# Patient Record
Sex: FEMALE | Race: WHITE | NOT HISPANIC OR LATINO | Employment: FULL TIME | URBAN - METROPOLITAN AREA
[De-identification: names, ages, dates, MRNs, and addresses within clinical notes are randomized per-mention and may not be internally consistent; named-entity substitution may affect disease eponyms.]

---

## 2017-01-19 ENCOUNTER — COMMUNICATION - HEALTHEAST (OUTPATIENT)
Dept: INTERNAL MEDICINE | Facility: CLINIC | Age: 57
End: 2017-01-19

## 2017-01-26 ENCOUNTER — RECORDS - HEALTHEAST (OUTPATIENT)
Dept: ADMINISTRATIVE | Facility: OTHER | Age: 57
End: 2017-01-26

## 2017-02-02 ENCOUNTER — AMBULATORY - HEALTHEAST (OUTPATIENT)
Dept: INTERNAL MEDICINE | Facility: CLINIC | Age: 57
End: 2017-02-02

## 2017-02-02 ENCOUNTER — COMMUNICATION - HEALTHEAST (OUTPATIENT)
Dept: INTERNAL MEDICINE | Facility: CLINIC | Age: 57
End: 2017-02-02

## 2017-02-07 ENCOUNTER — COMMUNICATION - HEALTHEAST (OUTPATIENT)
Dept: INTERNAL MEDICINE | Facility: CLINIC | Age: 57
End: 2017-02-07

## 2017-02-24 ENCOUNTER — OFFICE VISIT - HEALTHEAST (OUTPATIENT)
Dept: INTERNAL MEDICINE | Facility: CLINIC | Age: 57
End: 2017-02-24

## 2017-02-24 ENCOUNTER — AMBULATORY - HEALTHEAST (OUTPATIENT)
Dept: INTERNAL MEDICINE | Facility: CLINIC | Age: 57
End: 2017-02-24

## 2017-02-24 DIAGNOSIS — K21.9 GERD (GASTROESOPHAGEAL REFLUX DISEASE): ICD-10-CM

## 2017-02-24 DIAGNOSIS — Z13.9 SCREENING: ICD-10-CM

## 2017-02-24 DIAGNOSIS — R06.09 DYSPNEA ON EXERTION: ICD-10-CM

## 2017-02-24 DIAGNOSIS — D50.9 IRON DEFICIENCY ANEMIA: ICD-10-CM

## 2017-02-24 DIAGNOSIS — R53.83 FATIGUE: ICD-10-CM

## 2017-02-24 ASSESSMENT — MIFFLIN-ST. JEOR: SCORE: 1115.66

## 2017-02-28 ENCOUNTER — HOSPITAL ENCOUNTER (OUTPATIENT)
Dept: RADIOLOGY | Facility: HOSPITAL | Age: 57
Discharge: HOME | End: 2017-02-28
Attending: INTERNAL MEDICINE

## 2017-02-28 ENCOUNTER — RECORDS - HEALTHEAST (OUTPATIENT)
Dept: MAMMOGRAPHY | Facility: CLINIC | Age: 57
End: 2017-02-28

## 2017-02-28 DIAGNOSIS — R06.09 OTHER FORMS OF DYSPNEA: ICD-10-CM

## 2017-02-28 DIAGNOSIS — Z13.9 ENCOUNTER FOR SCREENING, UNSPECIFIED: ICD-10-CM

## 2017-02-28 DIAGNOSIS — R06.09 DYSPNEA ON EXERTION: ICD-10-CM

## 2017-03-01 ENCOUNTER — AMBULATORY - HEALTHEAST (OUTPATIENT)
Dept: INTERNAL MEDICINE | Facility: CLINIC | Age: 57
End: 2017-03-01

## 2017-03-01 DIAGNOSIS — R06.09 DYSPNEA ON EXERTION: ICD-10-CM

## 2017-03-03 ENCOUNTER — COMMUNICATION - HEALTHEAST (OUTPATIENT)
Dept: INTERNAL MEDICINE | Facility: CLINIC | Age: 57
End: 2017-03-03

## 2017-03-06 ENCOUNTER — COMMUNICATION - HEALTHEAST (OUTPATIENT)
Dept: INTERNAL MEDICINE | Facility: CLINIC | Age: 57
End: 2017-03-06

## 2017-03-06 ENCOUNTER — AMBULATORY - HEALTHEAST (OUTPATIENT)
Dept: INTERNAL MEDICINE | Facility: CLINIC | Age: 57
End: 2017-03-06

## 2017-03-06 DIAGNOSIS — R06.09 DYSPNEA ON EXERTION: ICD-10-CM

## 2017-03-14 ENCOUNTER — HOSPITAL ENCOUNTER (OUTPATIENT)
Dept: CARDIOLOGY | Facility: CLINIC | Age: 57
Discharge: HOME | End: 2017-03-14
Attending: INTERNAL MEDICINE

## 2017-03-14 DIAGNOSIS — R06.09 OTHER FORMS OF DYSPNEA: ICD-10-CM

## 2017-03-14 DIAGNOSIS — R06.09 DYSPNEA ON EXERTION: ICD-10-CM

## 2017-03-15 LAB
CV STRESS CURRENT BP HE: NORMAL
CV STRESS CURRENT HR HE: 109
CV STRESS CURRENT HR HE: 114
CV STRESS CURRENT HR HE: 114
CV STRESS CURRENT HR HE: 116
CV STRESS CURRENT HR HE: 122
CV STRESS CURRENT HR HE: 123
CV STRESS CURRENT HR HE: 124
CV STRESS CURRENT HR HE: 138
CV STRESS CURRENT HR HE: 138
CV STRESS CURRENT HR HE: 139
CV STRESS CURRENT HR HE: 142
CV STRESS CURRENT HR HE: 149
CV STRESS CURRENT HR HE: 153
CV STRESS CURRENT HR HE: 160
CV STRESS CURRENT HR HE: 160
CV STRESS CURRENT HR HE: 163
CV STRESS CURRENT HR HE: 167
CV STRESS CURRENT HR HE: 171
CV STRESS CURRENT HR HE: 172
CV STRESS CURRENT HR HE: 173
CV STRESS DEVIATION TIME HE: NORMAL
CV STRESS ECHO PERCENT HR HE: NORMAL
CV STRESS EXERCISE STAGE HE: NORMAL
CV STRESS EXERCISE STAGE REACHED HE: NORMAL
CV STRESS FINAL RESTING BP HE: NORMAL
CV STRESS FINAL RESTING HR HE: 109
CV STRESS MAX HR HE: 173
CV STRESS MAX TREADMILL GRADE HE: 12
CV STRESS MAX TREADMILL SPEED HE: 2.5
CV STRESS PEAK DIA BP HE: NORMAL
CV STRESS PEAK SYS BP HE: NORMAL
CV STRESS PHASE HE: NORMAL
CV STRESS PROTOCOL HE: NORMAL
CV STRESS RESTING PT POSITION HE: NORMAL
CV STRESS ST DEVIATION AMOUNT HE: NORMAL
CV STRESS ST DEVIATION ELEVATION HE: NORMAL
CV STRESS ST EVELATION AMOUNT HE: NORMAL
CV STRESS TEST TYPE HE: NORMAL
CV STRESS TOTAL STAGE TIME MIN 1 HE: NORMAL
STRESS ECHO BASELINE BP: NORMAL
STRESS ECHO BASELINE HR: 108
STRESS ECHO CALCULATED PERCENT HR: 104 %
STRESS ECHO LAST STRESS BP: NORMAL
STRESS ECHO LAST STRESS HR: 173
STRESS ECHO POST ESTIMATED WORKLOAD: 6.4
STRESS ECHO POST EXERCISE DUR MIN: 4
STRESS ECHO POST EXERCISE DUR SEC: 30
STRESS ECHO TARGET HR: 172.64

## 2017-03-20 ENCOUNTER — COMMUNICATION - HEALTHEAST (OUTPATIENT)
Dept: INTERNAL MEDICINE | Facility: CLINIC | Age: 57
End: 2017-03-20

## 2017-03-24 ENCOUNTER — COMMUNICATION - HEALTHEAST (OUTPATIENT)
Dept: INTERNAL MEDICINE | Facility: CLINIC | Age: 57
End: 2017-03-24

## 2017-06-11 ENCOUNTER — COMMUNICATION - HEALTHEAST (OUTPATIENT)
Dept: INTERNAL MEDICINE | Facility: CLINIC | Age: 57
End: 2017-06-11

## 2017-08-12 ENCOUNTER — RECORDS - HEALTHEAST (OUTPATIENT)
Dept: ADMINISTRATIVE | Facility: OTHER | Age: 57
End: 2017-08-12

## 2017-08-14 ENCOUNTER — RECORDS - HEALTHEAST (OUTPATIENT)
Dept: ADMINISTRATIVE | Facility: OTHER | Age: 57
End: 2017-08-14

## 2017-08-15 ENCOUNTER — OFFICE VISIT - HEALTHEAST (OUTPATIENT)
Dept: INTERNAL MEDICINE | Facility: CLINIC | Age: 57
End: 2017-08-15

## 2017-08-15 ENCOUNTER — COMMUNICATION - HEALTHEAST (OUTPATIENT)
Dept: INTERNAL MEDICINE | Facility: CLINIC | Age: 57
End: 2017-08-15

## 2017-08-15 DIAGNOSIS — Z01.818 PREOP EXAM FOR INTERNAL MEDICINE: ICD-10-CM

## 2017-08-15 DIAGNOSIS — D64.9 ANEMIA: ICD-10-CM

## 2017-08-15 DIAGNOSIS — K21.9 GASTROESOPHAGEAL REFLUX DISEASE WITHOUT ESOPHAGITIS: ICD-10-CM

## 2017-08-15 DIAGNOSIS — S91.309A: ICD-10-CM

## 2017-08-15 ASSESSMENT — MIFFLIN-ST. JEOR: SCORE: 1102.05

## 2017-08-16 ENCOUNTER — ANESTHESIA - HEALTHEAST (OUTPATIENT)
Dept: SURGERY | Facility: CLINIC | Age: 57
End: 2017-08-16

## 2017-08-16 ENCOUNTER — RECORDS - HEALTHEAST (OUTPATIENT)
Dept: ADMINISTRATIVE | Facility: OTHER | Age: 57
End: 2017-08-16

## 2017-08-16 ENCOUNTER — SURGERY - HEALTHEAST (OUTPATIENT)
Dept: SURGERY | Facility: CLINIC | Age: 57
End: 2017-08-16

## 2017-08-16 ASSESSMENT — MIFFLIN-ST. JEOR: SCORE: 1098.65

## 2017-08-22 ENCOUNTER — RECORDS - HEALTHEAST (OUTPATIENT)
Dept: ADMINISTRATIVE | Facility: OTHER | Age: 57
End: 2017-08-22

## 2017-08-30 ENCOUNTER — RECORDS - HEALTHEAST (OUTPATIENT)
Dept: ADMINISTRATIVE | Facility: OTHER | Age: 57
End: 2017-08-30

## 2017-09-11 ENCOUNTER — RECORDS - HEALTHEAST (OUTPATIENT)
Dept: ADMINISTRATIVE | Facility: OTHER | Age: 57
End: 2017-09-11

## 2017-09-18 ENCOUNTER — RECORDS - HEALTHEAST (OUTPATIENT)
Dept: ADMINISTRATIVE | Facility: OTHER | Age: 57
End: 2017-09-18

## 2017-10-03 ENCOUNTER — COMMUNICATION - HEALTHEAST (OUTPATIENT)
Dept: INFECTIOUS DISEASES | Facility: CLINIC | Age: 57
End: 2017-10-03

## 2017-10-04 ENCOUNTER — RECORDS - HEALTHEAST (OUTPATIENT)
Dept: ADMINISTRATIVE | Facility: OTHER | Age: 57
End: 2017-10-04

## 2017-10-04 ENCOUNTER — COMMUNICATION - HEALTHEAST (OUTPATIENT)
Dept: INTERNAL MEDICINE | Facility: CLINIC | Age: 57
End: 2017-10-04

## 2017-10-06 ENCOUNTER — OFFICE VISIT - HEALTHEAST (OUTPATIENT)
Dept: INTERNAL MEDICINE | Facility: CLINIC | Age: 57
End: 2017-10-06

## 2017-10-06 DIAGNOSIS — K21.9 GASTROESOPHAGEAL REFLUX DISEASE WITHOUT ESOPHAGITIS: ICD-10-CM

## 2017-10-06 DIAGNOSIS — D50.9 IRON DEFICIENCY ANEMIA, UNSPECIFIED IRON DEFICIENCY ANEMIA TYPE: ICD-10-CM

## 2017-10-06 DIAGNOSIS — M65.10 ACHILLES TENDON INFECTION: ICD-10-CM

## 2017-10-06 DIAGNOSIS — Z01.818 PREOPERATIVE EXAMINATION: ICD-10-CM

## 2017-10-06 ASSESSMENT — MIFFLIN-ST. JEOR: SCORE: 1102.05

## 2017-10-09 ENCOUNTER — RECORDS - HEALTHEAST (OUTPATIENT)
Dept: ADMINISTRATIVE | Facility: OTHER | Age: 57
End: 2017-10-09

## 2017-10-10 ENCOUNTER — RECORDS - HEALTHEAST (OUTPATIENT)
Dept: ADMINISTRATIVE | Facility: OTHER | Age: 57
End: 2017-10-10

## 2017-10-12 ENCOUNTER — RECORDS - HEALTHEAST (OUTPATIENT)
Dept: ADMINISTRATIVE | Facility: OTHER | Age: 57
End: 2017-10-12

## 2017-10-17 ENCOUNTER — RECORDS - HEALTHEAST (OUTPATIENT)
Dept: ADMINISTRATIVE | Facility: OTHER | Age: 57
End: 2017-10-17

## 2017-10-18 ASSESSMENT — MIFFLIN-ST. JEOR: SCORE: 1102.05

## 2017-10-19 ENCOUNTER — SURGERY - HEALTHEAST (OUTPATIENT)
Dept: SURGERY | Facility: HOSPITAL | Age: 57
End: 2017-10-19

## 2017-10-19 ENCOUNTER — ANESTHESIA - HEALTHEAST (OUTPATIENT)
Dept: SURGERY | Facility: HOSPITAL | Age: 57
End: 2017-10-19

## 2017-10-23 ENCOUNTER — RECORDS - HEALTHEAST (OUTPATIENT)
Dept: ADMINISTRATIVE | Facility: OTHER | Age: 57
End: 2017-10-23

## 2017-10-30 ENCOUNTER — RECORDS - HEALTHEAST (OUTPATIENT)
Dept: ADMINISTRATIVE | Facility: OTHER | Age: 57
End: 2017-10-30

## 2017-11-01 ENCOUNTER — COMMUNICATION - HEALTHEAST (OUTPATIENT)
Dept: INFECTIOUS DISEASES | Facility: CLINIC | Age: 57
End: 2017-11-01

## 2017-11-02 ENCOUNTER — RECORDS - HEALTHEAST (OUTPATIENT)
Dept: ADMINISTRATIVE | Facility: OTHER | Age: 57
End: 2017-11-02

## 2017-11-06 ENCOUNTER — RECORDS - HEALTHEAST (OUTPATIENT)
Dept: ADMINISTRATIVE | Facility: OTHER | Age: 57
End: 2017-11-06

## 2017-11-09 ENCOUNTER — RECORDS - HEALTHEAST (OUTPATIENT)
Dept: ADMINISTRATIVE | Facility: OTHER | Age: 57
End: 2017-11-09

## 2017-11-13 ENCOUNTER — OFFICE VISIT - HEALTHEAST (OUTPATIENT)
Dept: INFECTIOUS DISEASES | Facility: CLINIC | Age: 57
End: 2017-11-13

## 2017-11-16 ENCOUNTER — AMBULATORY - HEALTHEAST (OUTPATIENT)
Dept: INFECTIOUS DISEASES | Facility: CLINIC | Age: 57
End: 2017-11-16

## 2017-11-16 ENCOUNTER — RECORDS - HEALTHEAST (OUTPATIENT)
Dept: ADMINISTRATIVE | Facility: OTHER | Age: 57
End: 2017-11-16

## 2017-11-16 DIAGNOSIS — A49.8 PSEUDOMONAS INFECTION: ICD-10-CM

## 2017-11-20 ENCOUNTER — RECORDS - HEALTHEAST (OUTPATIENT)
Dept: ADMINISTRATIVE | Facility: OTHER | Age: 57
End: 2017-11-20

## 2017-11-27 ENCOUNTER — RECORDS - HEALTHEAST (OUTPATIENT)
Dept: ADMINISTRATIVE | Facility: OTHER | Age: 57
End: 2017-11-27

## 2017-12-09 ENCOUNTER — COMMUNICATION - HEALTHEAST (OUTPATIENT)
Dept: INTERNAL MEDICINE | Facility: CLINIC | Age: 57
End: 2017-12-09

## 2017-12-09 DIAGNOSIS — K21.9 GERD (GASTROESOPHAGEAL REFLUX DISEASE): ICD-10-CM

## 2017-12-13 ENCOUNTER — RECORDS - HEALTHEAST (OUTPATIENT)
Dept: ADMINISTRATIVE | Facility: OTHER | Age: 57
End: 2017-12-13

## 2018-01-08 ENCOUNTER — OFFICE VISIT - HEALTHEAST (OUTPATIENT)
Dept: INFECTIOUS DISEASES | Facility: CLINIC | Age: 58
End: 2018-01-08

## 2018-01-08 DIAGNOSIS — S91.009D WOUND OF ANKLE, UNSPECIFIED LATERALITY, SUBSEQUENT ENCOUNTER: ICD-10-CM

## 2018-03-06 ENCOUNTER — RECORDS - HEALTHEAST (OUTPATIENT)
Dept: LAB | Facility: CLINIC | Age: 58
End: 2018-03-06

## 2018-03-06 ENCOUNTER — RECORDS - HEALTHEAST (OUTPATIENT)
Dept: ADMINISTRATIVE | Facility: OTHER | Age: 58
End: 2018-03-06

## 2018-03-06 LAB
C REACTIVE PROTEIN LHE: <0.1 MG/DL (ref 0–0.8)
ERYTHROCYTE [DISTWIDTH] IN BLOOD BY AUTOMATED COUNT: 14.9 % (ref 11–14.5)
ERYTHROCYTE [SEDIMENTATION RATE] IN BLOOD BY WESTERGREN METHOD: 8 MM/HR (ref 0–20)
HCT VFR BLD AUTO: 35.5 % (ref 35–47)
HGB BLD-MCNC: 11.3 G/DL (ref 12–16)
MCH RBC QN AUTO: 27.1 PG (ref 27–34)
MCHC RBC AUTO-ENTMCNC: 31.8 G/DL (ref 32–36)
MCV RBC AUTO: 85 FL (ref 80–100)
PLATELET # BLD AUTO: 321 THOU/UL (ref 140–440)
PMV BLD AUTO: 9.2 FL (ref 8.5–12.5)
RBC # BLD AUTO: 4.17 MILL/UL (ref 3.8–5.4)
WBC: 7.5 THOU/UL (ref 4–11)

## 2018-03-18 ENCOUNTER — COMMUNICATION - HEALTHEAST (OUTPATIENT)
Dept: INTERNAL MEDICINE | Facility: CLINIC | Age: 58
End: 2018-03-18

## 2018-03-20 ENCOUNTER — COMMUNICATION - HEALTHEAST (OUTPATIENT)
Dept: INTERNAL MEDICINE | Facility: CLINIC | Age: 58
End: 2018-03-20

## 2018-03-20 ENCOUNTER — RECORDS - HEALTHEAST (OUTPATIENT)
Dept: ADMINISTRATIVE | Facility: OTHER | Age: 58
End: 2018-03-20

## 2018-03-20 ENCOUNTER — OFFICE VISIT - HEALTHEAST (OUTPATIENT)
Dept: INTERNAL MEDICINE | Facility: CLINIC | Age: 58
End: 2018-03-20

## 2018-03-20 DIAGNOSIS — S80.929A: ICD-10-CM

## 2018-03-20 DIAGNOSIS — Z01.810 PREOP CARDIOVASCULAR EXAM: ICD-10-CM

## 2018-03-20 LAB
ALBUMIN SERPL-MCNC: 3.4 G/DL (ref 3.5–5)
ALP SERPL-CCNC: 90 U/L (ref 45–120)
ALT SERPL W P-5'-P-CCNC: <9 U/L (ref 0–45)
ANION GAP SERPL CALCULATED.3IONS-SCNC: 9 MMOL/L (ref 5–18)
AST SERPL W P-5'-P-CCNC: 14 U/L (ref 0–40)
BILIRUB SERPL-MCNC: 0.4 MG/DL (ref 0–1)
BUN SERPL-MCNC: 14 MG/DL (ref 8–22)
CALCIUM SERPL-MCNC: 9.3 MG/DL (ref 8.5–10.5)
CHLORIDE BLD-SCNC: 110 MMOL/L (ref 98–107)
CO2 SERPL-SCNC: 23 MMOL/L (ref 22–31)
CREAT SERPL-MCNC: 0.69 MG/DL (ref 0.6–1.1)
ERYTHROCYTE [DISTWIDTH] IN BLOOD BY AUTOMATED COUNT: 13.5 % (ref 11–14.5)
ERYTHROCYTE [SEDIMENTATION RATE] IN BLOOD BY WESTERGREN METHOD: 49 MM/HR (ref 0–20)
GFR SERPL CREATININE-BSD FRML MDRD: >60 ML/MIN/1.73M2
GLUCOSE BLD-MCNC: 90 MG/DL (ref 70–125)
HCT VFR BLD AUTO: 33.8 % (ref 35–47)
HGB BLD-MCNC: 10.8 G/DL (ref 12–16)
MCH RBC QN AUTO: 26.5 PG (ref 27–34)
MCHC RBC AUTO-ENTMCNC: 32.1 G/DL (ref 32–36)
MCV RBC AUTO: 83 FL (ref 80–100)
PLATELET # BLD AUTO: 359 THOU/UL (ref 140–440)
PMV BLD AUTO: 7.3 FL (ref 7–10)
POTASSIUM BLD-SCNC: 4.7 MMOL/L (ref 3.5–5)
PROT SERPL-MCNC: 7.3 G/DL (ref 6–8)
RBC # BLD AUTO: 4.09 MILL/UL (ref 3.8–5.4)
SODIUM SERPL-SCNC: 142 MMOL/L (ref 136–145)
WBC: 9.7 THOU/UL (ref 4–11)

## 2018-03-20 ASSESSMENT — MIFFLIN-ST. JEOR: SCORE: 1081.64

## 2018-03-21 LAB — C REACTIVE PROTEIN LHE: 0.8 MG/DL (ref 0–0.8)

## 2018-03-22 ENCOUNTER — COMMUNICATION - HEALTHEAST (OUTPATIENT)
Dept: INTERNAL MEDICINE | Facility: CLINIC | Age: 58
End: 2018-03-22

## 2018-03-22 ENCOUNTER — ANESTHESIA - HEALTHEAST (OUTPATIENT)
Dept: SURGERY | Facility: HOSPITAL | Age: 58
End: 2018-03-22

## 2018-03-22 ASSESSMENT — MIFFLIN-ST. JEOR: SCORE: 1081.64

## 2018-03-23 ENCOUNTER — HOME INFUSION (PRE-WILLOW HOME INFUSION) (OUTPATIENT)
Dept: PHARMACY | Facility: CLINIC | Age: 58
End: 2018-03-23

## 2018-03-23 ENCOUNTER — SURGERY - HEALTHEAST (OUTPATIENT)
Dept: SURGERY | Facility: HOSPITAL | Age: 58
End: 2018-03-23

## 2018-03-26 ENCOUNTER — HOME INFUSION (PRE-WILLOW HOME INFUSION) (OUTPATIENT)
Dept: PHARMACY | Facility: CLINIC | Age: 58
End: 2018-03-26

## 2018-03-26 ENCOUNTER — AMBULATORY - HEALTHEAST (OUTPATIENT)
Dept: INFECTIOUS DISEASES | Facility: CLINIC | Age: 58
End: 2018-03-26

## 2018-03-26 ENCOUNTER — INFUSION - HEALTHEAST (OUTPATIENT)
Dept: INFUSION THERAPY | Facility: HOSPITAL | Age: 58
End: 2018-03-26

## 2018-03-26 ENCOUNTER — RECORDS - HEALTHEAST (OUTPATIENT)
Dept: ADMINISTRATIVE | Facility: OTHER | Age: 58
End: 2018-03-26

## 2018-03-26 DIAGNOSIS — M65.10 ACHILLES TENDON INFECTION: ICD-10-CM

## 2018-03-26 NOTE — PROGRESS NOTES
This is a recent snapshot of the patient's [facility] medical record.  For current drug dose and complete information and questions, call [phone] or In Basket pool, fv home infusion ([id number])  CSN Number:  [id number]

## 2018-03-27 ENCOUNTER — RECORDS - HEALTHEAST (OUTPATIENT)
Dept: RADIOLOGY | Facility: CLINIC | Age: 58
End: 2018-03-27

## 2018-03-27 NOTE — PROGRESS NOTES
This is a recent snapshot of the patient's [facility] medical record.  For current drug dose and complete information and questions, call [phone] or In Basket pool, fv home infusion ([id number])  CSN Number:  [id number]

## 2018-03-28 ENCOUNTER — HOME INFUSION (PRE-WILLOW HOME INFUSION) (OUTPATIENT)
Dept: PHARMACY | Facility: CLINIC | Age: 58
End: 2018-03-28

## 2018-03-28 LAB
AST SERPL W P-5'-P-CCNC: 9 U/L (ref 0–45)
BASOPHILS # BLD AUTO: 0.1 10E9/L (ref 0–0.2)
BASOPHILS NFR BLD AUTO: 0.8 %
BUN SERPL-MCNC: 7 MG/DL (ref 7–30)
CREAT SERPL-MCNC: 0.63 MG/DL (ref 0.52–1.04)
CRP SERPL-MCNC: 4.4 MG/L (ref 0–8)
DIFFERENTIAL METHOD BLD: ABNORMAL
EOSINOPHIL # BLD AUTO: 0.3 10E9/L (ref 0–0.7)
EOSINOPHIL NFR BLD AUTO: 4.4 %
ERYTHROCYTE [DISTWIDTH] IN BLOOD BY AUTOMATED COUNT: 14.5 % (ref 10–15)
ERYTHROCYTE [SEDIMENTATION RATE] IN BLOOD BY WESTERGREN METHOD: 72 MM/H (ref 0–30)
GFR SERPL CREATININE-BSD FRML MDRD: >90 ML/MIN/1.7M2
HCT VFR BLD AUTO: 32.5 % (ref 35–47)
HGB BLD-MCNC: 9.9 G/DL (ref 11.7–15.7)
IMM GRANULOCYTES # BLD: 0 10E9/L (ref 0–0.4)
IMM GRANULOCYTES NFR BLD: 0.4 %
LYMPHOCYTES # BLD AUTO: 1.8 10E9/L (ref 0.8–5.3)
LYMPHOCYTES NFR BLD AUTO: 23.8 %
MCH RBC QN AUTO: 26.5 PG (ref 26.5–33)
MCHC RBC AUTO-ENTMCNC: 30.5 G/DL (ref 31.5–36.5)
MCV RBC AUTO: 87 FL (ref 78–100)
MONOCYTES # BLD AUTO: 0.6 10E9/L (ref 0–1.3)
MONOCYTES NFR BLD AUTO: 8.5 %
NEUTROPHILS # BLD AUTO: 4.7 10E9/L (ref 1.6–8.3)
NEUTROPHILS NFR BLD AUTO: 62.1 %
NRBC # BLD AUTO: 0 10*3/UL
NRBC BLD AUTO-RTO: 0 /100
PLATELET # BLD AUTO: 484 10E9/L (ref 150–450)
RBC # BLD AUTO: 3.74 10E12/L (ref 3.8–5.2)
WBC # BLD AUTO: 7.6 10E9/L (ref 4–11)

## 2018-03-28 PROCEDURE — 84520 ASSAY OF UREA NITROGEN: CPT | Performed by: INTERNAL MEDICINE

## 2018-03-28 PROCEDURE — 86140 C-REACTIVE PROTEIN: CPT | Performed by: INTERNAL MEDICINE

## 2018-03-28 PROCEDURE — 85652 RBC SED RATE AUTOMATED: CPT | Performed by: INTERNAL MEDICINE

## 2018-03-28 PROCEDURE — 84450 TRANSFERASE (AST) (SGOT): CPT | Performed by: INTERNAL MEDICINE

## 2018-03-28 PROCEDURE — 82565 ASSAY OF CREATININE: CPT | Performed by: INTERNAL MEDICINE

## 2018-03-28 PROCEDURE — 85025 COMPLETE CBC W/AUTO DIFF WBC: CPT | Performed by: INTERNAL MEDICINE

## 2018-03-29 ENCOUNTER — HOME INFUSION (PRE-WILLOW HOME INFUSION) (OUTPATIENT)
Dept: PHARMACY | Facility: CLINIC | Age: 58
End: 2018-03-29

## 2018-03-30 NOTE — PROGRESS NOTES
This is a recent snapshot of the patient's [facility] medical record.  For current drug dose and complete information and questions, call [phone] or In Basket pool, fv home infusion ([id number])  CSN Number:  [id number]

## 2018-04-02 ENCOUNTER — HOME INFUSION (PRE-WILLOW HOME INFUSION) (OUTPATIENT)
Dept: PHARMACY | Facility: CLINIC | Age: 58
End: 2018-04-02

## 2018-04-02 ENCOUNTER — RECORDS - HEALTHEAST (OUTPATIENT)
Dept: ADMINISTRATIVE | Facility: OTHER | Age: 58
End: 2018-04-02

## 2018-04-02 ENCOUNTER — COMMUNICATION - HEALTHEAST (OUTPATIENT)
Dept: ADMINISTRATIVE | Facility: CLINIC | Age: 58
End: 2018-04-02

## 2018-04-03 ENCOUNTER — HOME INFUSION (PRE-WILLOW HOME INFUSION) (OUTPATIENT)
Dept: PHARMACY | Facility: CLINIC | Age: 58
End: 2018-04-03

## 2018-04-03 ENCOUNTER — INFUSION - HEALTHEAST (OUTPATIENT)
Dept: INFUSION THERAPY | Facility: HOSPITAL | Age: 58
End: 2018-04-03

## 2018-04-03 ENCOUNTER — RECORDS - HEALTHEAST (OUTPATIENT)
Dept: ADMINISTRATIVE | Facility: OTHER | Age: 58
End: 2018-04-03

## 2018-04-03 DIAGNOSIS — M65.10 ACHILLES TENDON INFECTION: ICD-10-CM

## 2018-04-03 NOTE — PROGRESS NOTES
This is a recent snapshot of the patient's [facility] medical record.  For current drug dose and complete information and questions, call [phone] or In [facility] pool, fv home infusion ([id number])  CSN Number:  [id number]

## 2018-04-04 ENCOUNTER — HOME INFUSION (PRE-WILLOW HOME INFUSION) (OUTPATIENT)
Dept: PHARMACY | Facility: CLINIC | Age: 58
End: 2018-04-04

## 2018-04-04 ENCOUNTER — RECORDS - HEALTHEAST (OUTPATIENT)
Dept: LAB | Facility: CLINIC | Age: 58
End: 2018-04-04

## 2018-04-04 ENCOUNTER — RECORDS - HEALTHEAST (OUTPATIENT)
Dept: ADMINISTRATIVE | Facility: OTHER | Age: 58
End: 2018-04-04

## 2018-04-04 LAB
AST SERPL W P-5'-P-CCNC: 13 U/L (ref 0–40)
BASOPHILS # BLD AUTO: 0.1 THOU/UL (ref 0–0.2)
BASOPHILS NFR BLD AUTO: 1 % (ref 0–2)
BUN SERPL-MCNC: 12 MG/DL (ref 8–22)
C REACTIVE PROTEIN LHE: <0.1 MG/DL (ref 0–0.8)
CREAT SERPL-MCNC: 0.64 MG/DL (ref 0.6–1.1)
EOSINOPHIL # BLD AUTO: 0.2 THOU/UL (ref 0–0.4)
EOSINOPHIL NFR BLD AUTO: 3 % (ref 0–6)
ERYTHROCYTE [DISTWIDTH] IN BLOOD BY AUTOMATED COUNT: 14.4 % (ref 11–14.5)
ERYTHROCYTE [SEDIMENTATION RATE] IN BLOOD BY WESTERGREN METHOD: 32 MM/HR (ref 0–20)
GFR SERPL CREATININE-BSD FRML MDRD: >60 ML/MIN/1.73M2
HCT VFR BLD AUTO: 32.2 % (ref 35–47)
HGB BLD-MCNC: 9.7 G/DL (ref 12–16)
LYMPHOCYTES # BLD AUTO: 2.3 THOU/UL (ref 0.8–4.4)
LYMPHOCYTES NFR BLD AUTO: 32 % (ref 20–40)
MCH RBC QN AUTO: 26.4 PG (ref 27–34)
MCHC RBC AUTO-ENTMCNC: 30.1 G/DL (ref 32–36)
MCV RBC AUTO: 88 FL (ref 80–100)
MONOCYTES # BLD AUTO: 0.6 THOU/UL (ref 0–0.9)
MONOCYTES NFR BLD AUTO: 9 % (ref 2–10)
NEUTROPHILS # BLD AUTO: 4 THOU/UL (ref 2–7.7)
NEUTROPHILS NFR BLD AUTO: 56 % (ref 50–70)
PLATELET # BLD AUTO: 510 THOU/UL (ref 140–440)
PMV BLD AUTO: 9.4 FL (ref 8.5–12.5)
RBC # BLD AUTO: 3.68 MILL/UL (ref 3.8–5.4)
WBC: 7.3 THOU/UL (ref 4–11)

## 2018-04-04 NOTE — PROGRESS NOTES
This is a recent snapshot of the patient's [facility] medical record.  For current drug dose and complete information and questions, call [phone] or In Basket pool, fv home infusion ([id number])  CSN Number:  [id number]

## 2018-04-05 ENCOUNTER — HOME INFUSION (PRE-WILLOW HOME INFUSION) (OUTPATIENT)
Dept: PHARMACY | Facility: CLINIC | Age: 58
End: 2018-04-05

## 2018-04-06 NOTE — PROGRESS NOTES
This is a recent snapshot of the patient's [facility] medical record.  For current drug dose and complete information and questions, call [phone] or In Basket pool, fv home infusion ([id number])  CSN Number:  [id number]

## 2018-04-09 NOTE — PROGRESS NOTES
This is a recent snapshot of the patient's [facility] medical record.  For current drug dose and complete information and questions, call [phone] or In Basket pool, fv home infusion ([id number])  CSN Number:  [id number]

## 2018-04-11 ENCOUNTER — AMBULATORY - HEALTHEAST (OUTPATIENT)
Dept: INFECTIOUS DISEASES | Facility: CLINIC | Age: 58
End: 2018-04-11

## 2018-04-11 ENCOUNTER — RECORDS - HEALTHEAST (OUTPATIENT)
Dept: ADMINISTRATIVE | Facility: OTHER | Age: 58
End: 2018-04-11

## 2018-04-11 ENCOUNTER — HOME INFUSION (PRE-WILLOW HOME INFUSION) (OUTPATIENT)
Dept: PHARMACY | Facility: CLINIC | Age: 58
End: 2018-04-11

## 2018-04-11 LAB
AST SERPL W P-5'-P-CCNC: 15 U/L (ref 0–45)
BASOPHILS # BLD AUTO: 0.1 10E9/L (ref 0–0.2)
BASOPHILS NFR BLD AUTO: 0.8 %
BUN SERPL-MCNC: 13 MG/DL (ref 7–30)
CREAT SERPL-MCNC: 0.54 MG/DL (ref 0.52–1.04)
CRP SERPL-MCNC: <2.9 MG/L (ref 0–8)
DIFFERENTIAL METHOD BLD: ABNORMAL
EOSINOPHIL # BLD AUTO: 0.2 10E9/L (ref 0–0.7)
EOSINOPHIL NFR BLD AUTO: 3.5 %
ERYTHROCYTE [DISTWIDTH] IN BLOOD BY AUTOMATED COUNT: 14.5 % (ref 10–15)
ERYTHROCYTE [SEDIMENTATION RATE] IN BLOOD BY WESTERGREN METHOD: 20 MM/H (ref 0–30)
GFR SERPL CREATININE-BSD FRML MDRD: >90 ML/MIN/1.7M2
HCT VFR BLD AUTO: 32.5 % (ref 35–47)
HGB BLD-MCNC: 9.9 G/DL (ref 11.7–15.7)
IMM GRANULOCYTES # BLD: 0 10E9/L (ref 0–0.4)
IMM GRANULOCYTES NFR BLD: 0.2 %
LYMPHOCYTES # BLD AUTO: 2.5 10E9/L (ref 0.8–5.3)
LYMPHOCYTES NFR BLD AUTO: 40.9 %
MCH RBC QN AUTO: 26.2 PG (ref 26.5–33)
MCHC RBC AUTO-ENTMCNC: 30.5 G/DL (ref 31.5–36.5)
MCV RBC AUTO: 86 FL (ref 78–100)
MONOCYTES # BLD AUTO: 0.5 10E9/L (ref 0–1.3)
MONOCYTES NFR BLD AUTO: 8.4 %
NEUTROPHILS # BLD AUTO: 2.8 10E9/L (ref 1.6–8.3)
NEUTROPHILS NFR BLD AUTO: 46.2 %
NRBC # BLD AUTO: 0 10*3/UL
NRBC BLD AUTO-RTO: 0 /100
PLATELET # BLD AUTO: 371 10E9/L (ref 150–450)
RBC # BLD AUTO: 3.78 10E12/L (ref 3.8–5.2)
WBC # BLD AUTO: 6 10E9/L (ref 4–11)

## 2018-04-11 PROCEDURE — 82565 ASSAY OF CREATININE: CPT | Performed by: INTERNAL MEDICINE

## 2018-04-11 PROCEDURE — 84450 TRANSFERASE (AST) (SGOT): CPT | Performed by: INTERNAL MEDICINE

## 2018-04-11 PROCEDURE — 84520 ASSAY OF UREA NITROGEN: CPT | Performed by: INTERNAL MEDICINE

## 2018-04-11 PROCEDURE — 85652 RBC SED RATE AUTOMATED: CPT | Performed by: INTERNAL MEDICINE

## 2018-04-11 PROCEDURE — 85025 COMPLETE CBC W/AUTO DIFF WBC: CPT | Performed by: INTERNAL MEDICINE

## 2018-04-11 PROCEDURE — 86140 C-REACTIVE PROTEIN: CPT | Performed by: INTERNAL MEDICINE

## 2018-04-12 ENCOUNTER — HOME INFUSION (PRE-WILLOW HOME INFUSION) (OUTPATIENT)
Dept: PHARMACY | Facility: CLINIC | Age: 58
End: 2018-04-12

## 2018-04-12 NOTE — PROGRESS NOTES
This is a recent snapshot of the patient's [facility] medical record.  For current drug dose and complete information and questions, call [phone] or In [facility] pool, fv home infusion ([id number])  CSN Number:  [id number]

## 2018-04-13 ENCOUNTER — HOME INFUSION (PRE-WILLOW HOME INFUSION) (OUTPATIENT)
Dept: PHARMACY | Facility: CLINIC | Age: 58
End: 2018-04-13

## 2018-04-13 NOTE — PROGRESS NOTES
This is a recent snapshot of the patient's [facility] medical record.  For current drug dose and complete information and questions, call [phone] or In Basket pool, fv home infusion ([id number])  CSN Number:  [id number]

## 2018-04-14 ENCOUNTER — HOME INFUSION (PRE-WILLOW HOME INFUSION) (OUTPATIENT)
Dept: PHARMACY | Facility: CLINIC | Age: 58
End: 2018-04-14

## 2018-04-16 ENCOUNTER — OFFICE VISIT - HEALTHEAST (OUTPATIENT)
Dept: INFECTIOUS DISEASES | Facility: CLINIC | Age: 58
End: 2018-04-16

## 2018-04-16 ENCOUNTER — HOME INFUSION (PRE-WILLOW HOME INFUSION) (OUTPATIENT)
Dept: PHARMACY | Facility: CLINIC | Age: 58
End: 2018-04-16

## 2018-04-16 DIAGNOSIS — L08.9 FOOT INFECTION: ICD-10-CM

## 2018-04-17 ENCOUNTER — OFFICE VISIT - HEALTHEAST (OUTPATIENT)
Dept: INTERNAL MEDICINE | Facility: CLINIC | Age: 58
End: 2018-04-17

## 2018-04-17 DIAGNOSIS — M65.10 ACHILLES TENDON INFECTION: ICD-10-CM

## 2018-04-17 DIAGNOSIS — I82.622 ACUTE DEEP VEIN THROMBOSIS (DVT) OF BRACHIAL VEIN OF LEFT UPPER EXTREMITY (H): ICD-10-CM

## 2018-04-17 NOTE — PROGRESS NOTES
This is a recent snapshot of the patient's [facility] medical record.  For current drug dose and complete information and questions, call [phone] or In Basket pool, fv home infusion ([id number])  CSN Number:  [id number]

## 2018-04-17 NOTE — PROGRESS NOTES
This is a recent snapshot of the patient's [facility] medical record.  For current drug dose and complete information and questions, call [phone] or In Basket pool, fv home infusion ([id number])  CSN Number:  [id number]

## 2018-04-17 NOTE — PROGRESS NOTES
This is a recent snapshot of the patient's [facility] medical record.  For current drug dose and complete information and questions, call [phone] or In Basket pool, fv home infusion ([id number])  CSN Number:  [id number]

## 2018-04-18 ENCOUNTER — RECORDS - HEALTHEAST (OUTPATIENT)
Dept: ADMINISTRATIVE | Facility: OTHER | Age: 58
End: 2018-04-18

## 2018-04-20 ENCOUNTER — HOSPITAL ENCOUNTER (OUTPATIENT)
Dept: ULTRASOUND IMAGING | Facility: CLINIC | Age: 58
Discharge: HOME | End: 2018-04-20
Attending: INTERNAL MEDICINE

## 2018-04-20 DIAGNOSIS — I82.622 ACUTE DEEP VEIN THROMBOSIS (DVT) OF BRACHIAL VEIN OF LEFT UPPER EXTREMITY (H): ICD-10-CM

## 2018-04-23 ENCOUNTER — RECORDS - HEALTHEAST (OUTPATIENT)
Dept: ADMINISTRATIVE | Facility: OTHER | Age: 58
End: 2018-04-23

## 2018-04-25 ENCOUNTER — RECORDS - HEALTHEAST (OUTPATIENT)
Dept: ADMINISTRATIVE | Facility: OTHER | Age: 58
End: 2018-04-25

## 2018-06-13 ENCOUNTER — RECORDS - HEALTHEAST (OUTPATIENT)
Dept: ADMINISTRATIVE | Facility: OTHER | Age: 58
End: 2018-06-13

## 2018-06-25 ENCOUNTER — RECORDS - HEALTHEAST (OUTPATIENT)
Dept: ADMINISTRATIVE | Facility: OTHER | Age: 58
End: 2018-06-25

## 2018-07-17 ENCOUNTER — RECORDS - HEALTHEAST (OUTPATIENT)
Dept: ADMINISTRATIVE | Facility: OTHER | Age: 58
End: 2018-07-17

## 2018-07-19 ENCOUNTER — RECORDS - HEALTHEAST (OUTPATIENT)
Dept: LAB | Facility: CLINIC | Age: 58
End: 2018-07-19

## 2018-07-19 ENCOUNTER — TRANSFERRED RECORDS (OUTPATIENT)
Dept: HEALTH INFORMATION MANAGEMENT | Facility: CLINIC | Age: 58
End: 2018-07-19

## 2018-07-19 LAB
C REACTIVE PROTEIN LHE: <0.1 MG/DL (ref 0–0.8)
ERYTHROCYTE [DISTWIDTH] IN BLOOD BY AUTOMATED COUNT: 16.1 % (ref 11–14.5)
ERYTHROCYTE [SEDIMENTATION RATE] IN BLOOD BY WESTERGREN METHOD: 11 MM/HR (ref 0–20)
HCT VFR BLD AUTO: 34.3 % (ref 35–47)
HGB BLD-MCNC: 10.4 G/DL (ref 12–16)
MCH RBC QN AUTO: 24.3 PG (ref 27–34)
MCHC RBC AUTO-ENTMCNC: 30.3 G/DL (ref 32–36)
MCV RBC AUTO: 80 FL (ref 80–100)
PLATELET # BLD AUTO: 276 THOU/UL (ref 140–440)
PMV BLD AUTO: 10 FL (ref 8.5–12.5)
RBC # BLD AUTO: 4.28 MILL/UL (ref 3.8–5.4)
WBC: 8.1 THOU/UL (ref 4–11)

## 2018-07-25 ENCOUNTER — COMMUNICATION - HEALTHEAST (OUTPATIENT)
Dept: INTERNAL MEDICINE | Facility: CLINIC | Age: 58
End: 2018-07-25

## 2018-07-25 ENCOUNTER — RECORDS - HEALTHEAST (OUTPATIENT)
Dept: ADMINISTRATIVE | Facility: OTHER | Age: 58
End: 2018-07-25

## 2018-07-26 ENCOUNTER — OFFICE VISIT - HEALTHEAST (OUTPATIENT)
Dept: INTERNAL MEDICINE | Facility: CLINIC | Age: 58
End: 2018-07-26

## 2018-07-26 DIAGNOSIS — Z01.818 PREOP EXAMINATION: ICD-10-CM

## 2018-07-26 DIAGNOSIS — M65.10 ACHILLES TENDON INFECTION: ICD-10-CM

## 2018-07-26 LAB — HGB BLD-MCNC: 10.9 G/DL (ref 12–16)

## 2018-07-27 ENCOUNTER — TRANSFERRED RECORDS (OUTPATIENT)
Dept: HEALTH INFORMATION MANAGEMENT | Facility: CLINIC | Age: 58
End: 2018-07-27

## 2018-07-27 ENCOUNTER — RECORDS - HEALTHEAST (OUTPATIENT)
Dept: ADMINISTRATIVE | Facility: OTHER | Age: 58
End: 2018-07-27

## 2018-08-03 ENCOUNTER — RECORDS - HEALTHEAST (OUTPATIENT)
Dept: ADMINISTRATIVE | Facility: OTHER | Age: 58
End: 2018-08-03

## 2018-08-03 ENCOUNTER — COMMUNICATION - HEALTHEAST (OUTPATIENT)
Dept: ADMINISTRATIVE | Facility: CLINIC | Age: 58
End: 2018-08-03

## 2018-08-08 ENCOUNTER — RECORDS - HEALTHEAST (OUTPATIENT)
Dept: ADMINISTRATIVE | Facility: OTHER | Age: 58
End: 2018-08-08

## 2018-08-13 ENCOUNTER — OFFICE VISIT - HEALTHEAST (OUTPATIENT)
Dept: INFECTIOUS DISEASES | Facility: CLINIC | Age: 58
End: 2018-08-13

## 2018-08-13 DIAGNOSIS — T14.8XXA WOUND INFECTION: ICD-10-CM

## 2018-08-13 DIAGNOSIS — L08.9 WOUND INFECTION: ICD-10-CM

## 2018-08-13 ASSESSMENT — MIFFLIN-ST. JEOR: SCORE: 1099.78

## 2018-08-15 ENCOUNTER — RECORDS - HEALTHEAST (OUTPATIENT)
Dept: ADMINISTRATIVE | Facility: OTHER | Age: 58
End: 2018-08-15

## 2018-08-16 LAB
BACTERIA SPEC CULT: ABNORMAL
GRAM STAIN RESULT: ABNORMAL
GRAM STAIN RESULT: ABNORMAL

## 2018-08-28 ENCOUNTER — RECORDS - HEALTHEAST (OUTPATIENT)
Dept: ADMINISTRATIVE | Facility: OTHER | Age: 58
End: 2018-08-28

## 2018-08-28 ENCOUNTER — TRANSFERRED RECORDS (OUTPATIENT)
Dept: HEALTH INFORMATION MANAGEMENT | Facility: CLINIC | Age: 58
End: 2018-08-28

## 2018-09-10 ENCOUNTER — TELEPHONE (OUTPATIENT)
Dept: PLASTIC SURGERY | Facility: CLINIC | Age: 58
End: 2018-09-10

## 2018-09-10 NOTE — TELEPHONE ENCOUNTER
Received message from Dr. Damico's nurse to contact patient to schedule first available with Dr. Damico. No answer, left message for patient to call back to set up appt.    Gorge RODRIGUEZ LPN

## 2018-10-31 ENCOUNTER — OFFICE VISIT (OUTPATIENT)
Dept: PLASTIC SURGERY | Facility: CLINIC | Age: 58
End: 2018-10-31
Payer: COMMERCIAL

## 2018-10-31 VITALS
BODY MASS INDEX: 20.84 KG/M2 | HEART RATE: 85 BPM | OXYGEN SATURATION: 97 % | TEMPERATURE: 98.7 F | HEIGHT: 65 IN | SYSTOLIC BLOOD PRESSURE: 144 MMHG | RESPIRATION RATE: 18 BRPM | DIASTOLIC BLOOD PRESSURE: 81 MMHG | WEIGHT: 125.1 LBS

## 2018-10-31 DIAGNOSIS — S91.309A NONHEALING WOUND OF HEEL: Primary | ICD-10-CM

## 2018-10-31 RX ORDER — SUCRALFATE 1 G/10ML
1 SUSPENSION ORAL 4 TIMES DAILY PRN
Refills: 5 | Status: ON HOLD | COMMUNITY
Start: 2018-03-20 | End: 2020-11-11

## 2018-10-31 RX ORDER — IBUPROFEN 200 MG
200-800 TABLET ORAL 2 TIMES DAILY
COMMUNITY

## 2018-10-31 RX ORDER — PANTOPRAZOLE SODIUM 40 MG/1
40 TABLET, DELAYED RELEASE ORAL 2 TIMES DAILY
COMMUNITY
End: 2022-02-07

## 2018-10-31 ASSESSMENT — PAIN SCALES - GENERAL: PAINLEVEL: NO PAIN (0)

## 2018-10-31 NOTE — LETTER
Date:November 2, 2018      Patient was self referred, no letter generated. Do not send.        [facility] Health Information

## 2018-10-31 NOTE — PROGRESS NOTES
Service Date: 10/31/2018      PRESENTING COMPLAINT:  Consultation for left chronic nonhealing Achilles tendon wound.      HISTORY OF PRESENTING COMPLAINT:  Mrs. Underwood is 58 years old.  Her history goes back to about 12 years ago when she had a ruptured Achilles tendon, underwent a repair.  Unfortunately, it got infected.  The wound failed to heal in, had multiple debridements, primary closure of the wound, which continued to dehisce.  Ultimately over the years, has had 2 failed free flaps from radial forearm skin grafts as well as a reverse sural flap that have all failed.  The patient has a nonhealing granulating skin-only wound over the Achilles tendon has had multiple infections, last one being in April requiring debridements, hospitalization and antibiotic therapy.  Currently, noninfected, here to get my recommendations.      PAST MEDICAL HISTORY:  GERD.      PAST SURGICAL HISTORY:  Hysterectomy and .      MEDICATIONS:  Protonix.      ALLERGIES:  Morphine, hives.      SOCIAL HISTORY:  Quit smoking 20 years ago, she smoked 1/2 pack a day for about 10 years.  Does not drink alcohol.  Lives in [place].  Works in administration.      REVIEW OF SYSTEMS:  Denies chest pain, shortness of breath, MI, CVA, DVT and PE.      PHYSICAL EXAMINATION:   VITAL SIGNS:  Stable.  She is afebrile, in no obvious distress.  She is 5 feet 5 inches, 125 pounds, BMI of 20.8 kg/m2.  On examination of her left leg, she has an approximately 6 x 3 cm granulating wound in the Achilles tendon area.  There were a lot of scars in the region.  No palpable posterior tibial but a palpable dorsalis pedis.  She has some faint numbness in the saphenous nerve as well as the superficial peroneal nerve distribution, sural nerve distribution remarkably does have sensation and deep peroneal nerve as well as tibial nerve have sensation.  She has good range of motion of her ankle.  She has no swelling.  She has no evidence of infection.  She  has thin subcutaneous tissues in the anterolateral thigh areas and she has thin tissues in the groin regions.  No scars.      ASSESSMENT AND PLAN:  Based on above findings, a diagnosis of chronic nonhealing wound of the Achilles tendon area of the left heel was made.  It is odd that this area has not healed in despite numerous attempts, it is also concerning that she has lost 2 free flaps in the process.  I do not think this wound is going to heal in secondarily.  There is no evidence that this wound looks like a malignancy.  I think the next course of action is to proceed with ruling out any occult hematologic issues that led to the free flap failures.  We will send her to Hematology to rule out hypocoagulability issues.  At the same time, we will get a CTA of her lower extremities to assess the blood flow to the area as well as to the region including the groins and the thighs.  With respect to treatment of the area, option #1 is to do nothing, continue with conservative management proceed with just dressing changes, probably get a biopsy if she chooses this option just to rule out any occult malignancy.  Option 2 is to proceed with a thorough debridement, biopsy and then ultimately closure with a free flap.  At this time, keeping in mind either spasms or hypercoagulability issues, we will rule the hypercoagulability issues with the Hematology consult, but if it is a spasmodic issue, although she has no history of other vascular issues, we will probably think of doing some kind of vasodilator tree regional block sympathectomy through the block perioperatively, probably prophylactically keep her on some anticoagulation. and of course intraoperatively do aggressive amounts of stripping of the adventitia and maybe even think of doing some kind of local papaverine slow drip in the local area with an On-Q pump catheter.  The exact flap that we would use would either be a free skip flap SCIP or a free ALT flap, maybe  think about getting out of the zone of injury with vein grafts if necessary and all of this would also depend upon the findings on the CTA.  This was discussed with the patient in detail.  She understood the plan and agreed.  We will start with the above-named steps and then proceed as indicated.  She was happy with the visit.  I look forward to helping her out in the near future.      Total time spent with patient 30 minutes, more than half was counseling.         RAFFI CROWLEY MD             D: 10/31/2018   T: 10/31/2018   MT: FELIZ      Name:     AMY HUNT   MRN:      [id number]        Account:      [id number]   :      1960           Service Date: 10/31/2018      Document: [id number]

## 2018-10-31 NOTE — LETTER
10/31/2018       RE: Anna Underwood  [address]  [place] [address]     Dear Colleague,    Thank you for referring your patient, Anna Underwood, to the [facility] PLASTIC AND RECONSTRUCTIVE SURGERY at [facility]. Please see a copy of my visit note below.    Service Date: 10/31/2018      PRESENTING COMPLAINT:  Consultation for left chronic nonhealing Achilles tendon wound.      HISTORY OF PRESENTING COMPLAINT:  Mrs. Underwood is 58 years old.  Her history goes back to about 12 years ago when she had a ruptured Achilles tendon, underwent a repair.  Unfortunately, it got infected.  The wound failed to heal in, had multiple debridements, primary closure of the wound, which continued to dehisce.  Ultimately over the years, has had 2 failed free flaps from radial forearm skin grafts as well as a reverse sural flap that have all failed.  The patient has a nonhealing granulating skin-only wound over the Achilles tendon has had multiple infections, last one being in April requiring debridements, hospitalization and antibiotic therapy.  Currently, noninfected, here to get my recommendations.      PAST MEDICAL HISTORY:  GERD.      PAST SURGICAL HISTORY:  Hysterectomy and .      MEDICATIONS:  Protonix.      ALLERGIES:  Morphine, hives.      SOCIAL HISTORY:  Quit smoking 20 years ago, she smoked 1/2 pack a day for about 10 years.  Does not drink alcohol.  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      REVIEW OF SYSTEMS:  Denies chest pain, shortness of breath, MI, CVA, DVT and PE.      PHYSICAL EXAMINATION:   VITAL SIGNS:  Stable.  She is afebrile, in no obvious distress.  She is 5 feet 5 inches, 125 pounds, BMI of 20.8 kg/m2.  On examination of her right leg, she has an approximately 6 x 3 cm granulating wound in the Achilles tendon area.  There were a lot of scars in the region.  No palpable posterior tibial but a palpable dorsalis pedis.  She has  some faint numbness in the saphenous nerve as well as the superficial peroneal nerve distribution, sural nerve distribution remarkably does have sensation and deep peroneal nerve as well as tibial nerve have sensation.  She has good range of motion of her ankle.  She has no swelling.  She has no evidence of infection.  She has thin subcutaneous tissues in the anterolateral thigh areas and she has thin tissues in the groin regions.  No scars.      ASSESSMENT AND PLAN:  Based on above findings, a diagnosis of chronic nonhealing wound of the Achilles tendon area of the left heel was made.  It is odd that this area has not healed in despite numerous attempts, it is also concerning that she has lost 2 free flaps in the process.  I do not think this wound is going to heal in secondarily.  There is no evidence that this wound looks like a malignancy.  I think the next course of action is to proceed with ruling out any occult hematologic issues that led to the free flap failures.  We will send her to Hematology to rule out hypocoagulability issues.  At the same time, we will get a CTA of her lower extremities to assess the blood flow to the area as well as to the region including the groins and the thighs.  With respect to treatment of the area, option #1 is to do nothing, continue with conservative management proceed with just dressing changes, probably get a biopsy if she chooses this option just to rule out any occult malignancy.  Option 2 is to proceed with a thorough debridement, biopsy and then ultimately closure with a free flap.  At this time, keeping in mind either spasms or hypercoagulability issues, we will rule the hypercoagulability issues with the Hematology consult, but if it is a spasmodic issue, although she has no history of other vascular issues, we will probably think of doing some kind of vasodilator tree regional block sympathectomy through the block perioperatively, probably prophylactically keep her  on some anticoagulation. and of course intraoperatively do aggressive amounts of stripping of the adventitia and maybe even think of doing some kind of local papaverine slow drip in the local area with an On-Q pump catheter.  The exact flap that we would use would either be a free skip flap SCIP or a free ALT flap, maybe think about getting out of the zone of injury with vein grafts if necessary and all of this would also depend upon the findings on the CTA.  This was discussed with the patient in detail.  She understood the plan and agreed.  We will start with the above-named steps and then proceed as indicated.  She was happy with the visit.  I look forward to helping her out in the near future.      Total time spent with patient 30 minutes, more than half was counseling.         RAFFI DAMICO MD             D: 10/31/2018   T: 10/31/2018   MT: KK      Name:     AMY HUNT   MRN:      [id number]        Account:      [id number]   :      1960           Service Date: 10/31/2018      Document: [id number]       Again, thank you for allowing me to participate in the care of your patient.      Sincerely,    RAFFI Damico MD

## 2018-10-31 NOTE — NURSING NOTE
"Chief Complaint   Patient presents with     Consult     Pt here for a consult for an open wound       Vitals:    10/31/18 0800   BP: 144/81   BP Location: Left arm   Patient Position: Sitting   Cuff Size: Adult Regular   Pulse: 85   Resp: 18   Temp: 98.7  F (37.1  C)   TempSrc: Oral   SpO2: 97%   Weight: 125 lb 1.6 oz   Height: 5' 5\"       Body mass index is 20.82 kg/(m^2).      CELINE Abrams, EMT                      "

## 2018-11-07 ENCOUNTER — MYC MEDICAL ADVICE (OUTPATIENT)
Dept: PLASTIC SURGERY | Facility: CLINIC | Age: 58
End: 2018-11-07

## 2018-11-07 ENCOUNTER — TRANSFERRED RECORDS (OUTPATIENT)
Dept: HEALTH INFORMATION MANAGEMENT | Facility: CLINIC | Age: 58
End: 2018-11-07

## 2018-11-10 ENCOUNTER — HEALTH MAINTENANCE LETTER (OUTPATIENT)
Age: 58
End: 2018-11-10

## 2018-11-14 ENCOUNTER — OFFICE VISIT (OUTPATIENT)
Dept: PLASTIC SURGERY | Facility: CLINIC | Age: 58
End: 2018-11-14
Payer: COMMERCIAL

## 2018-11-14 DIAGNOSIS — T81.89XS NON-HEALING SURGICAL WOUND, SEQUELA: ICD-10-CM

## 2018-11-14 DIAGNOSIS — T81.89XS NON-HEALING SURGICAL WOUND, SEQUELA: Primary | ICD-10-CM

## 2018-11-14 PROBLEM — T81.89XA NONHEALING SURGICAL WOUND: Status: ACTIVE | Noted: 2018-11-14

## 2018-11-14 RX ORDER — CEFAZOLIN SODIUM 2 G/50ML
2 SOLUTION INTRAVENOUS
Status: CANCELLED | OUTPATIENT
Start: 2018-11-14

## 2018-11-14 RX ORDER — CEFAZOLIN SODIUM 1 G/50ML
1 INJECTION, SOLUTION INTRAVENOUS SEE ADMIN INSTRUCTIONS
Status: CANCELLED | OUTPATIENT
Start: 2018-11-14

## 2018-11-14 ASSESSMENT — PAIN SCALES - GENERAL: PAINLEVEL: NO PAIN (0)

## 2018-11-14 NOTE — LETTER
11/14/2018       RE: Anna Sanderson  [address]  [place] [address]     Dear Colleague,    Thank you for referring your patient, Anna Sanderson, to the [facility] PLASTIC AND RECONSTRUCTIVE SURGERY at [facility]. Please see a copy of my visit note below.    Service Date: 11/14/2018      FOLLOWUP VISIT NOTE      PRESENTING COMPLAINT:  Followup visit for left nonhealing wound of her heel.      HISTORY OF PRESENTING COMPLAINT:  Ms. Sanderson is 58 years old, well known to my service.  In the interim, the patient has gotten a CTA done at an outside facility.  The films have been sent to our facility.  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  She is yet to see Hematology.  She is doing that at the end of this month.  She is interested in pursuing another round of surgery to try and heal this nonhealing wound.  No otherwise change in her history and physical exam.      ASSESSMENT AND PLAN:  Based on above findings, a diagnosis of a nonhealing chronic wound of her left heel was made.  Plan is to proceed with either a SCIP or ALT free flap.  My plan is to most likely discuss with our anesthesiologist to do an epidural at the time of surgery and to see if the sympathetic to the left leg can be knocked out.  Similarly, we will also do aggressive adventitial stripping of the vessel used as the recipient vessel.  We will also use papaverine as a drip in the local area and will also put her on a heparin drip postoperatively.  We will obviously see if Hematology finds any hypercoagulable states that need to be addressed as well.  All of this was discussed with the patient.  She is eager to proceed.  We will plan this in the coming months.  In the interim, we will follow up with the steps discussed  above.  All questions were answered.  She was happy with the visit.      Total time spent with patient 15 minutes of which more than half was counseling.         RAFFI DAMICO MD             D: 2018   T: 11/15/2018   MT: john      Name:     AMY PAGE   MRN:      [id number]        Account:      [id number]   :      1960           Service Date: 2018      Document: [id number]       Again, thank you for allowing me to participate in the care of your patient.      Sincerely,    RAFFI Damico MD

## 2018-11-14 NOTE — LETTER
Date:November 16, 2018      Patient was self referred, no letter generated. Do not send.        [facility] Health Information

## 2018-11-14 NOTE — NURSING NOTE
Chief Complaint   Patient presents with     RECHECK     Pt here for follow up on CT scans       There were no vitals filed for this visit.    There is no height or weight on file to calculate BMI.      CELINE Abrams, EMT                    No vitals taken per provider

## 2018-11-15 ENCOUNTER — HOSPITAL ENCOUNTER (INPATIENT)
Facility: CLINIC | Age: 58
Setting detail: SURGERY ADMIT
End: 2018-11-15
Attending: PLASTIC SURGERY | Admitting: PLASTIC SURGERY
Payer: COMMERCIAL

## 2018-11-15 NOTE — PROGRESS NOTES
Service Date: 11/14/2018      FOLLOWUP VISIT NOTE      PRESENTING COMPLAINT:  Followup visit for left nonhealing wound of her heel.      HISTORY OF PRESENTING COMPLAINT:  Ms. Sanderson is 58 years old, well known to my service.  In the interim, the patient has gotten a CTA done at an outside facility.  The films have been sent to our facility.  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  She is yet to see Hematology.  She is doing that at the end of this month.  She is interested in pursuing another round of surgery to try and heal this nonhealing wound.  No otherwise change in her history and physical exam.      ASSESSMENT AND PLAN:  Based on above findings, a diagnosis of a nonhealing chronic wound of her left heel was made.  Plan is to proceed with either a SCIP or ALT free flap.  My plan is to most likely discuss with our anesthesiologist to do an epidural at the time of surgery and to see if the sympathetic to the left leg can be knocked out.  Similarly, we will also do aggressive adventitial stripping of the vessel used as the recipient vessel.  We will also use papaverine as a drip in the local area and will also put her on a heparin drip postoperatively.  We will obviously see if Hematology finds any hypercoagulable states that need to be addressed as well.  All of this was discussed with the patient.  She is eager to proceed.  We will plan this in the coming months.  In the interim, we will follow up with the steps discussed above.  All questions were answered.  She was happy with the visit.      Total time spent with patient 15 minutes of which more than half was counseling.         RAFFI CROWLEY MD             D: 11/14/2018   T: 11/15/2018   MT: john      Name:     AMY PAGE   MRN:      [id number]        Account:       [id number]   :      1960           Service Date: 2018      Document: [id number]

## 2018-12-13 ENCOUNTER — TELEPHONE (OUTPATIENT)
Dept: PLASTIC SURGERY | Facility: CLINIC | Age: 58
End: 2018-12-13

## 2018-12-13 NOTE — TELEPHONE ENCOUNTER
Left VM for patient reminding her of upcoming appointment which is scheduled for 10:30 a.m. on 12/19/18. Left call back number.     Erika Pruett LPN

## 2019-03-09 ENCOUNTER — OFFICE VISIT - HEALTHEAST (OUTPATIENT)
Dept: FAMILY MEDICINE | Facility: CLINIC | Age: 59
End: 2019-03-09

## 2019-03-09 DIAGNOSIS — J02.9 PHARYNGITIS, UNSPECIFIED ETIOLOGY: ICD-10-CM

## 2019-03-09 LAB — DEPRECATED S PYO AG THROAT QL EIA: NORMAL

## 2019-03-10 LAB — GROUP A STREP BY PCR: NORMAL

## 2019-03-31 ENCOUNTER — COMMUNICATION - HEALTHEAST (OUTPATIENT)
Dept: INTERNAL MEDICINE | Facility: CLINIC | Age: 59
End: 2019-03-31

## 2019-03-31 DIAGNOSIS — K21.9 GERD (GASTROESOPHAGEAL REFLUX DISEASE): ICD-10-CM

## 2019-04-09 ENCOUNTER — COMMUNICATION - HEALTHEAST (OUTPATIENT)
Dept: INTERNAL MEDICINE | Facility: CLINIC | Age: 59
End: 2019-04-09

## 2019-04-24 ENCOUNTER — RECORDS - HEALTHEAST (OUTPATIENT)
Dept: ADMINISTRATIVE | Facility: OTHER | Age: 59
End: 2019-04-24

## 2019-04-24 ENCOUNTER — RECORDS - HEALTHEAST (OUTPATIENT)
Dept: LAB | Facility: CLINIC | Age: 59
End: 2019-04-24

## 2019-04-24 LAB
C REACTIVE PROTEIN LHE: 1 MG/DL (ref 0–0.8)
ERYTHROCYTE [DISTWIDTH] IN BLOOD BY AUTOMATED COUNT: 17.5 % (ref 11–14.5)
ERYTHROCYTE [SEDIMENTATION RATE] IN BLOOD BY WESTERGREN METHOD: 14 MM/HR (ref 0–20)
HCT VFR BLD AUTO: 31.5 % (ref 35–47)
HGB BLD-MCNC: 9.2 G/DL (ref 12–16)
MCH RBC QN AUTO: 22.4 PG (ref 27–34)
MCHC RBC AUTO-ENTMCNC: 29.2 G/DL (ref 32–36)
MCV RBC AUTO: 77 FL (ref 80–100)
PLATELET # BLD AUTO: 310 THOU/UL (ref 140–440)
PMV BLD AUTO: 8.9 FL (ref 8.5–12.5)
RBC # BLD AUTO: 4.1 MILL/UL (ref 3.8–5.4)
WBC: 10.3 THOU/UL (ref 4–11)

## 2019-05-01 ENCOUNTER — RECORDS - HEALTHEAST (OUTPATIENT)
Dept: ADMINISTRATIVE | Facility: OTHER | Age: 59
End: 2019-05-01

## 2019-05-02 ENCOUNTER — HOSPITAL ENCOUNTER (OUTPATIENT)
Dept: ULTRASOUND IMAGING | Facility: CLINIC | Age: 59
Discharge: HOME | End: 2019-05-02
Attending: ORTHOPAEDIC SURGERY

## 2019-05-02 ENCOUNTER — RECORDS - HEALTHEAST (OUTPATIENT)
Dept: ADMINISTRATIVE | Facility: OTHER | Age: 59
End: 2019-05-02

## 2019-05-02 DIAGNOSIS — L02.416 CUTANEOUS ABSCESS OF LEFT LOWER EXTREMITY: ICD-10-CM

## 2019-05-07 ENCOUNTER — RECORDS - HEALTHEAST (OUTPATIENT)
Dept: ADMINISTRATIVE | Facility: OTHER | Age: 59
End: 2019-05-07

## 2019-07-01 ENCOUNTER — COMMUNICATION - HEALTHEAST (OUTPATIENT)
Dept: INTERNAL MEDICINE | Facility: CLINIC | Age: 59
End: 2019-07-01

## 2019-07-01 DIAGNOSIS — K21.9 GERD (GASTROESOPHAGEAL REFLUX DISEASE): ICD-10-CM

## 2019-07-03 ENCOUNTER — OFFICE VISIT - HEALTHEAST (OUTPATIENT)
Dept: INTERNAL MEDICINE | Facility: CLINIC | Age: 59
End: 2019-07-03

## 2019-07-03 DIAGNOSIS — Z12.31 VISIT FOR SCREENING MAMMOGRAM: ICD-10-CM

## 2019-07-03 DIAGNOSIS — J06.9 UPPER RESPIRATORY TRACT INFECTION, UNSPECIFIED TYPE: ICD-10-CM

## 2019-07-11 ENCOUNTER — RECORDS - HEALTHEAST (OUTPATIENT)
Dept: ADMINISTRATIVE | Facility: OTHER | Age: 59
End: 2019-07-11

## 2019-07-23 ENCOUNTER — COMMUNICATION - HEALTHEAST (OUTPATIENT)
Dept: INTERNAL MEDICINE | Facility: CLINIC | Age: 59
End: 2019-07-23

## 2019-07-23 ENCOUNTER — OFFICE VISIT - HEALTHEAST (OUTPATIENT)
Dept: INTERNAL MEDICINE | Facility: CLINIC | Age: 59
End: 2019-07-23

## 2019-07-23 ENCOUNTER — RECORDS - HEALTHEAST (OUTPATIENT)
Dept: ADMINISTRATIVE | Facility: OTHER | Age: 59
End: 2019-07-23

## 2019-07-23 DIAGNOSIS — M65.10 ACHILLES TENDON INFECTION: ICD-10-CM

## 2019-07-23 DIAGNOSIS — D50.8 OTHER IRON DEFICIENCY ANEMIA: ICD-10-CM

## 2019-07-23 DIAGNOSIS — Z01.818 PREOPERATIVE EXAMINATION: ICD-10-CM

## 2019-07-23 DIAGNOSIS — D50.9 IRON DEFICIENCY ANEMIA, UNSPECIFIED IRON DEFICIENCY ANEMIA TYPE: ICD-10-CM

## 2019-07-23 DIAGNOSIS — K21.9 GASTROESOPHAGEAL REFLUX DISEASE WITHOUT ESOPHAGITIS: ICD-10-CM

## 2019-07-23 LAB
ANION GAP SERPL CALCULATED.3IONS-SCNC: 9 MMOL/L (ref 5–18)
BUN SERPL-MCNC: 10 MG/DL (ref 8–22)
CALCIUM SERPL-MCNC: 9.5 MG/DL (ref 8.5–10.5)
CHLORIDE BLD-SCNC: 109 MMOL/L (ref 98–107)
CO2 SERPL-SCNC: 23 MMOL/L (ref 22–31)
CREAT SERPL-MCNC: 0.72 MG/DL (ref 0.6–1.1)
GFR SERPL CREATININE-BSD FRML MDRD: >60 ML/MIN/1.73M2
GLUCOSE BLD-MCNC: 84 MG/DL (ref 70–125)
HGB BLD-MCNC: 9.1 G/DL (ref 12–16)
POTASSIUM BLD-SCNC: 3.8 MMOL/L (ref 3.5–5)
SODIUM SERPL-SCNC: 141 MMOL/L (ref 136–145)

## 2019-07-23 ASSESSMENT — MIFFLIN-ST. JEOR: SCORE: 1098.65

## 2019-07-24 ENCOUNTER — RECORDS - HEALTHEAST (OUTPATIENT)
Dept: LAB | Facility: CLINIC | Age: 59
End: 2019-07-24

## 2019-07-24 ENCOUNTER — RECORDS - HEALTHEAST (OUTPATIENT)
Dept: ADMINISTRATIVE | Facility: OTHER | Age: 59
End: 2019-07-24

## 2019-07-27 LAB
BACTERIA SPEC CULT: ABNORMAL
BACTERIA SPEC CULT: NORMAL
BACTERIA SPEC CULT: NORMAL
GRAM STAIN RESULT: ABNORMAL

## 2019-08-01 ENCOUNTER — RECORDS - HEALTHEAST (OUTPATIENT)
Dept: ADMINISTRATIVE | Facility: OTHER | Age: 59
End: 2019-08-01

## 2019-08-15 ENCOUNTER — RECORDS - HEALTHEAST (OUTPATIENT)
Dept: ADMINISTRATIVE | Facility: OTHER | Age: 59
End: 2019-08-15

## 2019-08-15 ENCOUNTER — RECORDS - HEALTHEAST (OUTPATIENT)
Dept: LAB | Facility: CLINIC | Age: 59
End: 2019-08-15

## 2019-08-15 LAB
BACTERIA SPEC CULT: NORMAL
BACTERIA SPEC CULT: NORMAL
C REACTIVE PROTEIN LHE: <0.1 MG/DL (ref 0–0.8)
ERYTHROCYTE [DISTWIDTH] IN BLOOD BY AUTOMATED COUNT: 18.1 % (ref 11–14.5)
ERYTHROCYTE [SEDIMENTATION RATE] IN BLOOD BY WESTERGREN METHOD: 10 MM/HR (ref 0–20)
HCT VFR BLD AUTO: 32.1 % (ref 35–47)
HGB BLD-MCNC: 9.3 G/DL (ref 12–16)
MCH RBC QN AUTO: 22.5 PG (ref 27–34)
MCHC RBC AUTO-ENTMCNC: 29 G/DL (ref 32–36)
MCV RBC AUTO: 78 FL (ref 80–100)
PLATELET # BLD AUTO: 371 THOU/UL (ref 140–440)
PMV BLD AUTO: 9.7 FL (ref 8.5–12.5)
RBC # BLD AUTO: 4.13 MILL/UL (ref 3.8–5.4)
WBC: 7.2 THOU/UL (ref 4–11)

## 2019-08-19 ENCOUNTER — RECORDS - HEALTHEAST (OUTPATIENT)
Dept: ADMINISTRATIVE | Facility: OTHER | Age: 59
End: 2019-08-19

## 2019-08-20 ENCOUNTER — COMMUNICATION - HEALTHEAST (OUTPATIENT)
Dept: CARE COORDINATION | Facility: CLINIC | Age: 59
End: 2019-08-20

## 2019-08-26 ENCOUNTER — OFFICE VISIT - HEALTHEAST (OUTPATIENT)
Dept: INTERNAL MEDICINE | Facility: CLINIC | Age: 59
End: 2019-08-26

## 2019-08-26 ENCOUNTER — RECORDS - HEALTHEAST (OUTPATIENT)
Dept: ADMINISTRATIVE | Facility: OTHER | Age: 59
End: 2019-08-26

## 2019-08-26 ENCOUNTER — COMMUNICATION - HEALTHEAST (OUTPATIENT)
Dept: INTERNAL MEDICINE | Facility: CLINIC | Age: 59
End: 2019-08-26

## 2019-08-26 DIAGNOSIS — Z01.818 ENCOUNTER FOR PREOPERATIVE EXAMINATION FOR GENERAL SURGICAL PROCEDURE: ICD-10-CM

## 2019-08-26 DIAGNOSIS — M65.10 ACHILLES TENDON INFECTION: ICD-10-CM

## 2019-08-26 DIAGNOSIS — T81.89XS NON-HEALING SURGICAL WOUND, SEQUELA: ICD-10-CM

## 2019-08-26 DIAGNOSIS — D50.9 IRON DEFICIENCY ANEMIA, UNSPECIFIED IRON DEFICIENCY ANEMIA TYPE: ICD-10-CM

## 2019-08-26 LAB
ERYTHROCYTE [DISTWIDTH] IN BLOOD BY AUTOMATED COUNT: 17 % (ref 11–14.5)
FERRITIN SERPL-MCNC: 14 NG/ML (ref 10–130)
HCT VFR BLD AUTO: 29.9 % (ref 35–47)
HGB BLD-MCNC: 9.5 G/DL (ref 12–16)
IRON SATN MFR SERPL: 3 % (ref 20–50)
IRON SERPL-MCNC: 15 UG/DL (ref 42–175)
MCH RBC QN AUTO: 22.5 PG (ref 27–34)
MCHC RBC AUTO-ENTMCNC: 31.8 G/DL (ref 32–36)
MCV RBC AUTO: 71 FL (ref 80–100)
PLATELET # BLD AUTO: 504 THOU/UL (ref 140–440)
PMV BLD AUTO: 7.8 FL (ref 7–10)
RBC # BLD AUTO: 4.23 MILL/UL (ref 3.8–5.4)
TIBC SERPL-MCNC: 442 UG/DL (ref 313–563)
TRANSFERRIN SERPL-MCNC: 353 MG/DL (ref 212–360)
WBC: 9.4 THOU/UL (ref 4–11)

## 2019-08-26 ASSESSMENT — MIFFLIN-ST. JEOR: SCORE: 1081.64

## 2019-08-28 ENCOUNTER — RECORDS - HEALTHEAST (OUTPATIENT)
Dept: ADMINISTRATIVE | Facility: OTHER | Age: 59
End: 2019-08-28

## 2019-08-28 ENCOUNTER — RECORDS - HEALTHEAST (OUTPATIENT)
Dept: LAB | Facility: CLINIC | Age: 59
End: 2019-08-28

## 2019-08-30 ENCOUNTER — INFUSION - HEALTHEAST (OUTPATIENT)
Dept: INFUSION THERAPY | Facility: CLINIC | Age: 59
End: 2019-08-30

## 2019-08-30 ENCOUNTER — RECORDS - HEALTHEAST (OUTPATIENT)
Dept: ADMINISTRATIVE | Facility: OTHER | Age: 59
End: 2019-08-30

## 2019-08-30 DIAGNOSIS — D50.9 IRON DEFICIENCY ANEMIA, UNSPECIFIED IRON DEFICIENCY ANEMIA TYPE: ICD-10-CM

## 2019-08-30 LAB
ERYTHROCYTE [DISTWIDTH] IN BLOOD BY AUTOMATED COUNT: 18 % (ref 11–14.5)
HCT VFR BLD AUTO: 28.3 % (ref 35–47)
HGB BLD-MCNC: 8.2 G/DL (ref 12–16)
LAB AP CHARGES (HE HISTORICAL CONVERSION): NORMAL
MCH RBC QN AUTO: 22.5 PG (ref 27–34)
MCHC RBC AUTO-ENTMCNC: 29 G/DL (ref 32–36)
MCV RBC AUTO: 78 FL (ref 80–100)
PATH REPORT.COMMENTS IMP SPEC: NORMAL
PATH REPORT.FINAL DX SPEC: NORMAL
PATH REPORT.GROSS SPEC: NORMAL
PATH REPORT.MICROSCOPIC SPEC OTHER STN: NORMAL
PATH REPORT.RELEVANT HX SPEC: NORMAL
PLATELET # BLD AUTO: 487 THOU/UL (ref 140–440)
PMV BLD AUTO: 9.5 FL (ref 8.5–12.5)
RBC # BLD AUTO: 3.65 MILL/UL (ref 3.8–5.4)
RESULT FLAG (HE HISTORICAL CONVERSION): NORMAL
WBC: 7.5 THOU/UL (ref 4–11)

## 2019-08-31 LAB
BACTERIA SPEC CULT: ABNORMAL
BACTERIA SPEC CULT: ABNORMAL
BACTERIA SPEC CULT: NORMAL
GRAM STAIN RESULT: ABNORMAL
GRAM STAIN RESULT: ABNORMAL

## 2019-09-03 ENCOUNTER — RECORDS - HEALTHEAST (OUTPATIENT)
Dept: ADMINISTRATIVE | Facility: OTHER | Age: 59
End: 2019-09-03

## 2019-09-05 LAB
BACTERIA SPEC CULT: NORMAL
BACTERIA SPEC CULT: NORMAL

## 2019-09-10 ENCOUNTER — RECORDS - HEALTHEAST (OUTPATIENT)
Dept: ADMINISTRATIVE | Facility: OTHER | Age: 59
End: 2019-09-10

## 2019-09-17 ENCOUNTER — RECORDS - HEALTHEAST (OUTPATIENT)
Dept: ADMINISTRATIVE | Facility: OTHER | Age: 59
End: 2019-09-17

## 2019-09-24 ENCOUNTER — RECORDS - HEALTHEAST (OUTPATIENT)
Dept: ADMINISTRATIVE | Facility: OTHER | Age: 59
End: 2019-09-24

## 2019-09-27 ENCOUNTER — RECORDS - HEALTHEAST (OUTPATIENT)
Dept: GENERAL RADIOLOGY | Facility: CLINIC | Age: 59
End: 2019-09-27

## 2019-09-27 ENCOUNTER — OFFICE VISIT - HEALTHEAST (OUTPATIENT)
Dept: INTERNAL MEDICINE | Facility: CLINIC | Age: 59
End: 2019-09-27

## 2019-09-27 DIAGNOSIS — R68.83 CHILLS (WITHOUT FEVER): ICD-10-CM

## 2019-09-27 DIAGNOSIS — R50.9 ACUTE FEBRILE ILLNESS: ICD-10-CM

## 2019-09-27 DIAGNOSIS — J02.9 SORE THROAT: ICD-10-CM

## 2019-09-27 DIAGNOSIS — J18.9 PNEUMONIA OF RIGHT LOWER LOBE DUE TO INFECTIOUS ORGANISM: ICD-10-CM

## 2019-09-27 DIAGNOSIS — J02.0 STREPTOCOCCAL SORE THROAT: ICD-10-CM

## 2019-09-27 LAB — DEPRECATED S PYO AG THROAT QL EIA: NORMAL

## 2019-09-28 ENCOUNTER — COMMUNICATION - HEALTHEAST (OUTPATIENT)
Dept: INTERNAL MEDICINE | Facility: CLINIC | Age: 59
End: 2019-09-28

## 2019-09-28 DIAGNOSIS — K21.9 GERD (GASTROESOPHAGEAL REFLUX DISEASE): ICD-10-CM

## 2019-09-28 LAB — GROUP A STREP BY PCR: NORMAL

## 2019-10-01 ENCOUNTER — HEALTH MAINTENANCE LETTER (OUTPATIENT)
Age: 59
End: 2019-10-01

## 2019-10-08 ENCOUNTER — RECORDS - HEALTHEAST (OUTPATIENT)
Dept: ADMINISTRATIVE | Facility: OTHER | Age: 59
End: 2019-10-08

## 2019-11-05 ENCOUNTER — OFFICE VISIT - HEALTHEAST (OUTPATIENT)
Dept: INTERNAL MEDICINE | Facility: CLINIC | Age: 59
End: 2019-11-05

## 2019-11-05 ENCOUNTER — RECORDS - HEALTHEAST (OUTPATIENT)
Dept: ADMINISTRATIVE | Facility: OTHER | Age: 59
End: 2019-11-05

## 2019-11-05 DIAGNOSIS — Z12.11 ENCOUNTER FOR COLORECTAL CANCER SCREENING: ICD-10-CM

## 2019-11-05 DIAGNOSIS — Z11.59 ENCOUNTER FOR HEPATITIS C SCREENING TEST FOR LOW RISK PATIENT: ICD-10-CM

## 2019-11-05 DIAGNOSIS — Z00.00 ROUTINE GENERAL MEDICAL EXAMINATION AT A HEALTH CARE FACILITY: ICD-10-CM

## 2019-11-05 DIAGNOSIS — Z12.31 VISIT FOR SCREENING MAMMOGRAM: ICD-10-CM

## 2019-11-05 DIAGNOSIS — Z11.4 SCREENING FOR HIV (HUMAN IMMUNODEFICIENCY VIRUS): ICD-10-CM

## 2019-11-05 DIAGNOSIS — Z12.12 ENCOUNTER FOR COLORECTAL CANCER SCREENING: ICD-10-CM

## 2019-11-05 DIAGNOSIS — M65.10 ACHILLES TENDON INFECTION: ICD-10-CM

## 2019-11-05 DIAGNOSIS — K21.00 GASTROESOPHAGEAL REFLUX DISEASE WITH ESOPHAGITIS: ICD-10-CM

## 2019-11-05 DIAGNOSIS — Z78.0 POSTMENOPAUSAL STATUS: ICD-10-CM

## 2019-11-05 DIAGNOSIS — R10.13 ABDOMINAL PAIN, EPIGASTRIC: ICD-10-CM

## 2019-11-05 DIAGNOSIS — D50.9 IRON DEFICIENCY ANEMIA, UNSPECIFIED IRON DEFICIENCY ANEMIA TYPE: ICD-10-CM

## 2019-11-05 DIAGNOSIS — Z13.220 ENCOUNTER FOR SCREENING FOR LIPOID DISORDERS: ICD-10-CM

## 2019-11-05 LAB
ALBUMIN SERPL-MCNC: 4 G/DL (ref 3.5–5)
ALBUMIN UR-MCNC: NEGATIVE MG/DL
ALP SERPL-CCNC: 97 U/L (ref 45–120)
ALT SERPL W P-5'-P-CCNC: 10 U/L (ref 0–45)
ANION GAP SERPL CALCULATED.3IONS-SCNC: 9 MMOL/L (ref 5–18)
APPEARANCE UR: CLEAR
AST SERPL W P-5'-P-CCNC: 16 U/L (ref 0–40)
BACTERIA #/AREA URNS HPF: ABNORMAL HPF
BILIRUB SERPL-MCNC: 0.3 MG/DL (ref 0–1)
BILIRUB UR QL STRIP: NEGATIVE
BUN SERPL-MCNC: 12 MG/DL (ref 8–22)
CALCIUM SERPL-MCNC: 9.6 MG/DL (ref 8.5–10.5)
CHLORIDE BLD-SCNC: 108 MMOL/L (ref 98–107)
CHOLEST SERPL-MCNC: 181 MG/DL
CO2 SERPL-SCNC: 26 MMOL/L (ref 22–31)
COLOR UR AUTO: YELLOW
CREAT SERPL-MCNC: 0.69 MG/DL (ref 0.6–1.1)
ERYTHROCYTE [DISTWIDTH] IN BLOOD BY AUTOMATED COUNT: 18.2 % (ref 11–14.5)
FASTING STATUS PATIENT QL REPORTED: YES
FERRITIN SERPL-MCNC: 21 NG/ML (ref 10–130)
GFR SERPL CREATININE-BSD FRML MDRD: >60 ML/MIN/1.73M2
GLUCOSE BLD-MCNC: 78 MG/DL (ref 70–125)
GLUCOSE UR STRIP-MCNC: NEGATIVE MG/DL
HCT VFR BLD AUTO: 38.7 % (ref 35–47)
HCV AB SERPL QL IA: NEGATIVE
HDLC SERPL-MCNC: 58 MG/DL
HGB BLD-MCNC: 12.8 G/DL (ref 12–16)
HGB UR QL STRIP: ABNORMAL
HIV 1+2 AB+HIV1 P24 AG SERPL QL IA: NEGATIVE
IRON SATN MFR SERPL: 18 % (ref 20–50)
IRON SERPL-MCNC: 69 UG/DL (ref 42–175)
KETONES UR STRIP-MCNC: NEGATIVE MG/DL
LDLC SERPL CALC-MCNC: 110 MG/DL
LEUKOCYTE ESTERASE UR QL STRIP: NEGATIVE
LIPASE SERPL-CCNC: 53 U/L (ref 0–52)
MCH RBC QN AUTO: 27.2 PG (ref 27–34)
MCHC RBC AUTO-ENTMCNC: 33.1 G/DL (ref 32–36)
MCV RBC AUTO: 82 FL (ref 80–100)
NITRATE UR QL: NEGATIVE
PH UR STRIP: 6.5 [PH] (ref 5–8)
PLATELET # BLD AUTO: 351 THOU/UL (ref 140–440)
PMV BLD AUTO: 7.2 FL (ref 7–10)
POTASSIUM BLD-SCNC: 4.4 MMOL/L (ref 3.5–5)
PROT SERPL-MCNC: 6.9 G/DL (ref 6–8)
RBC # BLD AUTO: 4.72 MILL/UL (ref 3.8–5.4)
RBC #/AREA URNS AUTO: ABNORMAL HPF
SODIUM SERPL-SCNC: 143 MMOL/L (ref 136–145)
SP GR UR STRIP: 1.01 (ref 1–1.03)
SQUAMOUS #/AREA URNS AUTO: ABNORMAL LPF
TIBC SERPL-MCNC: 388 UG/DL (ref 313–563)
TRANSFERRIN SERPL-MCNC: 311 MG/DL (ref 212–360)
TRIGL SERPL-MCNC: 64 MG/DL
UROBILINOGEN UR STRIP-ACNC: ABNORMAL
WBC #/AREA URNS AUTO: ABNORMAL HPF
WBC: 9.4 THOU/UL (ref 4–11)

## 2019-11-05 ASSESSMENT — MIFFLIN-ST. JEOR: SCORE: 1095.24

## 2019-11-09 ENCOUNTER — HOSPITAL ENCOUNTER (OUTPATIENT)
Dept: ULTRASOUND IMAGING | Facility: CLINIC | Age: 59
Discharge: HOME | End: 2019-11-09
Attending: INTERNAL MEDICINE

## 2019-11-09 DIAGNOSIS — R10.13 ABDOMINAL PAIN, EPIGASTRIC: ICD-10-CM

## 2019-11-29 ENCOUNTER — HOSPITAL ENCOUNTER (OUTPATIENT)
Dept: RADIOLOGY | Facility: CLINIC | Age: 59
Discharge: HOME | End: 2019-11-29
Attending: INTERNAL MEDICINE

## 2019-11-29 ENCOUNTER — OFFICE VISIT - HEALTHEAST (OUTPATIENT)
Dept: INTERNAL MEDICINE | Facility: CLINIC | Age: 59
End: 2019-11-29

## 2019-11-29 DIAGNOSIS — R05.3 PERSISTENT COUGH FOR 3 WEEKS OR LONGER: ICD-10-CM

## 2019-11-29 ASSESSMENT — MIFFLIN-ST. JEOR: SCORE: 1095.24

## 2020-01-02 ENCOUNTER — RECORDS - HEALTHEAST (OUTPATIENT)
Dept: ADMINISTRATIVE | Facility: OTHER | Age: 60
End: 2020-01-02

## 2020-01-02 ENCOUNTER — RECORDS - HEALTHEAST (OUTPATIENT)
Dept: BONE DENSITY | Facility: CLINIC | Age: 60
End: 2020-01-02

## 2020-01-02 DIAGNOSIS — Z78.0 ASYMPTOMATIC MENOPAUSAL STATE: ICD-10-CM

## 2020-01-21 ENCOUNTER — OFFICE VISIT - HEALTHEAST (OUTPATIENT)
Dept: INTERNAL MEDICINE | Facility: CLINIC | Age: 60
End: 2020-01-21

## 2020-01-21 DIAGNOSIS — D50.9 IRON DEFICIENCY ANEMIA, UNSPECIFIED IRON DEFICIENCY ANEMIA TYPE: ICD-10-CM

## 2020-01-21 DIAGNOSIS — K21.00 GASTROESOPHAGEAL REFLUX DISEASE WITH ESOPHAGITIS: ICD-10-CM

## 2020-01-21 DIAGNOSIS — M81.0 OSTEOPOROSIS OF MULTIPLE SITES WITHOUT PATHOLOGICAL FRACTURE: ICD-10-CM

## 2020-01-21 LAB
HGB BLD-MCNC: 12.3 G/DL (ref 12–16)
TSH SERPL DL<=0.005 MIU/L-ACNC: 0.98 UIU/ML (ref 0.3–5)

## 2020-01-21 ASSESSMENT — MIFFLIN-ST. JEOR: SCORE: 1095.24

## 2020-01-22 ENCOUNTER — AMBULATORY - HEALTHEAST (OUTPATIENT)
Dept: INTERNAL MEDICINE | Facility: CLINIC | Age: 60
End: 2020-01-22

## 2020-01-22 DIAGNOSIS — E55.9 VITAMIN D DEFICIENCY: ICD-10-CM

## 2020-01-22 LAB
25(OH)D3 SERPL-MCNC: 18.5 NG/ML (ref 30–80)
25(OH)D3 SERPL-MCNC: 18.5 NG/ML (ref 30–80)

## 2020-02-19 ENCOUNTER — COMMUNICATION - HEALTHEAST (OUTPATIENT)
Dept: GENERAL RADIOLOGY | Facility: CLINIC | Age: 60
End: 2020-02-19

## 2020-02-20 ENCOUNTER — OFFICE VISIT - HEALTHEAST (OUTPATIENT)
Dept: INTERNAL MEDICINE | Facility: CLINIC | Age: 60
End: 2020-02-20

## 2020-02-20 ENCOUNTER — AMBULATORY - HEALTHEAST (OUTPATIENT)
Dept: INTERNAL MEDICINE | Facility: CLINIC | Age: 60
End: 2020-02-20

## 2020-02-20 DIAGNOSIS — Z23 NEED FOR ZOSTER VACCINE: ICD-10-CM

## 2020-02-20 DIAGNOSIS — J06.9 UPPER RESPIRATORY TRACT INFECTION, UNSPECIFIED TYPE: ICD-10-CM

## 2020-02-21 ENCOUNTER — COMMUNICATION - HEALTHEAST (OUTPATIENT)
Dept: INTERNAL MEDICINE | Facility: CLINIC | Age: 60
End: 2020-02-21

## 2020-02-28 ENCOUNTER — AMBULATORY - HEALTHEAST (OUTPATIENT)
Dept: NURSING | Facility: CLINIC | Age: 60
End: 2020-02-28

## 2020-03-03 ENCOUNTER — OFFICE VISIT - HEALTHEAST (OUTPATIENT)
Dept: INTERNAL MEDICINE | Facility: CLINIC | Age: 60
End: 2020-03-03

## 2020-03-03 DIAGNOSIS — J32.0 CHRONIC MAXILLARY SINUSITIS: ICD-10-CM

## 2020-03-03 DIAGNOSIS — R05.9 COUGH: ICD-10-CM

## 2020-03-03 ASSESSMENT — MIFFLIN-ST. JEOR: SCORE: 1072.56

## 2020-03-10 ENCOUNTER — HOSPITAL ENCOUNTER (OUTPATIENT)
Dept: CT IMAGING | Facility: CLINIC | Age: 60
Discharge: HOME | End: 2020-03-10
Attending: INTERNAL MEDICINE

## 2020-03-10 DIAGNOSIS — J32.0 CHRONIC MAXILLARY SINUSITIS: ICD-10-CM

## 2020-03-10 DIAGNOSIS — R05.9 COUGH: ICD-10-CM

## 2020-03-13 ENCOUNTER — COMMUNICATION - HEALTHEAST (OUTPATIENT)
Dept: INTERNAL MEDICINE | Facility: CLINIC | Age: 60
End: 2020-03-13

## 2020-03-13 ENCOUNTER — AMBULATORY - HEALTHEAST (OUTPATIENT)
Dept: INTERNAL MEDICINE | Facility: CLINIC | Age: 60
End: 2020-03-13

## 2020-03-13 DIAGNOSIS — J47.0 BRONCHIECTASIS WITH ACUTE LOWER RESPIRATORY INFECTION (H): ICD-10-CM

## 2020-03-22 ENCOUNTER — COMMUNICATION - HEALTHEAST (OUTPATIENT)
Dept: INTERNAL MEDICINE | Facility: CLINIC | Age: 60
End: 2020-03-22

## 2020-03-22 DIAGNOSIS — J47.0 BRONCHIECTASIS WITH ACUTE LOWER RESPIRATORY INFECTION (H): ICD-10-CM

## 2020-03-23 ENCOUNTER — OFFICE VISIT - HEALTHEAST (OUTPATIENT)
Dept: INTERNAL MEDICINE | Facility: CLINIC | Age: 60
End: 2020-03-23

## 2020-03-23 DIAGNOSIS — R05.3 CHRONIC COUGH: ICD-10-CM

## 2020-04-07 ENCOUNTER — AMBULATORY - HEALTHEAST (OUTPATIENT)
Dept: PULMONOLOGY | Facility: OTHER | Age: 60
End: 2020-04-07

## 2020-04-07 DIAGNOSIS — J47.0 BRONCHIECTASIS WITH ACUTE LOWER RESPIRATORY INFECTION (H): ICD-10-CM

## 2020-05-21 ENCOUNTER — COMMUNICATION - HEALTHEAST (OUTPATIENT)
Dept: SCHEDULING | Facility: CLINIC | Age: 60
End: 2020-05-21

## 2020-05-21 ENCOUNTER — COMMUNICATION - HEALTHEAST (OUTPATIENT)
Dept: INTERNAL MEDICINE | Facility: CLINIC | Age: 60
End: 2020-05-21

## 2020-05-21 ENCOUNTER — OFFICE VISIT - HEALTHEAST (OUTPATIENT)
Dept: INTERNAL MEDICINE | Facility: CLINIC | Age: 60
End: 2020-05-21

## 2020-05-21 DIAGNOSIS — R05.3 CHRONIC COUGH: ICD-10-CM

## 2020-05-22 ENCOUNTER — RECORDS - HEALTHEAST (OUTPATIENT)
Dept: ADMINISTRATIVE | Facility: OTHER | Age: 60
End: 2020-05-22

## 2020-05-26 ENCOUNTER — COMMUNICATION - HEALTHEAST (OUTPATIENT)
Dept: INTERNAL MEDICINE | Facility: CLINIC | Age: 60
End: 2020-05-26

## 2020-05-27 ENCOUNTER — RECORDS - HEALTHEAST (OUTPATIENT)
Dept: LAB | Facility: CLINIC | Age: 60
End: 2020-05-27

## 2020-05-27 ENCOUNTER — TRANSFERRED RECORDS (OUTPATIENT)
Dept: HEALTH INFORMATION MANAGEMENT | Facility: CLINIC | Age: 60
End: 2020-05-27

## 2020-05-29 LAB
BACTERIA SPEC CULT: ABNORMAL
GRAM STAIN RESULT: ABNORMAL
GRAM STAIN RESULT: ABNORMAL

## 2020-05-30 LAB
BACTERIA SPEC CULT: ABNORMAL
BACTERIA SPEC CULT: ABNORMAL
BACTERIA SPEC CULT: NORMAL
BACTERIA SPEC CULT: NORMAL
GRAM STAIN RESULT: ABNORMAL
GRAM STAIN RESULT: ABNORMAL

## 2020-06-02 ENCOUNTER — COMMUNICATION - HEALTHEAST (OUTPATIENT)
Dept: INTERNAL MEDICINE | Facility: CLINIC | Age: 60
End: 2020-06-02

## 2020-06-02 ENCOUNTER — RECORDS - HEALTHEAST (OUTPATIENT)
Dept: ADMINISTRATIVE | Facility: OTHER | Age: 60
End: 2020-06-02

## 2020-06-04 ENCOUNTER — AMBULATORY - HEALTHEAST (OUTPATIENT)
Dept: LAB | Facility: CLINIC | Age: 60
End: 2020-06-04

## 2020-06-04 DIAGNOSIS — S80.929A: ICD-10-CM

## 2020-06-04 DIAGNOSIS — L08.9: ICD-10-CM

## 2020-06-04 DIAGNOSIS — Z47.89 AFTERCARE FOLLOWING SURGERY OF THE MUSCULOSKELETAL SYSTEM: ICD-10-CM

## 2020-06-04 LAB
C REACTIVE PROTEIN LHE: <0.1 MG/DL (ref 0–0.8)
ERYTHROCYTE [DISTWIDTH] IN BLOOD BY AUTOMATED COUNT: 12.9 % (ref 11–14.5)
ERYTHROCYTE [SEDIMENTATION RATE] IN BLOOD BY WESTERGREN METHOD: 25 MM/HR (ref 0–20)
HCT VFR BLD AUTO: 37.7 % (ref 35–47)
HGB BLD-MCNC: 12.3 G/DL (ref 12–16)
MCH RBC QN AUTO: 27.5 PG (ref 27–34)
MCHC RBC AUTO-ENTMCNC: 32.5 G/DL (ref 32–36)
MCV RBC AUTO: 85 FL (ref 80–100)
PLATELET # BLD AUTO: 505 THOU/UL (ref 140–440)
PMV BLD AUTO: 7 FL (ref 7–10)
RBC # BLD AUTO: 4.46 MILL/UL (ref 3.8–5.4)
WBC: 8.6 THOU/UL (ref 4–11)

## 2020-06-10 ENCOUNTER — COMMUNICATION - HEALTHEAST (OUTPATIENT)
Dept: INTERNAL MEDICINE | Facility: CLINIC | Age: 60
End: 2020-06-10

## 2020-06-18 ENCOUNTER — RECORDS - HEALTHEAST (OUTPATIENT)
Dept: ADMINISTRATIVE | Facility: OTHER | Age: 60
End: 2020-06-18

## 2020-06-22 ENCOUNTER — COMMUNICATION - HEALTHEAST (OUTPATIENT)
Dept: INTERNAL MEDICINE | Facility: CLINIC | Age: 60
End: 2020-06-22

## 2020-06-23 ENCOUNTER — AMBULATORY - HEALTHEAST (OUTPATIENT)
Dept: INTERNAL MEDICINE | Facility: CLINIC | Age: 60
End: 2020-06-23

## 2020-06-23 DIAGNOSIS — D75.839 THROMBOCYTOSIS: ICD-10-CM

## 2020-06-25 ENCOUNTER — RECORDS - HEALTHEAST (OUTPATIENT)
Dept: LAB | Facility: CLINIC | Age: 60
End: 2020-06-25

## 2020-06-25 LAB
C REACTIVE PROTEIN LHE: 1 MG/DL (ref 0–0.8)
ERYTHROCYTE [DISTWIDTH] IN BLOOD BY AUTOMATED COUNT: 14.3 % (ref 11–14.5)
ERYTHROCYTE [SEDIMENTATION RATE] IN BLOOD BY WESTERGREN METHOD: 17 MM/HR (ref 0–20)
HCT VFR BLD AUTO: 37.5 % (ref 35–47)
HGB BLD-MCNC: 11.8 G/DL (ref 12–16)
MCH RBC QN AUTO: 27.8 PG (ref 27–34)
MCHC RBC AUTO-ENTMCNC: 31.5 G/DL (ref 32–36)
MCV RBC AUTO: 88 FL (ref 80–100)
PLATELET # BLD AUTO: 324 THOU/UL (ref 140–440)
PMV BLD AUTO: 9.1 FL (ref 8.5–12.5)
RBC # BLD AUTO: 4.25 MILL/UL (ref 3.8–5.4)
WBC: 10.6 THOU/UL (ref 4–11)

## 2020-06-28 ENCOUNTER — COMMUNICATION - HEALTHEAST (OUTPATIENT)
Dept: INTERNAL MEDICINE | Facility: CLINIC | Age: 60
End: 2020-06-28

## 2020-06-28 DIAGNOSIS — K21.9 GERD (GASTROESOPHAGEAL REFLUX DISEASE): ICD-10-CM

## 2020-06-29 ENCOUNTER — TRANSFERRED RECORDS (OUTPATIENT)
Dept: HEALTH INFORMATION MANAGEMENT | Facility: CLINIC | Age: 60
End: 2020-06-29

## 2020-06-29 ENCOUNTER — RECORDS - HEALTHEAST (OUTPATIENT)
Dept: ADMINISTRATIVE | Facility: OTHER | Age: 60
End: 2020-06-29

## 2020-06-29 ENCOUNTER — COMMUNICATION - HEALTHEAST (OUTPATIENT)
Dept: SCHEDULING | Facility: CLINIC | Age: 60
End: 2020-06-29

## 2020-07-01 ENCOUNTER — RECORDS - HEALTHEAST (OUTPATIENT)
Dept: LAB | Facility: CLINIC | Age: 60
End: 2020-07-01

## 2020-07-01 ENCOUNTER — RECORDS - HEALTHEAST (OUTPATIENT)
Dept: ADMINISTRATIVE | Facility: OTHER | Age: 60
End: 2020-07-01

## 2020-07-01 ENCOUNTER — TRANSFERRED RECORDS (OUTPATIENT)
Dept: HEALTH INFORMATION MANAGEMENT | Facility: CLINIC | Age: 60
End: 2020-07-01

## 2020-07-04 LAB
BACTERIA SPEC CULT: ABNORMAL
BACTERIA SPEC CULT: ABNORMAL
GRAM STAIN RESULT: ABNORMAL
GRAM STAIN RESULT: ABNORMAL

## 2020-07-06 ENCOUNTER — COMMUNICATION - HEALTHEAST (OUTPATIENT)
Dept: INFECTIOUS DISEASES | Facility: CLINIC | Age: 60
End: 2020-07-06

## 2020-07-06 ENCOUNTER — RECORDS - HEALTHEAST (OUTPATIENT)
Dept: ADMINISTRATIVE | Facility: OTHER | Age: 60
End: 2020-07-06

## 2020-07-13 ENCOUNTER — RECORDS - HEALTHEAST (OUTPATIENT)
Dept: ADMINISTRATIVE | Facility: OTHER | Age: 60
End: 2020-07-13

## 2020-07-20 ENCOUNTER — OFFICE VISIT - HEALTHEAST (OUTPATIENT)
Dept: INFECTIOUS DISEASES | Facility: CLINIC | Age: 60
End: 2020-07-20

## 2020-07-20 DIAGNOSIS — L08.9 WOUND INFECTION: ICD-10-CM

## 2020-07-20 DIAGNOSIS — T14.8XXA WOUND INFECTION: ICD-10-CM

## 2020-07-20 DIAGNOSIS — T81.30XA WOUND DEHISCENCE: ICD-10-CM

## 2020-07-22 ENCOUNTER — AMBULATORY - HEALTHEAST (OUTPATIENT)
Dept: ENDOCRINOLOGY | Facility: CLINIC | Age: 60
End: 2020-07-22

## 2020-07-22 ENCOUNTER — AMBULATORY - HEALTHEAST (OUTPATIENT)
Dept: VASCULAR SURGERY | Facility: CLINIC | Age: 60
End: 2020-07-22

## 2020-07-22 ENCOUNTER — TRANSFERRED RECORDS (OUTPATIENT)
Dept: HEALTH INFORMATION MANAGEMENT | Facility: CLINIC | Age: 60
End: 2020-07-22

## 2020-07-22 ENCOUNTER — RECORDS - HEALTHEAST (OUTPATIENT)
Dept: ADMINISTRATIVE | Facility: OTHER | Age: 60
End: 2020-07-22

## 2020-07-22 DIAGNOSIS — T14.8XXA WOUND INFECTION: ICD-10-CM

## 2020-07-22 DIAGNOSIS — L08.9 WOUND INFECTION: ICD-10-CM

## 2020-07-22 DIAGNOSIS — I73.9 PAD (PERIPHERAL ARTERY DISEASE) (H): ICD-10-CM

## 2020-07-24 ENCOUNTER — RECORDS - HEALTHEAST (OUTPATIENT)
Dept: VASCULAR ULTRASOUND | Facility: CLINIC | Age: 60
End: 2020-07-24

## 2020-07-24 ENCOUNTER — AMBULATORY - HEALTHEAST (OUTPATIENT)
Dept: VASCULAR SURGERY | Facility: CLINIC | Age: 60
End: 2020-07-24

## 2020-07-24 ENCOUNTER — COMMUNICATION - HEALTHEAST (OUTPATIENT)
Dept: INFECTIOUS DISEASES | Facility: CLINIC | Age: 60
End: 2020-07-24

## 2020-07-24 DIAGNOSIS — I73.9 PERIPHERAL VASCULAR DISEASE, UNSPECIFIED (H): ICD-10-CM

## 2020-07-24 DIAGNOSIS — I87.1 COMPRESSION OF VEIN: ICD-10-CM

## 2020-07-24 DIAGNOSIS — I87.1 MAY-THURNER SYNDROME: ICD-10-CM

## 2020-07-25 LAB
BACTERIA SPEC CULT: ABNORMAL
BACTERIA SPEC CULT: ABNORMAL
GRAM STAIN RESULT: ABNORMAL

## 2020-07-27 ENCOUNTER — OFFICE VISIT - HEALTHEAST (OUTPATIENT)
Dept: INFECTIOUS DISEASES | Facility: CLINIC | Age: 60
End: 2020-07-27

## 2020-07-27 DIAGNOSIS — L08.9 LOCAL INFECTION OF WOUND: ICD-10-CM

## 2020-07-27 DIAGNOSIS — T14.8XXA WOUND INFECTION: ICD-10-CM

## 2020-07-27 DIAGNOSIS — T14.8XXA LOCAL INFECTION OF WOUND: ICD-10-CM

## 2020-07-27 DIAGNOSIS — L08.9 WOUND INFECTION: ICD-10-CM

## 2020-07-28 ENCOUNTER — RECORDS - HEALTHEAST (OUTPATIENT)
Dept: ADMINISTRATIVE | Facility: OTHER | Age: 60
End: 2020-07-28

## 2020-07-28 ENCOUNTER — HOSPITAL ENCOUNTER (OUTPATIENT)
Dept: CT IMAGING | Facility: CLINIC | Age: 60
Discharge: HOME | End: 2020-07-28
Attending: SURGERY

## 2020-07-28 ENCOUNTER — AMBULATORY - HEALTHEAST (OUTPATIENT)
Dept: LAB | Facility: CLINIC | Age: 60
End: 2020-07-28

## 2020-07-28 ENCOUNTER — HOSPITAL ENCOUNTER (OUTPATIENT)
Dept: MRI IMAGING | Facility: CLINIC | Age: 60
Discharge: HOME | End: 2020-07-28
Attending: INTERNAL MEDICINE

## 2020-07-28 DIAGNOSIS — L08.9 LOCAL INFECTION OF WOUND: ICD-10-CM

## 2020-07-28 DIAGNOSIS — T14.8XXA LOCAL INFECTION OF WOUND: ICD-10-CM

## 2020-07-28 DIAGNOSIS — I87.1 MAY-THURNER SYNDROME: ICD-10-CM

## 2020-07-28 LAB
ALBUMIN SERPL-MCNC: 3.8 G/DL (ref 3.5–5)
ALP SERPL-CCNC: 91 U/L (ref 45–120)
ALT SERPL W P-5'-P-CCNC: <9 U/L (ref 0–45)
ANION GAP SERPL CALCULATED.3IONS-SCNC: 10 MMOL/L (ref 5–18)
AST SERPL W P-5'-P-CCNC: 14 U/L (ref 0–40)
BASOPHILS # BLD AUTO: 0.1 THOU/UL (ref 0–0.2)
BASOPHILS NFR BLD AUTO: 1 % (ref 0–2)
BILIRUB SERPL-MCNC: 0.2 MG/DL (ref 0–1)
BUN SERPL-MCNC: 8 MG/DL (ref 8–22)
C REACTIVE PROTEIN LHE: 0.1 MG/DL (ref 0–0.8)
CALCIUM SERPL-MCNC: 9.1 MG/DL (ref 8.5–10.5)
CHLORIDE BLD-SCNC: 106 MMOL/L (ref 98–107)
CO2 SERPL-SCNC: 25 MMOL/L (ref 22–31)
CREAT SERPL-MCNC: 0.72 MG/DL (ref 0.6–1.1)
EOSINOPHIL # BLD AUTO: 0.2 THOU/UL (ref 0–0.4)
EOSINOPHIL NFR BLD AUTO: 2 % (ref 0–6)
ERYTHROCYTE [DISTWIDTH] IN BLOOD BY AUTOMATED COUNT: 14.4 % (ref 11–14.5)
ERYTHROCYTE [SEDIMENTATION RATE] IN BLOOD BY WESTERGREN METHOD: 29 MM/HR (ref 0–20)
GFR SERPL CREATININE-BSD FRML MDRD: >60 ML/MIN/1.73M2
GLUCOSE BLD-MCNC: 87 MG/DL (ref 70–125)
HCT VFR BLD AUTO: 39.4 % (ref 35–47)
HGB BLD-MCNC: 12.4 G/DL (ref 12–16)
LYMPHOCYTES # BLD AUTO: 3.7 THOU/UL (ref 0.8–4.4)
LYMPHOCYTES NFR BLD AUTO: 35 % (ref 20–40)
MCH RBC QN AUTO: 27.7 PG (ref 27–34)
MCHC RBC AUTO-ENTMCNC: 31.5 G/DL (ref 32–36)
MCV RBC AUTO: 88 FL (ref 80–100)
MONOCYTES # BLD AUTO: 0.9 THOU/UL (ref 0–0.9)
MONOCYTES NFR BLD AUTO: 9 % (ref 2–10)
NEUTROPHILS # BLD AUTO: 5.5 THOU/UL (ref 2–7.7)
NEUTROPHILS NFR BLD AUTO: 53 % (ref 50–70)
PLATELET # BLD AUTO: 434 THOU/UL (ref 140–440)
PMV BLD AUTO: 8.9 FL (ref 8.5–12.5)
POTASSIUM BLD-SCNC: 3.8 MMOL/L (ref 3.5–5)
PROT SERPL-MCNC: 7.6 G/DL (ref 6–8)
RBC # BLD AUTO: 4.47 MILL/UL (ref 3.8–5.4)
SODIUM SERPL-SCNC: 141 MMOL/L (ref 136–145)
WBC: 10.4 THOU/UL (ref 4–11)

## 2020-07-30 ENCOUNTER — COMMUNICATION - HEALTHEAST (OUTPATIENT)
Dept: INFECTIOUS DISEASES | Facility: CLINIC | Age: 60
End: 2020-07-30

## 2020-07-31 ENCOUNTER — OFFICE VISIT - HEALTHEAST (OUTPATIENT)
Dept: VASCULAR SURGERY | Facility: CLINIC | Age: 60
End: 2020-07-31

## 2020-07-31 DIAGNOSIS — L98.499 CHRONIC SKIN ULCER, UNSPECIFIED ULCER STAGE (H): ICD-10-CM

## 2020-08-03 ENCOUNTER — COMMUNICATION - HEALTHEAST (OUTPATIENT)
Dept: VASCULAR SURGERY | Facility: CLINIC | Age: 60
End: 2020-08-03

## 2020-08-03 DIAGNOSIS — I87.2 VENOUS INSUFFICIENCY: ICD-10-CM

## 2020-08-03 DIAGNOSIS — L98.499 CHRONIC SKIN ULCER, UNSPECIFIED ULCER STAGE (H): ICD-10-CM

## 2020-08-04 ENCOUNTER — HOSPITAL ENCOUNTER (OUTPATIENT)
Dept: WOUND CARE | Facility: CLINIC | Age: 60
Discharge: HOME | End: 2020-08-04
Attending: PHYSICIAN ASSISTANT | Admitting: PHYSICIAN ASSISTANT
Payer: COMMERCIAL

## 2020-08-04 VITALS — HEIGHT: 64 IN | BODY MASS INDEX: 20.49 KG/M2 | TEMPERATURE: 98 F | RESPIRATION RATE: 16 BRPM | WEIGHT: 120 LBS

## 2020-08-04 DIAGNOSIS — T81.89XA NON-HEALING SURGICAL WOUND, INITIAL ENCOUNTER: Primary | ICD-10-CM

## 2020-08-04 PROCEDURE — 99203 OFFICE O/P NEW LOW 30 MIN: CPT | Mod: 25 | Performed by: PHYSICIAN ASSISTANT

## 2020-08-04 PROCEDURE — 11042 DBRDMT SUBQ TIS 1ST 20SQCM/<: CPT

## 2020-08-04 PROCEDURE — G0463 HOSPITAL OUTPT CLINIC VISIT: HCPCS | Mod: 25

## 2020-08-04 PROCEDURE — 11042 DBRDMT SUBQ TIS 1ST 20SQCM/<: CPT | Performed by: PHYSICIAN ASSISTANT

## 2020-08-04 RX ORDER — DOXYCYCLINE 100 MG/1
CAPSULE ORAL
COMMUNITY
Start: 2020-07-20 | End: 2020-11-04

## 2020-08-04 RX ORDER — ERGOCALCIFEROL 1.25 MG/1
50000 CAPSULE, LIQUID FILLED ORAL WEEKLY
COMMUNITY
Start: 2020-05-15 | End: 2021-12-24

## 2020-08-04 RX ORDER — CIPROFLOXACIN 500 MG/1
500 TABLET, FILM COATED ORAL
COMMUNITY
Start: 2020-07-16 | End: 2020-08-06

## 2020-08-04 ASSESSMENT — MIFFLIN-ST. JEOR: SCORE: 1109.32

## 2020-08-04 NOTE — PROGRESS NOTES
Patient arrived for wound care visit. Certified Wound Care Nurse time spent evaluating patient record, completed a full evaluation and documented wound(s) & alen-wound skin; provided recommendation based on treatment plan. Applied dressing, reviewed discharge instructions, patient education, and discussed plan of care with appropriate medical team staff members and patient and/or family members.

## 2020-08-04 NOTE — PROGRESS NOTES
[place] [facility]    ASSESSMENT:   1. Dehisced surgical wound of left posterior ankle with necrotic tendon exposed  2. 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 Occluded posterior tibial artery  4. Suspect she has been noncompliant with immobilizing this area adequately    PLAN/DISCUSSION:   1. Wound care plan: vashe soak, dres with MeSalt  2. Continue CAM walker - may need some kind of cast/splint to immobilize in future  3. EdemaWear to help with microperfusion and edema control  4. Consider dermatopathology biopsy with tissue cultures (mycobacterium, fungal, aerobic, anaerobic)    HISTORY OF PRESENT ILLNESS:   Anna Sanderson is a 60 year old female who presents today for a nonhealing surgical wound of her left posterior ankle. PMH significant for iron deficiency anemia for which she receives infusions, GERD, and osteoporosis. Original injury in 2004, has had numerous surgical debridements, and failed primary closure, free flaps from radial forearm x 2 and reverse sural flap. Has been on antibiotics off and on since 2004. Brief history as follows:    6/3/04 left anterior process fracture   10/7/04 ORIF  5/10/05 MRI showing mild partial thickness tearing of achilles  10/05 tendon and ulcer dessication  10/13/05 debridement with primary closure  10/27/05 repeat repair, half of tendon remained, wound VAC placed   Sustained rupture of achilles tendon in 2005 after trauma from a shopping cart. Underwent repair with Dr. Ricco Isbell, [facility]. Subsequently developed infection which could not be managed despite several I&Ds  7/30/07 negative vasculopathic workup  11/27/07 MSSA + culture  12/21/07 STSG  2/21/08 occlusion of L posterior tibial artery at level of previous postop change   5/19/08 full thickness skin graft  11/10/08 infection, MSSA & strep viridans  1/2009 graft, tendon ruptured and wou31/nd reopened  1/22/09 MSSA infection  3/31/09 I&D for L ankle extraarticular soft tissue  "abscess, culture grew fusobacterium prevotella, MSSA, enteroccocus, peptostreptoccocus, strep viridans  5/2/12, 1/17/13, 6/19/14, 3/4/15, 8/16/17, 10/9/17, 1/8/18, 3/23/18, 7/27/18 I&D's with Dr. Carie Wiley  10/31/18 consultation with Dr. Damico, recommended hematology and vascular in preparation for flap, she ultimately cancelled the flap reconstruction  11/7/18 CTA showed occluded PTA but had runoff from anterior tibial and peroneal arteries to foot  7/24/19 I&D Dr. Carie Wiley [facility]  7/20/20 presented to ID (Dr. Paulina Ruggiero) for concern of infection, recommended vascular workup and repeat MRI, restarted on antibiotics  7/24/20 - ABIs at [facility] (R 1.04, L 1.06)  7/24/20 venous competency study showing insufficiency of GSV  7/29/20 CTA negative for May Thurners  7/29/20  MR shows complex fluid collection but negative for OM    CURRENT/PREVIOUS DRESSING: adaptic    COMPRESSION: none    IMMOBILIZATION: CAM walker    REVIEW OF SYSTEMS:  CONSTITUTIONAL: Denies fevers or acute illness, denies recent weight loss  ENDOCRINE: Denies diabetes    PAST MEDICAL HISTORY: GERD, osteoporosis, iron deficiency anemia    SOCIAL HISTORY: 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 She has never used smokeless tobacco.    MEDICATIONS:   Current Outpatient Medications   Medication     CARAFATE 1 GM/10ML suspension     ciprofloxacin (CIPRO) 500 MG tablet     denosumab (PROLIA) 60 MG/ML SOSY injection     doxycycline monohydrate (MONODOX) 100 MG capsule     IBUPROFEN PO     order for DME     pantoprazole (PROTONIX) 40 MG EC tablet     vitamin D2 (ERGOCALCIFEROL) 93130 units (1250 mcg) capsule     No current facility-administered medications for this encounter.        VITALS: Temp 98  F (36.7  C) (Temporal)   Resp 16   Ht 1.626 m (5' 4\")   Wt 54.4 kg (120 lb)   BMI 20.60 kg/m       PHYSICAL EXAM:  GENERAL: Patient is alert and oriented and in no acute distress  CV: " palpable pedal pulses  INTEGUMENTARY:   Wound (used by OP [facility] only) 08/04/20 0805 Left posterior ankle trauma (Active)   Length (cm) 4.7 08/04/20 0800   Width (cm) 4.5 08/04/20 0800   Depth (cm) 1.6 08/04/20 0800   Wound (cm^2) 21.15 cm^2 08/04/20 0800   Wound Volume (cm^3) 33.84 cm^3 08/04/20 0800   Tunneling [Depth (cm)/Location] 1.4cm at 12 o'clock 08/04/20 0817   Dressing Appearance moist drainage 08/04/20 0817   Drainage Characteristics/Odor serosanguineous 08/04/20 0817   Drainage Amount copious 08/04/20 0817   Thickness/Stage full thickness 08/04/20 0817   Base tendon;slough 08/04/20 0817   Periwound intact 08/04/20 0817   Periwound Temperature warm 08/04/20 0817   Care, Wound debrided 08/04/20 0817           PROCEDURE: 4% topical lidocaine was applied to the wound by nursing staff. Patient was determined to be capable of making their own medical decisions and informed consent was obtained. Using a 15 blade a surgical debridement was performed down to and including subcutaneous tissue of <20 cm2. Hemostasis was achieved with pressure. The patient tolerated the procedure well.      FOLLOW-UP: 3 wks with Dr. Elena STARK PA-C

## 2020-08-04 NOTE — DISCHARGE INSTRUCTIONS
[facility]  [address] [facility] [address] [address]    Call us at [phone] if you have any questions about your wounds, have redness or swelling around your wound, have a fever of 101 or greater or if you have any other problems or concerns. We answer the phone Monday through Friday 8 am to 4 pm, please leave a message as we check the voicemail frequently throughout the day.     Anna Sanderson      1960    Medications/supplements to aid in healin. 1 packet of Lex Supplement into your favorite beverage twice a day. Purchase at [facility] in [address] [facility], [place] (by [employer]) or [employer]  2. Vitamin D3 5,000 iu per day.    A diet high in protein is important for wound healing, we recommend getting 90 grams of protein per day. Taking protein shakes or bars are a good way to get extra protein in your diet.  Other good sources of protein:  Pork 26g per 3 oz  Whey protein powder - 24g per scoop (on average)  Greek yogurt - 23g per 8oz   Chicken or Turkey - 23g per 3oz  Fish - 20-25g per 3oz  Beef - 18-23g per 3oz  Navy beans - 20g per cup  Cottage cheese - 14g per 1/2 cup   Lentils - 13g per 1/4 cup  Beef jerky 13g per 1oz  2% milk - 8g per cup  Peanut butter - 8g per 2 tablespoons  Eggs - 6g per egg  Mixed nuts - 6g per 2oz     Wound care recommendations to left achilles wound  After cleansing with saline or wound cleanser, apply small amount of VASHE on gauze, lay into wound bed, let sit for 10 minutes, remove gauze (do not rinse) then apply dressing. Apply Mesalt to wound bed including tucking into the tunneled area followed by EdemaWear (Spandage used in clinic) then cover with gauze and secure with roll gauze and tape. Change daily    Please raise your legs above your heart for 30 mins 3 times a day to promote wound healing.      Halima Bynum PA-C. 2020    Wound Clinc follow up with Dr. Parker on 2020 at 8:00am

## 2020-08-13 ENCOUNTER — HOSPITAL ENCOUNTER (OUTPATIENT)
Dept: WOUND CARE | Facility: CLINIC | Age: 60
Discharge: HOME | End: 2020-08-13
Attending: SURGERY | Admitting: SURGERY
Payer: COMMERCIAL

## 2020-08-13 VITALS
SYSTOLIC BLOOD PRESSURE: 145 MMHG | TEMPERATURE: 98.2 F | RESPIRATION RATE: 16 BRPM | HEART RATE: 96 BPM | DIASTOLIC BLOOD PRESSURE: 83 MMHG

## 2020-08-13 DIAGNOSIS — T81.89XA NON-HEALING SURGICAL WOUND, INITIAL ENCOUNTER: ICD-10-CM

## 2020-08-13 PROCEDURE — 97602 WOUND(S) CARE NON-SELECTIVE: CPT

## 2020-08-13 RX ORDER — CIPROFLOXACIN 500 MG/1
TABLET, FILM COATED ORAL
COMMUNITY
Start: 2020-08-07 | End: 2020-11-04

## 2020-08-13 NOTE — DISCHARGE INSTRUCTIONS
[facility]  [address] [facility] [address] [address]    Call us at [phone] if you have any questions about your wounds, have redness  or swelling around your wound, have a fever of 101 or greater or if you have any  other problems or concerns. We answer the phone Monday through Friday 8 am to 4  pm, please leave a message as we check the voicemail frequently throughout the day.    Anna Sanderson 1960    Medications/supplements to aid in healin. 1 packet of Lex Supplement into your favorite beverage twice a day. Purchase at [facility] in [address]  2. Vitamin D3 10,000 iu per day.  3. Vitamin C 2,000 mg daily  4. Folate 400 mcg daily   5. Vitamin B 12 1000 mcg daily    A diet high in protein is important for wound healing, we recommend getting 90  grams of protein per day. Taking protein shakes or bars are a good way to get extra  protein in your diet. Other good sources of protein:  Pork 26g per 3 oz  Whey protein powder - 24g per scoop (on average)  Greek yogurt - 23g per 8oz  Chicken or Turkey - 23g per 3oz  Fish - 20-25g per 3oz  Beef - 18-23g per 3oz  Navy beans - 20g per cup  Cottage cheese - 14g per 1/2 cup  Lentils - 13g per 1/4 cup  Beef jerky 13g per 1oz  2% milk - 8g per cup  Peanut butter - 8g per 2 tablespoons  Eggs - 6g per egg  Mixed nuts - 6g per 2oz    Wound care recommendations to left achilles wound  After cleansing with saline or wound cleanser, apply small amount of VASHE on  gauze, lay into wound bed, let sit for 10 minutes, remove gauze (do not rinse) then  apply dressing. Apply Mesalt to wound bed including tucking into the tunneled area  followed by EdemaWear then cover with gauze and secure  with roll gauze and tape. Change daily    Please raise your legs above your heart for 30 mins 3 times a day to promote  wound healing.    Dr. Nicole Parker, 2020    Wound Clinc follow up with  Dr. Her in September and Dr. Parker in October

## 2020-08-13 NOTE — PROGRESS NOTES
Visit Date:   08/13/2020      [facility] VISIT       This is a 60-year-old female who was referred from our physician's assistant, Patricia Bynum, for evaluation of a very chronic right Achilles wound.  This patient's history is very complex, in that she has had multiple failed attempts at healing this defect.  It originated with an injury from a shopping cart and ultimately had 2 failed flaps by Dr. Doran and Dr. Price in [place] using bilateral radial forearm flaps that were complicated by vasospasm.  They then tried a reverse sural, but that was too short, so there was still an open wound.  More recently, she had been assessed by Dr. Damico at the , who was willing to attempt another free flap, but she chose not to go that route.  Vascular studies so far have shown an occluded posterior tibial vessel and there was some question as to whether or not she would benefit from a GSV ablation.  She is also being followed by Dr. [facility]ie [facility] at [place] and has had a number of procedures, including debridements and also tendon transfer.  She has used the VAC in the past, and currently has been using Mesalt and Vashe, which helped clean things up quite a bit and get rid of the odor.  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      PHYSICAL EXAMINATION:     EXTREMITIES:  On exam, her right foreleg shows a well-healed donor graft site proximally from her reverse sural.  There is the wound itself, which measures 5.7 x 4.7 x 1.5 cm with tunneling of 1.8 cm at the 12 o'clock position.  Surrounding the defect with the exposed tendinous structures or what is left of them, there was a lot of kind of indurated scar tissue.  The wound itself appears quite dry, but with minimal slough.  The tendon area was very thickened, indurated and fibrotic.   There is a crevice medially to the Achilles that tunnels a bit.  There is not a lot of what I would consider beefy granulation tissue.  The area in general has no appearance of infection, although I would not be surprised if there is biofilm.  Her dorsalis pedis pulse is very strong, but no posterior tibial pulse is present given her previous vascular workup.  The question remains if she has adequate collaterals in this region.  We will have Dr. Kenyon Her take a look at her case in the next month or so to see if there is any further workup that we need to look at local perfusion.  We could conceivably use a SPY real time angiography in the OR if we needed to.  Hyperbaric oxygen might also be a possibility given the fact that she has had multiple failed flaps.  This may be a bit of a disruption to her life, however, to go for daily treatments, but that is a possibility.  While we could certainly debride her in the operating room, the question is how much can and should be debrided from the tendon and still leave her with a functional ankle.  Would she would benefit from the VAC Veraflo to stimulate more aggressive granulation tissue or should we consider something like a skin substitute and/or an amniotic product with a traditional VAC.  Should all these things fail, at the very least, she might need a fusion, but still would need coverage, or she would just have to learn to live with her wound if she wanted to preserve her limb from amputation, which was suggested in the past.  As far as other factors related to healing, she does have a history of iron deficiency anemia, but her last infusion was last year.  Nutritionally, she does take some protein supplements, even though she prefers carbs.  She is a nonsmoker and she is not on any immunosuppressants.  Also to consider would be some occult infections.  She states she gets a lot of Pseudomonas usually, but that we may need to look for fungal and mycobacterial  cultures as well.  My hope would be to debride things and use some sort of a skin substitute to fill the crevice until we have a more beefy bed.  We will see what Dr. Her might have to add.  She may recommend some of his vascular health supplements.  She can see him in September and I will see her on 10/01.  Please see nursing notes for dimensions of the wound today.  Vital signs were actually within normal limits.    Labs:   Recent Labs   Lab Test 18  1435   HGB 9.9*   WBC 6.0   CRP <2.9     Nutrition requirements were discussed with patient today.  Vitals:  BP (!) 145/83   Pulse 96   Temp 98.2  F (36.8  C) (Temporal)   Resp 16   Wound:     Photo:        DAYANNA PARISH MD             D: 2020   T: 2020   MT: NATHAN      Name:     AMY PAGE   MRN:      [id number]        Account:      [id number]   :      1960           Visit Date:   2020      Document: [id number]

## 2020-08-18 ENCOUNTER — TELEPHONE (OUTPATIENT)
Dept: WOUND CARE | Facility: CLINIC | Age: 60
End: 2020-08-18

## 2020-08-18 DIAGNOSIS — T81.89XA NON-HEALING SURGICAL WOUND, INITIAL ENCOUNTER: Primary | ICD-10-CM

## 2020-08-18 NOTE — TELEPHONE ENCOUNTER
Anna wants to know if she should go to the Vascular Surgeon in [place] if she is suppose to see Dr. Her here?

## 2020-08-27 ENCOUNTER — COMMUNICATION - HEALTHEAST (OUTPATIENT)
Dept: INTERNAL MEDICINE | Facility: CLINIC | Age: 60
End: 2020-08-27

## 2020-08-27 DIAGNOSIS — M81.0 OSTEOPOROSIS OF MULTIPLE SITES WITHOUT PATHOLOGICAL FRACTURE: ICD-10-CM

## 2020-08-27 DIAGNOSIS — Z23 NEED FOR SHINGLES VACCINE: ICD-10-CM

## 2020-08-31 ENCOUNTER — HOSPITAL ENCOUNTER (OUTPATIENT)
Dept: ULTRASOUND IMAGING | Facility: CLINIC | Age: 60
Discharge: HOME | End: 2020-08-31
Attending: SURGERY | Admitting: SURGERY
Payer: COMMERCIAL

## 2020-08-31 DIAGNOSIS — T81.89XA NON-HEALING SURGICAL WOUND, INITIAL ENCOUNTER: ICD-10-CM

## 2020-08-31 PROCEDURE — 93925 LOWER EXTREMITY STUDY: CPT

## 2020-09-02 ENCOUNTER — COMMUNICATION - HEALTHEAST (OUTPATIENT)
Dept: GENERAL RADIOLOGY | Facility: CLINIC | Age: 60
End: 2020-09-02

## 2020-09-03 ENCOUNTER — HOSPITAL ENCOUNTER (OUTPATIENT)
Dept: WOUND CARE | Facility: CLINIC | Age: 60
Discharge: HOME | End: 2020-09-03
Attending: SURGERY | Admitting: SURGERY
Payer: COMMERCIAL

## 2020-09-03 VITALS
RESPIRATION RATE: 18 BRPM | DIASTOLIC BLOOD PRESSURE: 80 MMHG | SYSTOLIC BLOOD PRESSURE: 122 MMHG | TEMPERATURE: 98 F | HEART RATE: 87 BPM

## 2020-09-03 DIAGNOSIS — T81.89XA NON-HEALING SURGICAL WOUND, INITIAL ENCOUNTER: ICD-10-CM

## 2020-09-03 DIAGNOSIS — L97.323 SKIN ULCER OF LEFT ANKLE WITH NECROSIS OF MUSCLE (H): ICD-10-CM

## 2020-09-03 DIAGNOSIS — I73.9 PAD (PERIPHERAL ARTERY DISEASE) (H): Primary | ICD-10-CM

## 2020-09-03 PROBLEM — J30.9 ALLERGIC RHINITIS: Status: ACTIVE | Noted: 2020-09-03

## 2020-09-03 PROBLEM — K21.00 GASTROESOPHAGEAL REFLUX DISEASE WITH ESOPHAGITIS: Status: ACTIVE | Noted: 2020-09-03

## 2020-09-03 PROBLEM — R05.3 CHRONIC COUGH: Status: ACTIVE | Noted: 2020-03-23

## 2020-09-03 PROBLEM — M76.60 ACHILLES TENDINITIS: Status: ACTIVE | Noted: 2019-07-26

## 2020-09-03 PROBLEM — M81.0 OSTEOPOROSIS OF MULTIPLE SITES WITHOUT PATHOLOGICAL FRACTURE: Status: ACTIVE | Noted: 2020-09-03

## 2020-09-03 PROBLEM — R10.13 ABDOMINAL PAIN, EPIGASTRIC: Status: ACTIVE | Noted: 2019-11-05

## 2020-09-03 PROBLEM — L20.9 ATOPIC DERMATITIS, UNSPECIFIED TYPE: Status: ACTIVE | Noted: 2020-09-03

## 2020-09-03 PROBLEM — M65.10 ACHILLES TENDON INFECTION: Status: ACTIVE | Noted: 2020-09-03

## 2020-09-03 PROBLEM — E55.9 VITAMIN D DEFICIENCY: Status: ACTIVE | Noted: 2020-01-22

## 2020-09-03 PROCEDURE — 99213 OFFICE O/P EST LOW 20 MIN: CPT | Performed by: SURGERY

## 2020-09-03 PROCEDURE — 97602 WOUND(S) CARE NON-SELECTIVE: CPT

## 2020-09-03 RX ORDER — DIOSMIN COMPLEX NO.1 630 MG
1 TABLET ORAL 2 TIMES DAILY
Qty: 180 TABLET | Refills: 3 | Status: SHIPPED | OUTPATIENT
Start: 2020-09-03 | End: 2021-11-04

## 2020-09-03 NOTE — DISCHARGE INSTRUCTIONS
[facility]  [address] [facility] [address] [address]    Call us at [phone] if you have any questions about your wounds, have redness or swelling around your wound, have a fever of 101 or greater or if you have any other problems or concerns. We answer the phone Monday through Friday 8 am to 4 pm, please leave a message as we check the voicemail frequently throughout the day.     Anna Sanderson      1960    Medications/supplements to aid in healin. 1 packet of Lex Supplement into your favorite beverage twice a day. Purchase at [facility] in [address]  2. Vitamin C 2,000 mg daily  3. Folate 1000 mcg daily   4. Vitamin B 12 1000 mcg daily    A diet high in protein is important for wound healing, we recommend getting 90 grams of protein per day. Taking protein shakes or bars are a good way to get extra protein in your diet.  Other good sources of protein:  Pork 26g per 3 oz  Whey protein powder - 24g per scoop (on average)  Greek yogurt - 23g per 8oz   Chicken or Turkey - 23g per 3oz  Fish - 20-25g per 3oz  Beef - 18-23g per 3oz  Navy beans - 20g per cup  Cottage cheese - 14g per 1/2 cup   Lentils - 13g per 1/4 cup  Beef jerky 13g per 1oz  2% milk - 8g per cup  Peanut butter - 8g per 2 tablespoons  Eggs - 6g per egg  Mixed nuts - 6g per 2oz     Wound care recommendations to left achilles wound  After cleansing with saline or wound cleanser, apply small amount of VASHE on gauze, lay into wound bed and into tunneled area followed by EdemaWear then cover with gauze and secure with roll gauze and tape. Change twice daily  Compression:   You have a compression wrap coolflex pulled to second white line on strap, this is supposed to be removed at night and put back on first thing in the morning.   Please remove compression dressing if toes turn blue and/or tingle and can not be relieved by raising the leg for one hour.     Please  raise your legs above your heart for 30 mins 3 times a day to promote wound healing.         SHI Her M.D.. September 3, 2020    Wound Clinc follow up Dr. Elena Sims Compression By [employer] Phone [phone] to get coolflex

## 2020-09-03 NOTE — PROGRESS NOTES
[facility] Progress Note    Subject: Anna Sanderson medical record reviewed, previous extensive evaluation with Dr. Aislinn Parker, chronic ulceration left distal calf.  60-year-old female, remote history of tobacco utilization for 15 years, ceased 15 years ago, nondiabetic, no history of Sjogren's syndrome, scleroderma, lupus, vasculitis, hypertension or hyperlipidemia.  Original injury was falling on the stairs approximately 15 years ago resulting in left ankle fracture, was in a cast, within several weeks of cast removal, she was struck from behind a grocery store by a grocery cart resulting in Achilles tendon rupture.  Subsequent development of complex wound which required a left radial soft tissue free flap that failed, subsequent attempted right radial artery based soft tissue free flap however intraoperative significant vasospasm resulted in abandoning the procedure and the flap was replaced to the right arm.  Recently underwent a CT scan of the abdomen to evaluate for potential May Thurner syndrome given chronic edema of the left lower extremity,, performed at [facility], results identified in care everywhere, moderate extrinsic compression of the left common iliac vein was noted but not felt to be hemodynamically significant, there was no evidence of May Thurner syndrome.  Previous CT angiogram performed through [facility] November 7, 2018, this demonstrated occlusion of the left posterior tibial artery.  A duplex ultrasound performed at our vascular lab on August 31, 2020 identified the left common femoral, profunda femoris, superficial normal and popliteal arteries to be patent, the peroneal and posterior tibial arteries were identified to be patent with biphasic waveforms, the anterior tibial artery was noted to be patent however had retrograde flow distally.  This is discordant with the previously performed CT angiogram which demonstrated the posterior  tibial artery to be occluded, based on physical examination there is angiosomal arterial occlusive disease that would be associated with the posterior tibial artery, therefore repeat high-resolution CT angiogram of the left lower extremity to further evaluate and intra-articular options for percutaneous intervention.  She is currently performing hypochlorous acid washes to the ulceration and application of Mesalt, utilizing edema wear.    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  Exam:  /80   Pulse 87   Temp 98  F (36.7  C) (Temporal)   Resp 18   Wound (used by OP [facility] only) 08/04/20 0805 Left posterior ankle trauma (Active)   Length (cm) 5 09/03/20 0800   Width (cm) 4.5 09/03/20 0800   Depth (cm) 1.5 09/03/20 0800   Wound (cm^2) 22.5 cm^2 09/03/20 0800   Wound Volume (cm^3) 33.75 cm^3 09/03/20 0800   Wound healing % -6.38 09/03/20 0800   Dressing Appearance moist drainage 09/03/20 0800   Drainage Characteristics/Odor serosanguineous 09/03/20 0800   Drainage Amount moderate 09/03/20 0800   Thickness/Stage full thickness 09/03/20 0800   Base red/granulating 09/03/20 0800   Red (%), Wound Tissue Color 100 09/03/20 0800   Periwound intact 09/03/20 0800   Periwound Temperature warm 09/03/20 0800   Periwound Skin Turgor soft 09/03/20 0800   Edges open 09/03/20 0800   Care, Wound non-select wound debridement performed 09/03/20 0800     60-year-old female, normal body mass index, left posterior calf wound with no exposure of Achilles tendon however extensive biofilm  is present, small 1 of undermining, no periwound erythema, some degree of granulation tissue present, no odor, no bone or tendon exposure.  She has full range of motion.  Palpable left dorsalis pedis pulse, nonpalpable left posterior tibial pulse, palpable left popliteal pulse.  Absent sensation at the left deep peroneal nerve distribution, hypersensitivity and tingling in the posterior tibial nerve distribution of the plantar surface of the foot, no heel ulcerations.  Measures for cool flex calf and foot component, foot length 14 cm, base of toes 20 cm, heel to ankle mortise 30 cm, length of left calf 30 cm, ankle circumference 23 cm, calf circumference 26 cm.        Impression: Complex wound left posterior distal calf, history of Achilles tendon rupture, status post left radial forearm free flap, failed vascular anastomosis, occluded left posterior tibial artery by previous CT angiogram though discordant with findings of duplex ultrasound recently performed.    Plan: We will dress the wounds with hypochlorous acid Vashe moist moist dressing changes twice a day, edema wear, adjunct for Sigvaris CoolFlex calf component and foot component given significance of edema in the periwound region and immediately proximal and distal..  Micro nutrient adjective therapy consisting of vitamin D 10,000 units daily, vitamin C 2000 is daily, micronized flavonoid fraction, vascular, 1 tablet twice daily, MTHFR testing, if heterozygous or homozygous, will initiate Rheumate, medical grade folate and vitamin B12 daily  Patient will return to the clinic in 4 weeks time    Mitchell Her MD on 9/3/2020 at 9:44 AM

## 2020-09-04 ENCOUNTER — AMBULATORY - HEALTHEAST (OUTPATIENT)
Dept: NURSING | Facility: CLINIC | Age: 60
End: 2020-09-04

## 2020-09-04 DIAGNOSIS — Z23 NEED FOR SHINGLES VACCINE: ICD-10-CM

## 2020-09-08 ENCOUNTER — RECORDS - HEALTHEAST (OUTPATIENT)
Dept: ADMINISTRATIVE | Facility: OTHER | Age: 60
End: 2020-09-08

## 2020-09-14 ENCOUNTER — TRANSFERRED RECORDS (OUTPATIENT)
Dept: HEALTH INFORMATION MANAGEMENT | Facility: CLINIC | Age: 60
End: 2020-09-14

## 2020-09-14 ENCOUNTER — HOSPITAL ENCOUNTER (OUTPATIENT)
Dept: CT IMAGING | Facility: CLINIC | Age: 60
Discharge: HOME | End: 2020-09-14
Attending: SURGERY

## 2020-09-14 DIAGNOSIS — I73.9 PERIPHERAL ARTERY DISEASE (H): ICD-10-CM

## 2020-09-14 DIAGNOSIS — T81.89XA NON-HEALING SURGICAL WOUND, INITIAL ENCOUNTER: ICD-10-CM

## 2020-09-16 ENCOUNTER — COMMUNICATION - HEALTHEAST (OUTPATIENT)
Dept: INTERNAL MEDICINE | Facility: CLINIC | Age: 60
End: 2020-09-16

## 2020-09-16 ENCOUNTER — OFFICE VISIT - HEALTHEAST (OUTPATIENT)
Dept: INTERNAL MEDICINE | Facility: CLINIC | Age: 60
End: 2020-09-16

## 2020-09-16 ENCOUNTER — AMBULATORY - HEALTHEAST (OUTPATIENT)
Dept: INTERNAL MEDICINE | Facility: CLINIC | Age: 60
End: 2020-09-16

## 2020-09-16 DIAGNOSIS — H60.00 FURUNCLE OF EAR: ICD-10-CM

## 2020-09-17 ENCOUNTER — TELEPHONE (OUTPATIENT)
Dept: WOUND CARE | Facility: CLINIC | Age: 60
End: 2020-09-17

## 2020-09-17 DIAGNOSIS — I73.9 PAD (PERIPHERAL ARTERY DISEASE) (H): ICD-10-CM

## 2020-09-17 DIAGNOSIS — T81.89XA NON-HEALING SURGICAL WOUND, INITIAL ENCOUNTER: Primary | ICD-10-CM

## 2020-09-17 NOTE — TELEPHONE ENCOUNTER
"Pt referred to [facility] by Dr. Her for left distal calf ulceration.    8/31/20 bilateral LE arterial US in Epic:  \"Flow in the left anterior tibial artery distally is retrograde suggesting a stenosis or occlusion in the proximal left anterior tibial artery\"    Per Dr. Her's 9/3/20 note:  \"Palpable left dorsalis pedis pulse, nonpalpable left posterior tibial pulse, palpable left popliteal pulse\".    Per Care Everywhere, pt has been evaluated by Dr. Quezada at [facility].  Pt would like to transfer care to [facility].    Routing to scheduling to contact pt to coordinate AMY with exercise and in person consult with Vascular Surgery at next available.    FEMI HurtadoN, RN-BC  [facility]      "

## 2020-09-17 NOTE — TELEPHONE ENCOUNTER
Due to recent Left Lower Ext CT Arteriography results Dr. Her is requesting a consult by our Vascular Surgery Team consult placed.

## 2020-09-17 NOTE — TELEPHONE ENCOUNTER
RAJANI to sched US AMY and in person new consult with Vascular Surgery.     Anna will be at the wounds clinic on 10/1 so trying to coordinate a 9:30am apt for the same day for US and then next available with Surgeon.     Tita BOWER

## 2020-09-18 ENCOUNTER — TELEPHONE (OUTPATIENT)
Dept: WOUND CARE | Facility: CLINIC | Age: 60
End: 2020-09-18

## 2020-09-18 NOTE — TELEPHONE ENCOUNTER
Patient is questioning if she needs to have the AMY repeated since it was just done in July. Below are results. Will review with [facility] staff and have them follow up with her.   US AMY Doppler No Exercise, 1-2 Levels, Bilateral7/24/2020  [facility]  Other Result Information   This result has an attachment that is not available.   Result Narrative       RESTING ANKLE-BRACHIAL INDICES (AMY'S)    Indication: Left calf ulcer non-healing     Previous: None    History: Previous Smoker and Vascular Ulcers           Right: mmHg Index     Brachial: 126    Ankle-(PT): 159 1.18   Ankle-(DP): 141 1.04            Digit: 127 0.94                  Left: mmHg Index      Brachial: 135    Ankle-(PT): 143 1.06   Ankle-(DP): 143 1.06           Digit:  106 0.79     Resting ankle-brachial index of 1.18 on the right. Toe Pressures of 127   mmHg and TBI of 0.94    Resting ankle-brachial index of 1.06 on the left. Toe Pressures of 106   mmHg and TBI of 0.79    WAVEFORMS: The right dorsalis pedis and posterior tibial arteries show   triphasic waveforms.   The left dorsalis pedis and posterior tibial arteries show triphasic   waveforms.    Impression:      Right AMY is  Normal .    Left AMY is Normal

## 2020-09-21 NOTE — TELEPHONE ENCOUNTER
Anna is scheduled to new consult with Dr. Stacy on 9/23/2020.    Per nurse DEB, Anna has US in chart from 8/2020. No further imaging needed.     Tita BOWER

## 2020-09-23 ENCOUNTER — OFFICE VISIT (OUTPATIENT)
Dept: OTHER | Facility: CLINIC | Age: 60
End: 2020-09-23
Attending: SURGERY
Payer: COMMERCIAL

## 2020-09-23 ENCOUNTER — COMMUNICATION - HEALTHEAST (OUTPATIENT)
Dept: INTERNAL MEDICINE | Facility: CLINIC | Age: 60
End: 2020-09-23

## 2020-09-23 VITALS
HEIGHT: 64 IN | SYSTOLIC BLOOD PRESSURE: 134 MMHG | RESPIRATION RATE: 18 BRPM | DIASTOLIC BLOOD PRESSURE: 87 MMHG | WEIGHT: 120 LBS | HEART RATE: 93 BPM | OXYGEN SATURATION: 98 % | BODY MASS INDEX: 20.49 KG/M2

## 2020-09-23 DIAGNOSIS — I70.202 POPLITEAL ARTERY OCCLUSION, LEFT (H): Primary | ICD-10-CM

## 2020-09-23 DIAGNOSIS — I73.9 PAD (PERIPHERAL ARTERY DISEASE) (H): ICD-10-CM

## 2020-09-23 DIAGNOSIS — T81.89XA NON-HEALING SURGICAL WOUND, INITIAL ENCOUNTER: ICD-10-CM

## 2020-09-23 PROCEDURE — G0463 HOSPITAL OUTPT CLINIC VISIT: HCPCS

## 2020-09-23 PROCEDURE — 99214 OFFICE O/P EST MOD 30 MIN: CPT | Mod: ZP | Performed by: SURGERY

## 2020-09-23 ASSESSMENT — MIFFLIN-ST. JEOR: SCORE: 1109.32

## 2020-09-23 NOTE — PROGRESS NOTES
"Anna Sanderson is a 60 year old female who presents for:  Chief Complaint   Patient presents with     RECHECK     Referred by Dr. 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 US completed 8/2020. Per MAO August US can be used no additional imaging needed.   *vg        Vitals:    Vitals:    09/23/20 0915 09/23/20 0916   BP: 115/66 134/87   BP Location: Right arm Left arm   Patient Position: Chair Chair   Cuff Size: Adult Regular Adult Regular   Pulse: 93    Resp: 18    SpO2: 98%    Weight: 120 lb (54.4 kg)    Height: 5' 4\" (1.626 m)        BMI:  Estimated body mass index is 20.6 kg/m  as calculated from the following:    Height as of this encounter: 5' 4\" (1.626 m).    Weight as of this encounter: 120 lb (54.4 kg).    Pain Score:  Data Unavailable        Diana Price CMA    "

## 2020-09-25 ENCOUNTER — TELEPHONE (OUTPATIENT)
Dept: OTHER | Facility: CLINIC | Age: 60
End: 2020-09-25

## 2020-09-25 DIAGNOSIS — Z11.59 ENCOUNTER FOR SCREENING FOR OTHER VIRAL DISEASES: Primary | ICD-10-CM

## 2020-09-25 NOTE — TELEPHONE ENCOUNTER
Type of procedure: LEFT LEG ANGIOGRAM WITH POSSIBLE INTERVENTION  Location of procedure: [facility]  Date and time of procedure: 10/2/20 @ 9:30 AM  Requesting Surgeon: DR. ACOSTA  Performing Surgeon/IR: DR. ACOSTA  Pre-Op Appt Date: PT TO SCHEDULE  Post-Op Appt Date: PT TO SCHEDULE   Packet sent out: Yes  Pre-cert/Authorization completed:  Yes  Date: 9/25/20

## 2020-09-28 ENCOUNTER — OFFICE VISIT - HEALTHEAST (OUTPATIENT)
Dept: INTERNAL MEDICINE | Facility: CLINIC | Age: 60
End: 2020-09-28

## 2020-09-28 DIAGNOSIS — D50.9 IRON DEFICIENCY ANEMIA, UNSPECIFIED IRON DEFICIENCY ANEMIA TYPE: ICD-10-CM

## 2020-09-28 DIAGNOSIS — K21.00 GASTROESOPHAGEAL REFLUX DISEASE WITH ESOPHAGITIS: ICD-10-CM

## 2020-09-28 DIAGNOSIS — T81.89XD NON-HEALING SURGICAL WOUND, SUBSEQUENT ENCOUNTER: ICD-10-CM

## 2020-09-28 DIAGNOSIS — Z01.818 PREOP GENERAL PHYSICAL EXAM: ICD-10-CM

## 2020-09-28 LAB
ERYTHROCYTE [DISTWIDTH] IN BLOOD BY AUTOMATED COUNT: 13.6 % (ref 11–14.5)
HCT VFR BLD AUTO: 36.9 % (ref 35–47)
HGB BLD-MCNC: 12.2 G/DL (ref 12–16)
MCH RBC QN AUTO: 28.5 PG (ref 27–34)
MCHC RBC AUTO-ENTMCNC: 32.9 G/DL (ref 32–36)
MCV RBC AUTO: 87 FL (ref 80–100)
PLATELET # BLD AUTO: 366 THOU/UL (ref 140–440)
PMV BLD AUTO: 7.4 FL (ref 7–10)
RBC # BLD AUTO: 4.27 MILL/UL (ref 3.8–5.4)
WBC: 11.1 THOU/UL (ref 4–11)

## 2020-09-28 RX ORDER — HEPARIN SODIUM 200 [USP'U]/100ML
1 INJECTION, SOLUTION INTRAVENOUS CONTINUOUS PRN
Status: CANCELLED | OUTPATIENT
Start: 2020-09-28

## 2020-09-28 ASSESSMENT — MIFFLIN-ST. JEOR: SCORE: 1099.78

## 2020-09-30 ENCOUNTER — COMMUNICATION - HEALTHEAST (OUTPATIENT)
Dept: INFECTIOUS DISEASES | Facility: CLINIC | Age: 60
End: 2020-09-30

## 2020-09-30 ENCOUNTER — COMMUNICATION - HEALTHEAST (OUTPATIENT)
Dept: SCHEDULING | Facility: CLINIC | Age: 60
End: 2020-09-30

## 2020-10-01 ENCOUNTER — HOSPITAL ENCOUNTER (OUTPATIENT)
Dept: WOUND CARE | Facility: CLINIC | Age: 60
End: 2020-10-01
Attending: SURGERY
Payer: COMMERCIAL

## 2020-10-01 VITALS
HEART RATE: 95 BPM | SYSTOLIC BLOOD PRESSURE: 133 MMHG | RESPIRATION RATE: 18 BRPM | TEMPERATURE: 97.6 F | DIASTOLIC BLOOD PRESSURE: 87 MMHG

## 2020-10-01 DIAGNOSIS — T81.89XA NON-HEALING SURGICAL WOUND, INITIAL ENCOUNTER: ICD-10-CM

## 2020-10-01 DIAGNOSIS — L97.323 SKIN ULCER OF LEFT ANKLE WITH NECROSIS OF MUSCLE (H): ICD-10-CM

## 2020-10-01 PROCEDURE — 87075 CULTR BACTERIA EXCEPT BLOOD: CPT | Performed by: SURGERY

## 2020-10-01 PROCEDURE — 87070 CULTURE OTHR SPECIMN AEROBIC: CPT | Performed by: SURGERY

## 2020-10-01 PROCEDURE — 97602 WOUND(S) CARE NON-SELECTIVE: CPT

## 2020-10-01 PROCEDURE — 97597 DBRDMT OPN WND 1ST 20 CM/<: CPT | Performed by: SURGERY

## 2020-10-01 PROCEDURE — 11042 DBRDMT SUBQ TIS 1ST 20SQCM/<: CPT

## 2020-10-01 RX ORDER — CEFAZOLIN SODIUM 2 G/100ML
2 INJECTION, SOLUTION INTRAVENOUS
Status: CANCELLED | OUTPATIENT
Start: 2020-10-01

## 2020-10-01 RX ORDER — CEFAZOLIN SODIUM 1 G/3ML
1 INJECTION, POWDER, FOR SOLUTION INTRAMUSCULAR; INTRAVENOUS SEE ADMIN INSTRUCTIONS
Status: CANCELLED | OUTPATIENT
Start: 2020-10-01

## 2020-10-01 NOTE — DISCHARGE INSTRUCTIONS
[facility]  [address] [address]    Call us at [phone] if you have any questions about your wounds, have redness or swelling around your wound, have a fever of 101 or greater or if you have any other problems or concerns. We answer the phone Monday through Friday 8 am to 4 pm, please leave a message as we check the voicemail frequently throughout the day.     Anna Sanderson      1960  Continue with vitamins  Wound care recommendations to left achilles wound  After cleansing with saline or wound cleanser, apply small amount of VASHE on gauze, lay into wound bed and into tunneled area followed by EdemaWear then cover with gauze and secure with roll gauze and tape. Change twice daily  Compression:  You have a compression wrap coolflex pulled to second white line on strap, this is supposed to be removed at night and put back on first thing in the morning.  Please remove compression dressing if toes turn blue and/or tingle and can not be relieved by raising the leg for one hour.  Please raise your legs above your heart for 30 mins 3 times a day to promote wound healing.       SHI Her M.D.. October 1, 2020

## 2020-10-01 NOTE — PROGRESS NOTES
[facility] [place] Progress Note    Subject: Anna Sanderson history of flap placement left lower extremity, medical record reviewed.  CT angiogram from [facility] reviewed, images reviewed, duplex ultrasound demonstrates retrograde flow with chronically occluded posterior tibial artery.  She has a palpable dorsalis pedis pulse however may have a significant angisome deficit in the area of the ulcer.  She is scheduled for an angiogram with Dr. Stacy tomorrow.  I discussed surgical management with operative debridement using sonic 1 with Prontosan, application of vera flow with hypochlorous acid, potential hospitalization for 7 days given that vera flow can only be utilized inpatient with subsequent split-thickness skin grafting.  Will further discuss with the patient after completion angiogram though anticipate potential surgical date of October 22, 2020.  Her  has significant medical complications and required assistance from family during her hospitalization, she needs to explore these options.    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  Exam:  /87 (BP Location: Left arm)   Pulse 95   Temp 97.6  F (36.4  C) (Temporal)   Resp 18   Wound (used by OP [facility] only) 08/04/20 0805 Left posterior ankle trauma (Active)   Thickness/Stage full thickness 10/01/20 0800   Dressing Appearance moist drainage 10/01/20 0800   Base red 10/01/20 0800   Periwound intact 10/01/20 0800   Periwound Temperature warm 10/01/20 0800   Periwound Skin Turgor  soft 10/01/20 0800   Edges open 10/01/20 0800   Length (cm) 5 10/01/20 0800   Width (cm) 4 10/01/20 0800   Depth (cm) 1.3 10/01/20 0800   Wound (cm^2) 20 cm^2 10/01/20 0800   Wound Volume (cm^3) 26 cm^3 10/01/20 0800   Wound healing % 5.44 10/01/20 0800   Drainage Characteristics/Odor serous 10/01/20 0800   Drainage Amount moderate 10/01/20 0800   Care, Wound debrided 10/01/20 0800     60-year-old female, conversant, alert and oriented 3, palpable left her dorsalis pedis pulse.  Extensive biofilm present within wound, this was selective debrided    Procedure:   Patient was determined to be capable of making their own medical decisions and informed consent was obtained. Topical anesthetic of 4% lidocaine was applied, debridement was performed using a #15 blade down to and including biofilm, selective debridement.  Wound thoroughly irrigated with normal saline, An culture obtained.  Bleeding controlled with light pressure. Patient tolerated procedure well.    Impression: History of free flap placement from left radial-based forearm to left leg, chronic wound, attempted right radial free flap placement which was abandoned due to significant vasospasm, chronic left leg wound pending performance of angiography October 2, 2020    Plan: We will dress the wounds with Vashe moist to moist gauze, angiogram tomorrow, will list for surgical procedure on October 22, 2020 consisting of Sonic 1 debridement, Prontosan, application of vera flow with hypochlorous acid.      Mitchell Her MD on 10/1/2020 at 4:07 PM  No images are attached to the encounter, unable to incorporate due to new epic software change yesterday

## 2020-10-02 ENCOUNTER — APPOINTMENT (OUTPATIENT)
Dept: INTERVENTIONAL RADIOLOGY/VASCULAR | Facility: CLINIC | Age: 60
End: 2020-10-02
Attending: SURGERY
Payer: COMMERCIAL

## 2020-10-02 ENCOUNTER — HOSPITAL ENCOUNTER (OUTPATIENT)
Facility: CLINIC | Age: 60
Discharge: HOME | End: 2020-10-02
Attending: SURGERY | Admitting: SURGERY
Payer: COMMERCIAL

## 2020-10-02 ENCOUNTER — TELEPHONE (OUTPATIENT)
Facility: CLINIC | Age: 60
End: 2020-10-02

## 2020-10-02 ENCOUNTER — APPOINTMENT (OUTPATIENT)
Dept: SURGERY | Facility: PHYSICIAN GROUP | Age: 60
End: 2020-10-02
Payer: COMMERCIAL

## 2020-10-02 VITALS
BODY MASS INDEX: 20.03 KG/M2 | DIASTOLIC BLOOD PRESSURE: 58 MMHG | WEIGHT: 117.3 LBS | HEART RATE: 87 BPM | RESPIRATION RATE: 16 BRPM | HEIGHT: 64 IN | SYSTOLIC BLOOD PRESSURE: 116 MMHG | TEMPERATURE: 98.2 F | OXYGEN SATURATION: 96 %

## 2020-10-02 DIAGNOSIS — I70.202 POPLITEAL ARTERY OCCLUSION, LEFT (H): ICD-10-CM

## 2020-10-02 DIAGNOSIS — L97.323 SKIN ULCER OF LEFT ANKLE WITH NECROSIS OF MUSCLE (H): Primary | ICD-10-CM

## 2020-10-02 DIAGNOSIS — Z11.59 ENCOUNTER FOR SCREENING FOR OTHER VIRAL DISEASES: Primary | ICD-10-CM

## 2020-10-02 DIAGNOSIS — T81.89XA NON-HEALING SURGICAL WOUND, INITIAL ENCOUNTER: ICD-10-CM

## 2020-10-02 LAB
APTT PPP: 31 SEC (ref 22–37)
CHOLEST SERPL-MCNC: 183 MG/DL
CREAT SERPL-MCNC: 0.8 MG/DL (ref 0.52–1.04)
ERYTHROCYTE [DISTWIDTH] IN BLOOD BY AUTOMATED COUNT: 14.2 % (ref 10–15)
GFR SERPL CREATININE-BSD FRML MDRD: 82 ML/MIN/{1.73_M2}
HBA1C MFR BLD: 5.4 % (ref 0–5.6)
HCT VFR BLD AUTO: 36.5 % (ref 35–47)
HDLC SERPL-MCNC: 55 MG/DL
HGB BLD-MCNC: 11.6 G/DL (ref 11.7–15.7)
INR PPP: 0.89 (ref 0.86–1.14)
LDLC SERPL CALC-MCNC: 118 MG/DL
MCH RBC QN AUTO: 28.4 PG (ref 26.5–33)
MCHC RBC AUTO-ENTMCNC: 31.8 G/DL (ref 31.5–36.5)
MCV RBC AUTO: 89 FL (ref 78–100)
NONHDLC SERPL-MCNC: 128 MG/DL
PLATELET # BLD AUTO: 416 10E9/L (ref 150–450)
RBC # BLD AUTO: 4.09 10E12/L (ref 3.8–5.2)
TRIGL SERPL-MCNC: 51 MG/DL
WBC # BLD AUTO: 6.8 10E9/L (ref 4–11)

## 2020-10-02 PROCEDURE — 258N000003 HC RX IP 258 OP 636: Performed by: SURGERY

## 2020-10-02 PROCEDURE — 250N000011 HC RX IP 250 OP 636: Performed by: SURGERY

## 2020-10-02 PROCEDURE — C1887 CATHETER, GUIDING: HCPCS

## 2020-10-02 PROCEDURE — 255N000002 HC RX 255 OP 636: Performed by: SURGERY

## 2020-10-02 PROCEDURE — 85027 COMPLETE CBC AUTOMATED: CPT | Performed by: SURGERY

## 2020-10-02 PROCEDURE — 85610 PROTHROMBIN TIME: CPT | Performed by: SURGERY

## 2020-10-02 PROCEDURE — 85730 THROMBOPLASTIN TIME PARTIAL: CPT | Mod: GZ | Performed by: SURGERY

## 2020-10-02 PROCEDURE — 272N000686 HC HEADBAND, CERIBELL EEG

## 2020-10-02 PROCEDURE — 272N000567 HC SHEATH CR4

## 2020-10-02 PROCEDURE — C1769 GUIDE WIRE: HCPCS

## 2020-10-02 PROCEDURE — 272N000116 HC CATH CR1

## 2020-10-02 PROCEDURE — 272N000197 HC ACCESSORY CR6

## 2020-10-02 PROCEDURE — 36415 COLL VENOUS BLD VENIPUNCTURE: CPT

## 2020-10-02 PROCEDURE — 36247 INS CATH ABD/L-EXT ART 3RD: CPT | Performed by: SURGERY

## 2020-10-02 PROCEDURE — 250N000009 HC RX 250: Performed by: SURGERY

## 2020-10-02 PROCEDURE — 80061 LIPID PANEL: CPT | Performed by: SURGERY

## 2020-10-02 PROCEDURE — 76937 US GUIDE VASCULAR ACCESS: CPT

## 2020-10-02 PROCEDURE — 82565 ASSAY OF CREATININE: CPT | Performed by: SURGERY

## 2020-10-02 PROCEDURE — 75710 ARTERY X-RAYS ARM/LEG: CPT | Mod: 26 | Performed by: SURGERY

## 2020-10-02 PROCEDURE — 75774 ARTERY X-RAY EACH VESSEL: CPT | Mod: 26 | Performed by: SURGERY

## 2020-10-02 PROCEDURE — 36246 INS CATH ABD/L-EXT ART 2ND: CPT

## 2020-10-02 PROCEDURE — 999N000012 HC STATISTIC ANGIOGRAM, STENT, VERTEBRO PLASTY

## 2020-10-02 PROCEDURE — 83036 HEMOGLOBIN GLYCOSYLATED A1C: CPT | Mod: GZ | Performed by: SURGERY

## 2020-10-02 PROCEDURE — 99152 MOD SED SAME PHYS/QHP 5/>YRS: CPT | Performed by: SURGERY

## 2020-10-02 RX ORDER — LIDOCAINE 40 MG/G
CREAM TOPICAL
Status: DISCONTINUED | OUTPATIENT
Start: 2020-10-02 | End: 2020-10-02 | Stop reason: HOSPADM

## 2020-10-02 RX ORDER — FLUMAZENIL 0.1 MG/ML
0.2 INJECTION, SOLUTION INTRAVENOUS
Status: DISCONTINUED | OUTPATIENT
Start: 2020-10-02 | End: 2020-10-02 | Stop reason: HOSPADM

## 2020-10-02 RX ORDER — DEXTROSE MONOHYDRATE 25 G/50ML
25-50 INJECTION, SOLUTION INTRAVENOUS
Status: DISCONTINUED | OUTPATIENT
Start: 2020-10-02 | End: 2020-10-02 | Stop reason: HOSPADM

## 2020-10-02 RX ORDER — SODIUM CHLORIDE 9 MG/ML
INJECTION, SOLUTION INTRAVENOUS CONTINUOUS
Status: DISCONTINUED | OUTPATIENT
Start: 2020-10-02 | End: 2020-10-02 | Stop reason: HOSPADM

## 2020-10-02 RX ORDER — NICOTINE POLACRILEX 4 MG
15-30 LOZENGE BUCCAL
Status: DISCONTINUED | OUTPATIENT
Start: 2020-10-02 | End: 2020-10-02 | Stop reason: HOSPADM

## 2020-10-02 RX ORDER — FENTANYL CITRATE 50 UG/ML
25-50 INJECTION, SOLUTION INTRAMUSCULAR; INTRAVENOUS EVERY 5 MIN PRN
Status: DISCONTINUED | OUTPATIENT
Start: 2020-10-02 | End: 2020-10-02 | Stop reason: HOSPADM

## 2020-10-02 RX ORDER — IODIXANOL 320 MG/ML
50 INJECTION, SOLUTION INTRAVASCULAR ONCE
Status: COMPLETED | OUTPATIENT
Start: 2020-10-02 | End: 2020-10-02

## 2020-10-02 RX ORDER — NALOXONE HYDROCHLORIDE 0.4 MG/ML
.1-.4 INJECTION, SOLUTION INTRAMUSCULAR; INTRAVENOUS; SUBCUTANEOUS
Status: DISCONTINUED | OUTPATIENT
Start: 2020-10-02 | End: 2020-10-02 | Stop reason: HOSPADM

## 2020-10-02 RX ORDER — HEPARIN SODIUM 200 [USP'U]/100ML
1 INJECTION, SOLUTION INTRAVENOUS CONTINUOUS PRN
Status: DISCONTINUED | OUTPATIENT
Start: 2020-10-02 | End: 2020-10-02 | Stop reason: HOSPADM

## 2020-10-02 RX ADMIN — FENTANYL CITRATE 25 MCG: 50 INJECTION, SOLUTION INTRAMUSCULAR; INTRAVENOUS at 10:39

## 2020-10-02 RX ADMIN — SODIUM CHLORIDE: 9 INJECTION, SOLUTION INTRAVENOUS at 09:12

## 2020-10-02 RX ADMIN — LIDOCAINE HYDROCHLORIDE 10 ML: 10 INJECTION, SOLUTION INFILTRATION; PERINEURAL at 10:55

## 2020-10-02 RX ADMIN — FENTANYL CITRATE 50 MCG: 50 INJECTION, SOLUTION INTRAMUSCULAR; INTRAVENOUS at 10:15

## 2020-10-02 RX ADMIN — MIDAZOLAM HYDROCHLORIDE 1 MG: 1 INJECTION, SOLUTION INTRAMUSCULAR; INTRAVENOUS at 10:14

## 2020-10-02 RX ADMIN — FENTANYL CITRATE 50 MCG: 50 INJECTION, SOLUTION INTRAMUSCULAR; INTRAVENOUS at 10:01

## 2020-10-02 RX ADMIN — IODIXANOL 45 ML: 320 INJECTION, SOLUTION INTRAVASCULAR at 10:54

## 2020-10-02 RX ADMIN — FENTANYL CITRATE 50 MCG: 50 INJECTION, SOLUTION INTRAMUSCULAR; INTRAVENOUS at 09:55

## 2020-10-02 RX ADMIN — HEPARIN SODIUM 2000 UNITS: 200 INJECTION, SOLUTION INTRAVENOUS at 10:53

## 2020-10-02 RX ADMIN — MIDAZOLAM HYDROCHLORIDE 1 MG: 1 INJECTION, SOLUTION INTRAMUSCULAR; INTRAVENOUS at 09:54

## 2020-10-02 RX ADMIN — HEPARIN SODIUM 2000 UNITS: 200 INJECTION, SOLUTION INTRAVENOUS at 10:52

## 2020-10-02 RX ADMIN — FENTANYL CITRATE 25 MCG: 50 INJECTION, SOLUTION INTRAMUSCULAR; INTRAVENOUS at 10:52

## 2020-10-02 RX ADMIN — MIDAZOLAM HYDROCHLORIDE 0.5 MG: 1 INJECTION, SOLUTION INTRAMUSCULAR; INTRAVENOUS at 10:52

## 2020-10-02 RX ADMIN — MIDAZOLAM HYDROCHLORIDE 0.5 MG: 1 INJECTION, SOLUTION INTRAMUSCULAR; INTRAVENOUS at 10:39

## 2020-10-02 RX ADMIN — MIDAZOLAM HYDROCHLORIDE 1 MG: 1 INJECTION, SOLUTION INTRAMUSCULAR; INTRAVENOUS at 10:00

## 2020-10-02 ASSESSMENT — MIFFLIN-ST. JEOR: SCORE: 1097.07

## 2020-10-02 NOTE — PROGRESS NOTES
Care Suites Admission Nursing Note    Patient Information  Name: Anna Sanderson  Age: 60 year old  Reason for admission: left leg angiogram  Care Suites arrival time: 0815    Visitor Information  Name: elizabeth Quiroga  Informed of visitor restrictions: Yes  1 visitor allowed per patient   Visitor must screen negative for COVID symptoms   Visitor must wear a mask  Waiting rooms closed to visitors    Patient Admission/Assessment   Pre-procedure assessment complete: Yes  If abnormal assessment/labs, provider notified: N/A  NPO: Yes  Medications held per instructions/orders: N/A  Consent: obtained  If applicable, pregnancy test status: obtained  Patient oriented to room: Yes  Education/questions answered: Yes  Plan/other: peripheral angiogram    Discharge Planning  Discharge name/phone number: elizabeth Salvador [phone]  Overnight post sedation caregiver:  Sher  Discharge location: home    Rochelle Bradley RN

## 2020-10-02 NOTE — PROGRESS NOTES
Care Suites Post Procedure Note    Patient Information  Name: Anna PARRIS Sanderson  Age: 60 year old    Post Procedure  Time patient returned to Care Suites: 1130  Concerns/abnormal assessment: no  If abnormal assessment, provider notified: N/A  Plan/Other: Bedrest for 4 hours- discharge sometime around 1600.    Anna Hernandez RN

## 2020-10-02 NOTE — DISCHARGE INSTRUCTIONS
Peripheral Angiogram Discharge Instructions - Femoral     After you go home:      Have an adult stay with you until tomorrow.    Drink extra fluids for 2 days.    You may resume your normal diet.    No smoking       For 24 hours - due to the sedation you received:    Relax and take it easy.    Do NOT make any important or legal decisions.    Do NOT drive or operate machines at home or at work.    Do NOT drink alcohol.    Care of Groin Puncture Site:      For the first 24 hrs - check the puncture site every 1-2 hours while awake.    For 2 days, when you cough, sneeze, laugh or move your bowels, hold your hand over the puncture site and press firmly.    Remove the bandaid after 24 hours. If there is minor oozing, apply another bandaid and remove it after 12 hours.    It is normal to have a small bruise or pea size lump at the site.    You may shower tomorrow.  Do NOT take a bath, or use a hot tub or pool for at least 3 days. Do NOT scrub the site. Do not use lotion or powder near the puncture site.     Activity:            For 2 days:    No stooping or squatting    Do NOT do any heavy activity such as exercise, lifting, or straining.     No housework, yard work or any activity that make you sweat    Do NOT lift more than 10 pounds    Bleeding:      If you start bleeding from the site in your groin, lie down flat and press firmly on/above the site for 10 minutes.     Once bleeding stops, lay flat for 2 hours.     [facility] as soon as you can.       Call 911 right away if you have heavy bleeding or bleeding that does not stop.      Medicines:           Take your medications, including blood thinners, unless your provider tells you not to.      If you have stopped any medicines, check with your provider about when to restart them.        Follow Up Appointments:      Follow up with [facility] as directed.    Call the clinic if:      You have increased pain or a large or growing hard lump  around the site.    The site is red, swollen, hot or tender.    Blood or fluid is draining from the site.    You have chills or a fever greater than 101 F (38 C).    Your leg feels numb, cool or changes color.    You have hives, a rash or unusual itching.    New pain in the back or belly that you cannot control with Tylenol.    Any questions or concerns.    Other Instructions:  No need to follow-up with Dr. Stacy.  Follow-up with Dr. Her.          If you have questions or your original symptoms do not improve, call:         [facility] @ [phone]

## 2020-10-02 NOTE — TELEPHONE ENCOUNTER
Type of surgery: IRRIGATION AND DEBRIDEMENT LOWER EXTREMITY WITH VASHE APPLICATION; WOUND VAC APPLICATION  Location of surgery: [facility]  Date and time of surgery: 10/22/2020 11:00AM  Surgeon: DR. MAE  Pre-Op Appt Date: 9/28/2020  [facility]   Packet sent out: GIVEN AT APPOINTEMENT BY AW  Pre-cert/Authorization completed:  UNKNOWN  Date: 10/2/2020

## 2020-10-02 NOTE — IR NOTE
Interventional Radiology Intra-procedural Nursing Note    Patient Name: Anna Sanderson  Medical Record Number: [id number]  Today's Date: October 2, 2020    Start Time: 0957  End of procedure time: 1057  Procedure: left leg angiogram, no intervention  Report given to: EDIL Dinero, Care suites  Time pt departs:  1120  : n/a    Other Notes:   Versed 4mg  Fentanyl 200mcg    Patient tolerated procedure well. VSS. Patient alert, respirations regular and unlabored, no c/o pain at this time.     Procedure access site (right groin) is c/d/i. 5f right femoral sheath out at 1055, manual pressure was held x20 minutes, hemostasis achieved, area covered with sterile gauze quick clot and tegaderm dressing, no hematoma    Patient transferred back to Care suites in stable condition accompanied by myself.        Radha Baig RN

## 2020-10-02 NOTE — PROGRESS NOTES
Care Suites Post Procedure Note    Patient Information  Name: Anna Sanderson  Age: 60 year old    Post Procedure  Time patient returned to Care Suites: 1130  Concerns/abnormal assessment: no immediate  If abnormal assessment, provider notified: N/A  Plan/Other: Continue post procedure plan of care.  Report received from Rochelle Rivera to continue monitoring pt.    Right groin site CDI, no hematoma.  Pulses are palpable.  VS stable, denies any pain or discomfort.  Taking po fluids well.  Reinforced discharge instruction.  All questions are answered.  Anticipate bedrest until 1530  Anticipate discharge after 1600 if stable.    1530 pt was up walking off bedrest, voiding without difficulty.  Right groin site is stable, no hematoma or bleeding.  VS stable. Pt can be discharged after 1600    Marco Trujillo RN     Care Suites Discharge Nursing Note    Patient Information  Name: Anna Sanderson  Age: 60 year old    Discharge Education:  Discharge instructions reviewed: Yes  Additional education/resources provided: Na    Patient/patient representative verbalizes understanding: Yes  Patient discharging on new medications: No  Medication education completed: N/A    Discharge Plans:   Discharge location: home  Discharge ride contacted: Yes, her sister Fani  [phone]  Approximate discharge time: [id number]    Discharge Criteria:  Discharge criteria met and vital signs stable: Yes    Patient Belongs:  Patient belongings returned to patient: Yes    Marco Trujillo RN

## 2020-10-02 NOTE — PRE-PROCEDURE
GENERAL PRE-PROCEDURE:   Procedure:  Left leg angiogram - possible intervention  Date/Time:  10/2/2020 9:22 AM    Written consent obtained?: Yes    Risks and benefits: Risks, benefits and alternatives were discussed    DC Plan: Appropriate discharge home plan in place for patients who are going home after procedure   Consent given by:  Patient  Patient states understanding of procedure being performed: Yes    Patient's understanding of procedure matches consent: Yes    Procedure consent matches procedure scheduled: Yes    Expected level of sedation:  Minimal  Appropriately NPO:  Yes  ASA Class:  Class 2- mild systemic disease, no acute problems, no functional limitations  Mallampati  :  Grade 2- soft palate, base of uvula, tonsillar pillars, and portion of posterior pharyngeal wall visible  Lungs:  Lungs clear with good breath sounds bilaterally  Heart:  Normal heart sounds and rate  History & Physical reviewed:  History and physical reviewed and no updates needed  Statement of review:  I have reviewed the lab findings, diagnostic data, medications, and the plan for sedation

## 2020-10-02 NOTE — PROGRESS NOTES
Pt remains on bedrest x 4 hours, until 1530, denies urge to void, taking PO food and fluids well, VSS, AVS reviewed with pt and son and given to pt, right groin site remains soft with dsng clean and dry, no signs of bleeding or hematoma. Very pleasant, cooperative.

## 2020-10-02 NOTE — PROGRESS NOTES
PATIENT/VISITOR WELLNESS SCREENING    Step 1 Patient Screening    1. In the last month, have you been in contact with someone who was confirmed or suspected to have Coronavirus/COVID-19? No    2. Do you have the following symptoms?  Fever/Chills? No   Cough? No   Shortness of breath? No   New loss of taste or smell? No  Sore throat? No  Muscle or body aches? No  Headaches? No  Fatigue? No  Vomiting or diarrhea? No    Step 2 Visitor Screening    1. Name of Visitor (1 visitor per patient): elizabeth Salvador    2. In the last month, have you been in contact with someone who was confirmed or suspected to have Coronavirus/COVID-19? No    3. Do you have the following symptoms?  Fever/Chills? No   Cough? No   Shortness of breath? No   Skin rash? No   Loss of taste or smell? No  Sore throat? No  Runny or stuffy nose? No  Muscle or body aches? No  Headaches? No  Fatigue? No  Vomiting or diarrhea? No    If the visitor has positive symptoms, notify supervisor/manger  Per policy, the visitor will need to leave the facility     Step 3 Refer to logic grid below for actions    NO SYMPTOM(S)    ACTIONS:  1. Standard rooming process  2. Provider to assess per normal protocol  3. Implement precautions as needed and per guidelines     POSITIVE SYMPTOM(S)  If positive for ANY of the following symptoms: fever, cough, shortness of breath, rash    ACTION:  1. Continue to have the patient wear a mask   2. Room patient as soon as possible  3. Don appropriate PPE when entering room  4. Provider evaluation

## 2020-10-03 LAB
BACTERIA SPEC CULT: NO GROWTH
Lab: NORMAL
SPECIMEN SOURCE: NORMAL

## 2020-10-05 ENCOUNTER — TELEPHONE (OUTPATIENT)
Dept: WOUND CARE | Facility: CLINIC | Age: 60
End: 2020-10-05

## 2020-10-05 DIAGNOSIS — L97.323 SKIN ULCER OF LEFT ANKLE WITH NECROSIS OF MUSCLE (H): Primary | ICD-10-CM

## 2020-10-05 NOTE — TELEPHONE ENCOUNTER
Discussed upcoming surgery with Dr. Her based off of recent surgery with Dr. Stacy. In preparation for that she should have patient in Rooke Boot. Order placed to get from [facility].   Patient notified.

## 2020-10-06 ENCOUNTER — COMMUNICATION - HEALTHEAST (OUTPATIENT)
Dept: INTERNAL MEDICINE | Facility: CLINIC | Age: 60
End: 2020-10-06

## 2020-10-08 LAB
BACTERIA SPEC CULT: NORMAL
Lab: NORMAL
SPECIMEN SOURCE: NORMAL

## 2020-10-13 ENCOUNTER — TELEPHONE (OUTPATIENT)
Dept: WOUND CARE | Facility: CLINIC | Age: 60
End: 2020-10-13

## 2020-10-13 NOTE — TELEPHONE ENCOUNTER
Patient stated that [facility] has been less than responsive and she has called around for the order to other locations and noone seems to have it. She preferred to have the order for the matilde boot sent to [facility] ([facility]). I called the [facility] location and got the fax number and faxed it to [phone]. I let her know they can ship it to her. She expressed understanding.

## 2020-10-13 NOTE — TELEPHONE ENCOUNTER
[facility]    Who is the name of the provider?:  Dr. Her      What is the location you see this provider at?: [facility]    Reason for call:  Patient is stating that Dr. Her ordered a Rooke boot for her. She talked with [facility] and they told her they did not have the order for it. Patient can be reached at [phone] with any questions    Can we leave a detailed message on this number?  YES

## 2020-10-23 ENCOUNTER — TELEPHONE (OUTPATIENT)
Dept: WOUND CARE | Facility: CLINIC | Age: 60
End: 2020-10-23

## 2020-10-26 ENCOUNTER — COMMUNICATION - HEALTHEAST (OUTPATIENT)
Dept: SCHEDULING | Facility: CLINIC | Age: 60
End: 2020-10-26

## 2020-11-02 ENCOUNTER — AMBULATORY - HEALTHEAST (OUTPATIENT)
Dept: LAB | Facility: CLINIC | Age: 60
End: 2020-11-02

## 2020-11-02 ENCOUNTER — OFFICE VISIT - HEALTHEAST (OUTPATIENT)
Dept: INTERNAL MEDICINE | Facility: CLINIC | Age: 60
End: 2020-11-02

## 2020-11-02 ENCOUNTER — COMMUNICATION - HEALTHEAST (OUTPATIENT)
Dept: VASCULAR SURGERY | Facility: CLINIC | Age: 60
End: 2020-11-02

## 2020-11-02 DIAGNOSIS — Z01.818 PREOP GENERAL PHYSICAL EXAM: ICD-10-CM

## 2020-11-02 DIAGNOSIS — K21.00 GASTROESOPHAGEAL REFLUX DISEASE WITH ESOPHAGITIS WITHOUT HEMORRHAGE: ICD-10-CM

## 2020-11-02 DIAGNOSIS — T81.89XD NON-HEALING SURGICAL WOUND, SUBSEQUENT ENCOUNTER: ICD-10-CM

## 2020-11-02 DIAGNOSIS — Z11.59 SPECIAL SCREENING EXAMINATION FOR VIRAL DISEASE: ICD-10-CM

## 2020-11-02 ASSESSMENT — MIFFLIN-ST. JEOR: SCORE: 1099.78

## 2020-11-04 ENCOUNTER — COMMUNICATION - HEALTHEAST (OUTPATIENT)
Dept: SCHEDULING | Facility: CLINIC | Age: 60
End: 2020-11-04

## 2020-11-04 ENCOUNTER — ANESTHESIA EVENT (OUTPATIENT)
Dept: SURGERY | Facility: CLINIC | Age: 60
End: 2020-11-04
Payer: COMMERCIAL

## 2020-11-04 SDOH — HEALTH STABILITY: MENTAL HEALTH: CURRENT SMOKER: 0

## 2020-11-04 NOTE — PROGRESS NOTES
PTA medications updated by Medication Scribe prior to surgery via phone call with patient      -LAST DOSES ENTERED BY NURSE-    Comments:    Medication history sources: Patient, Surescripts and H&P  Medication history source reliability: Good  Adherence assessment: N/A Not Observed    Significant changes made to the medication list:  Patient reports no longer taking the following meds (med scribe removed from PTA med list): Aspirin      Additional medication history information:   None        Prior to Admission medications    Medication Sig Last Dose Taking? Auth Provider   CARAFATE 1 GM/10ML suspension Take 1 g by mouth 4 times daily as needed  MORE THAN 1 MONTH at PRN Yes Reported, Patient   denosumab (PROLIA) 60 MG/ML SOSY injection Inject 60 mg Subcutaneous every 6 months  SEPTEMBER 2020 Yes Reported, Patient   Dietary Management Product (VASCULERA) TABS Take 1 tablet by mouth 2 times daily  Yes Mitchell Her MD   ibuprofen (ADVIL/MOTRIN) 200 MG tablet Take 200-800 mg by mouth 2 times daily   Yes Reported, Patient   pantoprazole (PROTONIX) 40 MG EC tablet Take 40 mg by mouth 2 times daily   Yes Reported, Patient   vitamin D2 (ERGOCALCIFEROL) 06696 units (1250 mcg) capsule Take 50,000 Units by mouth once a week Saturdays 10/31/2020 Yes Reported, Patient

## 2020-11-05 ENCOUNTER — ANESTHESIA (OUTPATIENT)
Dept: SURGERY | Facility: CLINIC | Age: 60
End: 2020-11-05
Payer: COMMERCIAL

## 2020-11-05 ENCOUNTER — HOSPITAL ENCOUNTER (INPATIENT)
Facility: CLINIC | Age: 60
LOS: 6 days | Discharge: HOME | End: 2020-11-11
Attending: SURGERY | Admitting: SURGERY
Payer: COMMERCIAL

## 2020-11-05 ENCOUNTER — COMMUNICATION - HEALTHEAST (OUTPATIENT)
Dept: INTERNAL MEDICINE | Facility: CLINIC | Age: 60
End: 2020-11-05

## 2020-11-05 ENCOUNTER — APPOINTMENT (OUTPATIENT)
Dept: SURGERY | Facility: PHYSICIAN GROUP | Age: 60
End: 2020-11-05
Payer: COMMERCIAL

## 2020-11-05 DIAGNOSIS — L97.323 SKIN ULCER OF LEFT ANKLE WITH NECROSIS OF MUSCLE (H): ICD-10-CM

## 2020-11-05 DIAGNOSIS — T81.89XD NON-HEALING SURGICAL WOUND, SUBSEQUENT ENCOUNTER: ICD-10-CM

## 2020-11-05 DIAGNOSIS — T81.89XD NON-HEALING SURGICAL WOUND, SUBSEQUENT ENCOUNTER: Primary | ICD-10-CM

## 2020-11-05 PROCEDURE — 250N000003 HC SEVOFLURANE, EA 15 MIN: Performed by: SURGERY

## 2020-11-05 PROCEDURE — 999N001020 HC STATISTIC H-SEND OUTS PREP: Performed by: SURGERY

## 2020-11-05 PROCEDURE — 250N000013 HC RX MED GY IP 250 OP 250 PS 637: Performed by: SURGERY

## 2020-11-05 PROCEDURE — 0JBP0ZZ EXCISION OF LEFT LOWER LEG SUBCUTANEOUS TISSUE AND FASCIA, OPEN APPROACH: ICD-10-PCS | Performed by: SURGERY

## 2020-11-05 PROCEDURE — 370N000002 HC ANESTHESIA TECHNICAL FEE, EACH ADDTL 15 MIN: Performed by: SURGERY

## 2020-11-05 PROCEDURE — 250N000009 HC RX 250: Performed by: SURGERY

## 2020-11-05 PROCEDURE — 761N000003 HC RECOVERY PHASE 1 LEVEL 2 FIRST HR: Performed by: SURGERY

## 2020-11-05 PROCEDURE — 88305 TISSUE EXAM BY PATHOLOGIST: CPT | Mod: 26 | Performed by: DERMATOLOGY

## 2020-11-05 PROCEDURE — 250N000009 HC RX 250: Performed by: NURSE ANESTHETIST, CERTIFIED REGISTERED

## 2020-11-05 PROCEDURE — 272N000001 HC OR GENERAL SUPPLY STERILE: Performed by: SURGERY

## 2020-11-05 PROCEDURE — 250N000011 HC RX IP 250 OP 636: Performed by: NURSE ANESTHETIST, CERTIFIED REGISTERED

## 2020-11-05 PROCEDURE — 360N000015 HC SURGERY LEVEL 2 EA 15 ADDTL MIN: Performed by: SURGERY

## 2020-11-05 PROCEDURE — 120N000001 HC R&B MED SURG/OB

## 2020-11-05 PROCEDURE — 761N000004 HC RECOVERY PHASE 1 LEVEL 2 EA ADDTL HR: Performed by: SURGERY

## 2020-11-05 PROCEDURE — 258N000003 HC RX IP 258 OP 636: Performed by: ANESTHESIOLOGY

## 2020-11-05 PROCEDURE — 250N000011 HC RX IP 250 OP 636: Performed by: SURGERY

## 2020-11-05 PROCEDURE — 999N000139 HC STATISTIC PRE-PROCEDURE ASSESSMENT II: Performed by: SURGERY

## 2020-11-05 PROCEDURE — 258N000003 HC RX IP 258 OP 636: Performed by: NURSE ANESTHETIST, CERTIFIED REGISTERED

## 2020-11-05 PROCEDURE — 370N000001 HC ANESTHESIA TECHNICAL FEE, 1ST 30 MIN: Performed by: SURGERY

## 2020-11-05 PROCEDURE — 360N000014 HC SURGERY LEVEL 2 1ST 30 MIN: Performed by: SURGERY

## 2020-11-05 PROCEDURE — 88305 TISSUE EXAM BY PATHOLOGIST: CPT | Mod: TC | Performed by: SURGERY

## 2020-11-05 RX ORDER — FENTANYL CITRATE 50 UG/ML
25-50 INJECTION, SOLUTION INTRAMUSCULAR; INTRAVENOUS
Status: DISCONTINUED | OUTPATIENT
Start: 2020-11-05 | End: 2020-11-05 | Stop reason: HOSPADM

## 2020-11-05 RX ORDER — NALOXONE HYDROCHLORIDE 0.4 MG/ML
.1-.4 INJECTION, SOLUTION INTRAMUSCULAR; INTRAVENOUS; SUBCUTANEOUS
Status: DISCONTINUED | OUTPATIENT
Start: 2020-11-05 | End: 2020-11-11 | Stop reason: HOSPADM

## 2020-11-05 RX ORDER — VANCOMYCIN HYDROCHLORIDE 1 G/200ML
1000 INJECTION, SOLUTION INTRAVENOUS
Status: COMPLETED | OUTPATIENT
Start: 2020-11-05 | End: 2020-11-05

## 2020-11-05 RX ORDER — DIOSMIN COMPLEX NO.1 630 MG
1 TABLET ORAL 2 TIMES DAILY
Status: DISCONTINUED | OUTPATIENT
Start: 2020-11-05 | End: 2020-11-11 | Stop reason: HOSPADM

## 2020-11-05 RX ORDER — ACETAMINOPHEN 325 MG/1
650 TABLET ORAL EVERY 4 HOURS PRN
Status: DISCONTINUED | OUTPATIENT
Start: 2020-11-08 | End: 2020-11-11 | Stop reason: HOSPADM

## 2020-11-05 RX ORDER — FOLIC ACID 1 MG/1
1 TABLET ORAL DAILY
Status: DISCONTINUED | OUTPATIENT
Start: 2020-11-05 | End: 2020-11-11 | Stop reason: HOSPADM

## 2020-11-05 RX ORDER — CEFAZOLIN SODIUM 2 G/100ML
2 INJECTION, SOLUTION INTRAVENOUS
Status: DISCONTINUED | OUTPATIENT
Start: 2020-11-05 | End: 2020-11-05 | Stop reason: CLARIF

## 2020-11-05 RX ORDER — ONDANSETRON 4 MG/1
4 TABLET, ORALLY DISINTEGRATING ORAL EVERY 30 MIN PRN
Status: DISCONTINUED | OUTPATIENT
Start: 2020-11-05 | End: 2020-11-10

## 2020-11-05 RX ORDER — SODIUM CHLORIDE, SODIUM LACTATE, POTASSIUM CHLORIDE, CALCIUM CHLORIDE 600; 310; 30; 20 MG/100ML; MG/100ML; MG/100ML; MG/100ML
INJECTION, SOLUTION INTRAVENOUS CONTINUOUS
Status: DISCONTINUED | OUTPATIENT
Start: 2020-11-05 | End: 2020-11-05 | Stop reason: HOSPADM

## 2020-11-05 RX ORDER — CEFAZOLIN SODIUM 1 G/3ML
1 INJECTION, POWDER, FOR SOLUTION INTRAMUSCULAR; INTRAVENOUS SEE ADMIN INSTRUCTIONS
Status: DISCONTINUED | OUTPATIENT
Start: 2020-11-05 | End: 2020-11-05 | Stop reason: CLARIF

## 2020-11-05 RX ORDER — LIDOCAINE HYDROCHLORIDE 20 MG/ML
INJECTION, SOLUTION INFILTRATION; PERINEURAL PRN
Status: DISCONTINUED | OUTPATIENT
Start: 2020-11-05 | End: 2020-11-05

## 2020-11-05 RX ORDER — FENTANYL CITRATE 50 UG/ML
50 INJECTION, SOLUTION INTRAMUSCULAR; INTRAVENOUS
Status: DISCONTINUED | OUTPATIENT
Start: 2020-11-05 | End: 2020-11-05 | Stop reason: HOSPADM

## 2020-11-05 RX ORDER — IBUPROFEN 200 MG
200-800 TABLET ORAL 2 TIMES DAILY
Status: DISCONTINUED | OUTPATIENT
Start: 2020-11-05 | End: 2020-11-11 | Stop reason: HOSPADM

## 2020-11-05 RX ORDER — ONDANSETRON 2 MG/ML
INJECTION INTRAMUSCULAR; INTRAVENOUS PRN
Status: DISCONTINUED | OUTPATIENT
Start: 2020-11-05 | End: 2020-11-05

## 2020-11-05 RX ORDER — NALOXONE HYDROCHLORIDE 0.4 MG/ML
.1-.4 INJECTION, SOLUTION INTRAMUSCULAR; INTRAVENOUS; SUBCUTANEOUS
Status: ACTIVE | OUTPATIENT
Start: 2020-11-05 | End: 2020-11-06

## 2020-11-05 RX ORDER — LIDOCAINE 40 MG/G
CREAM TOPICAL
Status: DISCONTINUED | OUTPATIENT
Start: 2020-11-05 | End: 2020-11-10

## 2020-11-05 RX ORDER — CHOLECALCIFEROL (VITAMIN D3) 1250 MCG
1250 CAPSULE ORAL
Status: DISCONTINUED | OUTPATIENT
Start: 2020-11-07 | End: 2020-11-11 | Stop reason: HOSPADM

## 2020-11-05 RX ORDER — FENTANYL CITRATE 50 UG/ML
INJECTION, SOLUTION INTRAMUSCULAR; INTRAVENOUS PRN
Status: DISCONTINUED | OUTPATIENT
Start: 2020-11-05 | End: 2020-11-05

## 2020-11-05 RX ORDER — HYDROMORPHONE HYDROCHLORIDE 1 MG/ML
.3-.5 INJECTION, SOLUTION INTRAMUSCULAR; INTRAVENOUS; SUBCUTANEOUS EVERY 5 MIN PRN
Status: DISCONTINUED | OUTPATIENT
Start: 2020-11-05 | End: 2020-11-11 | Stop reason: HOSPADM

## 2020-11-05 RX ORDER — ONDANSETRON 2 MG/ML
4 INJECTION INTRAMUSCULAR; INTRAVENOUS EVERY 30 MIN PRN
Status: DISCONTINUED | OUTPATIENT
Start: 2020-11-05 | End: 2020-11-10

## 2020-11-05 RX ORDER — UBIDECARENONE 75 MG
100 CAPSULE ORAL DAILY
Status: DISCONTINUED | OUTPATIENT
Start: 2020-11-05 | End: 2020-11-11 | Stop reason: HOSPADM

## 2020-11-05 RX ORDER — OXYCODONE HYDROCHLORIDE 5 MG/1
5-10 TABLET ORAL
Status: DISCONTINUED | OUTPATIENT
Start: 2020-11-05 | End: 2020-11-11 | Stop reason: HOSPADM

## 2020-11-05 RX ORDER — EPHEDRINE SULFATE 50 MG/ML
INJECTION, SOLUTION INTRAMUSCULAR; INTRAVENOUS; SUBCUTANEOUS PRN
Status: DISCONTINUED | OUTPATIENT
Start: 2020-11-05 | End: 2020-11-05

## 2020-11-05 RX ORDER — VANCOMYCIN HYDROCHLORIDE 1 G/200ML
1000 INJECTION, SOLUTION INTRAVENOUS EVERY 12 HOURS
Status: COMPLETED | OUTPATIENT
Start: 2020-11-06 | End: 2020-11-06

## 2020-11-05 RX ORDER — PROPOFOL 10 MG/ML
INJECTION, EMULSION INTRAVENOUS PRN
Status: DISCONTINUED | OUTPATIENT
Start: 2020-11-05 | End: 2020-11-05

## 2020-11-05 RX ORDER — DEXAMETHASONE SODIUM PHOSPHATE 4 MG/ML
INJECTION, SOLUTION INTRA-ARTICULAR; INTRALESIONAL; INTRAMUSCULAR; INTRAVENOUS; SOFT TISSUE PRN
Status: DISCONTINUED | OUTPATIENT
Start: 2020-11-05 | End: 2020-11-05

## 2020-11-05 RX ORDER — IBUPROFEN 400 MG/1
800 TABLET, FILM COATED ORAL EVERY 8 HOURS PRN
Status: DISCONTINUED | OUTPATIENT
Start: 2020-11-05 | End: 2020-11-11 | Stop reason: HOSPADM

## 2020-11-05 RX ORDER — HEPARIN SODIUM 200 [USP'U]/100ML
1 INJECTION, SOLUTION INTRAVENOUS CONTINUOUS PRN
Status: DISCONTINUED | OUTPATIENT
Start: 2020-11-05 | End: 2020-11-05

## 2020-11-05 RX ORDER — ACETAMINOPHEN 325 MG/1
975 TABLET ORAL EVERY 8 HOURS
Status: ACTIVE | OUTPATIENT
Start: 2020-11-05 | End: 2020-11-08

## 2020-11-05 RX ORDER — PANTOPRAZOLE SODIUM 40 MG/1
40 TABLET, DELAYED RELEASE ORAL 2 TIMES DAILY
Status: DISCONTINUED | OUTPATIENT
Start: 2020-11-05 | End: 2020-11-11 | Stop reason: HOSPADM

## 2020-11-05 RX ORDER — SUCRALFATE ORAL 1 G/10ML
1 SUSPENSION ORAL 4 TIMES DAILY PRN
Status: DISCONTINUED | OUTPATIENT
Start: 2020-11-05 | End: 2020-11-11 | Stop reason: HOSPADM

## 2020-11-05 RX ADMIN — MIDAZOLAM 1 MG: 1 INJECTION INTRAMUSCULAR; INTRAVENOUS at 12:50

## 2020-11-05 RX ADMIN — Medication 10 MG: at 13:20

## 2020-11-05 RX ADMIN — DEXAMETHASONE SODIUM PHOSPHATE 4 MG: 4 INJECTION, SOLUTION INTRA-ARTICULAR; INTRALESIONAL; INTRAMUSCULAR; INTRAVENOUS; SOFT TISSUE at 13:03

## 2020-11-05 RX ADMIN — VANCOMYCIN HYDROCHLORIDE 1000 MG: 1 INJECTION, SOLUTION INTRAVENOUS at 12:52

## 2020-11-05 RX ADMIN — PHENYLEPHRINE HYDROCHLORIDE 100 MCG: 10 INJECTION INTRAVENOUS at 13:14

## 2020-11-05 RX ADMIN — FOLIC ACID 1 MG: 1 TABLET ORAL at 19:03

## 2020-11-05 RX ADMIN — MIDAZOLAM 1 MG: 1 INJECTION INTRAMUSCULAR; INTRAVENOUS at 12:54

## 2020-11-05 RX ADMIN — LIDOCAINE HYDROCHLORIDE 100 MG: 20 INJECTION, SOLUTION INFILTRATION; PERINEURAL at 12:56

## 2020-11-05 RX ADMIN — Medication 10 MG: at 13:29

## 2020-11-05 RX ADMIN — PANTOPRAZOLE SODIUM 40 MG: 40 TABLET, DELAYED RELEASE ORAL at 21:50

## 2020-11-05 RX ADMIN — SODIUM CHLORIDE, POTASSIUM CHLORIDE, SODIUM LACTATE AND CALCIUM CHLORIDE: 600; 310; 30; 20 INJECTION, SOLUTION INTRAVENOUS at 12:48

## 2020-11-05 RX ADMIN — VITAM B12 100 MCG: 100 TAB at 19:04

## 2020-11-05 RX ADMIN — PROPOFOL 170 MG: 10 INJECTION, EMULSION INTRAVENOUS at 12:56

## 2020-11-05 RX ADMIN — SODIUM CHLORIDE, POTASSIUM CHLORIDE, SODIUM LACTATE AND CALCIUM CHLORIDE: 600; 310; 30; 20 INJECTION, SOLUTION INTRAVENOUS at 15:57

## 2020-11-05 RX ADMIN — IBUPROFEN 400 MG: 200 TABLET, FILM COATED ORAL at 21:50

## 2020-11-05 RX ADMIN — FENTANYL CITRATE 50 MCG: 50 INJECTION, SOLUTION INTRAMUSCULAR; INTRAVENOUS at 13:13

## 2020-11-05 RX ADMIN — ONDANSETRON 4 MG: 2 INJECTION INTRAMUSCULAR; INTRAVENOUS at 13:30

## 2020-11-05 RX ADMIN — IBUPROFEN 800 MG: 400 TABLET ORAL at 19:03

## 2020-11-05 RX ADMIN — FENTANYL CITRATE 50 MCG: 50 INJECTION, SOLUTION INTRAMUSCULAR; INTRAVENOUS at 12:56

## 2020-11-05 ASSESSMENT — ACTIVITIES OF DAILY LIVING (ADL): ADLS_ACUITY_SCORE: 14

## 2020-11-05 ASSESSMENT — LIFESTYLE VARIABLES: TOBACCO_USE: 0

## 2020-11-05 ASSESSMENT — MIFFLIN-ST. JEOR: SCORE: 1101.61

## 2020-11-05 ASSESSMENT — ENCOUNTER SYMPTOMS: SEIZURES: 0

## 2020-11-05 NOTE — ANESTHESIA POSTPROCEDURE EVALUATION
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    Anesthesia Type:  General    Note:  Anesthesia Post Evaluation    Patient location during evaluation: PACU  Patient participation: Able to fully participate in evaluation  Level of consciousness: awake and alert  Pain management: adequate  Airway patency: patent  Cardiovascular status: acceptable, hemodynamically stable and blood pressure returned to baseline  Respiratory status: acceptable  Hydration status: acceptable  PONV: none     Anesthetic complications: None          Last vitals:  Vitals:    11/05/20 1409 11/05/20 1415 11/05/20 1430   BP: 134/77 128/85 122/82   Pulse: 90 96 92   Resp: 18 16 16   Temp: 36.5  C (97.7  F) 36.6  C (97.9  F)    SpO2: 100% 99% 99%         Electronically Signed By: Loni Good MD  November 5, 2020  2:38 PM

## 2020-11-05 NOTE — ANESTHESIA CARE TRANSFER NOTE
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    Anesthesia Type:   General     Note:  Airway :Face Mask  Patient transferred to:PACU  Comments: At end of procedure, spontaneous respirations, adequate tidal volumes, followed commands to voice, LMA removed atraumatically, oropharynx suctioned, airway patent after LMA removal. Oxygen via facemask at 10 liters per minute to PACU. Oxygen tubing connected to wall O2 in PACU, SpO2, NiBP, and EKG monitors and alarms on and functioning, Tracy Hugger warmer connected to patient gown, report on patient's clinical status given to PACU RN, RN questions answered.Handoff Report: Identifed the Patient, Identified the Reponsible Provider, Reviewed the pertinent medical history, Discussed the surgical course, Reviewed Intra-OP anesthesia mangement and issues during anesthesia, Set expectations for post-procedure period and Allowed opportunity for questions and acknowledgement of understanding      Vitals: (Last set prior to Anesthesia Care Transfer)    CRNA VITALS  11/5/2020 1333 - 11/5/2020 1411      11/5/2020             Resp Rate (observed):  16    EKG:  Sinus rhythm                Electronically Signed By: SAWYER Kramer CRNA  November 5, 2020  2:11 PM

## 2020-11-05 NOTE — OP NOTE
Procedure Date: November 5, 2020      SURGEON: Mitchell Her MD     ASSISTANT:  None.     PREOPERATIVE DIAGNOSIS:   1.  Chronic left posterior distal calf wound, history of free flap application     POSTOPERATIVE DIAGNOSIS:   1.    Same     PROCEDURES PERFORMED:  Excisional debridement left posterior calf wound with debridement of skin, subcutaneous tissue, chronic biofilm, application of vera flow negative pressure wound therapy with hypochlorous acid instillation    ANESTHESIA:  MAC with lidocaine     ESTIMATED BLOOD LOSS:  Less than 10 cc          SPECIMENS:  Periwound skin full-thickness biopsy sent to dermatopathology, [facility]     COMPLICATIONS: None     DRAINS:  Vera flow negative pressure wound therapy with hypochlorous acid installation       DESCRIPTION OF PROCEDURE: Informed consent was obtained.  Patient was appropriately marked.  Patient was then taken to the operating room and placed in the supine position.  All pressure points were well-padded. The patient's left leg were then prepped and draped in a sterile fashion.  A timeout was performed.  Wound was examined, no bone exposure, no significant undermining, wound dimensions are approximately 6 x 4 cm with approximately 3 cm of depth.  A 10 blade was utilized to sharply excise the wound edges, full-thickness biopsy was sent to dermatopathology at [facility] for evaluation.  10 blade was utilized additionally to debride full margin of the wound, skin, subcutaneous tissue, biofilm, chronic scar tissue, chronic fibrotic tissue.  No bone exposure, chronic induration, biofilm present, no odor.  The wound was then debrided with ultrasonic debridement,Misonix, using 1 bottle of Prontosan, wound was then soaked with hypochlorous acid for 15 minutes.  Ioban was then placed over the wound, the wound window was created, vera flow negative pressure wound therapy sponges and placed in standard fashion with a bridge to the posterior aspect  of the calf for the suction port, installation with her directly over the wound.  An occlusive seal was obtained and therefore negative pressure wound therapy installation with hypochlorous acid initiated, installation volume 14 cc.  A Rooke boot was placed.    The patient tolerated the procedure and anesthesia well and was brought back to the recovery room, vital signs stable and vascular status intact to recovery room.  Anticipate bedside vera flow change in approximately 3 to 4 days.  When adequate wound bed preparation, will perform split-thickness skin grafting.  Will place negative pressure wound therapy over split-thickness skin graft for anticipated 1 week, will obtain approval for home negative pressure therapy utilization.    DISPOSITION:  Admit  WB status: Full    Antibiotics: Postoperative vancomycin given status of MRSA.  Mitchell Her MD  Pager [phone]

## 2020-11-05 NOTE — PLAN OF CARE
Report given to  55 RN, Gene. Pt AOx4, VSS, denies pain. Wound vac in place and programmed for wound cleansing. Pt aware of POC and anticipates long stay as discussed w/. All belongings transported with pt to floor.

## 2020-11-05 NOTE — PROVIDER NOTIFICATION
MD Notification    Notified Person: MD    Notified Person Name:  Taina    Notification Date/Time: 11/5/2020  1749 PM    Notification Interaction: Text paged    Purpose of Notification: Pt asking for Advil 800 mg.    Orders Received: PRN motrin ordered.    Comments:

## 2020-11-06 ENCOUNTER — COMMUNICATION - HEALTHEAST (OUTPATIENT)
Dept: INTERNAL MEDICINE | Facility: CLINIC | Age: 60
End: 2020-11-06

## 2020-11-06 DIAGNOSIS — E55.9 VITAMIN D DEFICIENCY: ICD-10-CM

## 2020-11-06 LAB — GLUCOSE BLDC GLUCOMTR-MCNC: 97 MG/DL (ref 70–99)

## 2020-11-06 PROCEDURE — 999N001017 HC STATISTIC GLUCOSE BY METER IP

## 2020-11-06 PROCEDURE — 120N000001 HC R&B MED SURG/OB

## 2020-11-06 PROCEDURE — 250N000013 HC RX MED GY IP 250 OP 250 PS 637: Performed by: SURGERY

## 2020-11-06 PROCEDURE — 999N000128 HC STATISTIC PERIPHERAL IV START W/O US GUIDANCE

## 2020-11-06 PROCEDURE — 250N000011 HC RX IP 250 OP 636: Performed by: SURGERY

## 2020-11-06 RX ADMIN — IBUPROFEN 400 MG: 200 TABLET, FILM COATED ORAL at 08:33

## 2020-11-06 RX ADMIN — IBUPROFEN 800 MG: 200 TABLET, FILM COATED ORAL at 21:43

## 2020-11-06 RX ADMIN — IBUPROFEN 800 MG: 400 TABLET ORAL at 16:09

## 2020-11-06 RX ADMIN — VITAM B12 100 MCG: 100 TAB at 08:33

## 2020-11-06 RX ADMIN — VANCOMYCIN HYDROCHLORIDE 1000 MG: 1 INJECTION, SOLUTION INTRAVENOUS at 00:22

## 2020-11-06 RX ADMIN — PANTOPRAZOLE SODIUM 40 MG: 40 TABLET, DELAYED RELEASE ORAL at 08:33

## 2020-11-06 RX ADMIN — FOLIC ACID 1 MG: 1 TABLET ORAL at 08:33

## 2020-11-06 RX ADMIN — PANTOPRAZOLE SODIUM 40 MG: 40 TABLET, DELAYED RELEASE ORAL at 21:43

## 2020-11-06 ASSESSMENT — ACTIVITIES OF DAILY LIVING (ADL)
ADLS_ACUITY_SCORE: 14

## 2020-11-06 NOTE — PROGRESS NOTES
Surgery Progress Note:     S: doing well, no issues this afternoon, resting comfortably, ate a good lunch     O:  General: appears well and not in distress   CV: regular rate and rhythm   Resp: normal respiratory effort   Abd: soft, no distenstion  Extremities: warm and well perfused; left leg in rook boot; vera flow wound vac with good seal, no leak     A/P:   Ms Robertson is a 59 yo female who is POD#1 from excisional debridement to chronic left posterior calf wound.     -continue vera flow wound vac    -Dr Her will plan to change on Monday   -plan for skin grafting in the near future when wound bed has good base of granulation tissue   -continue current cares, regular diet, IS, pain control     -d/w Dr Taina Costa MD  General Surgery Resident, PGY-4   P: [phone]

## 2020-11-06 NOTE — PLAN OF CARE
Pt A&Ox4. CMS intact-baseline numbness near wound. Wound vac in place and functioning. VSS. Up indep in room. Taking ibuprofen for pain. Voiding adequately in BR. Continue to monitor.

## 2020-11-06 NOTE — PROGRESS NOTES
SPIRITUAL HEALTH SERVICES Progress Note  [facility]    Referral Source: Hospital  Request    Pt expressed some concern regarding the welfare of others due to COVID, but indicated that she feels little threat herself. She designated her son as visitor but discouraged visits out of concern for his health and other members of the family in particular grandchildren. Pt stated that she serves as the primary caregiver for her  who has significant health problems due to type 1 diabetes. Pt stated that she relies upon children to provide support for her spouse allowing her to care for herself during this time and is confident that the support will continue as long as needed (daughter is currently living in her home). Pt indicates that she has confidence in her ability to cope with caring for her  during her recovery and while she is working full-time.    Provided emotional and spiritual support through reflective conversation and affirmation of concerns and feelings.     Pt said she expects to remain in hospital for about a week and is open to more visits by  while she is in hospital. Pt is aware of the availability of  services by request. Follow up in the next few days.      Geo Vigil  Chaplain Resident

## 2020-11-06 NOTE — PLAN OF CARE
A&OX4, VSS on RA. POD 0. Wound vac with Veroflow instillation c.d.I, Left foot in Rooke Boot. CMS intact.Pain managed with Motrin.Up Independently. On regular diet. PIV Sled. Possible plan for graft on Thursday. Continue to monitor.

## 2020-11-07 PROCEDURE — 250N000013 HC RX MED GY IP 250 OP 250 PS 637: Performed by: SURGERY

## 2020-11-07 PROCEDURE — 120N000001 HC R&B MED SURG/OB

## 2020-11-07 RX ADMIN — IBUPROFEN 800 MG: 200 TABLET, FILM COATED ORAL at 21:48

## 2020-11-07 RX ADMIN — IBUPROFEN 800 MG: 200 TABLET, FILM COATED ORAL at 08:08

## 2020-11-07 RX ADMIN — IBUPROFEN 800 MG: 400 TABLET ORAL at 16:17

## 2020-11-07 RX ADMIN — FOLIC ACID 1 MG: 1 TABLET ORAL at 08:07

## 2020-11-07 RX ADMIN — CHOLECALCIFEROL CAP 1.25 MG (50000 UNIT) 1250 MCG: 1.25 CAP at 08:07

## 2020-11-07 RX ADMIN — PANTOPRAZOLE SODIUM 40 MG: 40 TABLET, DELAYED RELEASE ORAL at 20:51

## 2020-11-07 RX ADMIN — PANTOPRAZOLE SODIUM 40 MG: 40 TABLET, DELAYED RELEASE ORAL at 08:08

## 2020-11-07 RX ADMIN — VITAM B12 100 MCG: 100 TAB at 08:08

## 2020-11-07 ASSESSMENT — ACTIVITIES OF DAILY LIVING (ADL)
ADLS_ACUITY_SCORE: 14

## 2020-11-07 NOTE — PROGRESS NOTES
Vascular Surgery Progress Note    S: Very comfortable.    O:   Vitals:  BP  Min: 111/62  Max: 135/75  Temp  Av.3  F (36.8  C)  Min: 98.2  F (36.8  C)  Max: 98.4  F (36.9  C)  Pulse  Av.5  Min: 68  Max: 71  No intake/output data recorded.    Physical Exam: Alert and appropriate.  Comfortable.          Christy had some leak issues this morning.               This was reinforced  by nursing staff and is working                       Well presently.          No leg edema or cellulitis.  CMS intact    Assessment/Plan: Continue with CortezaFlow if issues with seal we will change this at bedside.  Plan otherwise to change this on 2020 with Dr. Her.      Wm. Flaco MD

## 2020-11-07 NOTE — PROGRESS NOTES
Pt A&Ox4. CMS intact-baseline numbness near wound. VSS. Up indep in room. Taking ibuprofen for pain. Voiding adequately. Wound vac in place. Continue to monitor.

## 2020-11-07 NOTE — PLAN OF CARE
A&O. VSS. CMS intact. Scheduled Ibuprofen pain. Dressing CDI, wound vac in place, reinforced. Reg diet. Ind in room.

## 2020-11-08 PROCEDURE — 250N000013 HC RX MED GY IP 250 OP 250 PS 637: Performed by: SURGERY

## 2020-11-08 PROCEDURE — 120N000001 HC R&B MED SURG/OB

## 2020-11-08 RX ADMIN — VITAM B12 100 MCG: 100 TAB at 08:35

## 2020-11-08 RX ADMIN — PANTOPRAZOLE SODIUM 40 MG: 40 TABLET, DELAYED RELEASE ORAL at 08:35

## 2020-11-08 RX ADMIN — IBUPROFEN 800 MG: 200 TABLET, FILM COATED ORAL at 20:20

## 2020-11-08 RX ADMIN — IBUPROFEN 800 MG: 400 TABLET ORAL at 15:15

## 2020-11-08 RX ADMIN — IBUPROFEN 800 MG: 400 TABLET ORAL at 08:35

## 2020-11-08 RX ADMIN — FOLIC ACID 1 MG: 1 TABLET ORAL at 08:35

## 2020-11-08 RX ADMIN — PANTOPRAZOLE SODIUM 40 MG: 40 TABLET, DELAYED RELEASE ORAL at 20:20

## 2020-11-08 ASSESSMENT — ACTIVITIES OF DAILY LIVING (ADL)
ADLS_ACUITY_SCORE: 14

## 2020-11-08 NOTE — PROGRESS NOTES
Pt A&Ox4. CMS intact-baseline numbness near wound. VSS. Up indep in room. Taking ibuprofen for pain. Voiding adequately. Wound vac in place-reinforced this afternoon for small leaking. Shower done today. Continue to monitor.

## 2020-11-08 NOTE — PLAN OF CARE
VSS, pt on room air. Scheduled ibuprofen given for pain control. Wound vac in place to left ankle wound dressing clean dry and intact.

## 2020-11-08 NOTE — PLAN OF CARE
A&O. VSS. CMS intact. Scheduled Ibuprofen pain. Dressing CDI, wound vac in place. Reg diet. Ind in room.

## 2020-11-09 ENCOUNTER — TELEPHONE (OUTPATIENT)
Facility: CLINIC | Age: 60
End: 2020-11-09

## 2020-11-09 PROBLEM — T81.89XD NON-HEALING SURGICAL WOUND, SUBSEQUENT ENCOUNTER: Status: ACTIVE | Noted: 2020-10-02

## 2020-11-09 PROCEDURE — 250N000013 HC RX MED GY IP 250 OP 250 PS 637: Performed by: SURGERY

## 2020-11-09 PROCEDURE — 120N000001 HC R&B MED SURG/OB

## 2020-11-09 RX ADMIN — PANTOPRAZOLE SODIUM 40 MG: 40 TABLET, DELAYED RELEASE ORAL at 20:38

## 2020-11-09 RX ADMIN — VITAM B12 100 MCG: 100 TAB at 09:11

## 2020-11-09 RX ADMIN — IBUPROFEN 800 MG: 400 TABLET ORAL at 16:41

## 2020-11-09 RX ADMIN — IBUPROFEN 800 MG: 200 TABLET, FILM COATED ORAL at 20:37

## 2020-11-09 RX ADMIN — PANTOPRAZOLE SODIUM 40 MG: 40 TABLET, DELAYED RELEASE ORAL at 09:11

## 2020-11-09 RX ADMIN — IBUPROFEN 800 MG: 400 TABLET ORAL at 06:19

## 2020-11-09 RX ADMIN — FOLIC ACID 1 MG: 1 TABLET ORAL at 09:11

## 2020-11-09 ASSESSMENT — ACTIVITIES OF DAILY LIVING (ADL)
ADLS_ACUITY_SCORE: 14
DEPENDENT_IADLS:: INDEPENDENT
ADLS_ACUITY_SCORE: 14

## 2020-11-09 NOTE — TELEPHONE ENCOUNTER
Type of surgery: SURGICAL PROCUREMENT, SPLIT-THICKNESS SKIN GRAFT, HARVEST FROM LEFT THIGH TO LEFT LEG WOUND;  WOUND VAC APPLICATION  Location of surgery: [facility]  Date and time of surgery: 11/10/2020 3:30PM  Surgeon: DR. MAE  Pre-Op Appt Date: N/A  Post-Op Appt Date: N/A   Packet sent out: Not Applicable  Pre-cert/Authorization completed:  Not Applicable  Date: 11/9/2020

## 2020-11-09 NOTE — PLAN OF CARE
Date/Time  11/9 7-3:30    Service: Ortho  Behavior/Aggression tool color: Green  Diagnosis: MISONIX IRRIGATION AND DEBRIDEMENT LOWER EXTREMITY WITH VASHE APPLICATION  POD#: 3  Mental Status: A&Ox4  Activity/dangle: IND  Diet: Reg  Pain: Denies  Foster/Voiding: BR  Tele/Restraints/Iso: MRSA contact iso  02/LDA:   D/C Date:   Other Info: Will go down for surgery tomorrow around 3-4pm by .

## 2020-11-09 NOTE — PROGRESS NOTES
Veraflo removed, wound granulation tissue, no odor  Ready for STSG  Goals, risks, benefits discussed  STSG harvest from left thigh  Orders placed for 11- surgery  Will place outpatient VAC POD1 and plan discontinue home 11- with outpatient VAC over STSG  Orders placed  Anticipate surgery at 3pm or after    Taina  P - [phone]

## 2020-11-09 NOTE — PLAN OF CARE
Pt A&O X4. VSS on RA. CMS intact. Wound vac intact, dressing leaking when ambulating, rook book on. Up indep in room. Scheduled ibuprofen for pain. Plan for wound vac change @ bedside tomorrow. Continue to monitor.

## 2020-11-09 NOTE — PROGRESS NOTES
"[facility]  Inpatient dressing change  Patient seen with Dr. Her for assessment for future skin grafting.     Exam:  /78 (BP Location: Left arm)   Pulse 62   Temp 98.4  F (36.9  C) (Oral)   Resp 18   Ht 1.626 m (5' 4\")   Wt 53.7 kg (118 lb 4.8 oz)   SpO2 98%   BMI 20.31 kg/m    Left posterior achilles 4.5 x 3.5 x 1.0 100 Granular wound bed  Procedure: non-selective debridement was performed, no bleeding occurred. Patient tolerated procedure well.  Procedure: Wound was redressed with veraflow NPWT @ 125mmhg with good seal and no leaks.    Bella Richards, FEMIN, RN, CWOCN   November 9, 2020        "

## 2020-11-09 NOTE — PLAN OF CARE
Vss, slept well. No c/o pain. Independent in room. Left foot dsg clean and dry. Wound vac in place, to be replaced today.

## 2020-11-09 NOTE — CONSULTS
Care Management Initial Consult    General Information  Assessment completed with: Anna Julien  Type of CM/SW Visit: Initial Assessment  Primary Care Provider verified and updated as needed: Yes   Readmission within the last 30 days: no previous admission in last 30 days      Reason for Consult: care coordination/care conference;discharge planning  Advance Care Planning:            Communication Assessment  Patient's communication style: spoken language (English or Bilingual)    Hearing Difficulty or Deaf: no   Wear Glasses or Blind: no    Cognitive  Cognitive/Neuro/Behavioral: WDL  Level of Consciousness: alert  Arousal Level: opens eyes spontaneously  Orientation: oriented x 4  Mood/Behavior: calm;cooperative  Best Language: 0 - No aphasia  Speech: fluent    Living Environment:   People in home: spouse  Sher  Current living Arrangements: apartment      Able to return to prior arrangements: yes       Family/Social Support:  Care provided by: self  Provides care for: no one  Marital Status:              Description of Support System:    Spouse and childrent       Current Resources:   Skilled Home Care Services:  none  Community Resources: None  Equipment currently used at home: none(cam walker boot)  Supplies currently used at home: Wound Care Supplies;Other(Wound vac)    Employment/Financial:  Employment Status: employed full-time        Financial Concerns: No concerns identified           Lifestyle & Psychosocial Needs:        Socioeconomic History     Marital status:      Spouse name: Not on file     Number of children: Not on file     Years of education: Not on file     Highest education level: Not on file     Tobacco Use     Smoking status: Former Smoker     Quit date: 1/1/2005     Years since quitting: 15.8     Smokeless tobacco: Never Used   Substance and Sexual Activity     Alcohol use: No     Drug use: No       Functional Status:  Prior to admission patient needed assistance:    Dependent ADLs:: Independent  Dependent IADLs:: Independent  Assesssment of Functional Status: At functional baseline    Mental Health Status:          Chemical Dependency Status:                Values/Beliefs:  Spiritual, Cultural Beliefs, Mosque Practices, Values that affect care: no               Additional Information:  Pt is currently using a vera flow KCI wound vac. Plan it so go to the OR for skin graft with non vera flow KCI wound vac placement tomorrow 11/10 afternoon with discharge later that evening (pt hopes) or on 11/11. Pt is in need of a vac for home.  Orders placed through [employer]. Documents faxed to [facility] at [phone].  Waiting on insurance auth and release of ready vac.     Tiffany Gomez RN BAN  Inpatient Care Coordination  [address] [address]  [email or web address]   Office: [phone]  Fax: [phone]

## 2020-11-10 ENCOUNTER — SURGERY (OUTPATIENT)
Age: 60
End: 2020-11-10
Payer: COMMERCIAL

## 2020-11-10 ENCOUNTER — APPOINTMENT (OUTPATIENT)
Dept: SURGERY | Facility: PHYSICIAN GROUP | Age: 60
End: 2020-11-10
Payer: COMMERCIAL

## 2020-11-10 ENCOUNTER — ANESTHESIA EVENT (OUTPATIENT)
Dept: SURGERY | Facility: CLINIC | Age: 60
End: 2020-11-10
Payer: COMMERCIAL

## 2020-11-10 ENCOUNTER — ANESTHESIA (OUTPATIENT)
Dept: SURGERY | Facility: CLINIC | Age: 60
End: 2020-11-10
Payer: COMMERCIAL

## 2020-11-10 PROCEDURE — 250N000011 HC RX IP 250 OP 636: Performed by: SURGERY

## 2020-11-10 PROCEDURE — 250N000013 HC RX MED GY IP 250 OP 250 PS 637: Performed by: SURGERY

## 2020-11-10 PROCEDURE — 999N000138 HC STATISTIC PRE-PROCEDURE ASSESSMENT I: Performed by: SURGERY

## 2020-11-10 PROCEDURE — 0HBJXZZ EXCISION OF LEFT UPPER LEG SKIN, EXTERNAL APPROACH: ICD-10-PCS | Performed by: SURGERY

## 2020-11-10 PROCEDURE — 360N000020 HC SURGERY LEVEL 3 1ST 30 MIN: Performed by: SURGERY

## 2020-11-10 PROCEDURE — 250N000011 HC RX IP 250 OP 636: Performed by: NURSE ANESTHETIST, CERTIFIED REGISTERED

## 2020-11-10 PROCEDURE — 258N000003 HC RX IP 258 OP 636: Performed by: ANESTHESIOLOGY

## 2020-11-10 PROCEDURE — 15100 SPLT AGRFT T/A/L 1ST 100SQCM: CPT | Performed by: SURGERY

## 2020-11-10 PROCEDURE — 761N000001 HC RECOVERY PHASE 1 LEVEL 1 FIRST HR: Performed by: SURGERY

## 2020-11-10 PROCEDURE — 370N000002 HC ANESTHESIA TECHNICAL FEE, EACH ADDTL 15 MIN: Performed by: SURGERY

## 2020-11-10 PROCEDURE — 15002 WOUND PREP TRK/ARM/LEG: CPT | Performed by: SURGERY

## 2020-11-10 PROCEDURE — 0HRLX74 REPLACEMENT OF LEFT LOWER LEG SKIN WITH AUTOLOGOUS TISSUE SUBSTITUTE, PARTIAL THICKNESS, EXTERNAL APPROACH: ICD-10-PCS | Performed by: SURGERY

## 2020-11-10 PROCEDURE — 120N000001 HC R&B MED SURG/OB

## 2020-11-10 PROCEDURE — 360N000021 HC SURGERY LEVEL 3 EA 15 ADDTL MIN: Performed by: SURGERY

## 2020-11-10 PROCEDURE — 370N000001 HC ANESTHESIA TECHNICAL FEE, 1ST 30 MIN: Performed by: SURGERY

## 2020-11-10 PROCEDURE — 272N000001 HC OR GENERAL SUPPLY STERILE: Performed by: SURGERY

## 2020-11-10 PROCEDURE — 258N000003 HC RX IP 258 OP 636: Performed by: NURSE ANESTHETIST, CERTIFIED REGISTERED

## 2020-11-10 PROCEDURE — 250N000009 HC RX 250: Performed by: SURGERY

## 2020-11-10 RX ORDER — CEFAZOLIN SODIUM 1 G/3ML
1 INJECTION, POWDER, FOR SOLUTION INTRAMUSCULAR; INTRAVENOUS SEE ADMIN INSTRUCTIONS
Status: DISCONTINUED | OUTPATIENT
Start: 2020-11-10 | End: 2020-11-10 | Stop reason: HOSPADM

## 2020-11-10 RX ORDER — CEFAZOLIN SODIUM 1 G/3ML
1 INJECTION, POWDER, FOR SOLUTION INTRAMUSCULAR; INTRAVENOUS EVERY 8 HOURS
Status: COMPLETED | OUTPATIENT
Start: 2020-11-11 | End: 2020-11-11

## 2020-11-10 RX ORDER — HYDRALAZINE HYDROCHLORIDE 20 MG/ML
2.5-5 INJECTION INTRAMUSCULAR; INTRAVENOUS EVERY 10 MIN PRN
Status: DISCONTINUED | OUTPATIENT
Start: 2020-11-10 | End: 2020-11-10 | Stop reason: HOSPADM

## 2020-11-10 RX ORDER — SODIUM CHLORIDE, SODIUM LACTATE, POTASSIUM CHLORIDE, CALCIUM CHLORIDE 600; 310; 30; 20 MG/100ML; MG/100ML; MG/100ML; MG/100ML
INJECTION, SOLUTION INTRAVENOUS CONTINUOUS
Status: DISCONTINUED | OUTPATIENT
Start: 2020-11-10 | End: 2020-11-10 | Stop reason: HOSPADM

## 2020-11-10 RX ORDER — CEFAZOLIN SODIUM 2 G/100ML
2 INJECTION, SOLUTION INTRAVENOUS
Status: COMPLETED | OUTPATIENT
Start: 2020-11-10 | End: 2020-11-10

## 2020-11-10 RX ORDER — PROPOFOL 10 MG/ML
INJECTION, EMULSION INTRAVENOUS CONTINUOUS PRN
Status: DISCONTINUED | OUTPATIENT
Start: 2020-11-10 | End: 2020-11-10

## 2020-11-10 RX ORDER — ALBUTEROL SULFATE 0.83 MG/ML
2.5 SOLUTION RESPIRATORY (INHALATION) EVERY 4 HOURS PRN
Status: DISCONTINUED | OUTPATIENT
Start: 2020-11-10 | End: 2020-11-10 | Stop reason: HOSPADM

## 2020-11-10 RX ORDER — FENTANYL CITRATE 50 UG/ML
25-50 INJECTION, SOLUTION INTRAMUSCULAR; INTRAVENOUS
Status: DISCONTINUED | OUTPATIENT
Start: 2020-11-10 | End: 2020-11-10 | Stop reason: HOSPADM

## 2020-11-10 RX ORDER — HYDROMORPHONE HYDROCHLORIDE 1 MG/ML
.3-.5 INJECTION, SOLUTION INTRAMUSCULAR; INTRAVENOUS; SUBCUTANEOUS EVERY 5 MIN PRN
Status: DISCONTINUED | OUTPATIENT
Start: 2020-11-10 | End: 2020-11-10 | Stop reason: HOSPADM

## 2020-11-10 RX ORDER — ONDANSETRON 4 MG/1
4 TABLET, ORALLY DISINTEGRATING ORAL EVERY 30 MIN PRN
Status: DISCONTINUED | OUTPATIENT
Start: 2020-11-10 | End: 2020-11-10 | Stop reason: HOSPADM

## 2020-11-10 RX ORDER — ONDANSETRON 2 MG/ML
INJECTION INTRAMUSCULAR; INTRAVENOUS PRN
Status: DISCONTINUED | OUTPATIENT
Start: 2020-11-10 | End: 2020-11-10

## 2020-11-10 RX ORDER — ONDANSETRON 2 MG/ML
4 INJECTION INTRAMUSCULAR; INTRAVENOUS EVERY 30 MIN PRN
Status: DISCONTINUED | OUTPATIENT
Start: 2020-11-10 | End: 2020-11-10 | Stop reason: HOSPADM

## 2020-11-10 RX ORDER — MAGNESIUM CARB/ALUMINUM HYDROX 105-160MG
TABLET,CHEWABLE ORAL PRN
Status: DISCONTINUED | OUTPATIENT
Start: 2020-11-10 | End: 2020-11-10 | Stop reason: HOSPADM

## 2020-11-10 RX ORDER — DEXAMETHASONE SODIUM PHOSPHATE 4 MG/ML
INJECTION, SOLUTION INTRA-ARTICULAR; INTRALESIONAL; INTRAMUSCULAR; INTRAVENOUS; SOFT TISSUE PRN
Status: DISCONTINUED | OUTPATIENT
Start: 2020-11-10 | End: 2020-11-10

## 2020-11-10 RX ORDER — MEPERIDINE HYDROCHLORIDE 25 MG/ML
12.5 INJECTION INTRAMUSCULAR; INTRAVENOUS; SUBCUTANEOUS EVERY 5 MIN PRN
Status: DISCONTINUED | OUTPATIENT
Start: 2020-11-10 | End: 2020-11-10 | Stop reason: HOSPADM

## 2020-11-10 RX ORDER — MAGNESIUM HYDROXIDE 1200 MG/15ML
LIQUID ORAL PRN
Status: DISCONTINUED | OUTPATIENT
Start: 2020-11-10 | End: 2020-11-10 | Stop reason: HOSPADM

## 2020-11-10 RX ORDER — LABETALOL HYDROCHLORIDE 5 MG/ML
10 INJECTION, SOLUTION INTRAVENOUS
Status: DISCONTINUED | OUTPATIENT
Start: 2020-11-10 | End: 2020-11-10 | Stop reason: HOSPADM

## 2020-11-10 RX ORDER — FENTANYL CITRATE 50 UG/ML
INJECTION, SOLUTION INTRAMUSCULAR; INTRAVENOUS PRN
Status: DISCONTINUED | OUTPATIENT
Start: 2020-11-10 | End: 2020-11-10

## 2020-11-10 RX ORDER — NALOXONE HYDROCHLORIDE 0.4 MG/ML
.1-.4 INJECTION, SOLUTION INTRAMUSCULAR; INTRAVENOUS; SUBCUTANEOUS
Status: DISCONTINUED | OUTPATIENT
Start: 2020-11-10 | End: 2020-11-10

## 2020-11-10 RX ORDER — LIDOCAINE 40 MG/G
CREAM TOPICAL
Status: DISCONTINUED | OUTPATIENT
Start: 2020-11-10 | End: 2020-11-11 | Stop reason: HOSPADM

## 2020-11-10 RX ORDER — MINERAL OIL
OIL (ML) MISCELLANEOUS PRN
Status: DISCONTINUED | OUTPATIENT
Start: 2020-11-10 | End: 2020-11-10 | Stop reason: HOSPADM

## 2020-11-10 RX ADMIN — CEFAZOLIN SODIUM 2 G: 2 INJECTION, SOLUTION INTRAVENOUS at 16:01

## 2020-11-10 RX ADMIN — DEXAMETHASONE SODIUM PHOSPHATE 4 MG: 4 INJECTION, SOLUTION INTRA-ARTICULAR; INTRALESIONAL; INTRAMUSCULAR; INTRAVENOUS; SOFT TISSUE at 16:07

## 2020-11-10 RX ADMIN — IBUPROFEN 800 MG: 200 TABLET, FILM COATED ORAL at 08:25

## 2020-11-10 RX ADMIN — CEFAZOLIN 1 G: 1 INJECTION, POWDER, FOR SOLUTION INTRAMUSCULAR; INTRAVENOUS at 23:32

## 2020-11-10 RX ADMIN — PROPOFOL 100 MCG/KG/MIN: 10 INJECTION, EMULSION INTRAVENOUS at 15:58

## 2020-11-10 RX ADMIN — SODIUM CHLORIDE, POTASSIUM CHLORIDE, SODIUM LACTATE AND CALCIUM CHLORIDE: 600; 310; 30; 20 INJECTION, SOLUTION INTRAVENOUS at 14:57

## 2020-11-10 RX ADMIN — MINERAL OIL 10 ML: 1 OIL ORAL at 16:30

## 2020-11-10 RX ADMIN — SODIUM CHLORIDE, POTASSIUM CHLORIDE, SODIUM LACTATE AND CALCIUM CHLORIDE: 600; 310; 30; 20 INJECTION, SOLUTION INTRAVENOUS at 15:56

## 2020-11-10 RX ADMIN — PHENYLEPHRINE HYDROCHLORIDE 100 MCG: 10 INJECTION INTRAVENOUS at 16:18

## 2020-11-10 RX ADMIN — ONDANSETRON 4 MG: 2 INJECTION INTRAMUSCULAR; INTRAVENOUS at 16:13

## 2020-11-10 RX ADMIN — PANTOPRAZOLE SODIUM 40 MG: 40 TABLET, DELAYED RELEASE ORAL at 20:10

## 2020-11-10 RX ADMIN — IBUPROFEN 800 MG: 200 TABLET, FILM COATED ORAL at 22:27

## 2020-11-10 RX ADMIN — SODIUM CHLORIDE 1000 ML: 900 IRRIGANT IRRIGATION at 16:30

## 2020-11-10 RX ADMIN — PHENYLEPHRINE HYDROCHLORIDE 100 MCG: 10 INJECTION INTRAVENOUS at 16:41

## 2020-11-10 RX ADMIN — FENTANYL CITRATE 50 MCG: 50 INJECTION, SOLUTION INTRAMUSCULAR; INTRAVENOUS at 16:06

## 2020-11-10 RX ADMIN — THROMBIN, TOPICAL (BOVINE) 5000 UNITS: KIT at 16:30

## 2020-11-10 RX ADMIN — IBUPROFEN 800 MG: 400 TABLET ORAL at 18:56

## 2020-11-10 RX ADMIN — LIDOCAINE HYDROCHLORIDE 30 ML: 10 INJECTION, SOLUTION EPIDURAL; INFILTRATION; INTRACAUDAL; PERINEURAL at 16:25

## 2020-11-10 RX ADMIN — MIDAZOLAM 2 MG: 1 INJECTION INTRAMUSCULAR; INTRAVENOUS at 15:58

## 2020-11-10 RX ADMIN — PANTOPRAZOLE SODIUM 40 MG: 40 TABLET, DELAYED RELEASE ORAL at 08:25

## 2020-11-10 RX ADMIN — PHENYLEPHRINE HYDROCHLORIDE 100 MCG: 10 INJECTION INTRAVENOUS at 16:24

## 2020-11-10 ASSESSMENT — ACTIVITIES OF DAILY LIVING (ADL)
ADLS_ACUITY_SCORE: 14

## 2020-11-10 ASSESSMENT — LIFESTYLE VARIABLES: TOBACCO_USE: 0

## 2020-11-10 ASSESSMENT — ENCOUNTER SYMPTOMS: SEIZURES: 0

## 2020-11-10 NOTE — PLAN OF CARE
A&Ox 4. Assist independent in room w/walking boot on. VSS on RA ex tachy. Dressing moist W/wound vac in place on 125 suction. Pain controlled with ibuprofen. Tolerating NPO Diet -procedure at 3:15 today. Progressing per POC. Will Cont to monitor.

## 2020-11-10 NOTE — ANESTHESIA PREPROCEDURE EVALUATION
Anesthesia Pre-Procedure Evaluation    Patient: Anna Sanderson   MRN: [id number] : 1960          Preoperative Diagnosis: Non-healing surgical wound, subsequent encounter [T81.89XD]    Procedure(s):  SURGICAL PROCUREMENT, SPLIT-THICKNESS SKIN GRAFT, HARVEST FROM LEFT THIGH TO LEFT LEG WOUND  WOUND VAC APPLICATION    Past Medical History:   Diagnosis Date     GERD (gastroesophageal reflux disease)      Iron deficiency anemia      MRSA infection 2020    Left leg wound     Vitamin D deficiency      Past Surgical History:   Procedure Laterality Date     APPLY WOUND VAC Left 2020    Procedure: VERAFLO WOUND VAC APPLICATION;  Surgeon: Mitchell Her MD;  Location:  OR     GYN SURGERY      hysterectomy,  x 3     GYN SURGERY      right oophorectomy     IR LOWER EXTREMITY ANGIOGRAM LEFT  10/2/2020     IRRIGATION AND DEBRIDEMENT LOWER EXTREMITY, COMBINED Left 2020    Procedure: MISONIX IRRIGATION AND DEBRIDEMENT LOWER EXTREMITY WITH VASHE APPLICATION;  Surgeon: Mitchell Her MD;  Location:  OR     SOFT TISSUE SURGERY      multiple left Achilles area surgeries       Anesthesia Evaluation     . Pt has had prior anesthetic. Type: General    History of anesthetic complications   - PONV        ROS/MED HX    ENT/Pulmonary:      (-) tobacco use, asthma and recent URI   Neurologic:      (-) seizures, CVA and migraines   Cardiovascular:  - neg cardiovascular ROS       METS/Exercise Tolerance:     Hematologic:         Musculoskeletal: Comment: Complex wound of the distal posterior left calf ( 5 x 5 x 1.5 cm) overlying the Achilles tendon region.  Remote history of left ankle fracture.  History of Achilles tendon rupture approximately 12 years ago.  Status post failed left radial forearm free flap.  Status post failed left gastroc rotational flap.  Status post multiple Achilles procedures including debridements and an FHL tendon transfer.  Status post skin grafting of the affected area  "in 2017.   (+)  other musculoskeletal-       GI/Hepatic:     (+) GERD Asymptomatic on medication,      (-) liver disease   Renal/Genitourinary:      (-) renal disease   Endo:      (-) Type II DM and thyroid disease   Psychiatric:         Infectious Disease:         Malignancy:         Other:                          Physical Exam  Normal systems: cardiovascular, pulmonary and dental    Airway   Mallampati: II  TM distance: >3 FB  Neck ROM: full    Dental     Cardiovascular   Rhythm and rate: regular and normal      Pulmonary    breath sounds clear to auscultation            Lab Results   Component Value Date    WBC 6.8 10/02/2020    HGB 11.6 (L) 10/02/2020    HCT 36.5 10/02/2020     10/02/2020    CRP <2.9 04/11/2018    SED 20 04/11/2018     01/20/2010    POTASSIUM 4.4 01/20/2010    CHLORIDE 106 01/20/2010    CO2 26 01/20/2010    BUN 13 04/11/2018    CR 0.80 10/02/2020    GLC 81 01/20/2010    NILESH 9.3 01/20/2010    ALBUMIN 4.3 01/20/2010    PROTTOTAL 7.5 01/20/2010    ALT <6 01/20/2010    AST 15 04/11/2018    ALKPHOS 64 01/20/2010    BILITOTAL 0.2 01/20/2010    PTT 31 10/02/2020    INR 0.89 10/02/2020       Preop Vitals  BP Readings from Last 3 Encounters:   11/10/20 132/70   10/02/20 116/58   10/01/20 133/87    Pulse Readings from Last 3 Encounters:   11/10/20 77   10/02/20 87   10/01/20 95      Resp Readings from Last 3 Encounters:   11/10/20 18   10/02/20 16   10/01/20 18    SpO2 Readings from Last 3 Encounters:   11/10/20 99%   10/02/20 96%   09/23/20 98%      Temp Readings from Last 1 Encounters:   11/10/20 36.6  C (97.8  F) (Oral)    Ht Readings from Last 1 Encounters:   11/05/20 1.626 m (5' 4\")      Wt Readings from Last 1 Encounters:   11/05/20 53.7 kg (118 lb 4.8 oz)    Estimated body mass index is 20.31 kg/m  as calculated from the following:    Height as of this encounter: 1.626 m (5' 4\").    Weight as of this encounter: 53.7 kg (118 lb 4.8 oz).       Anesthesia Plan      History & Physical " Review  History and physical reviewed and following examination; no interval change.    ASA Status:  2 .    NPO Status:  > 8 hours    Plan for MAC Reason for MAC:  Deep or markedly invasive procedure (G8)  PONV prophylaxis:  Ondansetron (or other 5HT-3) and Dexamethasone or Solumedrol         Postoperative Care  Postoperative pain management:  IV analgesics and Oral pain medications.      Consents  Anesthetic plan, risks, benefits and alternatives discussed with:  Patient..                 Ian Castro DO

## 2020-11-10 NOTE — ANESTHESIA POSTPROCEDURE EVALUATION
Patient: Anna Sanderson    Procedure(s):  SURGICAL PROCUREMENT, SPLIT-THICKNESS SKIN GRAFT, HARVEST FROM LEFT THIGH TO LEFT LEG WOUND  WOUND VAC APPLICATION    Diagnosis:Non-healing surgical wound, subsequent encounter [T81.89XD]  Diagnosis Additional Information: No value filed.    Anesthesia Type:  MAC    Note:  Anesthesia Post Evaluation    Patient location during evaluation: PACU  Patient participation: Able to fully participate in evaluation  Level of consciousness: awake  Pain management: adequate  Airway patency: patent  Cardiovascular status: acceptable  Respiratory status: acceptable  Hydration status: acceptable  PONV: none     Anesthetic complications: None          Last vitals:  Vitals:    11/10/20 0925 11/10/20 0959 11/10/20 1432   BP:  132/70 124/84   Pulse:  77 69   Resp: 16 18 16   Temp:  36.6  C (97.8  F) 37.2  C (98.9  F)   SpO2:  99% 98%         Electronically Signed By: Maria Fernanda Rodriguez MD, MD  November 10, 2020  5:06 PM

## 2020-11-10 NOTE — PLAN OF CARE
Date/Time  11/9 3-7:30pm    Service: Ortho  Behavior/Aggression tool color: Green  Diagnosis: MISONIX IRRIGATION AND DEBRIDEMENT LOWER EXTREMITY WITH VASHE APPLICATION  POD#: 3  Mental Status: A&Ox4  Activity/dangle: IND  Diet: Reg  Pain: Denies  Foster/Voiding: BR  Tele/Restraints/Iso: MRSA contact iso  02/LDA:   D/C Date:   Other Info: Will go down for surgery tomorrow around 3-4pm by .

## 2020-11-10 NOTE — OP NOTE
Procedure Date: November 10, 2020      SURGEON: Mitchell Her MD     ASSISTANT:  None.     PREOPERATIVE DIAGNOSIS:   1.    Chronic left lower extremity ulceration status post debridement, application of vera flow with hypochlorous acid, history of free flap     POSTOPERATIVE DIAGNOSIS:   1.    Same     PROCEDURES PERFORMED:  Left lower extremity wound debridement, harvest split-thickness skin graft left thigh, application of split-thickness skin graft to left lower extremity ulceration, application of negative pressure wound therapy over skin graft    ANESTHESIA: MAC     ESTIMATED BLOOD LOSS: 20 cc          DESCRIPTION OF PROCEDURE: Informed consent was obtained.  Patient was appropriately marked.  Patient was then taken to the operating room and placed in the supine position.  All pressure points were well-padded. The patient's left leg were then prepped and draped in a sterile fashion.  A timeout was performed.  The left leg wound, medial posterior distal calf was examined, 10 blade was utilized to remove all superficial biofilm, wound was copious irrigated with hypochlorous acid and covered with hypochlorous acid moistened gauze.  Wound measured 4 cm x 4 cm with approximately 1 cm of depth, no bone or tendon exposure, no undermining.  1% lidocaine and saline were injected to the proximal lateral left thigh, mineral oil was placed, appropriate sized split-thickness skin graft was obtained, meshed in a 1-1.5 ratio and sewn to the chronic wound with interrupted 4-0 Monocryl sutures.  5000 units of thrombin was placed on the skin graft, and covered with a single layer of Acticoat, window created with Ioban, sponges placed over Acticoat, seal obtained, negative pressure wound therapy initiated at -125 mmHg, continuous.  The left thigh however site was examined, cleansed with hypochlorous acid, Plurogel and Aquacel silver and Ioban placed.  The leg was placed in EdemaWear and a Rooke boot.    The patient tolerated  the procedure and anesthesia well and was brought back to the recovery room, vital signs stable and vascular status intact to recovery room         Mitchell Her MD  Pager [phone]

## 2020-11-10 NOTE — OR NURSING
1715hr - [facility] Michael now rounding on Pt;   Pt awake; VS &  surgical sites are stable.  Okay for Pt to transfer ot of PACU when appropriate.

## 2020-11-10 NOTE — ANESTHESIA CARE TRANSFER NOTE
Patient: Anna Sanderson    Procedure(s):  SURGICAL PROCUREMENT, SPLIT-THICKNESS SKIN GRAFT, HARVEST FROM LEFT THIGH TO LEFT LEG WOUND  WOUND VAC APPLICATION    Diagnosis: Non-healing surgical wound, subsequent encounter [T81.89XD]  Diagnosis Additional Information: No value filed.    Anesthesia Type:   MAC     Note:  Airway :Face Mask  Patient transferred to:PACU  Comments: Pt awake and able to verbalize needs. All monitors on and audible. VSS. Spontaneous respiration without difficulty. o2 sats 98% on 10L o2 per simple facemask. Pt denies pain. Report given to PACU RN.Handoff Report: Identifed the Patient, Identified the Reponsible Provider, Reviewed the pertinent medical history, Discussed the surgical course, Reviewed Intra-OP anesthesia mangement and issues during anesthesia, Set expectations for post-procedure period and Allowed opportunity for questions and acknowledgement of understanding      Vitals: (Last set prior to Anesthesia Care Transfer)    CRNA VITALS  11/10/2020 1622 - 11/10/2020 1659      11/10/2020             Resp Rate (set):  10                Electronically Signed By: SAWYER Abbott CRNA  November 10, 2020  4:59 PM

## 2020-11-10 NOTE — PROGRESS NOTES
Care Management Follow Up    Length of Stay (days): 5    Expected Discharge Date: 11/11/20     Concerns to be Addressed: all concerns addressed in this encounter;discharge planning     Patient plan of care discussed at interdisciplinary rounds: Yes    Anticipated Discharge Disposition:  home with home vac      Anticipated Discharge Services: None  Anticipated Discharge DME: Wound Vac  Patient/family educated on Medicare website which has current facility and service quality ratings: no  Education Provided on the Discharge Plan:  yes  Patient/Family in Agreement with the Plan: yes    Referrals Placed by CM/LYNDA: Durable Medical Equipment (DME)  Private pay costs discussed: Not applicable    Additional Information:  Spoke with Jasmin Richards at [facility] ([facility] [phone]) who confirms pt will have vera flow vac removed and regular hospital wound vac applied today 11/10/20 at 1515 and plan for discharge tomorrow 11/11/2020.  She also states [facility] will manage dressing changes at their clinic ( no need for home care services).    CM-RN checked [employer] for wound vac insurance coverage confirmation; it appears vac has been approved. This will be dispensed on day of discharge 11/11/2020 by CM-RN from [employer] supply.  [facility] rep Abimael phone [phone] available if needed for support.                   Bibiana Tijerina RN, BSN, PHN  [facility]  Inpatient Care Management  Direct Mobile: [phone]

## 2020-11-10 NOTE — PLAN OF CARE
Pre-op report given to AnnRN. Patient independent with crutches in room, voiding independently in bathroom, scheduled Ibuprofen given for pain, and patient scheduled for surgery at 1515. Possible discharge to home tomorrow with wound vac.

## 2020-11-11 VITALS
BODY MASS INDEX: 20.2 KG/M2 | OXYGEN SATURATION: 92 % | SYSTOLIC BLOOD PRESSURE: 118 MMHG | RESPIRATION RATE: 16 BRPM | TEMPERATURE: 98.2 F | HEIGHT: 64 IN | HEART RATE: 65 BPM | WEIGHT: 118.3 LBS | DIASTOLIC BLOOD PRESSURE: 66 MMHG

## 2020-11-11 LAB
COPATH REPORT: NORMAL
GLUCOSE BLDC GLUCOMTR-MCNC: 94 MG/DL (ref 70–99)

## 2020-11-11 PROCEDURE — 999N001017 HC STATISTIC GLUCOSE BY METER IP

## 2020-11-11 PROCEDURE — 250N000013 HC RX MED GY IP 250 OP 250 PS 637: Performed by: SURGERY

## 2020-11-11 PROCEDURE — 250N000011 HC RX IP 250 OP 636: Performed by: SURGERY

## 2020-11-11 RX ADMIN — FOLIC ACID 1 MG: 1 TABLET ORAL at 08:17

## 2020-11-11 RX ADMIN — CEFAZOLIN 1 G: 1 INJECTION, POWDER, FOR SOLUTION INTRAMUSCULAR; INTRAVENOUS at 08:19

## 2020-11-11 RX ADMIN — IBUPROFEN 800 MG: 200 TABLET, FILM COATED ORAL at 08:17

## 2020-11-11 RX ADMIN — VITAM B12 100 MCG: 100 TAB at 08:17

## 2020-11-11 RX ADMIN — PANTOPRAZOLE SODIUM 40 MG: 40 TABLET, DELAYED RELEASE ORAL at 08:17

## 2020-11-11 ASSESSMENT — ACTIVITIES OF DAILY LIVING (ADL)
ADLS_ACUITY_SCORE: 14

## 2020-11-11 NOTE — PROGRESS NOTES
Care Management Discharge Note    Discharge Date: 11/11/20     Discharge Disposition:  home     Discharge Services: None    Discharge DME: Wound Vac    Discharge Transportation: family or friend will provide    Private pay costs discussed: Not applicable, insurance covers    Patient/family educated on Medicare website which has current facility and service quality ratings: no    Education Provided on the Discharge Plan:    Persons Notified of Discharge Plans: patient, bedside RN   Patient/Family in Agreement with the Plan: yes    Handoff Referral Completed: No    Additional Information:  IP CM-RN dispensed Home Wound Vac as per [employer] and faxed confirmation of delivery (signed by patient) to [facility].  Patient states she has appointment with wound clinic for first dressing change and states no other needs.    Bibiana Tijerina RN, BSN, PHN  [facility]  Inpatient Care Management  Direct Mobile: [phone]

## 2020-11-11 NOTE — OR NURSING
1755hr - Pt transferred back to [facility].  Lt Thigh graft donar site & LE pulses assessed w/[facility] RN; no changes to drainage to Lt thigh, Pulses remain present to LLE as previously charted.

## 2020-11-11 NOTE — DISCHARGE SUMMARY
Discharge summary  Admission date November 5, 2020  Discharge date November 11, 2020  Surgical procedure November 5, 2020 debridement left leg wound, application of negative pressure instill therapy, vera flow, hypochlorous acid, November 10, 2020 split-thickness skin graft application to left leg wound, harvest, thigh, application of negative pressure wound therapy over Acticoat on skin graft  Hospital course; patient was admitted on November 5, 2020 and underwent surgical debridement of the left lower extremity chronic wound, application of negative pressure wound therapy instill with hypochlorous acid, adequate progression to granulation tissue and wound bed preparation by November 9, 2020, patient was returned to the operating room on November 10 and underwent split-thickness skin graft harvesting from the left thigh, application of the left chronic calf wound, suture fixated, Acticoat layer, negative pressure wound therapy.  She tolerated surgical procedure well without complications.  Minimal pain on November 11, she was discharged from the hospital with a outpatient VAC with anticipated use for 1 to 2 weeks.  Medication reconciliation completed.  Follow-up in clinic November 17, 2020, phone #[phone].  Wear [place] boot left leg 24 hours a day to protect skin graft site.  Left thigh dressing from skin harvest site intact at time of discharge, anticipate change in clinic November 17.

## 2020-11-11 NOTE — PLAN OF CARE
A&O.  VSS, RA.  CMS intact.  Dressing CDI.  Wound Vac changed.  Pain managed with ibuprofen.  Up independent.  Will discharge home around 1430.

## 2020-11-11 NOTE — PLAN OF CARE
Came from surgery around 1800.  VSS, RA.  CMS intact.  Graft site with mod drainage, marked.  Dressing on left ankle CDI.  Wound Vac running at 125.  Pain managed with ibuprofen.  Up independent in room.  Possible discharge home tomorrow.

## 2020-11-11 NOTE — PROGRESS NOTES
Vac l and Left thigh dressing intact  Ok to discontinue home with home VAC on STSG  Clinic appt next week for vac change

## 2020-11-11 NOTE — PLAN OF CARE
Pt A/Ox4, VSS on RA denies pain, CMS intact, graft area drainage, wound vac in place, independent room, will continue to monitor

## 2020-11-13 ENCOUNTER — TELEPHONE (OUTPATIENT)
Dept: WOUND CARE | Facility: CLINIC | Age: 60
End: 2020-11-13

## 2020-11-13 NOTE — TELEPHONE ENCOUNTER
Patients donor site is weeping. OK to change dressing with VASHE damp gauze or absorbant dressing such as silver alginate. Patient reports understanding.      English

## 2020-11-17 ENCOUNTER — HOSPITAL ENCOUNTER (OUTPATIENT)
Dept: WOUND CARE | Facility: CLINIC | Age: 60
Discharge: HOME | End: 2020-11-17
Attending: SURGERY | Admitting: SURGERY
Payer: COMMERCIAL

## 2020-11-17 VITALS
TEMPERATURE: 98 F | RESPIRATION RATE: 18 BRPM | DIASTOLIC BLOOD PRESSURE: 92 MMHG | HEART RATE: 105 BPM | SYSTOLIC BLOOD PRESSURE: 154 MMHG

## 2020-11-17 DIAGNOSIS — L97.323 SKIN ULCER OF LEFT ANKLE WITH NECROSIS OF MUSCLE (H): ICD-10-CM

## 2020-11-17 DIAGNOSIS — T81.89XD NON-HEALING SURGICAL WOUND, SUBSEQUENT ENCOUNTER: ICD-10-CM

## 2020-11-17 PROCEDURE — 29581 APPL MULTLAYER CMPRN SYS LEG: CPT | Mod: LT | Performed by: SURGERY

## 2020-11-17 PROCEDURE — 29581 APPL MULTLAYER CMPRN SYS LEG: CPT

## 2020-11-17 PROCEDURE — 99024 POSTOP FOLLOW-UP VISIT: CPT | Performed by: SURGERY

## 2020-11-17 RX ORDER — EMOLLIENT COMBINATION NO.32
1 EMULSION, EXTENDED RELEASE TOPICAL DAILY
Qty: 225 G | Refills: 11 | Status: SHIPPED | OUTPATIENT
Start: 2020-11-17 | End: 2021-11-04

## 2020-11-17 NOTE — PROGRESS NOTES
[facility] [place] Progress Note    Subject: Anna Sanderson status post split-thickness skin graft harvested left thigh, applied to left posterior calf, history of free flap, performed 1 week ago.  Preoperative wound bed prep with vera flow using hypochlorous acid.  Pain is 3 out of 10.  Has been utilizing negative pressure wound therapy over Acticoat over split-thickness skin graft for the past week.    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)     Non-healing surgical wound, subsequent encounter     Past Medical History:   Diagnosis Date     GERD (gastroesophageal reflux disease)      Iron deficiency anemia      MRSA infection 08/2020    Left leg wound     Vitamin D deficiency      Exam:  BP (!) 154/92 (BP Location: Left arm)   Pulse 105   Temp 98  F (36.7  C) (Temporal)   Resp 18   Wound (used by OP [facility] only) 08/04/20 0805 Left posterior ankle trauma (Active)   Dressing Appearance intact;moist drainage 11/17/20 0700   Drainage Characteristics/Odor serosanguineous 11/17/20 0700   Drainage Amount moderate 11/17/20 0700     60-year-old female, split-thickness skin graft viable, well adhered, mild periwound maceration.  Left thigh harvest site healing well.  No cellulitis.        Impression: Status post split-thickness skin graft application left posterior calf    Plan: Silver cell over split-thickness skin graft, edema wear, 2 layer wrap, change on November 20 then on a weekly basis for approximately 2 to 3 weeks.  EpiCeram ordered, will begin application to harvest site and  split-thickness skin graft site in approximately 3 to 4 weeks.  Application of PluroGel to harvest site for the next 2 weeks, cover with Mepilex pad, change every 2 to 3 days..  Return negative pressure wound therapy device.    Mitchell Her MD on 11/17/2020 at 7:58 AM

## 2020-11-17 NOTE — DISCHARGE INSTRUCTIONS
[facility]  [address] [address] [address]  Appointment Phone [phone]     Anna Sanderson      1960  Keep leg dry with use of a cast protector (available at [facility] or [employer])  Wound Dressing Change: Left Posterior Ankle  Cleanse wound with saline  Skin Care: Apply moisturizing cream to skin surrounding the wound (but not in the wound):skin prep   Cover wound with silvercel cut to fit the size of the wound   Apply Edemawear followed by kerra max dressing  Change dressing twice a weekly.   Compression:   Your compression wrap is a 2 layer coflex wrap and should stay on until next week.     Please remove compression dressing if toes turn blue and/or tingle and can not be relieved by raising the leg for one hour.     Please raise your legs above your heart for 30 mins 4 times a day to promote wound healing.     SHI Her M.D.. November 17, 2020  Call us at [phone] if you have any questions about your wounds, have redness or swelling around your wound, have a fever of 101 or greater or if you have any other problems or concerns. We answer the phone Monday through Friday 8 am to 4 pm, please leave a message as we check the voicemail frequently throughout the day.

## 2020-11-20 ENCOUNTER — HOSPITAL ENCOUNTER (OUTPATIENT)
Dept: WOUND CARE | Facility: CLINIC | Age: 60
Discharge: HOME | End: 2020-11-20
Attending: SURGERY | Admitting: SURGERY
Payer: COMMERCIAL

## 2020-11-20 VITALS — HEART RATE: 91 BPM | DIASTOLIC BLOOD PRESSURE: 78 MMHG | SYSTOLIC BLOOD PRESSURE: 144 MMHG | TEMPERATURE: 98.6 F

## 2020-11-20 DIAGNOSIS — T81.89XD NON-HEALING SURGICAL WOUND, SUBSEQUENT ENCOUNTER: ICD-10-CM

## 2020-11-20 DIAGNOSIS — L97.323 SKIN ULCER OF LEFT ANKLE WITH NECROSIS OF MUSCLE (H): ICD-10-CM

## 2020-11-20 PROCEDURE — 97602 WOUND(S) CARE NON-SELECTIVE: CPT

## 2020-11-20 PROCEDURE — 29581 APPL MULTLAYER CMPRN SYS LEG: CPT

## 2020-11-20 NOTE — PROGRESS NOTES
[facility] [place] Nurse Note    Subject: Anna Sanderson presents for nurse only visit for wound check and dressing change to left achilles graft site      Exam:  BP (!) 144/78 (BP Location: Right arm)   Pulse 91   Temp 98.6  F (37  C)   Wound (used by OP [facility] only) 08/04/20 0805 Left posterior ankle trauma (Active)   Dressing Appearance moist drainage 11/20/20 1100   Length (cm) 4.7 11/20/20 1100   Width (cm) 2.5 11/20/20 1100   Depth (cm) 0.2 11/20/20 1100   Wound (cm^2) 11.75 cm^2 11/20/20 1100   Wound Volume (cm^3) 2.35 cm^3 11/20/20 1100   Wound healing % 44.44 11/20/20 1100   Drainage Characteristics/Odor serosanguineous 11/20/20 1100   Drainage Amount moderate 11/20/20 1100     Procedure:  Topical anesthetic of 4% lidocaine was applied,non-selective debridement was performed, no bleeding occurred. Patient tolerated procedure well.  Procedure :Wound redressed with silver cell, 4x4 kerramax x 2, folded 4x4 and Application of 2 layer coflex wrap was applied.  Plan: Patient will return to the clinic on Tuesday.  November 20, 2020

## 2020-11-24 ENCOUNTER — HOSPITAL ENCOUNTER (OUTPATIENT)
Dept: WOUND CARE | Facility: CLINIC | Age: 60
Discharge: HOME | End: 2020-11-24
Attending: SURGERY | Admitting: SURGERY
Payer: COMMERCIAL

## 2020-11-24 VITALS
RESPIRATION RATE: 16 BRPM | TEMPERATURE: 98.1 F | DIASTOLIC BLOOD PRESSURE: 76 MMHG | SYSTOLIC BLOOD PRESSURE: 126 MMHG | HEART RATE: 99 BPM

## 2020-11-24 DIAGNOSIS — T81.89XD NON-HEALING SURGICAL WOUND, SUBSEQUENT ENCOUNTER: ICD-10-CM

## 2020-11-24 DIAGNOSIS — L97.323 SKIN ULCER OF LEFT ANKLE WITH NECROSIS OF MUSCLE (H): ICD-10-CM

## 2020-11-24 PROCEDURE — G0463 HOSPITAL OUTPT CLINIC VISIT: HCPCS

## 2020-11-24 PROCEDURE — 99212 OFFICE O/P EST SF 10 MIN: CPT | Performed by: SURGERY

## 2020-11-24 NOTE — PROGRESS NOTES
[facility] [place] Progress Note    Subject: Anna Sanderson status post full-thickness skin graft left posterior calf.  Denies fevers chills sweats.    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)     Non-healing surgical wound, subsequent encounter     Past Medical History:   Diagnosis Date     GERD (gastroesophageal reflux disease)      Iron deficiency anemia      MRSA infection 08/2020    Left leg wound     Vitamin D deficiency      Exam:  /76 (BP Location: Left arm)   Pulse 99   Temp 98.1  F (36.7  C) (Temporal)   Resp 16      60-year-old female, split-thickness skin graft intact left posterior calf, redundant split-thickness graft sharply debrided, no cellulitis.        Impression: Status post split-thickness skin graft left posterior distal calf in setting of previous free flap    Plan: We will dress the wounds with hypochlorous acid soak for 15 minutes, application of Xeroform, edema wear, change on a daily basis, continue adjunct of micronutrients.  Patient will return to the clinic in 1 weeks time    Mitchell Her MD on 11/24/2020 at 7:47 AM

## 2020-11-24 NOTE — DISCHARGE INSTRUCTIONS
[facility]  [address] [address]    Call us at [phone] if you have any questions about your wounds, have redness or swelling around your wound, have a fever of 101 or greater or if you have any other problems or concerns. We answer the phone Monday through Friday 8 am to 4 pm, please leave a message as we check the voicemail frequently throughout the day.     Anna Sanderson      1960    Wound Dressing Change:Left Posterior Ankle  After cleansing with saline or wound cleanser, apply small amount of VASHE on gauze, lay into wound bed, let sit for 10 minutes, remove gauze (do not rinse) then apply dressing.  Cover wound with xeroform than edemawear than gauze on outside of edemawear if needed  Change dressing daily    Compression:   You have a compression wrap spandigrip E is supposed to be removed at night and put back on first thing in the morning.   Please remove compression dressing if toes turn blue and/or tingle and can not be relieved by raising the leg for one hour.      SHI Her M.D.. November 24, 2020

## 2020-12-10 ENCOUNTER — HOSPITAL ENCOUNTER (OUTPATIENT)
Dept: WOUND CARE | Facility: CLINIC | Age: 60
End: 2020-12-10
Attending: SURGERY
Payer: COMMERCIAL

## 2020-12-10 DIAGNOSIS — T81.89XD NON-HEALING SURGICAL WOUND, SUBSEQUENT ENCOUNTER: ICD-10-CM

## 2020-12-10 DIAGNOSIS — L97.323 SKIN ULCER OF LEFT ANKLE WITH NECROSIS OF MUSCLE (H): ICD-10-CM

## 2020-12-10 PROCEDURE — 99024 POSTOP FOLLOW-UP VISIT: CPT | Mod: TEL | Performed by: SURGERY

## 2020-12-10 NOTE — PROGRESS NOTES
"Anna Sanderson is a 60 year old female who is being evaluated via a billable telephone visit.      The patient has been notified of following:     \"This telephone visit will be conducted via a call between you and your physician/provider. We have found that certain health care needs can be provided without the need for a physical exam.  This service lets us provide the care you need with a short phone conversation.  If a prescription is necessary we can send it directly to your pharmacy.  If lab work is needed we can place an order for that and you can then stop by our lab to have the test done at a later time.    If during the course of the call the physician/provider feels a telephone visit is not appropriate, you will not be charged for this service.\"     Physician has received verbal consent for a Telephone Visit from the patient? Yes    Anna Sanderson status post full-thickness skin grafting left lower extremity ulceration, skin graft intact, she is concerned about a feeling of tightness and difficulty resuming normal ambulatory style, we will order physical therapy for evaluation.  She denies pain.  She states however site is well-healed.  Chief Complaint   Patient presents with     WOUND CARE       I have reviewed and updated the patient's Past Medical History, Social History, Family History and Medication List.    ALLERGIES  No clinical screening - see comments, Morphine, Adhesive tape, and Other drug allergy (see comments)  Wound (used by OP I only) 08/04/20 0805 Left posterior ankle trauma (Active)   Thickness/Stage full thickness 12/10/20 0900   Dressing Appearance moist drainage 12/10/20 0900   Base granulating 12/10/20 0900   Periwound Temperature warm 12/10/20 0900   Periwound Skin Turgor soft 12/10/20 0900   Edges open 12/10/20 0900   Length (cm) 4.5 12/10/20 0900   Width (cm) 2 12/10/20 0900   Depth (cm) 0.2 12/10/20 0900   Wound (cm^2) 9 cm^2 12/10/20 0900   Wound Volume (cm^3) 1.8 " cm^3 12/10/20 0900   Wound healing % 57.45 12/10/20 0900   Drainage Characteristics/Odor serosanguineous 12/10/20 0900   Drainage Amount moderate 12/10/20 0900   Care, Wound non-select wound debridement performed 12/10/20 0900       Additional provider notes: Photodocumentation reviewed, discussed with patient  She has chronic small ulceration which persists which will require application of Xeroform and ABD pad with daily dressing changes.  Assessment/Plan: Status post split-thickness skin grafting left posterior distal calf November 10, 2020  Physical therapy evaluation discussed for improving overall ambulatory status.  Follow-up phone call, telehealth, approximately 3 to 4 weeks.  Application of ceramide-based lotion to skin graft at harvest site on a daily basis, she is utilizing edema wear and will reinitiate Sigvaris CoolFlex inelastic component for interstitial edema control.  Phone call duration: 6 minutes    Mitchell Her MD

## 2020-12-10 NOTE — ADDENDUM NOTE
Encounter addended by: Mitchell Her MD on: 12/10/2020 10:24 AM   Actions taken: Clinical Note Signed

## 2020-12-10 NOTE — DISCHARGE INSTRUCTIONS
Wound Dressing Change:Left Posterior Ankle  After cleansing with saline or wound cleanser, apply small amount of VASHE on gauze, lay into wound bed, let sit for 10 minutes, remove gauze (do not rinse) then apply dressing.  Cover wound with xeroform than edemawear than gauze on outside of edemawear if needed  Change dressing daily  Compression:  You have a compression wrap Coolflex Wraps -apply by pulling the strap to the second white line than hooking into placePlease remove compression dressing if toes turn blue and/or tingle and can not be relieved by raising the leg for one hour.    PHYSICAL THERAPY REFERRAL     Comments: If you have not heard from the scheduling office within 2 business days, please call [phone]

## 2021-01-05 ENCOUNTER — COMMUNICATION - HEALTHEAST (OUTPATIENT)
Dept: INTERNAL MEDICINE | Facility: CLINIC | Age: 61
End: 2021-01-05

## 2021-01-09 ENCOUNTER — COMMUNICATION - HEALTHEAST (OUTPATIENT)
Dept: INTERNAL MEDICINE | Facility: CLINIC | Age: 61
End: 2021-01-09

## 2021-01-09 DIAGNOSIS — K21.9 GERD (GASTROESOPHAGEAL REFLUX DISEASE): ICD-10-CM

## 2021-01-10 ENCOUNTER — COMMUNICATION - HEALTHEAST (OUTPATIENT)
Dept: INTERNAL MEDICINE | Facility: CLINIC | Age: 61
End: 2021-01-10

## 2021-01-10 DIAGNOSIS — M81.0 OSTEOPOROSIS OF MULTIPLE SITES WITHOUT PATHOLOGICAL FRACTURE: ICD-10-CM

## 2021-01-13 ENCOUNTER — HOSPITAL ENCOUNTER (OUTPATIENT)
Dept: WOUND CARE | Facility: CLINIC | Age: 61
Discharge: HOME | End: 2021-01-13
Attending: SURGERY | Admitting: SURGERY
Payer: COMMERCIAL

## 2021-01-13 VITALS — SYSTOLIC BLOOD PRESSURE: 163 MMHG | TEMPERATURE: 98.1 F | HEART RATE: 105 BPM | DIASTOLIC BLOOD PRESSURE: 85 MMHG

## 2021-01-13 DIAGNOSIS — L97.323 SKIN ULCER OF LEFT ANKLE WITH NECROSIS OF MUSCLE (H): ICD-10-CM

## 2021-01-13 DIAGNOSIS — T81.89XD NON-HEALING SURGICAL WOUND, SUBSEQUENT ENCOUNTER: ICD-10-CM

## 2021-01-13 PROCEDURE — 99024 POSTOP FOLLOW-UP VISIT: CPT | Performed by: SURGERY

## 2021-01-13 PROCEDURE — 97597 DBRDMT OPN WND 1ST 20 CM/<: CPT

## 2021-01-13 NOTE — DISCHARGE INSTRUCTIONS
[facility]  [address] [facility] [address] [address]    Call us at [phone] if you have any questions about your wounds, have redness or swelling around your wound, have a fever of 101 or greater or if you have any other problems or concerns. We answer the phone Monday through Friday 8 am to 4 pm, please leave a message as we check the voicemail frequently throughout the day.     Anna Sanderson      1960    Medications/supplements to aid in healin. 1 packet of Lex Supplement into your favorite beverage twice a day. Purchase at [facility] in [facility], [employer] or [employer]  2. Vitamin D3 10,000 iu per day  3. Vitamin C 2,000 mg daily  4. Methyl Folate 1000 mcg daily   5. Vitamin B 12 1000 mcg daily  6. Vasculera to help your veins -take one tablet a day. Purchase from [facility]      A diet high in protein is important for wound healing, we recommend getting 90 grams of protein per day. Taking protein shakes or bars are a good way to get extra protein in your diet.  Other good sources of protein:  Pork 26g per 3 oz  Whey protein powder - 24g per scoop (on average)  Greek yogurt - 23g per 8oz   Chicken or Turkey - 23g per 3oz  Fish - 20-25g per 3oz  Beef - 18-23g per 3oz  Navy beans - 20g per cup  Cottage cheese - 14g per 1/2 cup   Lentils - 13g per 1/4 cup  Beef jerky 13g per 1oz  2% milk - 8g per cup  Peanut butter - 8g per 2 tablespoons  Eggs - 6g per egg  Mixed nuts - 6g per 2oz     Wound care recommendations to left posterior ankle  After cleansing with saline or wound cleanser, apply small amount of VANCO on gauze, lay into wound bed, let sit for 15 minutes, remove gauze (do not rinse) then apply dressing.  Apply Plurogel to wound base or on ioplex dressing, glidewear   Cover wound with Edemawear and roll gauze  Change dressing daily       SHI Her M.D.. 2021

## 2021-01-13 NOTE — PROGRESS NOTES
[facility] [place] Progress Note    Subject: Anna Sanderson returns for evaluation of left lower extremity previously placed split-thickness skin graft, known posterior tibial artery occlusion, history of multiple free flaps with previous Achilles tendon exposure.  A portion of the split-thickness skin graft has degenerated in the area of expected perfusion of the posterior tibial artery, there is no exposure of Achilles tendon though there is a cleft present.  Mild drainage present.  No cellulitis.  Denies fever chills sweats.  Is having increased discomfort along the intertibial margin, she will be discussed with her primary care physician for further evaluation.  No recent falls that would account for the onset of pain in her left lower extremity.  No associated significant edema.    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)     Non-healing surgical wound, subsequent encounter     Past Medical History:   Diagnosis Date     GERD (gastroesophageal reflux disease)      Iron deficiency anemia      MRSA infection 08/2020    Left leg wound     Vitamin D deficiency      Exam:  BP (!) 163/85 (BP Location: Right arm)   Pulse 105   Temp 98.1  F (36.7  C) (Temporal)   Wound (used by OP [facility] only) 08/04/20 0805 Left posterior ankle trauma (Active)   Thickness/Stage full thickness 01/13/21 1300   Dressing Appearance moist drainage 01/13/21 1300   Base granulating 01/13/21 1300   Periwound Temperature warm 01/13/21 1300   Periwound Skin Turgor soft 01/13/21 1300   Edges open 01/13/21 1300    Length (cm) 4.2 01/13/21 1300   Width (cm) 2.3 01/13/21 1300   Depth (cm) 0.7 01/13/21 1300   Wound (cm^2) 9.66 cm^2 01/13/21 1300   Wound Volume (cm^3) 6.76 cm^3 01/13/21 1300   Wound healing % 54.33 01/13/21 1300   Drainage Characteristics/Odor serosanguineous 01/13/21 1300   Drainage Amount moderate 01/13/21 1300   Care, Wound debrided 01/13/21 1300     60-year-old female, palpable left her Rowe pedis pulse, nonpalpable left posterior tibial pulse.  Cleft present behind granulated Achilles tendon, no Achilles tendon exposed, approximately 40% of the graft is intact, approximately 60% loss of graft.  Wound was copiously irrigated with hypochlorous acid.  No cellulitis.  She has full range of motion of her left knee and left ankle.      Impression: Degeneration split-thickness skin graft left lower extremity, chronic posterior tibial artery occlusion, history of multiple 3 flaps    Plan: Suspected angios on deficit and posterior tibial artery resulting in degeneration of skin graft.  We will dress the wounds with PluroGel which has poloxamer 188 which ideally should enhance angiogenesis in this region, utilize on Ioplex, soak wound with vancomycin solution on a daily basis, utilize edema wear.  Continue adjunct of micronutrients.  Discussed option of arterial assist boot, this would be an out-of-pocket cost of $400 will require 4 to 6 hours a day, given her 's complex medical condition, this is not an option.  Patient will return to the clinic in 3 weeks time    Mitchell Her MD on 1/13/2021 at 4:12 PM

## 2021-01-15 ENCOUNTER — HEALTH MAINTENANCE LETTER (OUTPATIENT)
Age: 61
End: 2021-01-15

## 2021-02-01 ENCOUNTER — OFFICE VISIT - HEALTHEAST (OUTPATIENT)
Dept: INTERNAL MEDICINE | Facility: CLINIC | Age: 61
End: 2021-02-01

## 2021-02-01 DIAGNOSIS — I10 ESSENTIAL HYPERTENSION: ICD-10-CM

## 2021-02-01 DIAGNOSIS — Z13.220 ENCOUNTER FOR SCREENING FOR LIPOID DISORDERS: ICD-10-CM

## 2021-02-01 DIAGNOSIS — T81.89XS NON-HEALING SURGICAL WOUND, SEQUELA: ICD-10-CM

## 2021-02-01 DIAGNOSIS — M81.0 OSTEOPOROSIS OF MULTIPLE SITES WITHOUT PATHOLOGICAL FRACTURE: ICD-10-CM

## 2021-02-01 DIAGNOSIS — Z12.12 ENCOUNTER FOR COLORECTAL CANCER SCREENING: ICD-10-CM

## 2021-02-01 DIAGNOSIS — Z00.00 ROUTINE GENERAL MEDICAL EXAMINATION AT A HEALTH CARE FACILITY: ICD-10-CM

## 2021-02-01 DIAGNOSIS — Z12.31 VISIT FOR SCREENING MAMMOGRAM: ICD-10-CM

## 2021-02-01 DIAGNOSIS — K21.00 GASTROESOPHAGEAL REFLUX DISEASE WITH ESOPHAGITIS WITHOUT HEMORRHAGE: ICD-10-CM

## 2021-02-01 DIAGNOSIS — R51.9 HEADACHE DISORDER: ICD-10-CM

## 2021-02-01 DIAGNOSIS — D50.9 IRON DEFICIENCY ANEMIA, UNSPECIFIED IRON DEFICIENCY ANEMIA TYPE: ICD-10-CM

## 2021-02-01 DIAGNOSIS — E55.9 VITAMIN D DEFICIENCY: ICD-10-CM

## 2021-02-01 DIAGNOSIS — R93.89 ABNORMAL CT OF THE CHEST: ICD-10-CM

## 2021-02-01 DIAGNOSIS — Z12.11 ENCOUNTER FOR COLORECTAL CANCER SCREENING: ICD-10-CM

## 2021-02-01 LAB
ALBUMIN SERPL-MCNC: 4.2 G/DL (ref 3.5–5)
ALP SERPL-CCNC: 67 U/L (ref 45–120)
ALT SERPL W P-5'-P-CCNC: 10 U/L (ref 0–45)
ANION GAP SERPL CALCULATED.3IONS-SCNC: 11 MMOL/L (ref 5–18)
AST SERPL W P-5'-P-CCNC: 16 U/L (ref 0–40)
BILIRUB SERPL-MCNC: 0.3 MG/DL (ref 0–1)
BUN SERPL-MCNC: 11 MG/DL (ref 8–22)
CALCIUM SERPL-MCNC: 9.4 MG/DL (ref 8.5–10.5)
CHLORIDE BLD-SCNC: 109 MMOL/L (ref 98–107)
CHOLEST SERPL-MCNC: 212 MG/DL
CO2 SERPL-SCNC: 22 MMOL/L (ref 22–31)
CREAT SERPL-MCNC: 0.66 MG/DL (ref 0.6–1.1)
ERYTHROCYTE [DISTWIDTH] IN BLOOD BY AUTOMATED COUNT: 14.5 % (ref 11–14.5)
FASTING STATUS PATIENT QL REPORTED: YES
GFR SERPL CREATININE-BSD FRML MDRD: >60 ML/MIN/1.73M2
GLUCOSE BLD-MCNC: 119 MG/DL (ref 70–125)
HCT VFR BLD AUTO: 37.5 % (ref 35–47)
HDLC SERPL-MCNC: 71 MG/DL
HGB BLD-MCNC: 11.7 G/DL (ref 12–16)
IRON SATN MFR SERPL: 6 % (ref 20–50)
IRON SERPL-MCNC: 28 UG/DL (ref 42–175)
LDLC SERPL CALC-MCNC: 130 MG/DL
MCH RBC QN AUTO: 27.3 PG (ref 27–34)
MCHC RBC AUTO-ENTMCNC: 31.2 G/DL (ref 32–36)
MCV RBC AUTO: 88 FL (ref 80–100)
PLATELET # BLD AUTO: 380 THOU/UL (ref 140–440)
PMV BLD AUTO: 8.9 FL (ref 7–10)
POTASSIUM BLD-SCNC: 4.3 MMOL/L (ref 3.5–5)
PROT SERPL-MCNC: 7.1 G/DL (ref 6–8)
RBC # BLD AUTO: 4.28 MILL/UL (ref 3.8–5.4)
SODIUM SERPL-SCNC: 142 MMOL/L (ref 136–145)
TIBC SERPL-MCNC: 449 UG/DL (ref 313–563)
TRANSFERRIN SERPL-MCNC: 359 MG/DL (ref 212–360)
TRIGL SERPL-MCNC: 55 MG/DL
TSH SERPL DL<=0.005 MIU/L-ACNC: 0.18 UIU/ML (ref 0.3–5)
WBC: 15.5 THOU/UL (ref 4–11)

## 2021-02-01 ASSESSMENT — MIFFLIN-ST. JEOR: SCORE: 1087.87

## 2021-02-02 ENCOUNTER — AMBULATORY - HEALTHEAST (OUTPATIENT)
Dept: INTERNAL MEDICINE | Facility: CLINIC | Age: 61
End: 2021-02-02

## 2021-02-02 DIAGNOSIS — D50.9 IRON DEFICIENCY ANEMIA, UNSPECIFIED IRON DEFICIENCY ANEMIA TYPE: ICD-10-CM

## 2021-02-02 LAB
25(OH)D3 SERPL-MCNC: 53.1 NG/ML (ref 30–80)
25(OH)D3 SERPL-MCNC: 53.1 NG/ML (ref 30–80)
T4 FREE SERPL-MCNC: 1 NG/DL (ref 0.7–1.8)

## 2021-02-04 ENCOUNTER — AMBULATORY - HEALTHEAST (OUTPATIENT)
Dept: INTERNAL MEDICINE | Facility: CLINIC | Age: 61
End: 2021-02-04

## 2021-02-04 DIAGNOSIS — R79.89 ABNORMAL TSH: ICD-10-CM

## 2021-02-15 ENCOUNTER — OFFICE VISIT - HEALTHEAST (OUTPATIENT)
Dept: INTERNAL MEDICINE | Facility: CLINIC | Age: 61
End: 2021-02-15

## 2021-02-15 DIAGNOSIS — R05.9 COUGH: ICD-10-CM

## 2021-03-05 ENCOUNTER — OFFICE VISIT - HEALTHEAST (OUTPATIENT)
Dept: INTERNAL MEDICINE | Facility: CLINIC | Age: 61
End: 2021-03-05

## 2021-03-05 DIAGNOSIS — I10 ESSENTIAL HYPERTENSION: ICD-10-CM

## 2021-03-05 DIAGNOSIS — R79.89 ABNORMAL TSH: ICD-10-CM

## 2021-03-05 DIAGNOSIS — R73.01 IMPAIRED FASTING GLUCOSE: ICD-10-CM

## 2021-03-05 DIAGNOSIS — M81.0 OSTEOPOROSIS OF MULTIPLE SITES WITHOUT PATHOLOGICAL FRACTURE: ICD-10-CM

## 2021-03-05 DIAGNOSIS — D50.9 IRON DEFICIENCY ANEMIA, UNSPECIFIED IRON DEFICIENCY ANEMIA TYPE: ICD-10-CM

## 2021-03-05 LAB
ALBUMIN UR-MCNC: NEGATIVE MG/DL
APPEARANCE UR: CLEAR
BACTERIA #/AREA URNS HPF: ABNORMAL HPF
BILIRUB UR QL STRIP: NEGATIVE
COLOR UR AUTO: YELLOW
GLUCOSE UR STRIP-MCNC: NEGATIVE MG/DL
HBA1C MFR BLD: 5.3 %
HGB UR QL STRIP: ABNORMAL
HYALINE CASTS #/AREA URNS LPF: ABNORMAL LPF
KETONES UR STRIP-MCNC: NEGATIVE MG/DL
LEUKOCYTE ESTERASE UR QL STRIP: NEGATIVE
MUCOUS THREADS #/AREA URNS LPF: ABNORMAL LPF
NITRATE UR QL: NEGATIVE
PH UR STRIP: 5.5 [PH] (ref 5–8)
RBC #/AREA URNS AUTO: ABNORMAL HPF
SP GR UR STRIP: >=1.03 (ref 1–1.03)
SQUAMOUS #/AREA URNS AUTO: ABNORMAL LPF
T4 FREE SERPL-MCNC: 1 NG/DL (ref 0.7–1.8)
TRANS CELLS #/AREA URNS HPF: ABNORMAL LPF
TSH SERPL DL<=0.005 MIU/L-ACNC: 0.2 UIU/ML (ref 0.3–5)
UROBILINOGEN UR STRIP-ACNC: ABNORMAL
WBC #/AREA URNS AUTO: ABNORMAL HPF

## 2021-03-05 ASSESSMENT — MIFFLIN-ST. JEOR: SCORE: 1074.26

## 2021-03-07 ENCOUNTER — AMBULATORY - HEALTHEAST (OUTPATIENT)
Dept: INTERNAL MEDICINE | Facility: CLINIC | Age: 61
End: 2021-03-07

## 2021-03-07 DIAGNOSIS — R79.89 ABNORMAL TSH: ICD-10-CM

## 2021-03-07 DIAGNOSIS — D50.9 IRON DEFICIENCY ANEMIA, UNSPECIFIED IRON DEFICIENCY ANEMIA TYPE: ICD-10-CM

## 2021-03-10 ENCOUNTER — HOSPITAL ENCOUNTER (OUTPATIENT)
Dept: WOUND CARE | Facility: CLINIC | Age: 61
Discharge: HOME | End: 2021-03-10
Attending: SURGERY | Admitting: SURGERY
Payer: COMMERCIAL

## 2021-03-10 VITALS — TEMPERATURE: 98.6 F | HEART RATE: 104 BPM | DIASTOLIC BLOOD PRESSURE: 98 MMHG | SYSTOLIC BLOOD PRESSURE: 135 MMHG

## 2021-03-10 DIAGNOSIS — L97.323 SKIN ULCER OF LEFT ANKLE WITH NECROSIS OF MUSCLE (H): ICD-10-CM

## 2021-03-10 DIAGNOSIS — T81.89XD NON-HEALING SURGICAL WOUND, SUBSEQUENT ENCOUNTER: ICD-10-CM

## 2021-03-10 PROCEDURE — 97602 WOUND(S) CARE NON-SELECTIVE: CPT

## 2021-03-10 PROCEDURE — 99213 OFFICE O/P EST LOW 20 MIN: CPT | Performed by: SURGERY

## 2021-03-10 RX ORDER — METOPROLOL SUCCINATE 25 MG/1
25 TABLET, EXTENDED RELEASE ORAL EVERY EVENING
COMMUNITY
Start: 2021-02-01 | End: 2022-02-03

## 2021-03-10 RX ORDER — MULTIPLE VITAMINS W/ MINERALS TAB 9MG-400MCG
1 TAB ORAL
Status: ON HOLD | COMMUNITY
Start: 2021-03-05 | End: 2021-12-09

## 2021-03-10 RX ORDER — BETAMETHASONE DIPROPIONATE 0.05 %
OINTMENT (GRAM) TOPICAL DAILY
Qty: 100 G | Refills: 3 | Status: SHIPPED | OUTPATIENT
Start: 2021-03-10 | End: 2021-05-13

## 2021-03-10 NOTE — DISCHARGE INSTRUCTIONS
[facility]  [address] [address]    Call us at [phone] if you have any questions about your wounds, have redness or swelling around your wound, have a fever of 101 or greater or if you have any other problems or concerns. We answer the phone Monday through Friday 8 am to 4 pm, please leave a message as we check the voicemail frequently throughout the day.     Anna Sanderson      1960  Medications/supplements to aid in healing:  Vitamin D3 10,000 iu per day  Vitamin C 2,000 mg daily  Methyl Folate 1000 mcg daily  Vitamin B 12 1000 mcg daily  Vasculera to help your veins -take one tablet a day. Purchase from [facility]  Wound care recommendations to left posterior ankle  After cleansing with soap and water in shower, apply small amount of Vanco on gauze, lay into wound bed, apply betamethasone ointment on skin around wound, wrap with saran wrap for one hour. remove then apply dressing.  Apply medline wound gel and Cover wound with Edemawear and roll gauze  Change dressing daily       SHI Her M.D.. March 10, 2021      If you had a positive experience please indicate on your patient satisfaction survey form that [facility] will be sending you.

## 2021-03-10 NOTE — PROGRESS NOTES
[facility]ound [facility]aling [place] Progress Note    Subject: Anna Sanderson chronic inflammation left medial distal calf, is split-thickness skin graft is greater than 50% intact.  She recently went through Covid, she is on a flavonoid and vitamin D, her MTHFR status is pending.  We are adding in B12 and folate today.  She is wearing EdemaWear.  Using vancomycin solution on the wound given history of MRSA.  We will try steroids under Saran wrap for an hour for the significant inflammatory components to decrease histamine and hyper permeability in the microvasculature.    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)     Non-healing surgical wound, subsequent encounter     Past Medical History:   Diagnosis Date     GERD (gastroesophageal reflux disease)      Iron deficiency anemia      MRSA infection 08/2020    Left leg wound     Vitamin D deficiency      Exam:  BP (!) 135/98   Pulse 104   Temp 98.6  F (37  C)   Wound (used by OP [facility] only) 08/04/20 0805 Left posterior ankle trauma (Active)   Dressing Appearance moist drainage 03/10/21 1400   Length (cm) 4 03/10/21 1400   Width (cm) 1.8 03/10/21 1400   Depth (cm) 0.8 03/10/21 1400   Wound (cm^2) 7.2 cm^2 03/10/21 1400   Wound Volume (cm^3) 5.76 cm^3 03/10/21 1400   Wound healing % 65.96 03/10/21 1400   Drainage Characteristics/Odor serosanguineous 03/10/21 1400   Drainage Amount moderate 03/10/21 1400     Periwound inflammation, see photodocumentation and measurements, palpable left results pedis pulse        Impression: Chronic left medial distal  posterior calf wound, history of split-thickness skin grafting, greater than 50% intact    Plan: Steroid under Saran wrap for an hour left lower extremity, then apply Medline 6.0 pH hydrogel to wound, then cover with edema wear, she would be a candidate for the [place] trial, will readdress when we are together in approximately 6 weeks.  Continue also application of vancomycin solution which is a pH of 6.0 after the steroid under Saran wrap prior to application of the hydrogel.  Add 1 mg of folate daily, 1 mg of B12 daily.    Patient will return to the clinic in 4-6 weeks time    Mitchell Her MD on 3/10/2021 at 3:02 PM

## 2021-03-19 ENCOUNTER — AMBULATORY - HEALTHEAST (OUTPATIENT)
Dept: INTERNAL MEDICINE | Facility: CLINIC | Age: 61
End: 2021-03-19

## 2021-03-26 ENCOUNTER — AMBULATORY - HEALTHEAST (OUTPATIENT)
Dept: NURSING | Facility: CLINIC | Age: 61
End: 2021-03-26

## 2021-04-14 ENCOUNTER — TELEPHONE (OUTPATIENT)
Dept: WOUND CARE | Facility: CLINIC | Age: 61
End: 2021-04-14

## 2021-04-14 NOTE — TELEPHONE ENCOUNTER
Returned call to pt. She has questions about where to get the MTHFR lab drawn and what it is looking for. Questions answered. Pt will make an appt at [facility] to get lab drawn. No further questions or concerns.

## 2021-04-16 ENCOUNTER — TRANSFERRED RECORDS (OUTPATIENT)
Dept: HEALTH INFORMATION MANAGEMENT | Facility: CLINIC | Age: 61
End: 2021-04-16

## 2021-04-16 ENCOUNTER — AMBULATORY - HEALTHEAST (OUTPATIENT)
Dept: LAB | Facility: CLINIC | Age: 61
End: 2021-04-16

## 2021-04-16 DIAGNOSIS — T81.89XD NON-HEALING SURGICAL WOUND, SUBSEQUENT ENCOUNTER: ICD-10-CM

## 2021-04-16 DIAGNOSIS — Z11.59 SPECIAL SCREENING EXAMINATION FOR VIRAL DISEASE: ICD-10-CM

## 2021-04-22 ENCOUNTER — HOSPITAL ENCOUNTER (OUTPATIENT)
Dept: WOUND CARE | Facility: CLINIC | Age: 61
Discharge: HOME | End: 2021-04-22
Attending: SURGERY | Admitting: SURGERY
Payer: COMMERCIAL

## 2021-04-22 VITALS — HEART RATE: 103 BPM | TEMPERATURE: 97.5 F | SYSTOLIC BLOOD PRESSURE: 157 MMHG | DIASTOLIC BLOOD PRESSURE: 100 MMHG

## 2021-04-22 DIAGNOSIS — Z15.89 MTHFR GENE MUTATION: ICD-10-CM

## 2021-04-22 DIAGNOSIS — T81.89XD NON-HEALING SURGICAL WOUND, SUBSEQUENT ENCOUNTER: ICD-10-CM

## 2021-04-22 DIAGNOSIS — L97.323 SKIN ULCER OF LEFT ANKLE WITH NECROSIS OF MUSCLE (H): ICD-10-CM

## 2021-04-22 PROCEDURE — 87075 CULTR BACTERIA EXCEPT BLOOD: CPT | Performed by: SURGERY

## 2021-04-22 PROCEDURE — 87076 CULTURE ANAEROBE IDENT EACH: CPT | Performed by: SURGERY

## 2021-04-22 PROCEDURE — 87070 CULTURE OTHR SPECIMN AEROBIC: CPT | Performed by: SURGERY

## 2021-04-22 PROCEDURE — 97602 WOUND(S) CARE NON-SELECTIVE: CPT

## 2021-04-22 PROCEDURE — 99213 OFFICE O/P EST LOW 20 MIN: CPT | Performed by: SURGERY

## 2021-04-22 PROCEDURE — 87077 CULTURE AEROBIC IDENTIFY: CPT | Performed by: SURGERY

## 2021-04-22 RX ORDER — DOXYCYCLINE 100 MG/1
100 CAPSULE ORAL 2 TIMES DAILY
Qty: 42 CAPSULE | Refills: 0 | Status: SHIPPED | OUTPATIENT
Start: 2021-04-22 | End: 2021-05-13

## 2021-04-22 RX ORDER — CIPROFLOXACIN 500 MG/1
500 TABLET, FILM COATED ORAL 2 TIMES DAILY
Qty: 42 TABLET | Refills: 0 | Status: SHIPPED | OUTPATIENT
Start: 2021-04-22 | End: 2021-05-13

## 2021-04-22 NOTE — PROGRESS NOTES
[facility] Wound Healing [place] Progress Note    Subject: Anna Sanderson chronic left posterior calf wound, previous split-thickness skin graft, history of previous free flap, small vasculature diminutive in size, MRSA colonization, has recovered from Covid.  Has been trialing steroid under Saran wrap in the region, approximately 2 months without significant improvement, MTHFR status was drawn at [facility] on April 16, we await the results.  She is on micronutrients.  She is having increased pain and inflammation of the site.    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)     Non-healing surgical wound, subsequent encounter     Past Medical History:   Diagnosis Date     GERD (gastroesophageal reflux disease)      Iron deficiency anemia      MRSA infection 08/2020    Left leg wound     Vitamin D deficiency      Exam:  BP (!) 157/100   Pulse 103   Temp 97.5  F (36.4  C)   Wound (used by OP [facility] only) 08/04/20 0805 Left posterior ankle trauma (Active)   Thickness/Stage full thickness 04/22/21 0700   Dressing Appearance moist drainage 04/22/21 0700   Base granulating 04/22/21 0700   Periwound intact 04/22/21 0700   Periwound Temperature warm 04/22/21 0700   Periwound Skin Turgor soft 04/22/21 0700   Edges open 04/22/21 0700   Length (cm) 3.4 04/22/21 0700   Width (cm) 1.8 04/22/21 0700   Depth (cm) 0.4 04/22/21 0700   Wound (cm^2) 6.12 cm^2 04/22/21 0700   Wound Volume (cm^3) 2.45 cm^3 04/22/21 0700   Wound healing % 71.06 04/22/21 0700   Drainage Characteristics/Odor serosanguineous 04/22/21 0700    Drainage Amount moderate 04/22/21 0700   Care, Wound non-select wound debridement performed 04/22/21 0700     60-year-old female, palpable left dorsalis pedis pulse, chronic wound with periwound inflammation, small area of undermining, wound was copiously irrigated, An culture was obtained, no purulence, no granulation tissue on top of chronically indurated Achilles tendon though no specific tendon exposure.  She is having difficulty obtaining neutrality at 90 degrees in ankle mobility due to the inflammation of the Achilles tendon.        Impression: Increased inflammation, left posterior distal calf wound, MRSA colonization    Plan: Epsom salt soak for 30 minutes on a daily basis, application of Prontosan on PolyMem silver, discontinue steroids, continue micronutrients, adjusted to B12, B complex, Jamilah-C for flavonoid and vitamin C component and continue Vasculera.  Culture was obtained today.  Begin empiric Cipro 500 mg twice daily for 3 weeks, doxycycline 100 mg twice daily for 3 weeks   Patient will return to the clinic in 3 weeks time    Mitchell Her MD on 4/22/2021 at 8:27 AM

## 2021-04-22 NOTE — DISCHARGE INSTRUCTIONS
[facility]  [address] [address]    Call us at [phone] if you have any questions about your wounds, have redness or swelling around your wound, have a fever of 101 or greater or if you have any other problems or concerns. We answer the phone Monday through Friday 8 am to 4 pm, please leave a message as we check the voicemail frequently throughout the day.     Anna Sanderson      1960    Medications/supplements to aid in healing:  Vitamin D3 10,000 iu per day  Jamilah C 1 tablet twice a day  Methyl Folate 1000 mcg daily  Vitamin B 12 1000 mcg daily  Vitamin B complex with zinc daily  Vasculera to help your veins -take one tablet a day. Purchase from [facility]  Wound care recommendations to left posterior ankle  Epsom salt soaks for 30 minutes  After cleansing with prontosan wound solution,   Cover wound with Polymem AG cut to fit the size of the wound strip of polymem ag into crease  Cover wound with Edemawear and roll gauze  Change dressing daily       SHI Her M.D.. April 22, 2021    If you had a positive experience please indicate on your patient satisfaction survey form that [facility] will be sending you.

## 2021-04-25 LAB
BACTERIA SPEC CULT: ABNORMAL
Lab: ABNORMAL
SPECIMEN SOURCE: ABNORMAL

## 2021-04-26 ENCOUNTER — TELEPHONE (OUTPATIENT)
Dept: WOUND CARE | Facility: CLINIC | Age: 61
End: 2021-04-26

## 2021-04-26 LAB
BACTERIA SPEC CULT: ABNORMAL
BACTERIA SPEC CULT: ABNORMAL
Lab: ABNORMAL
MOLECULAR DX INHERIT DISORDER - HISTORICAL: NORMAL
SPECIMEN SOURCE: ABNORMAL

## 2021-04-26 NOTE — TELEPHONE ENCOUNTER
[facility] Wound    Who is the name of the provider?:  Taina      What is the location you see this provider at?: [place]    Reason for call:  Difficulty tolerating antibiotic.      Can we leave a detailed message on this number?  YES

## 2021-04-26 NOTE — TELEPHONE ENCOUNTER
Spoke to Dr. Her, stop the cipro and see if that helps the issue.   Patient reports understanding. She will call back if doesn't get better.

## 2021-04-26 NOTE — TELEPHONE ENCOUNTER
"Pt reports that she has attempted to take the Antibiotics but has consistently vomited them back up 30-60 minutes later. She has \"tried everything\" but to no avail.   Informed pt that I will speak to Dr Her and get back to her later today.  "

## 2021-04-28 PROBLEM — Z15.89 MTHFR GENE MUTATION: Status: ACTIVE | Noted: 2021-04-28

## 2021-04-28 NOTE — ADDENDUM NOTE
Encounter addended by: Mitchell Her MD on: 4/28/2021 10:54 AM   Actions taken: Visit diagnoses modified, Problem List modified

## 2021-05-13 ENCOUNTER — HOSPITAL ENCOUNTER (OUTPATIENT)
Dept: WOUND CARE | Facility: CLINIC | Age: 61
Discharge: HOME | End: 2021-05-13
Attending: SURGERY | Admitting: SURGERY
Payer: COMMERCIAL

## 2021-05-13 VITALS
RESPIRATION RATE: 16 BRPM | DIASTOLIC BLOOD PRESSURE: 82 MMHG | TEMPERATURE: 98.7 F | SYSTOLIC BLOOD PRESSURE: 134 MMHG | HEART RATE: 97 BPM

## 2021-05-13 DIAGNOSIS — T81.89XD NON-HEALING SURGICAL WOUND, SUBSEQUENT ENCOUNTER: ICD-10-CM

## 2021-05-13 DIAGNOSIS — L97.323 SKIN ULCER OF LEFT ANKLE WITH NECROSIS OF MUSCLE (H): ICD-10-CM

## 2021-05-13 PROBLEM — R79.89 ABNORMAL TSH: Status: ACTIVE | Noted: 2021-05-13

## 2021-05-13 PROBLEM — R93.89 ABNORMAL CT OF THE CHEST: Status: ACTIVE | Noted: 2021-02-01

## 2021-05-13 PROBLEM — R31.29 MICROSCOPIC HEMATURIA: Status: ACTIVE | Noted: 2021-03-07

## 2021-05-13 PROCEDURE — 11042 DBRDMT SUBQ TIS 1ST 20SQCM/<: CPT | Performed by: SURGERY

## 2021-05-13 PROCEDURE — 11042 DBRDMT SUBQ TIS 1ST 20SQCM/<: CPT

## 2021-05-13 RX ORDER — KETOROLAC TROMETHAMINE 10 MG/1
10 TABLET, FILM COATED ORAL EVERY 6 HOURS PRN
Qty: 20 TABLET | Refills: 0 | Status: SHIPPED | OUTPATIENT
Start: 2021-05-13 | End: 2021-10-18

## 2021-05-13 NOTE — DISCHARGE INSTRUCTIONS
[facility]  [address] [address]    Call us at [phone] if you have any questions about your wounds, have redness or swelling around your wound, have a fever of 101 or greater or if you have any other problems or concerns. We answer the phone Monday through Friday 8 am to 4 pm, please leave a message as we check the voicemail frequently throughout the day.     Anna Sanderson      1960    Medications/supplements to aid in healing:  Vitamin D3 10,000 iu per day  Jamilah C 1 tablet twice a day  Methyl Folate 1000 mcg daily  Vitamin B 12 1000 mcg daily  Vitamin B complex with zinc daily  Vasculera to help your veins -take one tablet a day. Purchase from [facility]  Wound care recommendations to left posterior ankle  Keep Dry.  We applied Endoform antimicrobial into depths of wound, covered with mepitel and steri strips (leave this on)  Cover wound with gauze Edemawear and roll gauze  Change dressing daily       SHI Her M.D.. May 13, 2021      If you had a positive experience please indicate on your patient satisfaction survey form that [facility] will be sending you.

## 2021-05-13 NOTE — PROGRESS NOTES
[facility] Wound [facility]aling [place] Progress Note    Subject: Anna Robertson Kin ,, medical record reviewed, surgical debridement performed November 5, 2020 with application of instill negative pressure wound therapy utilizing hypochlorous acid, patient was returned to the operating room on November 10, 2020 and underwent split-thickness skin grafting.  Greater than 50% of the skin graft remains intact however the area over the Achilles tendon and in the region of a cleft along the posterior aspect of the wound has had split-thickness given graft loss.  Patient is noted to have heterozygous status for MTHFR abnormality and would benefit from lifelong utilization of B12, folate, B6.  Also noted vitamin D deficiency, she is on additional vitamin D.  She is also having persistent pain and inability to obtain full flexion, denies systemic symptoms of fevers chills or sweats.  She has 1 screw in her left foot, no hardware in the tibia or fibula.  She has not noted any purulent drainage from the wound.    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)     Non-healing surgical wound, subsequent encounter     MTHFR gene mutation (H) Heterozygoyus April 2021, [facility]     Abnormal CT of the chest     Abnormal TSH     Microscopic hematuria     Past Medical History:   Diagnosis Date     GERD (gastroesophageal reflux disease)      Iron deficiency anemia      MRSA infection 08/2020    Left leg wound     Vitamin D deficiency      Exam:  /82   Pulse 97   Temp 98.7  F (37.1  C) (Temporal)   Resp 16    Wound (used by OP [facility] only) 08/04/20 0805 Left posterior ankle trauma (Active)   Thickness/Stage full thickness 05/13/21 0800   Dressing Appearance moist drainage 05/13/21 0800   Base granulating;slough 05/13/21 0800   Periwound intact 05/13/21 0800   Periwound Temperature warm 05/13/21 0800   Periwound Skin Turgor soft 05/13/21 0800   Edges open 05/13/21 0800   Length (cm) 2.5 05/13/21 0800   Width (cm) 1.5 05/13/21 0800   Depth (cm) 1.5 05/13/21 0800   Wound (cm^2) 3.75 cm^2 05/13/21 0800   Wound Volume (cm^3) 5.62 cm^3 05/13/21 0800   Wound healing % 82.27 05/13/21 0800   Drainage Characteristics/Odor serosanguineous 05/13/21 0800   Drainage Amount moderate 05/13/21 0800   Care, Wound debrided 05/13/21 0800     Biofilm and cleft within wound of left posterior calf, see photodocumentation, approximately 50% skin graft previously placed remains intact.    Procedure:   Patient was determined to be capable of making their own medical decisions and informed consent was obtained. Topical anesthetic of 4% lidocaine was applied, and 1% lidocaine injected debridement was performed using a #15 blade down to and including subcutaneous tissue biofilm, the depths of the cleft were vigorously debrided with a 15 blade bleeding controlled with light pressure.  No undermining, no purulence, no subcutaneous abscesses were encountered, the wound was then covered with 5 layer antimicrobial endoform and the cleft packed with antimicrobial endoform, Mepitel and Steri-Strips were applied, edema wear was applied.  Patient tolerated procedure well.    Impression: Chronic left lower extremity wound    Plan: If pain does not improve in the next 2 weeks, MRI of the left foot and ankle and calf will be obtained for further evaluation.  Patient will change absorptive dressing over top of the Mepitel on a daily basis, getting utilizing edema wear, continue stretching exercises, will contact us if there is significant change, prescription for  Toradol was prescribed.  Patient will return to the clinic in 2 weeks time    Mitchell Her MD on 5/13/2021 at 8:56 AM

## 2021-05-23 ENCOUNTER — OFFICE VISIT - HEALTHEAST (OUTPATIENT)
Dept: FAMILY MEDICINE | Facility: CLINIC | Age: 61
End: 2021-05-23

## 2021-05-23 DIAGNOSIS — J20.9 ACUTE BRONCHITIS, UNSPECIFIED ORGANISM: ICD-10-CM

## 2021-05-23 DIAGNOSIS — R05.9 COUGH: ICD-10-CM

## 2021-05-23 LAB
BASOPHILS # BLD AUTO: 0 THOU/UL (ref 0–0.2)
BASOPHILS NFR BLD AUTO: 1 % (ref 0–2)
EOSINOPHIL # BLD AUTO: 0.3 THOU/UL (ref 0–0.4)
EOSINOPHIL NFR BLD AUTO: 5 % (ref 0–6)
ERYTHROCYTE [DISTWIDTH] IN BLOOD BY AUTOMATED COUNT: 14.4 % (ref 11–14.5)
HCT VFR BLD AUTO: 37.7 % (ref 35–47)
HGB BLD-MCNC: 11.7 G/DL (ref 12–16)
IMM GRANULOCYTES # BLD: 0 THOU/UL
IMM GRANULOCYTES NFR BLD: 0 %
LYMPHOCYTES # BLD AUTO: 1.8 THOU/UL (ref 0.8–4.4)
LYMPHOCYTES NFR BLD AUTO: 26 % (ref 20–40)
MCH RBC QN AUTO: 27.5 PG (ref 27–34)
MCHC RBC AUTO-ENTMCNC: 31 G/DL (ref 32–36)
MCV RBC AUTO: 89 FL (ref 80–100)
MONOCYTES # BLD AUTO: 0.7 THOU/UL (ref 0–0.9)
MONOCYTES NFR BLD AUTO: 10 % (ref 2–10)
NEUTROPHILS # BLD AUTO: 4.1 THOU/UL (ref 2–7.7)
NEUTROPHILS NFR BLD AUTO: 59 % (ref 50–70)
PLATELET # BLD AUTO: 301 THOU/UL (ref 140–440)
PMV BLD AUTO: 9.2 FL (ref 7–10)
RBC # BLD AUTO: 4.25 MILL/UL (ref 3.8–5.4)
WBC: 7 THOU/UL (ref 4–11)

## 2021-05-24 LAB
SARS-COV-2 PCR COMMENT: NORMAL
SARS-COV-2 RNA SPEC QL NAA+PROBE: NEGATIVE
SARS-COV-2 VIRUS SPECIMEN SOURCE: NORMAL

## 2021-05-25 ENCOUNTER — HOSPITAL ENCOUNTER (OUTPATIENT)
Dept: WOUND CARE | Facility: CLINIC | Age: 61
Discharge: HOME | End: 2021-05-25
Attending: SURGERY | Admitting: SURGERY
Payer: COMMERCIAL

## 2021-05-25 ENCOUNTER — COMMUNICATION - HEALTHEAST (OUTPATIENT)
Dept: SCHEDULING | Facility: CLINIC | Age: 61
End: 2021-05-25

## 2021-05-25 ENCOUNTER — TELEPHONE (OUTPATIENT)
Dept: WOUND CARE | Facility: CLINIC | Age: 61
End: 2021-05-25

## 2021-05-25 ENCOUNTER — RECORDS - HEALTHEAST (OUTPATIENT)
Dept: ADMINISTRATIVE | Facility: OTHER | Age: 61
End: 2021-05-25

## 2021-05-25 ENCOUNTER — RECORDS - HEALTHEAST (OUTPATIENT)
Dept: SCHEDULING | Facility: CLINIC | Age: 61
End: 2021-05-25

## 2021-05-25 VITALS
SYSTOLIC BLOOD PRESSURE: 139 MMHG | RESPIRATION RATE: 24 BRPM | DIASTOLIC BLOOD PRESSURE: 84 MMHG | HEART RATE: 114 BPM | TEMPERATURE: 97.3 F

## 2021-05-25 DIAGNOSIS — L97.323 SKIN ULCER OF LEFT ANKLE WITH NECROSIS OF MUSCLE (H): ICD-10-CM

## 2021-05-25 DIAGNOSIS — T81.89XD NON-HEALING SURGICAL WOUND, SUBSEQUENT ENCOUNTER: Primary | ICD-10-CM

## 2021-05-25 DIAGNOSIS — I73.9 PAD (PERIPHERAL ARTERY DISEASE) (H): ICD-10-CM

## 2021-05-25 DIAGNOSIS — T81.89XD NON-HEALING SURGICAL WOUND, SUBSEQUENT ENCOUNTER: ICD-10-CM

## 2021-05-25 DIAGNOSIS — L97.323: ICD-10-CM

## 2021-05-25 PROCEDURE — 87070 CULTURE OTHR SPECIMN AEROBIC: CPT | Performed by: SURGERY

## 2021-05-25 PROCEDURE — 97602 WOUND(S) CARE NON-SELECTIVE: CPT

## 2021-05-25 PROCEDURE — 87077 CULTURE AEROBIC IDENTIFY: CPT | Performed by: SURGERY

## 2021-05-25 PROCEDURE — 87186 SC STD MICRODIL/AGAR DIL: CPT | Mod: 91 | Performed by: SURGERY

## 2021-05-25 PROCEDURE — 99213 OFFICE O/P EST LOW 20 MIN: CPT | Performed by: SURGERY

## 2021-05-25 RX ORDER — CIPROFLOXACIN 500 MG/1
TABLET, FILM COATED ORAL
COMMUNITY
Start: 2021-04-22 | End: 2021-06-09

## 2021-05-25 RX ORDER — SULFAMETHOXAZOLE/TRIMETHOPRIM 800-160 MG
1 TABLET ORAL 2 TIMES DAILY
Qty: 28 TABLET | Refills: 0 | Status: SHIPPED | OUTPATIENT
Start: 2021-05-25 | End: 2021-06-09

## 2021-05-25 NOTE — DISCHARGE INSTRUCTIONS
[facility]  [address] [address]    Call us at [phone] if you have any questions about your wounds, have redness or swelling around your wound, have a fever of 101 or greater or if you have any other problems or concerns. We answer the phone Monday through Friday 8 am to 4 pm, please leave a message as we check the voicemail frequently throughout the day.     Anna Sanderson      1960    Medications/supplements to aid in healing:  Vitamin D3 10,000 iu per day  Jamilah C 1 tablet twice a day  Methyl Folate 1000 mcg daily  Vitamin B 12 1000 mcg daily  Vitamin B complex with zinc daily  Vasculera to help your veins -take one tablet a day. Purchase from [facility]    Wound care recommendations to left posterior ankle  Apply vancomycin damp gauze to wound.   Cover wound with Edemawear and gauze/roll gauze  Change dressing daily    Please call [facility] [phone] ([address] [place], [place]) to schedule your MRI Left foot and ankle appointment if you have not heard from them in two business days.             SHI Her M.D. May 25, 2021    Wound Clinic follow up as scheduled    If you had a positive experience please indicate that on your patient satisfaction survey form that [facility] will be sending you.    It was a pleasure meeting with you today.  Thank you for allowing me and my team the privilege of caring for you today.  YOU are the reason we are here, and I truly hope we provided you with the excellent service you deserve.  Please let us know if there is anything else we can do for you so that we can be sure you are leaving completely satisfied with your care experience.      If you have any billing related questions please call the [facility] Business office at [phone]. The clinic staff does not handle billing related matters.

## 2021-05-25 NOTE — TELEPHONE ENCOUNTER
Anna needs the order for the MRI faxed over to [facility].   The fax number is .   Her phone number is  [phone].

## 2021-05-25 NOTE — PROGRESS NOTES
[facility] Wound Healing [place] Progress Note    Subject: Anna Sanderson seen as a work in, 24 hours early, is having increasing pain left lower extremity at the wound site, increased feeling of fatigue and not feeling well.  Denies nausea, vomiting, chest pain, diarrhea, was just checked for Covid status which is negative, she has had her complete vaccination series.  No one else at home is ill.    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)     Non-healing surgical wound, subsequent encounter     MTHFR gene mutation (H) Heterozygoyus April 2021, [facility]     Abnormal CT of the chest     Abnormal TSH     Microscopic hematuria     Past Medical History:   Diagnosis Date     GERD (gastroesophageal reflux disease)      Iron deficiency anemia      MRSA infection 08/2020    Left leg wound     Vitamin D deficiency      Exam:  /84   Pulse 114   Temp 97.3  F (36.3  C) (Temporal)   Resp 24   Wound (used by OP [facility] only) 08/04/20 0805 Left posterior ankle trauma (Active)   Thickness/Stage full thickness 05/25/21 0846   Dressing Appearance moist drainage 05/25/21 0846   Base granulating 05/25/21 0846   Periwound intact 05/25/21 0846   Periwound Temperature warm 05/25/21 0846   Periwound Skin Turgor soft 05/25/21 0846   Edges open 05/25/21 0846   Length (cm) 2.2 05/25/21 0846   Width (cm) 2.3 05/25/21 0846   Depth (cm) 1.3 05/25/21 0846   Wound (cm^2) 5.06 cm^2 05/25/21 0846   Wound Volume (cm^3) 6.58 cm^3 05/25/21 0846   Wound healing % 76.08 05/25/21 0846   Drainage Characteristics/Odor  serous;tan 05/25/21 0846   Drainage Amount moderate 05/25/21 0846   Care, Wound non-select wound debridement performed 05/25/21 0846     Examination left lower extremity reveals inflammatory changes without purulent discharge, wound has minimal undermining, aerobic culture was obtained after thoroughly irrigation with saline, An method.  In palpation of the medial lateral aspect and posterior aspect of the wound, no fluctuance, no discharge from the wound, no erythema, no edema on the dorsum of the foot.  She cannot dorsiflex foot to neutral position due to discomfort.        Impression: Probable cellulitis with potential deep space abscess left lower extremity    Plan: Obtain MRI foot and ankle left lower extremity to evaluate for potential deep space abscess given chronicity of wound and current clinical symptoms.  We will add Bactrim DS 1 tablet twice daily to Cipro, she does have a known chronic MRSA colonization of the wound.  MRI to be obtained within the next 24 hours.  I will contact the patient via phone call to discuss results and subsequent treatment options dependent upon results of MRI.  Apply vancomycin gauze to wound, change twice a day, she is utilizing edema wear.    Mitchell Her MD on 5/25/2021 at 9:20 AM

## 2021-05-27 ENCOUNTER — RECORDS - HEALTHEAST (OUTPATIENT)
Dept: ADMINISTRATIVE | Facility: CLINIC | Age: 61
End: 2021-05-27

## 2021-05-27 NOTE — TELEPHONE ENCOUNTER
Fax received from [facility] pharmacy requesting Prior Authorization    Medication Name: Pantoprazole 40mg     Insurance Plan: [facility]   PBM:    Patient ID Number: [id number]    Please start PA process

## 2021-05-27 NOTE — TELEPHONE ENCOUNTER
Central PA team  [phone]  Pool: HE PA MED ([id number])          PA has been initiated.       PA form completed and faxed insurance via Cover My Meds     Key:  [id number]     Medication:  Pantoprazole Sodium 40MG OR [facility]    Insurance:  [employer] [place]        Response will be received via fax and may take up to 5-10 business days depending on plan

## 2021-05-27 NOTE — TELEPHONE ENCOUNTER
RN cannot approve Refill Request    RN can NOT refill this medication historical medication requested.       Belinda Coles, [facility] Triage/Med Refill 4/1/2019    Requested Prescriptions   Pending Prescriptions Disp Refills     pantoprazole (PROTONIX) 40 MG tablet [Pharmacy Med Name: PANTOPRAZOLE 40MG TABLETS] 180 tablet 0     Sig: TAKE 1 TABLET BY MOUTH TWICE DAILY.(AT 9AM AND 5PM)    GI Medications Refill Protocol Passed - 3/31/2019 10:25 AM       Passed - PCP or prescribing provider visit in last 12 or next 3 months.    Last office visit with prescriber/PCP: 4/17/2018 Jame Dunn MD OR same dept: 4/17/2018 Jame Dunn MD OR same specialty: 4/17/2018 Jame Dunn MD  Last physical: Visit date not found Last MTM visit: Visit date not found   Next visit within 3 mo: Visit date not found  Next physical within 3 mo: Visit date not found  Prescriber OR PCP: Jame Dunn MD  Last diagnosis associated with med order: 1. GERD (gastroesophageal reflux disease)  - pantoprazole (PROTONIX) 40 MG tablet [Pharmacy Med Name: PANTOPRAZOLE 40MG TABLETS]; TAKE 1 TABLET BY MOUTH TWICE DAILY.(AT 9AM AND 5PM)  Dispense: 180 tablet; Refill: 0    If protocol passes may refill for 12 months if within 3 months of last provider visit (or a total of 15 months).

## 2021-05-28 ENCOUNTER — RECORDS - HEALTHEAST (OUTPATIENT)
Dept: ADMINISTRATIVE | Facility: CLINIC | Age: 61
End: 2021-05-28

## 2021-05-28 LAB
BACTERIA SPEC CULT: ABNORMAL
Lab: ABNORMAL
SPECIMEN SOURCE: ABNORMAL

## 2021-05-30 ENCOUNTER — RECORDS - HEALTHEAST (OUTPATIENT)
Dept: ADMINISTRATIVE | Facility: CLINIC | Age: 61
End: 2021-05-30

## 2021-05-30 VITALS — HEIGHT: 65 IN | WEIGHT: 119 LBS | BODY MASS INDEX: 19.83 KG/M2

## 2021-05-30 NOTE — TELEPHONE ENCOUNTER
RN cannot approve Refill Request    RN can NOT refill this medication Protocol failed and NO refill given.       Belinda Coles, [facility] Triage/Med Refill 7/2/2019    Requested Prescriptions   Pending Prescriptions Disp Refills     pantoprazole (PROTONIX) 40 MG tablet [Pharmacy Med Name: PANTOPRAZOLE 40MG TABLETS] 180 tablet 0     Sig: TAKE 1 TABLET BY MOUTH TWICE DAILY(AT 9AM AND 5PM)       GI Medications Refill Protocol Failed - 7/1/2019  9:16 PM        Failed - PCP or prescribing provider visit in last 12 or next 3 months.     Last office visit with prescriber/PCP: 4/17/2018 Jame Dunn MD OR same dept: Visit date not found OR same specialty: 4/17/2018 Jame Dunn MD  Last physical: Visit date not found Last MTM visit: Visit date not found   Next visit within 3 mo: Visit date not found  Next physical within 3 mo: Visit date not found  Prescriber OR PCP: Jame Dunn MD  Last diagnosis associated with med order: 1. GERD (gastroesophageal reflux disease)  - pantoprazole (PROTONIX) 40 MG tablet [Pharmacy Med Name: PANTOPRAZOLE 40MG TABLETS]; TAKE 1 TABLET BY MOUTH TWICE DAILY(AT 9AM AND 5PM)  Dispense: 180 tablet; Refill: 0    If protocol passes may refill for 12 months if within 3 months of last provider visit (or a total of 15 months).

## 2021-05-30 NOTE — TELEPHONE ENCOUNTER
New Appointment Needed  What is the reason for the visit:    Pre-Op Appt Request  When is the surgery? :  07-24-19   Where is the surgery?:   [facility].  Who is the surgeon? :  Dr. Radha Mcmahon  What type of surgery is being done?: Ankle  Provider Preference: PCP only  How soon do you need to be seen?: today  Waitlist offered?: No  Okay to leave a detailed message:  Yes

## 2021-05-30 NOTE — PROGRESS NOTES
[facility] Follow Up Note    Annatristan RobertsonVandanaKin   59 y.o. female    Date of Visit: 7/3/2019    Chief Complaint   Patient presents with     Fever     yeasterday     Sore Throat     monday     Loss of Consciousness     last night during a walk     Subjective  This is a 59-year-old lady was a patient of Dr. Jame Ramos.  She comes in with a several day history of a respiratory infection.  She has had a low-grade fever as well as a sore throat and generalized body aches.  She did have a slight nonproductive cough which is now cleared.  Last evening after a short walk with her  she collapsed in her building.  She thought she had a loss of consciousness for just a couple of seconds.  She drank some Gatorade and immediately felt better and has had no problems since.  This sounds very much like some dehydration.    ROS A comprehensive review of systems was performed and was otherwise negative    Medications, allergies, and problem list were reviewed and updated    Exam  General Appearance:   On examination her blood pressure is 110/70.  Temperature is 99 degrees and O2 saturation is 100%.    Heart is in sinus rhythm with a rate of 105 and no ectopy.    Some tenderness over the left frontal sinus.    Throat appears normal with no exudate.    Some tenderness in the right cervical lymph nodes.    Lungs are clear.    The patient is alert and oriented x3.      Assessment/Plan  1. Visit for screening mammogram  Mammo Screening Bilateral   2. Upper respiratory tract infection, unspecified type  azithromycin (ZITHROMAX Z-ZAIRE) 250 MG tablet     Upper respiratory infection.  Given the circumstances I thought we would put her on a Z-Zaire and have her follow-up with Dr. Ramos if she is not improving.  Body Mass Index was not assessed due to Patient was in with an acute medical issue..    Mayito Young MD      Current Outpatient Medications on File Prior to Visit   Medication Sig      ibuprofen (ADVIL) 200 MG tablet Take 200 mg by mouth.     pantoprazole (PROTONIX) 40 MG tablet Take 40 mg by mouth 2 (two) times a day.     ondansetron (ZOFRAN) 4 MG tablet Take 4 mg by mouth every 8 (eight) hours as needed for nausea.     sucralfate (CARAFATE) 100 mg/mL suspension Take 1 g by mouth 4 (four) times a day as needed.     [DISCONTINUED] pantoprazole (PROTONIX) 40 MG tablet Take 1 tablet (40 mg total) by mouth 2 (two) times a day. Follow-up appointment needed for further refills     Current Facility-Administered Medications on File Prior to Visit   Medication     lidocaine 10 mg/mL (1 %) injection 1-5 mL     Allergies   Allergen Reactions     Silver-Calcium Alginate Dermatitis     Skin blisters- in the past     Morphine Hives     Adhesive Tape-Silicones Rash     Other Drug Allergy (See Comments) Rash     CHLORAPREP     Social History     Tobacco Use     Smoking status: Former Smoker     Types: Cigarettes     Last attempt to quit: 3/14/2003     Years since quittin.3     Smokeless tobacco: Never Used     Tobacco comment: Quit    Substance Use Topics     Alcohol use: No     Drug use: No

## 2021-05-30 NOTE — TELEPHONE ENCOUNTER
Spoke with the patient and she has been scheduled to see Dr. Crocker this afternoon fora pre-op.  Patient had no further questions at this time.  Kristy GUERRA, ABDOUL/WILMA....................10:09 AM

## 2021-05-30 NOTE — PROGRESS NOTES
Preoperative Exam    Scheduled Procedure: left foot surgery  Surgery Date:  7/24/19  Surgery Location: [facility], fax [phone]    Surgeon:  Dr. Carie Oh    Assessment/Plan:         Surgical Procedure Risk: Low (reported cardiac risk generally < 1%)  Have you had prior anesthesia?: No  Have you or any family members had a previous anesthesia reaction:  Yes: nausea  Do you or any family members have a history of a clotting or bleeding disorder?: No  Cardiac Risk Assessment: no increased risk for major cardiac complications    APPROVAL GIVEN to proceed with proposed procedure, without further diagnostic evaluation        Functional Status: Independent  Patient plans to recover at home with family.     Subjective:      Anna Underwood is a 59 y.o. female who presents for a preoperative consultation.  Patient has long-standing history of left Achilles tendinopathy related to infection and trauma.  See notes by her surgeon regarding surgical plans and needs for surgery.    General health has been excellent she has a history of chronic reflux esophagitis controlled with a combination of pantoprazole and sucralfate.  No recent concerns regarding refractory esophagitis aspiration etc.    Is on preoperative prophylactic Keflex 7.    Has used Advil as needed this has been held    History of iron deficiency anemia.  She is intolerant of oral iron and has been managed with parenteral iron follow-up hemoglobin pending.  Previous history includes DVT left brachial vein related to a PICC line.  No other history of clotting disorder or coagulopathy.  Allergies see list no antibiotic or anesthetic allergies    General health has been otherwise excellent without new acute complaints    All other systems reviewed and are negative, other than those listed in the HPI.    Pertinent History  Do you have difficulty breathing or chest pain after walking up a flight of stairs: No  History  of obstructive sleep apnea: No  Steroid use in the last 6 months: No  Frequent Aspirin/NSAID use: Yes:   Prior Blood Transfusion: No  Prior Blood Transfusion Reaction: No  If for some reason prior to, during or after the procedure, if it is medically indicated, would you be willing to have a blood transfusion?:  There is no transfusion refusal.    Current Outpatient Medications   Medication Sig Dispense Refill     cephalexin (KEFLEX) 500 MG capsule TK 1 C PO QID TAT  1     ibuprofen (ADVIL) 200 MG tablet Take 200 mg by mouth.       pantoprazole (PROTONIX) 40 MG tablet Take 40 mg by mouth 2 (two) times a day.       ondansetron (ZOFRAN) 4 MG tablet Take 4 mg by mouth every 8 (eight) hours as needed for nausea.       sucralfate (CARAFATE) 100 mg/mL suspension Take 1 g by mouth 4 (four) times a day as needed.       Current Facility-Administered Medications   Medication Dose Route Frequency Provider Last Rate Last Dose     lidocaine 10 mg/mL (1 %) injection 1-5 mL  1-5 mL Intradermal Once Paulina Ruggiero MD            Allergies   Allergen Reactions     Silver-Calcium Alginate Dermatitis     Skin blisters- in the past     Morphine Hives     Adhesive Tape-Silicones Rash     Other Drug Allergy (See Comments) Rash     CHLORAPREP       Patient Active Problem List   Diagnosis     Iron deficiency anemia     GERD (gastroesophageal reflux disease)     Allergic rhinitis     Achilles tendon infection     Deep vein thrombosis (DVT) of brachial vein of left upper extremity (H)       Past Medical History:   Diagnosis Date     Achilles tendon infection      Deep vein thrombosis (DVT) of brachial vein of left upper extremity (H) 04/17/2018    Associated with PICC line, follow-up ultrasound showing resolution of thrombus     Dermatitis      Family history of myocardial infarction     dad 35  mother 70     Fatigue 2/24/2017     GERD (gastroesophageal reflux disease)     EGD with esophagitis 2015, experiencing chest pain and dysphagia      History of anesthesia complications      Iron deficiency anemia 2015    Has required iron infusions     Kidney stones      Open wound of foot, complicated     Achilles tendon rupture complicated by infected open wound with multiple surgeries including skin grafting     Ovarian cyst      PONV (postoperative nausea and vomiting)        Past Surgical History:   Procedure Laterality Date     ANKLE SURGERY      Multiple surgeries [id number] after complications from Achilles rupture and repair with nonhealing open wound including skin grafting      SECTION      x3     HYSTERECTOMY  2002     OOPHORECTOMY Right 2002     PICC AND MIDLINE TEAM LINE INSERTION  3/23/2018            Social History     Socioeconomic History     Marital status:      Spouse name: Not on file     Number of children: Not on file     Years of education: Not on file     Highest education level: Not on file   Occupational History     Not on file   Social Needs     Financial resource strain: Not on file     Food insecurity:     Worry: Not on file     Inability: Not on file     Transportation needs:     Medical: Not on file     Non-medical: Not on file   Tobacco Use     Smoking status: Former Smoker     Types: Cigarettes     Last attempt to quit: 3/14/2003     Years since quittin.3     Smokeless tobacco: Never Used     Tobacco comment: Quit    Substance and Sexual Activity     Alcohol use: No     Drug use: No     Sexual activity: Not on file   Lifestyle     Physical activity:     Days per week: Not on file     Minutes per session: Not on file     Stress: Not on file   Relationships     Social connections:     Talks on phone: Not on file     Gets together: Not on file     Attends Latter day service: Not on file     Active member of club or organization: Not on file     Attends meetings of clubs or organizations: Not on file     Relationship status: Not on file     Intimate partner violence:     Fear of current or ex partner: Not  on file     Emotionally abused: Not on file     Physically abused: Not on file     Forced sexual activity: Not on file   Other Topics Concern     Not on file   Social History Narrative    .  to Sher. 3 children             Objective:     There were no vitals filed for this visit.      Physical Exam:  Alert and oriented with normal cognitive function.  Head and neck negative EENT normal lungs clear heart negative abdomen benign extremities normal left lower extremity is in a boot.  Pulses right lower extremity normal    There are no Patient Instructions on file for this visit.    Previous EKG negative    Labs:  No results found for this or any previous visit (from the past 24 hour(s)).    Immunization History   Administered Date(s) Administered     Influenza, inj, historic,unspecified 01/20/2010, 09/15/2016     Influenza,inj,MDCK,PF,Quad >4yrs 10/07/2017     Td, adult adsorbed, PF 02/27/2004     Tdap 02/24/2017           Electronically signed by Moody Crocker MD 07/23/19 12:57 PM

## 2021-05-31 ENCOUNTER — RECORDS - HEALTHEAST (OUTPATIENT)
Dept: ADMINISTRATIVE | Facility: CLINIC | Age: 61
End: 2021-05-31

## 2021-05-31 VITALS — WEIGHT: 116 LBS | HEIGHT: 65 IN | BODY MASS INDEX: 19.33 KG/M2

## 2021-05-31 VITALS — BODY MASS INDEX: 19.33 KG/M2 | WEIGHT: 116 LBS | HEIGHT: 65 IN

## 2021-05-31 VITALS — WEIGHT: 115.25 LBS | BODY MASS INDEX: 19.2 KG/M2 | HEIGHT: 65 IN

## 2021-05-31 VITALS — HEIGHT: 65 IN | WEIGHT: 116 LBS | BODY MASS INDEX: 19.33 KG/M2

## 2021-05-31 VITALS — WEIGHT: 116 LBS | BODY MASS INDEX: 19.3 KG/M2

## 2021-05-31 NOTE — PROGRESS NOTES
Hospital discharge follow up call to pt, no answer.  Left VM message reminding pt of appt on 8/26. RN will attempt call back at another time.

## 2021-05-31 NOTE — TELEPHONE ENCOUNTER
Upcoming Appointment Question  When is the appointment: Today  What is your appointment for?: Hospital Follow Up  Who is your appointment scheduled with?: PCP only  What is your question/concern?: Patient is questioning if her appointment today can be changed to pre-op physical?    Patient stated she was seen at [place] today and is not scheduled to have surgery on Wednesday, 8/28/2019 for debridement.  Patient stated the surgery will be taking place at the [facility].  Okay to leave a detailed message?: Yes

## 2021-05-31 NOTE — PATIENT INSTRUCTIONS - HE
Hold all supplements, aspirin and NSAIDs for 7 days prior to surgery.  Follow your surgeon's direction on when to stop eating and drinking prior to surgery.  Your surgeon will be managing your pain after your surgery.

## 2021-05-31 NOTE — PROGRESS NOTES
Preoperative Exam    Scheduled Procedure: Debridement of left achilles wound infection  Surgery Date:  08/28/2019   Surgery Location: [facility], fax [phone]    Surgeon:  Dr Wiley    Assessment/Plan:     1. Encounter for preoperative examination for general surgical procedure  No contraindications to proceeding with planned surgery and anesthesia.  She will hold her NSAIDs between now and surgery.  DVT prophylaxis with early ambulation.    2. Non-healing surgical wound, sequela/Achilles tendon infection  Chronic nonhealing wound associated with Achilles tendon now with infection with plans for deep debridement        4. Iron deficiency anemia, unspecified iron deficiency anemia type  Chronic iron deficiency anemia.  Will make arrangements for outpatient iron infusion  - Ferritin  - Iron and Transferrin Iron Binding Capacity  - HM2(CBC w/o Differential)  - Ambulatory referral to Infusion Therapy        Surgical Procedure Risk: Intermediate (reported cardiac risk generally 1-5%)  Have you had prior anesthesia?: Yes  Have you or any family members had a previous anesthesia reaction:  No  Do you or any family members have a history of a clotting or bleeding disorder?: Yes: Dvt in arm due to pic line  Cardiac Risk Assessment: no increased risk for major cardiac complications    APPROVAL GIVEN to proceed with proposed procedure, without further diagnostic evaluation        Functional Status: Independent  Patient plans to recover at home with family.     Subjective:      Anna Underwood is a 59 y.o. female who presents for a preoperative consultation.  Chronic nonhealing wound associated with left Achilles with recent hospitalization with sepsis/SIRS.  Remains on Cipro.  Plans for surgery with deep debridement of wound.    She has tolerated previous surgeries and anesthesia without any complications.    No history of lower extremity DVT or pulmonary embolus although has  developed DVT involving left brachial vein associated with PICC line    No coronary artery disease or congestive heart failure.  Denies exertional chest pain or dyspnea.    She has chronic iron deficiency anemia and has needed iron infusions in the past.  She does not tolerate oral iron replacement    All other systems reviewed and are negative, other than those listed in the HPI.    Pertinent History  Do you have difficulty breathing or chest pain after walking up a flight of stairs: No  History of obstructive sleep apnea: No  Steroid use in the last 6 months: No  Frequent Aspirin/NSAID use: No  Prior Blood Transfusion: No  Prior Blood Transfusion Reaction: No  If for some reason prior to, during or after the procedure, if it is medically indicated, would you be willing to have a blood transfusion?:  There is no transfusion refusal.    Current Outpatient Medications   Medication Sig Dispense Refill     ciprofloxacin HCl (CIPRO) 500 MG tablet Take 1 tablet (500 mg total) by mouth 2 (two) times a day. 60 tablet 0     ibuprofen (ADVIL) 200 MG tablet Take 600 mg by mouth every 6 (six) hours as needed for pain.              ondansetron (ZOFRAN) 4 MG tablet Take 4 mg by mouth every 8 (eight) hours as needed for nausea.       pantoprazole (PROTONIX) 40 MG tablet Take 40 mg by mouth 2 (two) times a day.       sucralfate (CARAFATE) 100 mg/mL suspension Take 1 g by mouth 4 (four) times a day as needed.       triamcinolone (KENALOG) 0.5 % ointment Apply topically 2 (two) times a day. Rash on arms after tape contacts skin 30 g 0     Current Facility-Administered Medications   Medication Dose Route Frequency Provider Last Rate Last Dose     lidocaine 10 mg/mL (1 %) injection 1-5 mL  1-5 mL Intradermal Once Paulina Ruggiero MD            Allergies   Allergen Reactions     Silver-Calcium Alginate Dermatitis     Skin blisters- in the past     Morphine Hives     Adhesive Tape-Silicones Rash     Other Drug Allergy (See Comments)  Other (See Comments)     CHLORAPREP Blisters.       Patient Active Problem List   Diagnosis     Iron deficiency anemia     Gastroesophageal reflux disease with esophagitis     Allergic rhinitis     Achilles tendon infection     Achilles tendinitis     Nonhealing surgical wound     Atopic dermatitis, unspecified type       Past Medical History:   Diagnosis Date     Achilles tendon infection      Deep vein thrombosis (DVT) of brachial vein of left upper extremity (H) 2018    Associated with PICC line, follow-up ultrasound showing resolution of thrombus     Dermatitis      Family history of myocardial infarction     dad 35  mother 70     Fatigue 2017     GERD (gastroesophageal reflux disease)     EGD with esophagitis , experiencing chest pain and dysphagia     History of anesthesia complications      Iron deficiency anemia 2015    Has required iron infusions     Kidney stones      Open wound of foot, complicated     Achilles tendon rupture complicated by infected open wound with multiple surgeries including skin grafting     Ovarian cyst      PONV (postoperative nausea and vomiting)      SIRS (systemic inflammatory response syndrome) (H) 2019       Past Surgical History:   Procedure Laterality Date     ANKLE SURGERY      Multiple surgeries [id number] after complications from Achilles rupture and repair with nonhealing open wound including skin grafting      SECTION      x3     HYSTERECTOMY  2002     OOPHORECTOMY Right 2002     PICC AND MIDLINE TEAM LINE INSERTION  3/23/2018            Social History     Socioeconomic History     Marital status:      Spouse name: Not on file     Number of children: Not on file     Years of education: Not on file     Highest education level: Not on file   Occupational History     Not on file   Social Needs     Financial resource strain: Not on file     Food insecurity:     Worry: Not on file     Inability: Not on file     Transportation needs:      "Medical: Not on file     Non-medical: Not on file   Tobacco Use     Smoking status: Former Smoker     Types: Cigarettes     Last attempt to quit: 3/14/2003     Years since quittin.4     Smokeless tobacco: Never Used     Tobacco comment: Quit    Substance and Sexual Activity     Alcohol use: No     Drug use: No     Sexual activity: Not on file   Lifestyle     Physical activity:     Days per week: Not on file     Minutes per session: Not on file     Stress: Not on file   Relationships     Social connections:     Talks on phone: Not on file     Gets together: Not on file     Attends Alevism service: Not on file     Active member of club or organization: Not on file     Attends meetings of clubs or organizations: Not on file     Relationship status: Not on file     Intimate partner violence:     Fear of current or ex partner: Not on file     Emotionally abused: Not on file     Physically abused: Not on file     Forced sexual activity: Not on file   Other Topics Concern     Not on file   Social History Narrative    .  to Sher. 3 children             Objective:     Vitals:    19 1348   BP: 120/72   Pulse: 91   SpO2: 100%   Weight: 115 lb (52.2 kg)   Height: 5' 4\" (1.626 m)         Physical Exam:  HEENT normal  RESPIRATORY: Breathing pattern was normal and the chest moved symmetrically.   Lung sounds were normal and there were no rales or wheezes.  CARDIOVASCULAR: Heart rate and rhythm were normal.  S1 and S2 were normal and there were no extra sounds or murmurs. Peripheral pulses in arms and legs were normal.  Jugular venous pressure was normal.  There was no peripheral edema.  No carotid bruits.  GASTROINTESTINAL: The abdomen was normal in contour.  Bowel sounds were present.   Palpation detected no tenderness, mass, or enlarged organs.   SKIN/HAIR/NAILS: No cyanosis or pallor  NEUROLOGIC: Grossly nonfocal  PSYCHIATRIC:  Mood and affect were normal     Patient Instructions     Hold " all supplements, aspirin and NSAIDs for 7 days prior to surgery.  Follow your surgeon's direction on when to stop eating and drinking prior to surgery.  Your surgeon will be managing your pain after your surgery.              Labs:  Hemoglobin 9.5 platelet 504    Immunization History   Administered Date(s) Administered     Influenza, inj, historic,unspecified 01/20/2010, 09/15/2016     Influenza,inj,MDCK,PF,Quad >4yrs 10/07/2017     Td, adult adsorbed, PF 02/27/2004     Tdap 02/24/2017           Electronically signed by Jame Dunn MD 08/26/19 1:50 PM

## 2021-06-01 ENCOUNTER — TELEPHONE (OUTPATIENT)
Dept: WOUND CARE | Facility: CLINIC | Age: 61
End: 2021-06-01

## 2021-06-01 VITALS — HEIGHT: 64 IN | WEIGHT: 119 LBS | BODY MASS INDEX: 20.32 KG/M2

## 2021-06-01 VITALS — BODY MASS INDEX: 19.63 KG/M2 | WEIGHT: 115 LBS | HEIGHT: 64 IN

## 2021-06-01 VITALS — BODY MASS INDEX: 20.25 KG/M2 | WEIGHT: 118 LBS

## 2021-06-01 VITALS — HEIGHT: 64 IN | BODY MASS INDEX: 19.63 KG/M2 | WEIGHT: 115 LBS

## 2021-06-01 VITALS — HEIGHT: 64 IN | BODY MASS INDEX: 19.74 KG/M2

## 2021-06-01 NOTE — TELEPHONE ENCOUNTER
RN cannot approve Refill Request    RN can NOT refill this medication historical medication requested. Last office visit: 8/26/2019 Jame Dunn MD Last Physical: Visit date not found Last MTM visit: Visit date not found Last visit same specialty: 9/27/2019 Ayan Coreas MD.  Next visit within 3 mo: Visit date not found  Next physical within 3 mo: Visit date not found  Last OV 9/27/2019    Hynu Nolasco, [facility] Triage/Med Refill 9/29/2019    Requested Prescriptions   Pending Prescriptions Disp Refills     pantoprazole (PROTONIX) 40 MG tablet [Pharmacy Med Name: PANTOPRAZOLE 40MG TABLETS] 180 tablet 0     Sig: TAKE 1 TABLET(40 MG) BY MOUTH TWICE DAILY. FOLLOW-UP APPOINTMENT NEEDED FOR FURTHER REFILLS       GI Medications Refill Protocol Passed - 9/28/2019 10:10 AM        Passed - PCP or prescribing provider visit in last 12 or next 3 months.     Last office visit with prescriber/PCP: 8/26/2019 Jame Dunn MD OR same dept: 9/27/2019 Ayan Coreas MD OR same specialty: 9/27/2019 Ayan Coreas MD  Last physical: Visit date not found Last MTM visit: Visit date not found   Next visit within 3 mo: Visit date not found  Next physical within 3 mo: Visit date not found  Prescriber OR PCP: Jame uDnn MD  Last diagnosis associated with med order: 1. GERD (gastroesophageal reflux disease)  - pantoprazole (PROTONIX) 40 MG tablet [Pharmacy Med Name: PANTOPRAZOLE 40MG TABLETS]; TAKE 1 TABLET(40 MG) BY MOUTH TWICE DAILY. FOLLOW-UP APPOINTMENT NEEDED FOR FURTHER REFILLS  Dispense: 180 tablet; Refill: 0    If protocol passes may refill for 12 months if within 3 months of last provider visit (or a total of 15 months).

## 2021-06-01 NOTE — PROGRESS NOTES
OFFICE VISIT NOTE    Subjective:   Chief Complaint:  Fever (every night X Sunday, ST, sore chest, plugged ears)    T 72-year old woman who presented with a 5-day history of sore throat then cough and fever.  The last few days she is been running temps of 100 101.  Some chills.  Slight dullness in her ears but no severe pain.  Minor sore throat.  No severe coughing.  No sputum production.  She is not a diabetic.  She is a non-smoker.    Current Outpatient Medications   Medication Sig     ciprofloxacin HCl (CIPRO) 500 MG tablet Take 1 tablet (500 mg total) by mouth 2 (two) times a day.     ibuprofen (ADVIL) 200 MG tablet Take 600 mg by mouth every 6 (six) hours as needed for pain.            pantoprazole (PROTONIX) 40 MG tablet Take 40 mg by mouth 2 (two) times a day.     sucralfate (CARAFATE) 100 mg/mL suspension Take 1 g by mouth 4 (four) times a day as needed.     triamcinolone (KENALOG) 0.5 % ointment Apply topically 2 (two) times a day. Rash on arms after tape contacts skin       Review of Systems:  A comprehensive review of systems is negative except for the comments above    Objective:    Pulse (!) 106   Temp 99.3  F (37.4  C) (Oral)   SpO2 98%   GENERAL: No acute distress.  Today's temp is 99.3.  Pulse 108.  Oxygen saturations 98%.  Ears nose and throat exam shows mild erythema of the throat.  No evidence of any otitis media.  No adenopathy in the neck.  Lungs do a few crackles at the end of inspiration right and left lung base posteriorly.  No flank tenderness.  She has no urinary symptoms.    Assessment & Plan   Anna Underwood is a 59 y.o. female.    Chest x-ray today shows a right lower lobe pneumonia.  We will put her on a Z-Zaire along with pushing fluids.  Tylenol or Advil to control any temperature.  Throat swab was negative for strep  Diagnoses and all orders for this visit:    Streptococcal sore throat    Sore throat  -     Rapid Strep A Screen-Throat swab    Chills (without fever)  -     XR  Chest 2 Views; Future; Expected date: 09/27/2019    Acute febrile illness    Ayan Coreas MD  Transcription using voice recognition software, may contain typographical errors.

## 2021-06-02 ENCOUNTER — TELEPHONE (OUTPATIENT)
Facility: CLINIC | Age: 61
End: 2021-06-02

## 2021-06-02 VITALS — WEIGHT: 121.1 LBS | BODY MASS INDEX: 20.15 KG/M2

## 2021-06-02 NOTE — TELEPHONE ENCOUNTER
messages were emailed to Dr. Her regarding two MRI's ordered for this patient. Clarification of these orders are being requested.

## 2021-06-03 VITALS — BODY MASS INDEX: 19.63 KG/M2 | HEIGHT: 64 IN | WEIGHT: 115 LBS

## 2021-06-03 VITALS — BODY MASS INDEX: 20.27 KG/M2 | HEIGHT: 64 IN | WEIGHT: 118.75 LBS

## 2021-06-03 VITALS
HEIGHT: 64 IN | BODY MASS INDEX: 20.14 KG/M2 | SYSTOLIC BLOOD PRESSURE: 134 MMHG | DIASTOLIC BLOOD PRESSURE: 72 MMHG | WEIGHT: 118 LBS

## 2021-06-03 VITALS
OXYGEN SATURATION: 98 % | HEART RATE: 106 BPM | DIASTOLIC BLOOD PRESSURE: 70 MMHG | SYSTOLIC BLOOD PRESSURE: 120 MMHG | TEMPERATURE: 99.3 F

## 2021-06-03 NOTE — PROGRESS NOTES
Office Visit - Follow Up   Anna Underwood   59 y.o. female    Date of Visit: 2019    Chief Complaint   Patient presents with     Cough        Assessment and Plan   1. Persistent cough for 3 weeks or longer  Obtain chest x-ray as follow-up never completed from pneumonia that was diagnosed in September with infiltrate in right middle lobe.  Given persistence of symptoms, will treat with a Z-Elsa.  - XR Chest 2 Views; Future  - azithromycin (ZITHROMAX Z-ELSA) 250 MG tablet; Take 2 tablets (500 mg) on  Day 1,  followed by 1 tablet (250 mg) once daily on Days 2 through 5.  Dispense: 6 tablet; Refill: 0    Return in about 1 year (around 2020) for Annual physical.     History of Present Illness   This 59 y.o. old with persistent cough for the last 6 weeks.  Diagnosed with pneumonia in September.  She thinks her cough never completely resolved but definitely got worse over the last several weeks.  Denies any dyspnea.  No fevers or chills.    Review of Systems:  Otherwise, a comprehensive review of systems was negative except as noted.     Medications, Allergies and Problem List   Patient Active Problem List   Diagnosis     Iron deficiency anemia     Gastroesophageal reflux disease with esophagitis     Allergic rhinitis     Achilles tendon infection     Achilles tendinitis     Nonhealing surgical wound     Atopic dermatitis, unspecified type     Abdominal pain, epigastric     Persistent cough for 3 weeks or longer       She has a past surgical history that includes Ankle surgery;  section; Oophorectomy (Right, ); Hysterectomy (); and PICC Team Line Insertion (3/23/2018).    Allergies   Allergen Reactions     Silver-Calcium Alginate Dermatitis     Skin blisters- in the past     Morphine Hives     Adhesive Tape-Silicones Rash     Other Drug Allergy (See Comments) Other (See Comments)     CHLORAPREP Blisters.       Current Outpatient Medications   Medication Sig Dispense Refill     ibuprofen  "(ADVIL) 200 MG tablet Take 600 mg by mouth every 6 (six) hours as needed for pain.              pantoprazole (PROTONIX) 40 MG tablet Take 1 tablet (40 mg total) by mouth 2 (two) times a day. 180 tablet 1     sucralfate (CARAFATE) 100 mg/mL suspension Take 1 g by mouth 4 (four) times a day as needed.       triamcinolone (KENALOG) 0.5 % ointment Apply topically 2 (two) times a day. Rash on arms after tape contacts skin 30 g 0     azithromycin (ZITHROMAX Z-ELSA) 250 MG tablet Take 2 tablets (500 mg) on  Day 1,  followed by 1 tablet (250 mg) once daily on Days 2 through 5. 6 tablet 0     Current Facility-Administered Medications   Medication Dose Route Frequency Provider Last Rate Last Dose     lidocaine 10 mg/mL (1 %) injection 1-5 mL  1-5 mL Intradermal Once Paulina Ruggiero MD            Physical Exam   General Appearance:   Well-appearing middle-aged woman    /80 (Patient Site: Left Arm, Patient Position: Sitting, Cuff Size: Adult Large)   Ht 5' 4\" (1.626 m)   Wt 118 lb (53.5 kg)   BMI 20.25 kg/m      HEENT: Normal, oropharynx normal  Neck without lymphadenopathy  Respiratory: Normal respiratory effort.  Lungs are clear with no rales or wheezes.  Heart: Regular rate and rhythm          Additional Information   Social History     Tobacco Use     Smoking status: Former Smoker     Types: Cigarettes     Last attempt to quit: 3/14/2003     Years since quittin.7     Smokeless tobacco: Never Used     Tobacco comment: Quit    Substance Use Topics     Alcohol use: No     Drug use: No              Jame Dunn MD  "

## 2021-06-03 NOTE — TELEPHONE ENCOUNTER
Messages with [employer] for [facility] doing prior authorizations.   The above listed patient Anna is scheduled to have a couple MRI's tonight at [facility]. Dr. Her is out of the office and a nurse was going to see if anyone else was available to call [employer] and do a peer to peer review.  At this time they are NOT approving these tests.   Left message with Christine that I would try. Awaiting response.

## 2021-06-03 NOTE — TELEPHONE ENCOUNTER
Spoke to Anna she reports that she has chronic infection in her wound and she is concerned about abscess . She is on bactrim until 6/8/21, she knows that she should present to the ER for evaluation. She is concerned about another provider doing a procedure on her wound. Educated Patient that it is ok for another provider to care for her wound and assess the area. She reports understanding. She will think about it and if she gets worse present to ER for evaulation

## 2021-06-03 NOTE — PATIENT INSTRUCTIONS - HE
Schedule mammogram and continue annually  Schedule follow-up appointment with OB/GYN  Schedule DEXA for osteoporosis screening  Return in 2 to 6 months for a second Shingrix vaccine  Recommending Cologuard for colon cancer screening

## 2021-06-04 ENCOUNTER — HOSPITAL ENCOUNTER (EMERGENCY)
Dept: EMERGENCY MEDICINE | Facility: CLINIC | Age: 61
Discharge: HOME | End: 2021-06-04
Payer: COMMERCIAL

## 2021-06-04 VITALS
WEIGHT: 118 LBS | BODY MASS INDEX: 20.14 KG/M2 | DIASTOLIC BLOOD PRESSURE: 80 MMHG | HEIGHT: 64 IN | SYSTOLIC BLOOD PRESSURE: 130 MMHG

## 2021-06-04 DIAGNOSIS — L03.119 CELLULITIS AND ABSCESS OF LEG: ICD-10-CM

## 2021-06-04 DIAGNOSIS — L02.419 CELLULITIS AND ABSCESS OF LEG: ICD-10-CM

## 2021-06-04 DIAGNOSIS — M79.662 PAIN OF LEFT LOWER LEG: ICD-10-CM

## 2021-06-04 LAB
ALBUMIN SERPL-MCNC: 3.4 G/DL (ref 3.5–5)
ALP SERPL-CCNC: 73 U/L (ref 45–120)
ALT SERPL W P-5'-P-CCNC: <9 U/L (ref 0–45)
ANION GAP SERPL CALCULATED.3IONS-SCNC: 9 MMOL/L (ref 5–18)
AST SERPL W P-5'-P-CCNC: 11 U/L (ref 0–40)
BASOPHILS # BLD AUTO: 0.1 THOU/UL (ref 0–0.2)
BASOPHILS NFR BLD AUTO: 1 % (ref 0–2)
BILIRUB SERPL-MCNC: 0.4 MG/DL (ref 0–1)
BUN SERPL-MCNC: 10 MG/DL (ref 8–22)
C REACTIVE PROTEIN LHE: 2.4 MG/DL (ref 0–0.8)
CALCIUM SERPL-MCNC: 8.7 MG/DL (ref 8.5–10.5)
CHLORIDE BLD-SCNC: 105 MMOL/L (ref 98–107)
CO2 SERPL-SCNC: 24 MMOL/L (ref 22–31)
CREAT SERPL-MCNC: 0.64 MG/DL (ref 0.6–1.1)
EOSINOPHIL # BLD AUTO: 0.2 THOU/UL (ref 0–0.4)
EOSINOPHIL NFR BLD AUTO: 1 % (ref 0–6)
ERYTHROCYTE [DISTWIDTH] IN BLOOD BY AUTOMATED COUNT: 14 % (ref 11–14.5)
ERYTHROCYTE [SEDIMENTATION RATE] IN BLOOD BY WESTERGREN METHOD: 52 MM/HR (ref 0–20)
GFR SERPL CREATININE-BSD FRML MDRD: >60 ML/MIN/1.73M2
GLUCOSE BLD-MCNC: 99 MG/DL (ref 70–125)
HCT VFR BLD AUTO: 36.8 % (ref 35–47)
HGB BLD-MCNC: 11.5 G/DL (ref 12–16)
IMM GRANULOCYTES # BLD: 0.1 THOU/UL
IMM GRANULOCYTES NFR BLD: 1 %
LYMPHOCYTES # BLD AUTO: 1.2 THOU/UL (ref 0.8–4.4)
LYMPHOCYTES NFR BLD AUTO: 10 % (ref 20–40)
MCH RBC QN AUTO: 27.1 PG (ref 27–34)
MCHC RBC AUTO-ENTMCNC: 31.3 G/DL (ref 32–36)
MCV RBC AUTO: 87 FL (ref 80–100)
MONOCYTES # BLD AUTO: 0.7 THOU/UL (ref 0–0.9)
MONOCYTES NFR BLD AUTO: 6 % (ref 2–10)
NEUTROPHILS # BLD AUTO: 9.9 THOU/UL (ref 2–7.7)
NEUTROPHILS NFR BLD AUTO: 82 % (ref 50–70)
PLATELET # BLD AUTO: 501 THOU/UL (ref 140–440)
PMV BLD AUTO: 9.2 FL (ref 8.5–12.5)
POTASSIUM BLD-SCNC: 4 MMOL/L (ref 3.5–5)
PROT SERPL-MCNC: 7.3 G/DL (ref 6–8)
RBC # BLD AUTO: 4.25 MILL/UL (ref 3.8–5.4)
SODIUM SERPL-SCNC: 138 MMOL/L (ref 136–145)
WBC: 12 THOU/UL (ref 4–11)

## 2021-06-05 NOTE — PROGRESS NOTES
Office Visit - Follow Up   Anna Underwood   59 y.o. female    Date of Visit: 1/21/2020    Chief Complaint   Patient presents with     Test Results     bone density        Assessment and Plan   1. Osteoporosis of multiple sites without pathological fracture  New diagnosis of osteoporosis with T score of -3.2 involving left total hip.  We will look for secondary causes including excluding thyroid disease.  Hyperparathyroidism unlikely with normal serum calcium.  Probably related to genetics with 2 sisters with diagnosis of osteoporosis and inadequate calcium and vitamin D intake.  Will check vitamin D level and begin replacement if insufficient.  She does not tolerate dairy.  Information regarding diet and other ways to get adequate calcium provided.  Recommending 1500 mg daily.  Also discussed the importance of weightbearing exercise.  She is not a good candidate for oral bisphosphonates with history of severe GERD and esophagitis.  Recommending Prolia 60 mg subcutaneously every 6 months for the next 5 years.  Will need prior authorization.  - Vitamin D, Total (25-Hydroxy)  - Thyroid Stimulating Hormone (TSH)  - denosumab 60 mg (PROLIA 60 mg/ml)    2. Iron deficiency anemia, unspecified iron deficiency anemia type  Recheck hemoglobin with history of iron deficiency anemia  - Hemoglobin    3. Gastroesophageal reflux disease with esophagitis  Continues on pantoprazole twice daily along with sulcal fate.  Poor candidate for oral bisphosphonates.  - denosumab 60 mg (PROLIA 60 mg/ml)    Return in about 1 year (around 1/21/2021) for Annual physical.     History of Present Illness   This 59 y.o. old woman here to discuss new diagnosis of osteoporosis.  DEXA with T score of -3.2 involving left total hip and -2.6 involving right femoral neck.  History of ankle fracture over 10 years ago but no recent fracture.  Inadequate calcium intake, never a milk drinker.  Unknown vitamin D level.  No symptoms to suggest  inflammatory bowel disease or celiac disease although she does have history of chronic iron deficiency anemia.  No history of thyroid disease.  Normal serum calcium at her physical.  We discussed oral bisphosphonates but she would be a poor candidate with history of severe GERD with esophagitis and ongoing symptoms despite pantoprazole twice daily and occasional sucralfate.  She takes good care of her teeth seeing her dentist every 6 months.    Review of Systems:  Otherwise, a comprehensive review of systems was negative except as noted.     Medications, Allergies and Problem List   Patient Active Problem List   Diagnosis     Iron deficiency anemia     Gastroesophageal reflux disease with esophagitis     Allergic rhinitis     Achilles tendon infection     Achilles tendinitis     Nonhealing surgical wound     Atopic dermatitis, unspecified type     Abdominal pain, epigastric     Osteoporosis of multiple sites without pathological fracture       She has a past surgical history that includes Ankle surgery;  section; Oophorectomy (Right, ); Hysterectomy (); and PICC Team Line Insertion (3/23/2018).    Allergies   Allergen Reactions     Silver-Calcium Alginate Dermatitis     Skin blisters- in the past     Morphine Hives     Adhesive Tape-Silicones Rash     Other Drug Allergy (See Comments) Other (See Comments)     CHLORAPREP Blisters.       Current Outpatient Medications   Medication Sig Dispense Refill     ibuprofen (ADVIL) 200 MG tablet Take 600 mg by mouth every 6 (six) hours as needed for pain.              pantoprazole (PROTONIX) 40 MG tablet Take 1 tablet (40 mg total) by mouth 2 (two) times a day. 180 tablet 1     sucralfate (CARAFATE) 100 mg/mL suspension Take 1 g by mouth 4 (four) times a day as needed.       triamcinolone (KENALOG) 0.5 % ointment Apply topically 2 (two) times a day. Rash on arms after tape contacts skin 30 g 0     Current Facility-Administered Medications   Medication Dose Route  "Frequency Provider Last Rate Last Dose     [START ON 2020] denosumab 60 mg (PROLIA 60 mg/ml)  60 mg Subcutaneous Once Jame Dunn MD         lidocaine 10 mg/mL (1 %) injection 1-5 mL  1-5 mL Intradermal Once Paulina Ruggiero MD            Physical Exam   General Appearance:   Well-appearing middle-aged woman    /74 (Patient Site: Left Arm, Patient Position: Sitting, Cuff Size: Adult Large)   Pulse 90   Ht 5' 4\" (1.626 m)   Wt 118 lb (53.5 kg)   SpO2 99%   BMI 20.25 kg/m               Additional Information   Social History     Tobacco Use     Smoking status: Former Smoker     Types: Cigarettes     Last attempt to quit: 3/14/2003     Years since quittin.8     Smokeless tobacco: Never Used     Tobacco comment: Quit    Substance Use Topics     Alcohol use: No     Drug use: No              Jame Dunn MD  "

## 2021-06-06 NOTE — PROGRESS NOTES
The following steps were completed to comply with the REMS program for Prolia:  1. Ordering provider has previously reviewed information in the Medication Guide and Patient Counseling Chart, including the serious risks of Prolia  and the symptoms of each risk and have been advised to seek prompt medical attention if they have signs or symptoms of any of the serious risks.  2. Provided each patient a copy of the Medication Guide and Patient Brochure.  See MAR for administration details.   Indication: Prolia  (denosumab) is a prescription medicine used to treat osteoporosis in patients who:   Are at high risk for fracture, meaning patients who have had a fracture related to osteoporosis, or who have multiple risk factors for fracture; Cannot use another osteoporosis medicine or other osteoporosis medicines did not work well.   The timeline for early/late injections would be 4 weeks early and any time after the 6 month yannick. If a patient receives their injection late, then the subsequent injection would be 6 months from the date that they actually received the injection    Have the screening questions been asked prior to this administration? Yes       Mrs. Cui arrived at clinic for Prolia injection given Subcutaneous at Left arm.      Injection was given without incidence.      Kristy GUERRA CMA/WILMA....................11:49 AM

## 2021-06-06 NOTE — TELEPHONE ENCOUNTER
Dr. Dunn,  Please review patient's CT scans.  They are finalized in her chart.  Please advise.  Thank you.  Kristy GUERRA CMA/WILMA....................10:22 AM

## 2021-06-06 NOTE — PROGRESS NOTES
"OFFICE VISIT NOTE    Subjective:   Chief Complaint:  Cough (X November (first visit) productive, yellow has had 5 rounds of abx still no relief. had 2 CXR.)    59-year-old woman who is here with complaint of persistent cough.  This is basically been present since November.  She has a chronic cough sometimes productive of sputum.  She is taken antibiotics 3 or 4 times.  When she is on the antibiotic she gets a little better but symptoms quickly returned.  No definite wheezing.  She is taking PPI to prevent reflux.  She is sleeping in a chair.  No dysphasia.  No weight loss.  Non-smoker.  She is aware of some facial pressure and wonders if her sinuses are infected.  She will frequently get a bad taste in the mouth.  Current Outpatient Medications   Medication Sig     ergocalciferol (ERGOCALCIFEROL) 1,250 mcg (50,000 unit) capsule Take 1 capsule (50,000 Units total) by mouth once a week.     ibuprofen (ADVIL) 200 MG tablet Take 600 mg by mouth every 6 (six) hours as needed for pain.            pantoprazole (PROTONIX) 40 MG tablet Take 1 tablet (40 mg total) by mouth 2 (two) times a day.     sucralfate (CARAFATE) 100 mg/mL suspension Take 1 g by mouth 4 (four) times a day as needed.     triamcinolone (KENALOG) 0.5 % ointment Apply topically 2 (two) times a day. Rash on arms after tape contacts skin       Review of Systems:  A comprehensive review of systems is negative except for the comments above    Objective:    Ht 5' 4\" (1.626 m)   Wt 113 lb (51.3 kg)   BMI 19.40 kg/m    GENERAL: No acute distress.  Thin pleasant female no distress.  She is afebrile.  Pulse 90.  Respirations 14.  ENT reveals some yellow-green mucopurulent drainage in the left nostril only.  Mouth and throat look normal.  Lungs basically are clear after coughing.  I do not hear pleural rubs, rales, wheezes, rhonchi.  No JVD.  Heart shows a regular rhythm.  Not tender over sinuses.  Assessment & Plan   Anna Underwood is a 59 y.o. " female.    Chronic cough of 4 months duration.  Is also complaining of some facial sinus pressure.  We will do CTs of the chest as well as facial sinuses looking for abnormality that would cause this much coughing.    Diagnoses and all orders for this visit:    Cough  -     CT Chest Without Contrast; Future; Expected date: 03/03/2020    Chronic maxillary sinusitis  -     CT Sinuses Without Contrast; Future; Expected date: 03/03/2020        Ayan Coreas MD  Transcription using voice recognition software, may contain typographical errors.

## 2021-06-06 NOTE — PROGRESS NOTES
[facility] Follow Up Note    Anna Underwood   59 y.o. female    Date of Visit: 2/20/2020    Chief Complaint   Patient presents with     Ear Pain     left     Cough     Subjective  This is a 59-year-old lady was a patient of Dr. Jame Ramos.  She comes in with recurrent symptoms of a respiratory infection.  This is now been going on for about 5 days.  Symptoms of included a productive cough significant left ear fullness and discomfort and some chills.  She is fatigued but she is also under some stresses with various family matters.  She tells me that she is actually been through 4 previous episodes in the last several months with 2 courses of Zithromax and 2 of Augmentin.  Now she is having yet another recurrence.    ROS A comprehensive review of systems was performed and was otherwise negative    Medications, allergies, and problem list were reviewed and updated    Exam  General Appearance:   On examination her blood pressure is 142/80.  Weight is 113 pounds.    Heart rhythm is stable with rate of 95 and no ectopy.    Lungs are clear.    There is some facial tenderness bilaterally over the frontal and maxillary sinuses.    Throat appears normal.    No enlarged cervical lymph nodes.    Ears: Right ear canal and tympanic membrane are normal.  Left ear canal and tympanic membrane also appear normal.    The patient is alert and oriented x3.      Assessment/Plan  1. Upper respiratory tract infection, unspecified type  amoxicillin-clavulanate (AUGMENTIN) 875-125 mg per tablet     Upper respiratory infection.  Given the history and symptoms I would suggest we get her back on an antibiotic.  My recommendation would be Augmentin.  We talked about Tessalon for the cough but she prefers not to take this.  We briefly discussed the recurrence of the infections and I have no good explanation other than that she has been under stress and may be a little bit vulnerable.  We will have her  follow-up with Dr. Ramos if she has any further issues.      Mayito Young MD      Current Outpatient Medications on File Prior to Visit   Medication Sig     ergocalciferol (ERGOCALCIFEROL) 1,250 mcg (50,000 unit) capsule Take 1 capsule (50,000 Units total) by mouth once a week.     ibuprofen (ADVIL) 200 MG tablet Take 600 mg by mouth every 6 (six) hours as needed for pain.            pantoprazole (PROTONIX) 40 MG tablet Take 1 tablet (40 mg total) by mouth 2 (two) times a day.     sucralfate (CARAFATE) 100 mg/mL suspension Take 1 g by mouth 4 (four) times a day as needed.     triamcinolone (KENALOG) 0.5 % ointment Apply topically 2 (two) times a day. Rash on arms after tape contacts skin     Current Facility-Administered Medications on File Prior to Visit   Medication     denosumab 60 mg (PROLIA 60 mg/ml)     lidocaine 10 mg/mL (1 %) injection 1-5 mL     Allergies   Allergen Reactions     Silver-Calcium Alginate Dermatitis     Skin blisters- in the past     Morphine Hives     Adhesive Tape-Silicones Rash     Other Drug Allergy (See Comments) Other (See Comments)     CHLORAPREP Blisters.     Social History     Tobacco Use     Smoking status: Former Smoker     Types: Cigarettes     Last attempt to quit: 3/14/2003     Years since quittin.9     Smokeless tobacco: Never Used     Tobacco comment: Quit    Substance Use Topics     Alcohol use: No     Drug use: No

## 2021-06-07 ENCOUNTER — OFFICE VISIT - HEALTHEAST (OUTPATIENT)
Dept: INTERNAL MEDICINE | Facility: CLINIC | Age: 61
End: 2021-06-07

## 2021-06-07 ENCOUNTER — HOSPITAL ENCOUNTER (OUTPATIENT)
Dept: WOUND CARE | Facility: CLINIC | Age: 61
End: 2021-06-07
Attending: SURGERY
Payer: COMMERCIAL

## 2021-06-07 ENCOUNTER — COMMUNICATION - HEALTHEAST (OUTPATIENT)
Dept: INTERNAL MEDICINE | Facility: CLINIC | Age: 61
End: 2021-06-07
Payer: COMMERCIAL

## 2021-06-07 ENCOUNTER — COMMUNICATION - HEALTHEAST (OUTPATIENT)
Dept: INFECTIOUS DISEASES | Facility: CLINIC | Age: 61
End: 2021-06-07

## 2021-06-07 DIAGNOSIS — R79.89 ABNORMAL TSH: ICD-10-CM

## 2021-06-07 DIAGNOSIS — R11.0 NAUSEA: ICD-10-CM

## 2021-06-07 DIAGNOSIS — T81.89XD NON-HEALING SURGICAL WOUND, SUBSEQUENT ENCOUNTER: ICD-10-CM

## 2021-06-07 DIAGNOSIS — L02.416 ABSCESS OF LEFT LOWER LEG: ICD-10-CM

## 2021-06-07 DIAGNOSIS — M76.62 ACHILLES TENDINITIS OF LEFT LOWER EXTREMITY: ICD-10-CM

## 2021-06-07 DIAGNOSIS — M65.10 ACHILLES TENDON INFECTION: ICD-10-CM

## 2021-06-07 DIAGNOSIS — L02.91 ABSCESS: Primary | ICD-10-CM

## 2021-06-07 LAB
ERYTHROCYTE [DISTWIDTH] IN BLOOD BY AUTOMATED COUNT: 13.6 % (ref 11–14.5)
HCT VFR BLD AUTO: 35.2 % (ref 35–47)
HGB BLD-MCNC: 11.2 G/DL (ref 12–16)
MCH RBC QN AUTO: 27 PG (ref 27–34)
MCHC RBC AUTO-ENTMCNC: 31.8 G/DL (ref 32–36)
MCV RBC AUTO: 85 FL (ref 80–100)
PLATELET # BLD AUTO: 555 THOU/UL (ref 140–440)
PMV BLD AUTO: 8.8 FL (ref 7–10)
RBC # BLD AUTO: 4.15 MILL/UL (ref 3.8–5.4)
T4 FREE SERPL-MCNC: 1.1 NG/DL (ref 0.7–1.8)
TSH SERPL DL<=0.005 MIU/L-ACNC: 0.2 UIU/ML (ref 0.3–5)
WBC: 9.4 THOU/UL (ref 4–11)

## 2021-06-07 ASSESSMENT — MIFFLIN-ST. JEOR: SCORE: 1069.73

## 2021-06-07 NOTE — TELEPHONE ENCOUNTER
If the 10 days of cefdinir did not improve her symptoms, I am not sure that refilling is going to do any good.  I can certainly get her a cough syrup.  If she wishes to further discuss treatment options, please set her up for a telephone consult this afternoon.

## 2021-06-07 NOTE — TELEPHONE ENCOUNTER
Patient stated she is still coughing and hacking up green nasty stuff. No fever. Would like a cough medicine also. Romy Arceo, CMA

## 2021-06-07 NOTE — PROGRESS NOTES
"Anna Underwood is a 59 y.o. female who is being evaluated via a billable telephone visit.      The patient has been notified of following:     \"This telephone visit will be conducted via a call between you and your physician/provider. We have found that certain health care needs can be provided without the need for a physical exam.  This service lets us provide the care you need with a short phone conversation.  If a prescription is necessary we can send it directly to your pharmacy.  If lab work is needed we can place an order for that and you can then stop by our lab to have the test done at a later time.    If during the course of the call the physician/provider feels a telephone visit is not appropriate, you will not be charged for this service.\"         Anna Underwood complains of    Chief Complaint   Patient presents with     Cough     has been on multiple antibiotics, not getting better       I have reviewed and updated the patient's Past Medical History, Social History, Family History and Medication List.    ALLERGIES  Silver-calcium alginate; Morphine; Adhesive tape-silicones; and Other drug allergy (see comments)    Additional provider notes:   59-year-old woman with history of iron deficiency anemia and GERD with persistent cough for the last 3 months productive of green-colored sputum.  Some mild dyspnea with exertion.  Has been seen twice in the last 5 weeks with the symptoms.  Minimal or partial improvement after being treated with Z-Paks twice followed by Augmentin and most recently Omnicef 600 mg daily for 10 days prescribed by myself.  She is already using Flonase 2 sprays each nostril daily.  No previous history of asthma or COPD.  Former smoker quitting over 15 years ago.  She is exposed to secondhand smoke.  Underwent CT scan of sinuses showing only mild inflammatory changes.  No air-fluid level.  CT of chest showing hyperinflation of the lungs with mild cylindrical bronchiectasis " and signs of acute on chronic bronchial and bronchiolar inflammation with inflammatory obstruction distal airways anterior segment right upper lobe, medial and lateral segment right middle lobe, inferior lingular segment and lateral basilar segment left lower lobe causing peripheral foci of consolidation and volume loss.  Denies any fevers or chills.  No hemoptysis.    Assessment/Plan:  1. Chronic cough  Persistent cough productive of green sputum with mild dyspnea not responding to several courses of antibiotics including azithromycin, Augmentin, and Omnicef.  CT showing cylindrical bronchiectasis with evidence for acute on chronic bronchial and bronchiolar inflammation causing peripheral foci of consolidation.  I would like her to be evaluated by pulmonary but I am unable to get her an appointment at this time with current restrictions in place.  Cannot exclude Pseudomonas infection.  Will try Cipro 500 mg twice daily for 10 days.  I would also consider other contributing factors to chronic cough including chronic sinus drainage and postnasal drainage.  Will increase Flonase to 2 sprays each nostril twice daily.  There could be a component of hyperreactive airways or obstructive lung disease.  She would benefit from PFTs.  Empiric trial of prednisone 40 mg daily for 5 days combined with Symbicort 2 puffs twice daily.  She will update me in 1 week regarding her symptoms.  - ciprofloxacin HCl (CIPRO) 500 MG tablet; Take 1 tablet (500 mg total) by mouth 2 (two) times a day for 10 days.  Dispense: 20 tablet; Refill: 0  - fluticasone propionate (FLONASE) 50 mcg/actuation nasal spray; 2 sprays into each nostril 2 (two) times a day.  Dispense: 16 g; Refill: 12  - budesonide-formoteroL (SYMBICORT) 80-4.5 mcg/actuation inhaler; Inhale 2 puffs 2 (two) times a day.  Dispense: 1 Inhaler; Refill: 12  - predniSONE (DELTASONE) 20 MG tablet; Take 40 mg by mouth daily for 5 days.  Dispense: 10 tablet; Refill: 0        Phone call  duration:  12 minutes    Jame Dunn MD

## 2021-06-07 NOTE — TELEPHONE ENCOUNTER
RN cannot approve Refill Request    RN can NOT refill this medication med is not covered by policy/route to provider     . Last office visit: 1/21/2020 Jame Dunn MD Last Physical: 11/5/2019 Last MTM visit: Visit date not found Last visit same specialty: 3/3/2020 Ayan Coreas MD.  Next visit within 3 mo: Visit date not found  Next physical within 3 mo: Visit date not found      Belinda Coles, [facility] Triage/Med Refill 3/23/2020    Requested Prescriptions   Pending Prescriptions Disp Refills     cefdinir (OMNICEF) 300 MG capsule [Pharmacy Med Name: CEFDINIR 300MG CAPSULES] 20 capsule 0     Sig: TAKE 2 CAPSULES(600 MG) BY MOUTH DAILY FOR 10 DAYS       There is no refill protocol information for this order

## 2021-06-07 NOTE — TELEPHONE ENCOUNTER
She just finished a 10-day course of this antibiotic.  Please clarify why she is asking for a refill.

## 2021-06-07 NOTE — PROGRESS NOTES
Brief telephone conversation, approximately 5 minutes, patient is on her way to see her primary care physician, I completed a peer to peer phone call with [facility] regarding indications for performance of MRI of the left lower extremity to evaluate for potential abscess.  She was just recently seen in urgent care, MRI was performed which confirmed a abscess.  I discussed results with the patient, would recommend interventional radiology consultation for percutaneous drainage of abscess of the left lower extremity, sent for aerobic and anaerobic culture, infectious disease consult.  I will speak with her primary care physician today during the appointment to discuss and review.      Taina

## 2021-06-08 ENCOUNTER — AMBULATORY - HEALTHEAST (OUTPATIENT)
Dept: INTERNAL MEDICINE | Facility: CLINIC | Age: 61
End: 2021-06-08

## 2021-06-08 DIAGNOSIS — L02.416 ABSCESS OF LEFT LOWER LEG: ICD-10-CM

## 2021-06-08 NOTE — TELEPHONE ENCOUNTER
She might be able to see Dr Gerard at [facility] if he is available.  Please see if anyone there has availability

## 2021-06-08 NOTE — PROGRESS NOTES
"Anna Underwood is a 60 y.o. female who is being evaluated via a billable telephone visit.      The patient has been notified of following:     \"This telephone visit will be conducted via a call between you and your physician/provider. We have found that certain health care needs can be provided without the need for a physical exam.  This service lets us provide the care you need with a short phone conversation.  If a prescription is necessary we can send it directly to your pharmacy.  If lab work is needed we can place an order for that and you can then stop by our lab to have the test done at a later time.    Telephone visits are billed at different rates depending on your insurance coverage. During this emergency period, for some insurers they may be billed the same as an in-person visit.  Please reach out to your insurance provider with any questions.    If during the course of the call the physician/provider feels a telephone visit is not appropriate, you will not be charged for this service.\"    Patient has given verbal consent to a Telephone visit? Yes    What phone number would you like to be contacted at?     Patient would like to receive their AVS by AVS Preference: Megan.    Additional provider notes:     Office Visit - Follow Up   Anna Underwood   60 y.o. female    Date of Visit: 5/21/2020    Chief Complaint   Patient presents with     Cough     productive cough with yellow phelgm, SOB, no fever         Assessment and Plan   1. Chronic cough  Recurrent chronic cough that finally responded to Cipro and prednisone.  CT scan did show mild bronchiectasis.  As COVID-19 cannot be excluded, I am recommending that she quarantine herself and not go to work until current symptoms are clarified.  We will have her restart Cipro 500 mg twice daily for 10 days and prednisone 40 mg daily for 5 days.  This might represent moderate persistent asthma and I will have her retry a maintenance inhaler but " will go with Flovent 110 MCG 2 puffs twice daily rather than Symbicort which caused side effects of feeling tremulous likely from the beta agonist.  She should continue the Flovent for a minimum of 6 months.  I suspect cough is more likely asthma related rather than bronchiectasis.  She will update me next Monday if not significantly better.  At that point, she will need to be checked for COVID-19 and will maintain quarantine status.    - ciprofloxacin HCl (CIPRO) 500 MG tablet; Take 1 tablet (500 mg total) by mouth 2 (two) times a day for 10 days.  Dispense: 20 tablet; Refill: 0  - predniSONE (DELTASONE) 20 MG tablet; Take 40 mg by mouth daily for 5 days.  Dispense: 10 tablet; Refill: 0  - fluticasone propionate (FLOVENT HFA) 110 mcg/actuation inhaler; Inhale 2 puffs 2 (two) times a day.  Dispense: 1 Inhaler; Refill: 11    No follow-ups on file.     History of Present Illness   This 60 y.o. old woman with chronic cough over the last 5 to 6 months.  Evaluated earlier this year including CT scan showing hyperinflation and mild cylindrical bronchiectasis.  Had previously not responded to several different antibiotics including azithromycin, Augmentin and Omnicef.  Referred to pulmonary but cannot get appointment until later this summer.  She finally improved after I prescribed Cipro 500 mg twice daily for possible Pseudomonas along with a 5-day course of prednisone.  She was also started on Symbicort but only took this for 1 day as it caused her to feel tremulous.  She rapidly improved with a combination of Cipro and prednisone.  Was doing well for the last 4 to 5 weeks until cough has returned in the last day.  No fevers or chills.  There is slight sore throat.    Review of Systems: No chest pain.  No headache.      Medications, Allergies and Problem List   Patient Active Problem List   Diagnosis     Iron deficiency anemia     Gastroesophageal reflux disease with esophagitis     Allergic rhinitis     Achilles tendon  infection     Achilles tendinitis     Nonhealing surgical wound     Atopic dermatitis, unspecified type     Abdominal pain, epigastric     Osteoporosis of multiple sites without pathological fracture     Vitamin D deficiency     Chronic cough       She has a past surgical history that includes Ankle surgery;  section; Oophorectomy (Right, ); Hysterectomy (); and PICC Team Line Insertion (3/23/2018).    Allergies   Allergen Reactions     Silver-Calcium Alginate Dermatitis     Skin blisters- in the past     Morphine Hives     Adhesive Tape-Silicones Rash     Other Drug Allergy (See Comments) Other (See Comments)     CHLORAPREP Blisters.       Current Outpatient Medications   Medication Sig Dispense Refill     ergocalciferol (ERGOCALCIFEROL) 1,250 mcg (50,000 unit) capsule Take 1 capsule (50,000 Units total) by mouth once a week. 13 capsule 3     fluticasone propionate (FLONASE) 50 mcg/actuation nasal spray 2 sprays into each nostril 2 (two) times a day. 16 g 12     ibuprofen (ADVIL) 200 MG tablet Take 600 mg by mouth every 6 (six) hours as needed for pain.              pantoprazole (PROTONIX) 40 MG tablet Take 1 tablet (40 mg total) by mouth 2 (two) times a day. 180 tablet 1     ciprofloxacin HCl (CIPRO) 500 MG tablet Take 1 tablet (500 mg total) by mouth 2 (two) times a day for 10 days. 20 tablet 0     fluticasone propionate (FLOVENT HFA) 110 mcg/actuation inhaler Inhale 2 puffs 2 (two) times a day. 1 Inhaler 11     predniSONE (DELTASONE) 20 MG tablet Take 40 mg by mouth daily for 5 days. 10 tablet 0     sucralfate (CARAFATE) 100 mg/mL suspension Take 1 g by mouth 4 (four) times a day as needed.       triamcinolone (KENALOG) 0.5 % ointment Apply topically 2 (two) times a day. Rash on arms after tape contacts skin 30 g 0     Current Facility-Administered Medications   Medication Dose Route Frequency Provider Last Rate Last Dose     denosumab 60 mg (PROLIA 60 mg/ml)  60 mg Subcutaneous Q6 Months  Jame Dunn MD   60 mg at 20 1143     lidocaine 10 mg/mL (1 %) injection 1-5 mL  1-5 mL Intradermal Once Paulina Ruggiero MD            Physical Exam       Temp 97  F (36.1  C)   SpO2 97%              Additional Information   Social History     Tobacco Use     Smoking status: Former Smoker     Types: Cigarettes     Last attempt to quit: 3/14/2003     Years since quittin.2     Smokeless tobacco: Never Used     Tobacco comment: Quit    Substance Use Topics     Alcohol use: No     Drug use: No              Jame Dunn MD        Phone call duration:  15 minutes    Jame Dunn MD

## 2021-06-08 NOTE — TELEPHONE ENCOUNTER
Spoke with patient, she was able to get into a clinic today at 2:00 for her pre-op. Romy Arceo CMA

## 2021-06-08 NOTE — TELEPHONE ENCOUNTER
New Appointment Needed  What is the reason for the visit:    Pre-Op Appt Request  When is the surgery? :  05-27-20   Where is the surgery?:   [facility]  Who is the surgeon? :  Dr. Carie Segovia  What type of surgery is being done?: Scraping  Provider Preference: PCP only  How soon do you need to be seen?: today  Waitlist offered?: No  Okay to leave a detailed message:  Yes

## 2021-06-08 NOTE — ADDENDUM NOTE
Encounter addended by: Bella Richards RN on: 6/8/2021 12:23 PM   Actions taken: Visit diagnoses modified, Order list changed, Diagnosis association updated

## 2021-06-08 NOTE — TELEPHONE ENCOUNTER
Spoke with Anna, explained to her that we are unable to see her today for a pre-op. Dr Doll and Dr Sosa are both full at [facility], wasn't sure where else she can go for a pre-op. I told her she may need to call her surgeon back to see if she could do the pre-op or if she had any suggestions. Anna was going to call her surgeon. Do you have any suggestions?    Romy

## 2021-06-09 ENCOUNTER — TELEPHONE (OUTPATIENT)
Dept: WOUND CARE | Facility: CLINIC | Age: 61
End: 2021-06-09

## 2021-06-09 DIAGNOSIS — L97.323 SKIN ULCER OF LEFT ANKLE WITH NECROSIS OF MUSCLE (H): ICD-10-CM

## 2021-06-09 DIAGNOSIS — T81.89XD NON-HEALING SURGICAL WOUND, SUBSEQUENT ENCOUNTER: ICD-10-CM

## 2021-06-09 LAB
AEROBIC BLOOD CULTURE BOTTLE: NO GROWTH
AEROBIC BLOOD CULTURE BOTTLE: NO GROWTH
ANAEROBIC BLOOD CULTURE BOTTLE: NO GROWTH
ANAEROBIC BLOOD CULTURE BOTTLE: NO GROWTH

## 2021-06-09 RX ORDER — CIPROFLOXACIN 500 MG/1
500 TABLET, FILM COATED ORAL 2 TIMES DAILY
Qty: 28 TABLET | Refills: 0 | Status: SHIPPED | OUTPATIENT
Start: 2021-06-09 | End: 2021-06-10

## 2021-06-09 RX ORDER — SULFAMETHOXAZOLE/TRIMETHOPRIM 800-160 MG
1 TABLET ORAL 2 TIMES DAILY
Qty: 28 TABLET | Refills: 0 | Status: SHIPPED | OUTPATIENT
Start: 2021-06-09 | End: 2021-11-04

## 2021-06-09 NOTE — PROGRESS NOTES
Office Visit - Follow Up   Anna Robertson-Kin   56 y.o. female    Date of Visit: 2/24/2017    Chief Complaint   Patient presents with     Fatigue     Shortness of Breath     leg cramps        Assessment and Plan   1. Fatigue and dyspnea  May be related to anemia but will exclude any other metabolic problem by checking appropriate labs.  If everything appears normal, consider chest x-ray especially with unexpected weight loss and possible cardiac evaluation.  - HM2(CBC w/o Differential)  - Comprehensive Metabolic Panel  - Thyroid Stimulating Hormone (TSH)    2. Iron deficiency anemia  Recheck appropriate iron studies.  She is taking a prenatal vitamin daily.  - Ferritin  - Iron and Transferrin Iron Binding Capacity    3. GERD (gastroesophageal reflux disease)  Continue pantoprazole 40 mg daily    4. Screening  She should schedule a physical but am also recommending she get scheduled a colonoscopy and a mammogram as she is overdue  - Ambulatory referral for Colonoscopy  - Mammo Screening Bilateral; Future    Return in about 3 months (around 5/24/2017) for Annual physical.     History of Present Illness   This 56 y.o. old woman with history of iron deficiency anemia and GERD who is here with complaints of generalized fatigue and low energy.  She is experiencing leg cramps.  She is felt increasing dyspnea with exertion.  All these symptoms are reminiscent of when she has been profoundly anemic.  She started taking a prenatal vitamin 6 weeks ago but has not felt any improvement in her symptoms.  No associated cough or chest pain.  She has lost 14 pounds over the last year.  Not much appetite.  Denies any abdominal pain.  No melena or hematochezia.  No change in bowel movements.  She has not had a colonoscopy.  She also has not had a mammogram.  She has been under tremendous stress worrying about her 's health.  She continues to take pantoprazole to manage her reflux symptoms.     Review of Systems:   "Otherwise, a comprehensive review of systems was negative except as noted.     Medications, Allergies and Problem List   Patient Active Problem List   Diagnosis     Infection of left foot     GERD (gastroesophageal reflux disease)     Anemia     Iron deficiency anemia     Fatigue       She has a past surgical history that includes Ankle surgery; PICC (2014); Hysterectomy; and  section.    Allergies   Allergen Reactions     Morphine Hives     Adhesive Tape-Silicones Rash     Other Drug Allergy (See Comments) Rash     CHLORAPREP       Current Outpatient Prescriptions   Medication Sig Dispense Refill     ibuprofen (ADVIL,MOTRIN) 200 MG tablet Take 400 mg by mouth 3 (three) times a day as needed for pain.       ondansetron (ZOFRAN) 4 MG tablet TAKE 1 TABLET BY MOUTH EVERY 8 HOURS AS NEEDED FOR NAUSEA 20 tablet 0     pantoprazole (PROTONIX) 40 MG tablet Take 1 tablet (40 mg total) by mouth 2 times a day at 9:00am and 5:00pm. 180 tablet 3     sucralfate (CARAFATE) 100 mg/mL suspension Take 1 g by mouth 4 (four) times a day.       No current facility-administered medications for this visit.         Physical Exam   General Appearance:   Well-appearing middle-aged woman    Visit Vitals     /74 (Patient Site: Right Arm, Patient Position: Sitting, Cuff Size: Adult Regular)     Pulse 97     Ht 5' 5\" (1.651 m)     Wt 119 lb (54 kg)     SpO2 99%     BMI 19.8 kg/m2       HEENT: Normal  Respiratory: Normal respiratory effort.  Lungs are clear with no rales or wheezes.  Heart: Regular rate and rhythm without murmurs, rubs, or gallops.    Abdomen: Abdomen is soft, nontender without guarding, rebound, masses, or hepatosplenomegaly.  Skin: No cyanosis or pallor  Psych: Alert and oriented ×3, mood appropriate         Additional Information   Social History   Substance Use Topics     Smoking status: Never Smoker     Smokeless tobacco: None     Alcohol use Yes              Jame Dunn MD  "

## 2021-06-09 NOTE — PROGRESS NOTES
Wound located on pt's L achillis measured at 5.5cm x 5.5cm. Depth about 0.5-1.0cm    Redness and yellow/green discoloration similarly indicated in video visit 7/20/20 however secretions are lessened and wound looks clean. Pt states changes wound dressing daily.     Wound culture collected today and sent to lab to be tested.    Beata Ponce, CMA

## 2021-06-09 NOTE — TELEPHONE ENCOUNTER
Called number below, relayed msg. States they received a call from Dr Hernandez who stated it is okay to wait until dr sheth returns. They will keep original appt.

## 2021-06-09 NOTE — TELEPHONE ENCOUNTER
Per patient was told by surgeon to schedule a follow up appointment with Dr. Ruggiero. Please advise on when to schedule and if visit should be in the office or virtual/phone     Anna @ [phone]

## 2021-06-09 NOTE — TELEPHONE ENCOUNTER
Refill Approved    Rx renewed per Medication Renewal Policy. Medication was last renewed on 9/29/19.    Janet Pleitez, [facility] Connection Triage/Med Refill 6/29/2020     Requested Prescriptions   Pending Prescriptions Disp Refills     pantoprazole (PROTONIX) 40 MG tablet [Pharmacy Med Name: PANTOPRAZOLE 40MG TABLETS] 180 tablet 1     Sig: TAKE 1 TABLET(40 MG) BY MOUTH TWICE DAILY       GI Medications Refill Protocol Passed - 6/28/2020  1:02 PM        Passed - PCP or prescribing provider visit in last 12 or next 3 months.     Last office visit with prescriber/PCP: 1/21/2020 Jame Dunn MD OR same dept: 3/3/2020 Ayan Coreas MD OR same specialty: 3/3/2020 Ayan Coreas MD  Last physical: 11/5/2019 Last MTM visit: Visit date not found   Next visit within 3 mo: Visit date not found  Next physical within 3 mo: Visit date not found  Prescriber OR PCP: Jame Dunn MD  Last diagnosis associated with med order: 1. GERD (gastroesophageal reflux disease)  - pantoprazole (PROTONIX) 40 MG tablet [Pharmacy Med Name: PANTOPRAZOLE 40MG TABLETS]; TAKE 1 TABLET(40 MG) BY MOUTH TWICE DAILY  Dispense: 180 tablet; Refill: 1    If protocol passes may refill for 12 months if within 3 months of last provider visit (or a total of 15 months).

## 2021-06-09 NOTE — PATIENT INSTRUCTIONS - HE
Colt Castillo,    Thank you for taking the time to speak with us today.  As there is still drainage from the wound we will repeat culture.  Infectious disease clinic will call you to set up a nurse visit to obtain wound cultures  We will change clindamycin to doxycycline and extend course of ciprofloxacin.  Depending on the culture results will determine if we need to give IV antibiotics to continue current course of therapy.  We will also request a referral for vascular clinic to determine blood supply in the region.  Patient has follow-up scheduled with Dr. Wiley.  She is also obtain a second opinion regarding recurrent dehiscence at [facility], however this was in January.  May need to revisit if the wound does not improve    Paulina Ruggiero MD  [facility]  [phone]

## 2021-06-09 NOTE — TELEPHONE ENCOUNTER
[facility]    Who is the name of the provider?:  Taina      What is the location you see this provider at?: [place]    Reason for call:  Please call re wound and upcoming appt.      Can we leave a detailed message on this number?  YES

## 2021-06-09 NOTE — TELEPHONE ENCOUNTER
Dr Segovia is concerned that she should be on IV ABX and is requesting a sooner appt to discuss so. I did notify that pt has an appt next week Monday, 07/20, but still wants something sooner.    Please call

## 2021-06-09 NOTE — TELEPHONE ENCOUNTER
I called the pt, she states that she has an infection in her L achilles again. She had a procedure to clean the site and obtain a culture. The culture is in epic and growing the bacteria. The pt states that she has been on clindamycin for the last month and based on the culture from 7/1 she was just started on cipro as well. She needs to be seen by ID to inform her how long she is to be on the abx. Pt scheduled for a virtual visit.

## 2021-06-09 NOTE — TELEPHONE ENCOUNTER
"Patient reporting that she believes her abscess has drained - \"1/4 cup of tomato soup came out of my wound\" She notes that her wound appears different. She will present to clinic tomorrow for re-evaulation  "

## 2021-06-10 ENCOUNTER — HOSPITAL ENCOUNTER (OUTPATIENT)
Dept: WOUND CARE | Facility: CLINIC | Age: 61
Discharge: HOME | End: 2021-06-10
Attending: SURGERY | Admitting: SURGERY
Payer: COMMERCIAL

## 2021-06-10 VITALS
RESPIRATION RATE: 16 BRPM | HEART RATE: 125 BPM | DIASTOLIC BLOOD PRESSURE: 79 MMHG | SYSTOLIC BLOOD PRESSURE: 148 MMHG | TEMPERATURE: 98.3 F

## 2021-06-10 DIAGNOSIS — T81.89XD NON-HEALING SURGICAL WOUND, SUBSEQUENT ENCOUNTER: ICD-10-CM

## 2021-06-10 DIAGNOSIS — L97.323 SKIN ULCER OF LEFT ANKLE WITH NECROSIS OF MUSCLE (H): ICD-10-CM

## 2021-06-10 PROCEDURE — 87077 CULTURE AEROBIC IDENTIFY: CPT | Performed by: SURGERY

## 2021-06-10 PROCEDURE — 97602 WOUND(S) CARE NON-SELECTIVE: CPT

## 2021-06-10 PROCEDURE — 87076 CULTURE ANAEROBE IDENT EACH: CPT | Performed by: SURGERY

## 2021-06-10 PROCEDURE — 87070 CULTURE OTHR SPECIMN AEROBIC: CPT | Performed by: SURGERY

## 2021-06-10 PROCEDURE — 87075 CULTR BACTERIA EXCEPT BLOOD: CPT | Performed by: SURGERY

## 2021-06-10 PROCEDURE — 87186 SC STD MICRODIL/AGAR DIL: CPT | Performed by: SURGERY

## 2021-06-10 PROCEDURE — 99213 OFFICE O/P EST LOW 20 MIN: CPT | Performed by: SURGERY

## 2021-06-10 RX ORDER — ONDANSETRON 4 MG/1
4 TABLET, FILM COATED ORAL EVERY 6 HOURS PRN
COMMUNITY
Start: 2021-06-07 | End: 2023-03-05

## 2021-06-10 NOTE — TELEPHONE ENCOUNTER
Called pt per Dr Ruggiero's request. Provider and patient have concerns on scheduling visit    -not sure how this patient will manage with an open, draining, infected wound for 3 months  -she needs to be seen sooner than October to ensure that he myositis doesn't need debridement  -Surgical services have reviewed her MRI findings and appropriate follow up will be scheduled?  -is there a way she can see Dr. Wright sooner than October?    Pt states have been wearing ace bandages for years, if that not compression enough?

## 2021-06-10 NOTE — DISCHARGE INSTRUCTIONS
[facility]  [address] [address]    Call us at [phone] if you have any questions about your wounds, have redness or swelling around your wound, have a fever of 101 or greater or if you have any other problems or concerns. We answer the phone Monday through Friday 8 am to 4 pm, please leave a message as we check the voicemail frequently throughout the day.     Anna Sanderson      1960    Medications/supplements to aid in healing:  Vitamin D3 10,000 iu per day  Jamilah C 1 tablet twice a day  Methyl Folate 1000 mcg daily  Vitamin B 12 1000 mcg daily  Vitamin B complex with zinc daily  Vasculera to help your veins -take one tablet a day. Purchase from [facility]  Wound care recommendations to left posterior ankle  Irrigate wound with 10 ml of VASHE, apply vashe damp gauze on wound, apply navy stripe edemawear change three times daily        SHI Her M.D.. April 10, 2021      If you had a positive experience please indicate on your patient satisfaction survey form that [facility] will be sending you.    If you have any billing related questions please call the [facility] Business office at [phone]. The clinic staff does not handle billing related matters.

## 2021-06-10 NOTE — TELEPHONE ENCOUNTER
Ace bandages does count as compression therapy.     Roberto, Can you schedule her to Dr. Wright's schedule that we opened up (8/17) for him please?

## 2021-06-10 NOTE — PROGRESS NOTES
[facility] [place] Progress Note    Subject: Anna Sanderson spontaneously drained abscess left posterior distal calf several days ago, foul odor, purulent.  She is currently on Cipro though is needing to take Zofran to attempt to take Cipro given significant associated nausea, tolerating the Bactrim well.  I discussed with her infectious disease physician today.  She has an appointment on Monday with Dr. Aquiles Ng at [facility] interventional radiology [place] for further evaluation to determine if any secondary abscess is present, previous MRI scanning demonstrated a clear definitive abscess which we asked for interventional evaluation within her primary location [facility], interventional radiology declined intervention, subsequent spontaneous decompression of significant abscess.  I spoken with her primary care physician, Dr. Ramos previously regarding options for management.    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)     Non-healing surgical wound, subsequent encounter     MTHFR gene mutation (H) Heterozygoyus April 2021, [facility]     Abnormal CT of the chest     Abnormal TSH     Microscopic hematuria     Past Medical History:   Diagnosis Date     GERD (gastroesophageal reflux disease)      Iron deficiency anemia      MRSA infection 08/2020    Left leg wound     Vitamin D deficiency      Exam:  BP (!) 148/79   Pulse 125   Temp 98.3  F (36.8  C) (Temporal)   Resp 16   Wound (used by OP [facility] only) 08/04/20 0805 Left posterior ankle trauma (Active)    Thickness/Stage full thickness 06/10/21 1223   Dressing Appearance moist drainage 06/10/21 1223   Base granulating 06/10/21 1223   Periwound intact 06/10/21 1223   Periwound Temperature warm 06/10/21 1223   Periwound Skin Turgor soft 06/10/21 1223   Edges open 06/10/21 1223   Length (cm) 2.8 06/10/21 1223   Width (cm) 2 06/10/21 1223   Depth (cm) 0.8 06/10/21 1223   Wound (cm^2) 5.6 cm^2 06/10/21 1223   Wound Volume (cm^3) 4.48 cm^3 06/10/21 1223   Wound healing % 73.52 06/10/21 1223   Drainage Characteristics/Odor serosanguineous 06/10/21 1223   Drainage Amount moderate 06/10/21 1223   Care, Wound non-select wound debridement performed 06/10/21 1223     See photodocumentation and wound measurements, exposure of Achilles tendon which now has dry necrosis present, area underlying and undermining, aerobic and anaerobic cultures obtained, area irrigated thoroughly with 30 cc of hypochlorous acid, no bone exposure.      Impression: Spontaneous drainage abscess left posterior calf    Plan: Follow-up with Dr. Ng on Monday at [facility] interventional radiology.  Change antibiotics to Augmentin and Bactrim, this does not provide adequate coverage for Pseudomonas, may need additional antibiotic coverage depending on results of culture.  Irrigate with hypochlorous acid 3 times a day.  I believe she will now require plastic surgical evaluation, I would recommend Dr. Puma Olivarez at [facility] in [place], will await creation of this consultation until after evaluation with Dr. Ng.  Follow-up in clinic on July 16.       Mary Jane Her MD on 6/10/2021 at 12:40 PM

## 2021-06-10 NOTE — TELEPHONE ENCOUNTER
It looks like she had the Prolia earlier this year.  Does she need insurance verification again?  I think this should probably wait for Dr. Ramos's return because he has not documented the safety issues this year.  Okay for shingles vaccine if she would like it and she is due.

## 2021-06-10 NOTE — TELEPHONE ENCOUNTER
Dr. Doll,    Patient is scheduled for the 2nd shingrix and prolia shot on Monday 8/31/20. Dr. Dunn is out of the clinic until 9/8/20.    Last prolia was given on 2/20/20, however we do not have insurance verification completed - we will need to have that completed before patients injection.    Okay for 2nd shingles vaccine also?    If so, please sign attached orders.    Thank you.    Suzette LANGLEY LPN .......... 12:09 PM  08/27/20  [facility]

## 2021-06-10 NOTE — TELEPHONE ENCOUNTER
Pt informed of below and understands, called Catie, they are able to see MRI within epic    ----- Message from Paulina Ruggiero MD sent at 7/29/2020  7:05 PM CDT -----  Colt Cota/Chula,    Please call the patient and send the MRI results to Plastic and Vascular Surgery specialists, and Podiatry regarding next steps. If patient's wound is not improving, recommend going to [facility] [place].     Paulina Ruggiero MD  [facility]  [phone]        ----- Message -----  From: Hayde, Rad Results In  Sent: 7/29/2020  11:58 AM CDT  To: Paulina Ruggiero MD

## 2021-06-10 NOTE — TELEPHONE ENCOUNTER
I missed insurance verification completed in January of this year.    No new insurance verification needed.      Suzette LANGLEY LPN .......... 12:38 PM  08/27/20  [facility]

## 2021-06-10 NOTE — PROGRESS NOTES
"Anna Underwood is a 60 y.o. female who is being evaluated via a billable video visit.  Patient denies any new symptoms.  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  The flow is phasic in common femoral vein which almost eliminates concern for May Jauregui's syndrome.  CT scan was also done and excluded it.  At this point I recommended only great saphenous vein ablation which will not expedite healing process but can be done for prevention.  We will schedule this procedure with Dr. Wright.  I patient discussed that with him and he agreed to the perform it.    The patient has been notified of following:     \"This video visit will be conducted via a call between you and your physician/provider. We have found that certain health care needs can be provided without the need for an in-person physical exam.  This service lets us provide the care you need with a video conversation.  If a prescription is necessary we can send it directly to your pharmacy.  If lab work is needed we can place an order for that and you can then stop by our lab to have the test done at a later time.    Video visits are billed at different rates depending on your insurance coverage. Please reach out to your insurance provider with any questions.    If during the course of the call the physician/provider feels a video visit is not appropriate, you will not be charged for this service.\"    Patient has given verbal consent to a Video visit? Yes  How would you like to obtain your AVS? AVS Preference: MyChart.  If dropped by the video visit, the video invitation should be sent to: Text to cell phone: [phone]  Will anyone else be joining your video visit? No        Additional nursing notes:Has a wound on left posterior ankle. Developed 10-11yrs ago from a shopping cart bumping into patient. Hx of ruptured achilles tendon and MRSA. Infectious disease is involved. On cipro and doxy. She " is having an MRI on left ankle today. discuss angio; non healing wound left posterior ankle. US done on 7/24. CTA- No evidence of significant May Thurner syndrome. Using wet to moist dressing changes for past 10 days.      Video-Visit Details    Type of service:  Video Visit      Originating Location (pt. Location): Home    Distant Location (provider location):  [facility] VASCULAR [facility]      Platform used for Video Visit: [employer]

## 2021-06-10 NOTE — PATIENT INSTRUCTIONS - HE
Colt Castillo,  Thank you for taking the time for the virtual visit today.  We discussed the cultures results, and continuing the treatment for MRSA and Pseudomonas.   We will obtain and MRI of left ankle and repeat labs.   If there is no improvement or worsening over the next 48 hours, we recommend going to [facility] or [facility] ER for emergent evaluation and consultations with Orthopedics and Plastic Surgery (as the current Plastic Surgery appointment is not until 8/4/2020)    Please call with questions. ID nurse will be calling you to assist with labs and MRI appointments.    Paulina Ruggiero MD  [facility]  [phone]

## 2021-06-10 NOTE — TELEPHONE ENCOUNTER
Discussed with the patient. There was some confusion and miscommunication on what patient needed to do. Explained that patient needed to see Dr. Wright for a GSV ablation. Cancelled her virtual visit with Dr. Vega and made an appointment with Dr. Wright instead. Next available was not until October. Advised her to start wearing compressions now since normally it is recommended patient wear compressions for 3 months prior to submitting to insurance. Will mail out a prescription to her.

## 2021-06-11 NOTE — PROGRESS NOTES
"Anna Underwood is a 60 y.o. female who is being evaluated via a billable video visit.      The patient has been notified of following:     \"This telephone visit will be conducted via a call between you and your physician/provider. We have found that certain health care needs can be provided without the need for a physical exam.  This service lets us provide the care you need with a short phone conversation.  If a prescription is necessary we can send it directly to your pharmacy.  If lab work is needed we can place an order for that and you can then stop by our lab to have the test done at a later time.    Telephone visits are billed at different rates depending on your insurance coverage. During this emergency period, for some insurers they may be billed the same as an in-person visit.  Please reach out to your insurance provider with any questions.    If during the course of the call the physician/provider feels a video  visit is not appropriate, you will not be charged for this service.\"    Patient has given verbal consent to a Telephone visit? Yes    What phone number would you like to be contacted at? [phone]    Patient would like to receive their AVS by AVS Preference: Megan.    Additional provider notes:   Chief Complaint   Patient presents with     Ear Pain     pain in left ear, can see sore on outside, red and irritated     Very pleasant patient with a h/o postsurgical wound infection ( pseudomonas , MRSA) on chronic cipro noticed this pimple like redness left ear pinna . She has no hearing loss , tinnitus or headache .no neck stiffness .  She doesn't have pets but has a grandchildren . doest remember injuring it . No prior h/o impetigo or folliculitis     Assessment/Plan:  1. Furuncle of ear  Based on the my chart message photograph and video it looks like she has a furuncle left pinna with no generalized swelling . Rest of the exam could not be done on a video visit .  Will treat " empirically with bactrim given MRSA history   Sent message to her ID physician to keep them updated as she is already on antibiotics   - sulfamethoxazole-trimethoprim (BACTRIM DS) 800-160 mg per tablet; Take 1 tablet by mouth 2 (two) times a day for 10 days.  Dispense: 20 tablet; Refill: 0          SKAMAL

## 2021-06-11 NOTE — TELEPHONE ENCOUNTER
New Appointment Needed  What is the reason for the visit:  Pre-Op  Pre-Op Appt Request  When is the surgery? :  10/2/20  Where is the surgery?:   [facility]  Who is the surgeon? :  Marino Stacy  What type of surgery is being done?: Angiogram  Provider Preference: Any available  How soon do you need to be seen?: By 9/30  Waitlist offered?: Yes  Okay to leave a detailed message:  Yes

## 2021-06-11 NOTE — PATIENT INSTRUCTIONS - HE
"  Preparing for Your Surgery  Getting started  A surgery nurse will call you to review your health history and instructions. They will give you an arrival time based on your scheduled surgery time.  Please be ready to share the following:    Your doctor's clinic name and phone number    Your medical, surgical and anesthesia history    A list of allergies and sensitivities    A list of medicines, including herbal treatments and over-the-counter drugs    Whether the patient has a legal guardian (ask how to send us the papers in advance)  If your child is having surgery, please ask for a copy of Preparing for Your Child's Surgery.    Preparing for surgery    Within 30 days of surgery: Have an exam at your family clinic (primary care clinic), or go to a pre-operative clinic. This exam is called a \"History and Physical,\" or H&P.    At your H&P exam, talk to your care team about all medicines you take. If you need to stop any medicines before surgery, ask when to start taking them again.  ? We do this for your safety. Many medicines can make you bleed too much during surgery. Some change how well surgery (anesthesia) drugs work.    Call your insurance company to see what it will and won't pay for. Ask if they need to pre-approve the surgery. (If no insurance, call [phone].)    Call your surgeon's clinic if there's any change in your health. This includes signs of a cold or flu (sore throat, runny nose, cough, rash, fever). It also includes a scrape or scratch near the surgery site.    If you have questions on the day of surgery, call your surgery center.  Eating and drinking guidelines  For your safety: Unless your surgeon tells you otherwise, follow the guidelines below.    Eat and drink as usual until 8 hours before surgery. After that, no food or milk.    Drink clear liquids until 2 hours before surgery. These are liquids you can see through, like water, Gatorade and Propel Water. You may also have black coffee " and tea (no cream or milk).    Nothing by mouth within 2 hours of surgery. This includes gum, candy and breath mints.    Stop alcohol the midnight before surgery.    If your family clinic tells you to take medicine on the morning of surgery, it's okay to take it with a sip of water.  Preventing infection    Shower or bathe the night before and morning of your surgery. Follow the instructions your clinic gave you. (If no instructions, use regular soap.)    Don't shave or clip hair near your surgery site. This can lead to skin infection.    Don't smoke the morning of surgery. Smoking increases the risk of infection. You may chew nicotine gum up to 2 hours before surgery. A nicotine patch is okay.  ? Note: Some surgeries require you to completely quit smoking and nicotine. Check with your surgeon.    Your care team will make every effort to keep you safe from infection. We will:  ? Clean our hands often with soap and water (or an alcohol-based hand rub).  ? Clean the skin at your surgery site with a special soap that kills germs. We'll also remove hair from the site as needed.  ? Wear special hair covers, masks, gowns and gloves during surgery.  ? Give antibiotic medicine, if prescribed. Not all surgeries need antibiotics.  What to bring on the day of surgery    Photo ID and insurance card    Copy of your health care directive, if you have one    Glasses and hearing aides (bring cases)  ? You can't wear contacts during surgery    Inhaler and eye drops, if you use them (tell us about these when you arrive)    CPAP machine or breathing device, if you use them    A few personal items, if spending the night    If you have . . .  ? A pacemaker or ICD (cardiac defibrillator): Bring the ID card.  ? An implanted stimulator: Bring the remote control.  ? A legal guardian: Bring a copy of the certified (court-stamped) guardianship papers.  Please remove any jewelry, including body piercings. Leave jewelry and other valuables at  home.  If you're going home the day of surgery  Important: If you don't follow the rules below, we must cancel your surgery.     Arrange for someone to drive you home after surgery. You may not drive, take a taxi or take public transportation by yourself (unless you'll have local anesthesia only).    Arrange for a responsible adult to stay with you overnight. If you don't, we may keep you in the hospital overnight, and you may need to pay the costs yourself.  Questions?   If you have any questions for your care team, list them here: _________________________________________________________________________________________________________________________________________________________________________________________________________________________________________________________________________________________________________________________  For informational purposes only. Not to replace the advice of your health care provider. Copyright   0872-7757 [employer]. All rights reserved. Clinically reviewed by Linda Burris MD. [employer] 002592 - REV 07/19.      Before Your Procedure or Hospital Admission  Testing for COVID-19 (Coronavirus)  Thank you for choosing [facility] for your health care needs. This is a very challenging time for everyone. The World Health Organization and the State of [place] have declared the COVID-19 virus a pandemic.   Our goal is to keep you and our team here at [facility] safe and healthy. We've taken several steps to make this happen. For example:    We screen our staff, care teams and patients for COVID-19.    Everyone at [facility] must wear a mask and stay 6 feet apart.    We are limiting hospital and clinic visitors.  Before you come in  All patients must get tested for COVID-19. Your test needs to happen 1 to 4 days before you check in to the hospital or surgery site.  We'll call about a week in advance to set up a testing time. The call may come  from a phone number you don't know. Then, you'll need to travel to a testing clinic, where we'll take a swab of your nose or throat.   After the test, please stay at home and stay out of contact with other people. It will be harder for you to recover if you get COVID-19 before your treatment.   Please follow all current safety guidelines, including:    Limit trips outside your home.    Limit the number of people you see.    Always wear a mask outside your home.    Use social distancing. (Stay 6 feet away from others whenever you can.)    Wash your hands often.  If your test shows you have COVID-19  If your test is positive, we'll let you know. A positive test means that you have the virus.   We'll probably have to postpone your admission, surgery or procedure. Your doctor will discuss this with you. After that, we'll let you know what to do and when you can reschedule.   We may need to cancel your treatment on short notice for other reasons, too.  If your test shows you DON'T have COVID-19  Even if your test is negative, you may still get COVID-19. It's rare but, sometimes, the test result is wrong. You could also catch the virus after taking the test.   There's a very small chance that you could catch COVID-19 in the hospital or surgery center. [facility] has taken many steps to prevent this from happening.   Day of your surgery or procedure    Please come wearing a mask or something else that covers both your nose and mouth.    When you arrive, we'll ask you some questions to find out if you have any signs or symptoms of COVID-19.    Ask your care team if you can have visitors. All visitors must wear masks and will be screened for signs of COVID-19.  ? Even if no visitors are allowed, you can still have with you:    Your legal guardian or legal decision maker    A parent and one other visitor, if you are younger than 18 years old    A partner and a , if you are in labor  ? We might need to teach you  about taking care of yourself after surgery. If so, a visitor can come into the hospital to learn about it, too.  ? The rules for visitors change often, depending on how much the virus is spreading. To learn more, see Visiting a Loved One in the Hospital during the COVID-19 Outbreak.  Please call your care team, hospital or surgery center if you have any questions. We thank you for your understanding and for choosing [facility] for your care.   Questions and Answers  Does it matter where I get tested for COVID-19?  Yes. We urge you to get tested at one of our [facility] COVID-19 testing sites. We process these tests in our lab and can get the results quickly. Your [facility] care team needs to get your results before you check in.  What should I do if I can't get tested at [facility]?  You can get tested somewhere else, but you'll need to take these extra steps:  1. Contact your family doctor or clinic to arrange your test.  2. Take the test within 4 days of your surgery or procedure. We can't accept tests older than 4 days.  3. Make sure your doctor or clinic faxes your results to [facility] at [phone].  If we don't get your results in time, we may have to postpone or cancel your treatment.  For informational purposes only. Not to replace the advice of your health care provider. Copyright   2020 [facility]. All rights reserved. Clinically reviewed by Infection Prevention and the [facility] COVID-19 Clinical Team. [employer] [id number] - 07/20.    How to Take Your Medication Before Surgery  Take your usual dose of pantoprazole on the morning of surgery with small sip of water

## 2021-06-11 NOTE — PROGRESS NOTES
"Mrs. Underwood arrived at clinic for Shingrix injection given Intramuscular at Left deltoid.      Injection was given without incidence.     Prolia Injection Phone Screen      Screening questions have been asked 2-3 days prior to administration visit for Prolia. If any questions are answered with \"Yes,\" this phone encounter were will routed to ordering provider for further evaluation.     1.  When was the last injection?  2/28/20    2.  Has insurance for this injection been verified?  Yes    3.  Did you experience any new onset achiness or rashes that lasted for over a month with your previous Prolia injection?   No    4.  Do you have a fever over 101?F or a new deep cough that is unusual for you today? No    5.  Have you started any new medications in the last 6 months that you were told could affect your immune system? These may have been prescribed by oncologist, transplant, rheumatology, or dermatology.   No    6.  In the last 6 months have you have gastric bypass or parathyroid surgery?   No    7.  Do you plan dental work requiring drilling into the bone such as implants/extractions or oral surgery in the next 2-3 months?   No    8. Do you have new insurance since the last injection? No    Patient informed if symptoms discussed above present prior to their administration appointment, they are to notify clinic immediately.    The following steps were completed to comply with the REMS program for Prolia:  1. Ordering provider has previously reviewed information in the Medication Guide and Patient Counseling Chart, including the serious risks of Prolia  and the symptoms of each risk and have been advised to seek prompt medical attention if they have signs or symptoms of any of the serious risks.  2. Provided each patient a copy of the Medication Guide and Patient Brochure.  See MAR for administration details.   Indication: Prolia  (denosumab) is a prescription medicine used to treat osteoporosis in patients who: "   Are at high risk for fracture, meaning patients who have had a fracture related to osteoporosis, or who have multiple risk factors for fracture; Cannot use another osteoporosis medicine or other osteoporosis medicines did not work well.   The timeline for early/late injections would be 4 weeks early and any time after the 6 month yannick. If a patient receives their injection late, then the subsequent injection would be 6 months from the date that they actually received the injection    Have the screening questions been asked prior to this administration? Yes       Suzette LANGLEY LPN .......... 9:02 AM  09/04/20  [facility]

## 2021-06-11 NOTE — PROGRESS NOTES
[facility]  [address] [facility] [address]  [facility] PROFESSIONAL [facility] [address]  Dept: [phone]  Dept Fax: [phone]  Primary Provider: Jame Dunn MD  Pre-op Performing Provider: JAME DUNN    PREOPERATIVE EVALUATION:  Today's date: 9/28/2020    Anna Underwood is a 60 y.o. female who presents for a preoperative evaluation.    Surgical Information:  Surgery Details 9/28/2020   Surgery/Procedure: Artieralgram of leg   Surgery Location: [facility]   Surgeon: Dr Marino Stacy   Surgery Date: 10/02/2020   Time of Surgery: 900am   Where patient plans to recover: at home with family     Fax number for surgical facility:       Subjective     HPI related to upcoming procedure nonhealing surgical wound with plans for angiogram  Preop Questions 9/28/2020   Have you ever had a heart attack or stroke? No   Have you ever had surgery on your heart or blood vessels, such as a stent placement, a coronary artery bypass, or surgery on an artery in your head, neck, heart, or legs? YES -    Do you have chest pain with activity? No   Do you have a history of  heart failure? No   Do you currently have a cold, bronchitis or symptoms of other infection? No   Do you have a cough, shortness of breath, or wheezing? No   Do you or anyone in your family have previous history of blood clots? YES -family history DVT   Do you or does anyone in your family have a serious bleeding problem such as prolonged bleeding following surgeries or cuts? No   Have you ever had problems with anemia or been told to take iron pills? YES -    Have you had any abnormal blood loss such as black, tarry or bloody stools, or abnormal vaginal bleeding? No   Have you ever had a blood transfusion? No   Are you willing to have a blood transfusion if it is medically needed before, during, or after your surgery? Yes   Have you or any of your relatives ever had problems with anesthesia? No   Do you have sleep apnea,  excessive snoring or daytime drowsiness? No   Do you have any artifical heart valves or other implanted medical devices like a pacemaker, defibrillator, or continuous glucose monitor? No   Do you have artificial joints? No   Are you allergic to latex? No   Is there any chance that you may be pregnant? No         Patient does not have a Health Care Directive or Living Will:     269}  See problem list for active medical problems.  Problems all longstanding and stable, except as noted/documented.  See ROS for pertinent symptoms related to these conditions.      Review of Systems  Constitutional, neuro, ENT, endocrine, pulmonary, cardiac, gastrointestinal, genitourinary, musculoskeletal, integument and psychiatric systems are negative, except as otherwise noted.      Patient Active Problem List    Diagnosis Date Noted     Chronic cough 03/23/2020     Vitamin D deficiency 01/22/2020     Osteoporosis of multiple sites without pathological fracture      Abdominal pain, epigastric 11/05/2019     Atopic dermatitis, unspecified type      Achilles tendinitis 07/26/2019     Nonhealing surgical wound 11/14/2018     Achilles tendon infection      Gastroesophageal reflux disease with esophagitis      Allergic rhinitis      Iron deficiency anemia 12/18/2015     Past Medical History:   Diagnosis Date     Abdominal pain, epigastric 11/5/2019     Achilles tendon infection      Chronic cough 3/23/2020    CT chest with mild bronchiectasis and small area of consolidation     Deep vein thrombosis (DVT) of brachial vein of left upper extremity (H) 04/17/2018    Associated with PICC line, follow-up ultrasound showing resolution of thrombus     Dermatitis      Family history of myocardial infarction     dad 35  mother 70     Fatigue 2/24/2017     GERD (gastroesophageal reflux disease)     EGD with esophagitis 2015, experiencing chest pain and dysphagia     History of anesthesia complications      Iron deficiency anemia 12/18/2015    Has  required iron infusions     Kidney stones      Open wound of foot, complicated     Achilles tendon rupture complicated by infected open wound with multiple surgeries including skin grafting     Osteoporosis of multiple sites without pathological fracture     DEXA with T score -2.4 L1 L4, -3.2 left femoral neck and -2.6 right femoral neck     Ovarian cyst      PONV (postoperative nausea and vomiting)      SIRS (systemic inflammatory response syndrome) (H) 2019     Vitamin D deficiency 2020     Past Surgical History:   Procedure Laterality Date     ANKLE SURGERY      Multiple surgeries [id number] after complications from Achilles rupture and repair with nonhealing open wound including skin grafting      SECTION      x3     HYSTERECTOMY  2002     OOPHORECTOMY Right 2002     PICC AND MIDLINE TEAM LINE INSERTION  3/23/2018          Current Outpatient Medications   Medication Sig Dispense Refill     aspirin 81 mg chewable tablet Chew 81 mg daily.       ciprofloxacin HCl (CIPRO) 500 MG tablet TK 1 T Q 12 H D       ergocalciferol (ERGOCALCIFEROL) 1,250 mcg (50,000 unit) capsule Take 1 capsule (50,000 Units total) by mouth once a week. 13 capsule 3     ibuprofen (ADVIL) 200 MG tablet Take 600 mg by mouth every 6 (six) hours as needed for pain.              pantoprazole (PROTONIX) 40 MG tablet TAKE 1 TABLET(40 MG) BY MOUTH TWICE DAILY 180 tablet 1     sucralfate (CARAFATE) 100 mg/mL suspension Take 1 g by mouth 4 (four) times a day as needed.       VASCULERA 630 mg Tab Take 1 tablet by mouth 2 (two) times a day.       Current Facility-Administered Medications   Medication Dose Route Frequency Provider Last Rate Last Dose     denosumab 60 mg (PROLIA 60 mg/ml)  60 mg Subcutaneous Q6 Months Jame Dunn MD   60 mg at 20 0902       Allergies   Allergen Reactions     Silver-Calcium Alginate Dermatitis     Skin blisters- in the past     Morphine Hives     Adhesive Tape-Silicones Rash     Other Drug  "Allergy (See Comments) Other (See Comments)     CHLORAPREP Blisters.       Social History     Tobacco Use     Smoking status: Former Smoker     Types: Cigarettes     Last attempt to quit: 3/14/2003     Years since quittin.5     Smokeless tobacco: Never Used     Tobacco comment: Quit    Substance Use Topics     Alcohol use: No      Family History   Problem Relation Age of Onset     Heart attack Mother      Thyroid disease Mother      Heart attack Father      Alzheimer's disease Sister      Stroke Sister      No Medical Problems Brother      No Medical Problems Sister      No Medical Problems Brother      No Medical Problems Daughter      No Medical Problems Son      No Medical Problems Son      Breast cancer Neg Hx      Social History     Substance and Sexual Activity   Drug Use No           Objective   /64 (Patient Site: Left Arm, Patient Position: Sitting, Cuff Size: Adult Large)   Pulse (!) 101   Ht 5' 4\" (1.626 m)   Wt 119 lb (54 kg)   SpO2 96%   BMI 20.43 kg/m    Physical Exam    GENERAL APPEARANCE: healthy, alert and no distress     HENT: Normal     NECK: no adenopathy, no asymmetry, masses, or scars and thyroid normal to palpation     RESP: lungs clear to auscultation - no rales, rhonchi or wheezes     CV: regular rates and rhythm, normal S1 S2, no S3 or S4 and no murmur, click or rub     ABDOMEN:  soft, nontender, no HSM or masses and bowel sounds normal     SKIN: no suspicious lesions or rashes     NEURO: Normal strength and tone, sensory exam grossly normal, mentation intact and speech normal     PSYCH: mentation appears normal. and affect normal/bright      PRE-OP Diagnostics:   Hemoglobin 12.2 platelet 366           Assessment & Plan       The proposed surgical procedure is considered LOW risk.    REVISED CARDIAC RISK INDEX   The patient has the following serious cardiovascular risks for perioperative complications:  No serious cardiac risks = 0 points    INTERPRETATION: 0 points: " Class I (very low risk - 0.4% complication rate)    Anna was seen today for pre-op exam.    Diagnoses and all orders for this visit:    Preop general physical exam  -     HM2 (CBC w/o Differential)  -     Asymptomatic COVID-19 Virus (CORONAVIRUS) PCR  -     COVID-19 Virus PCR MRF    Non-healing surgical wound, subsequent encounter     Plan for lower extremity angiogram to further evaluate    Gastroesophageal reflux disease with esophagitis  She will take her usual dose of pantoprazole on the morning of surgery    Iron deficiency anemia, unspecified iron deficiency anemia type  Hemoglobin is checked preoperatively and is stable      The patient has the following additional risks and recommendations for perioperative complications:     - No identified additional risk factors other than previously addressed     MEDICATION INSTRUCTIONS:  Take usual dose of pantoprazole on morning of surgery with small sip of water    RECOMMENDATION:  APPROVAL GIVEN to proceed with proposed procedure, without further diagnostic evaluation.    No follow-ups on file.    Signed Electronically by: Jame Dunn MD    Copy of this evaluation report is provided to requesting physician.    Preop [facility] Preop Guidelines    Revised Cardiac Risk Index

## 2021-06-12 LAB
BACTERIA SPEC CULT: ABNORMAL
Lab: ABNORMAL
SPECIMEN SOURCE: ABNORMAL

## 2021-06-12 NOTE — ANESTHESIA PROCEDURE NOTES
Peripheral Block    Patient location during procedure: pre-op  Start time: 8/16/2017 12:36 PM  End time: 8/16/2017 12:40 PM  post-op analgesia per surgeon order as noted in medical record  Staffing:  Performing  Anesthesiologist: JAMEL KHALIL  Preanesthetic Checklist  Completed: patient identified, site marked, risks, benefits, and alternatives discussed, timeout performed, consent obtained, airway assessed, oxygen available, suction available, emergency drugs available and hand hygiene performed  Peripheral Block  Block type: sciatic, popliteal  Prep: ChloraPrep  Patient position: supine  Patient monitoring: cardiac monitor, continuous pulse oximetry, heart rate and blood pressure  Laterality: left  Injection technique: ultrasound guided and paresthesia technique        lidocaine 1% local anesthesia used for local skin prep  Needle  Needle type: Stimuplex   Needle gauge: 21 G  Needle length: 4 in  no peripheral nerve catheter placed  Assessment  Injection assessment: no difficulty with injection, negative aspiration for heme, no paresthesia on injection and incremental injection

## 2021-06-12 NOTE — ANESTHESIA PROCEDURE NOTES
Peripheral Block    Patient location during procedure: pre-op  Start time: 8/16/2017 12:40 PM  End time: 8/16/2017 12:42 PM  post-op analgesia per surgeon order as noted in medical record  Staffing:  Performing  Anesthesiologist: JAMEL KHALIL  Preanesthetic Checklist  Completed: patient identified, site marked, risks, benefits, and alternatives discussed, timeout performed, consent obtained, airway assessed, oxygen available, suction available, emergency drugs available and hand hygiene performed  Peripheral Block  Block type: saphenous, adductor canal block  Prep: ChloraPrep  Patient position: supine  Patient monitoring: cardiac monitor, continuous pulse oximetry, heart rate and blood pressure  Injection technique: ultrasound guided    Ultrasound used to visualize needle placement in proximity to nerve being blocked: yes   Permanent ultrasound image captured for medical record    Needle  Needle type: Stimuplex   Needle gauge: 21 G  Needle length: 4 in  no peripheral nerve catheter placed  Assessment  Injection assessment: no difficulty with injection, negative aspiration for heme, no paresthesia on injection and incremental injection

## 2021-06-12 NOTE — TELEPHONE ENCOUNTER
Please start prior authorization for patients Pantoprazole 40mg twice a day. Pantoprazole one a day is not effective in controlling reflux symptoms. Romy Arceo, CMA

## 2021-06-12 NOTE — TELEPHONE ENCOUNTER
Central PA team  [phone]  Pool: HE PA MED ([id number])          PA has been initiated.       PA form completed and faxed insurance via Cover My Meds     Key:  [id number]     Medication:  PANTOPRAZOLE 40MG    Insurance:  [facility] [place]        Response will be received via fax and may take up to 5-10 business days depending on plan      [id number]

## 2021-06-12 NOTE — ANESTHESIA PREPROCEDURE EVALUATION
Anesthesia Evaluation      Patient summary reviewed     Airway   Mallampati: II   Pulmonary - normal exam   (+) shortness of breath, sleep apnea,                          Cardiovascular    Neuro/Psych    (+) neuromuscular disease,      Endo/Other       GI/Hepatic/Renal    (+) GERD,        Other findings:                Fatigue     Dyspnea on exertion    Sleep apnea    Open wound of foot, complicated    Iron deficiency anemia    GERD (gastroesophageal reflux disease)    Family history of premature coronary artery disease    Chronic urticaria    Allergic rhinitis             Dental - normal exam                        Anesthesia Plan  Planned anesthetic: general mask and total IV anesthesia  PONV  DEC 10 / ZOF 4  SCOPOLAMINE  TIVA  SCIATIC/SAPH FOR POSTOP PAIN  ASA 2     Anesthetic plan and risks discussed with: patient  Anesthesia plan special considerations: antiemetics,   Post-op plan: routine recovery

## 2021-06-12 NOTE — PROGRESS NOTES
Preoperative Consultation   Anna Underwood   57 y.o.  female in for preop exam.  She has had multiple surgeries from 2008 until   After complications from Achilles rupture and repair with nonhealing open wound.  Recently, she had a flare of discomfort and then pain and fever secondary to infection of the left ankle.  IV antibiotics were started she is now taking Keflex and needs a debridement.  Medically she is stable.    Date of visit: 8/15/2017  Physician: Ayan Coreas MD    This is a preoperative consultation requested by Dr. Wiley in preparation for left Achilles tendon debridement and irrigation on 2017 at [facility], fax [phone].       Assessment and Plan   Anan Underwood was seen in preoperative consultation in preparation for irrigation and debridement of left Achilles tendon..  This is a low risk surgery and the patient has no increased risk for major cardiac complications.  Please note she had a normal stress test in the past year.  She is medically stable we can proceed with anesthesia and surgery as planned.    1. Preop exam for internal medicine         Patient Profile   Social History     Social History Narrative         to Sher    3 children            Past Medical History   Patient Active Problem List   Diagnosis     Fatigue     Dyspnea on exertion     Sleep apnea     Open wound of foot, complicated     Iron deficiency anemia     GERD (gastroesophageal reflux disease)     Family history of premature coronary artery disease     Chronic urticaria     Allergic rhinitis       Past Surgical History  She has a past surgical history that includes Ankle surgery;  section; Oophorectomy (Right, ); and Hysterectomy ().     History of Present Illness   This 57 y.o. old is in for preop exam.    Recent antiplatelet use: no  Personal or family history of bleeding or clotting disorders: no  Steroid use in the past  "year: no  Personal or family history of difficulty with anesthesia: no  Current cardiopulmonary symptoms: no  Last Menstrual Period: years    Review of Systems: A comprehensive review of systems was negative except as noted.     Medications and Allergies   Current Outpatient Prescriptions   Medication Sig Dispense Refill     cephalexin (KEFLEX) 500 MG capsule TK ONE C PO  QID FOR 10 DAYS  0     ibuprofen (ADVIL,MOTRIN) 200 MG tablet Take 400 mg by mouth 3 (three) times a day as needed for pain.       ondansetron (ZOFRAN) 4 MG tablet TAKE 1 TABLET BY MOUTH EVERY 8 HOURS AS NEEDED FOR NAUSEA 20 tablet 0     pantoprazole (PROTONIX) 40 MG tablet Take 1 tablet (40 mg total) by mouth 2 times a day at 9:00am and 5:00pm. 180 tablet 3     CARAFATE 100 mg/mL suspension TAKE 10 ML 'S BY MOUTH FOUR TIMES DAILY 420 mL 0     No current facility-administered medications for this visit.      Allergies   Allergen Reactions     Morphine Hives     Adhesive Tape-Silicones Rash     Other Drug Allergy (See Comments) Rash     CHLORAPREP        Family and Social History   Family History   Problem Relation Age of Onset     Heart attack Mother      Thyroid disease Mother      Heart attack Father      Alzheimer's disease Sister      Stroke Sister      Breast cancer Neg Hx         Social History   Substance Use Topics     Smoking status: Former Smoker     Types: Cigarettes     Quit date: 3/14/2003     Smokeless tobacco: None      Comment: Quit 2003     Alcohol use No        Physical Exam   General Appearance:   Thin woman in no distress.  She is afebrile.  Blood pressure is 134/78.    Pulse 96  Ht 5' 5\" (1.651 m)  Wt 116 lb (52.6 kg)  SpO2 100%  BMI 19.3 kg/m2    EYES: Eyelids, conjunctiva, and sclera were normal. Pupils were normal. Cornea, iris, and lens were normal bilaterally.  HEAD, EARS, NOSE, MOUTH, AND THROAT: Head and face were normal. Hearing was normal to voice and the ears were normal to external exam. Nose appearance was " normal and there was no discharge. Oropharynx was normal.  NECK: Neck appearance was normal. There were no neck masses and the thyroid was not enlarged.  RESPIRATORY: Breathing pattern was normal and the chest moved symmetrically.  Percussion/auscultatory percussion was normal.  Lung sounds were normal and there were no abnormal sounds.  CARDIOVASCULAR: Heart rate and rhythm were normal.  S1 and S2 were normal and there were no extra sounds or murmurs. Peripheral pulses in arms and legs were normal.  Jugular venous pressure was normal.  There was no peripheral edema.  GASTROINTESTINAL: The abdomen was normal in contour.  Bowel sounds were present.  Percussion detected no organ enlargement or tenderness.  Palpation detected no tenderness, mass, or enlarged organs.   MUSCULOSKELETAL: Skeletal configuration was normal and muscle mass was normal for age. Joint appearance was overall normal.  She has a heavy dressing on the left ankle.  She was just recently examined by the orthopedist.  I did not remove this dressing.  LYMPHATIC: There were no enlarged nodes.  SKIN/HAIR/NAILS: Skin color was normal.  There were no skin lesions.  Hair and nails were normal.  NEUROLOGIC: The patient was alert and oriented to person, place, time, and circumstance. Speech was normal. Cranial nerves were normal. Motor strength was normal for age. The patient was normally coordinated.  PSYCHIATRIC:  Mood and affect were normal and the patient had normal recent and remote memory. The patient's judgment and insight were normal.    ADDITIONAL VITAL SIGNS: Pulse 86.  CHEST WALL/BREASTS: Normal female  RECTAL: Not checked  GENITAL/URINARY: Not checked.  She tells me she has had a hysterectomy in the past.     Additional Tests   Laboratory: Myoglobin 10.4.    Radiology: na    Electrocardiogram: Normal stress test in March of this year.    Total time spent with the patient today was 40 minutes of which > 50% was spent in counseling and coordination  of care     Ayan Coreas MD  Internal Medicine  Contact me at [phone]

## 2021-06-12 NOTE — ANESTHESIA CARE TRANSFER NOTE
Last vitals:   Vitals:    08/16/17 1356   BP: 110/85   Pulse: 83   Resp: 16   Temp: 36.6  C (97.8  F)   SpO2: 100%     Patient's level of consciousness is drowsy  Spontaneous respirations: yes  Maintains airway independently: yes  Dentition unchanged: yes  Oropharynx: oropharynx clear of all foreign objects    QCDR Measures:  ASA# 20 - Surgical Safety Checklist: WHO surgical safety checklist completed prior to induction  PQRS# 430 - Adult PONV Prevention: 4558F - Pt received => 2 anti-emetic agents (different classes) preop & intraop  ASA# 8 - Peds PONV Prevention: NA - Not pediatric patient, not GA or 2 or more risk factors NOT present  PQRS# 424 - [place]-op Temp Management: 4559F - At least one body temp DOCUMENTED => 35.5C or 95.9F within required timeframe  PQRS# 426 - PACU Transfer Protocol: - Transfer of care checklist used  ASA# 14 - Acute Post-op Pain: ASA14B - Patient did NOT experience pain >= 7 out of 10

## 2021-06-12 NOTE — ANESTHESIA POSTPROCEDURE EVALUATION
Patient: Anna RobertsonLing  IRRIGATION AND DEBRIDEMENT, LEFT ACHILLIES  Anesthesia type: general    Patient location: PACU  Last vitals:   Vitals:    08/16/17 1415   BP: 111/64   Pulse: 78   Resp: 18   Temp:    SpO2: 100%     Post vital signs: stable  Level of consciousness: awake and responds to simple questions  Post-anesthesia pain: pain controlled  Post-anesthesia nausea and vomiting: no  Pulmonary: unassisted, return to baseline  Cardiovascular: stable and blood pressure at baseline  Hydration: adequate  Anesthetic events: no    QCDR Measures:  ASA# 11 - [place]-op Cardiac Arrest: ASA11B - Patient did NOT experience unanticipated cardiac arrest  ASA# 12 - [place]-op Mortality Rate: ASA12B - Patient did NOT die  ASA# 13 - PACU Re-Intubation Rate: NA - No ETT / LMA used for case  ASA# 10 - Composite Anes Safety: ASA10A - No serious adverse event  ASA# 38 - New Corneal Injury: ASA38A - No new exposure keratitis or corneal abrasion in PACU    Additional Notes:

## 2021-06-12 NOTE — TELEPHONE ENCOUNTER
LMTCB and verify if cologuard kit was received, and to collect specimens and send in at earliest convenience.

## 2021-06-12 NOTE — TELEPHONE ENCOUNTER
New Appointment Needed  What is the reason for the visit:    Pre-Op Appt Request  When is the surgery? :  11/05/20  Where is the surgery?:   [facility]  Who is the surgeon? :  Dr. Luque  What type of surgery is being done?:  Skin surgery, debridement and graph  Provider Preference: PCP only, would be willing to see partner.  How soon do you need to be seen?:  Monday, 11/06  Waitlist offered?: Yes  Okay to leave a detailed message:  Yes

## 2021-06-12 NOTE — PROGRESS NOTES
Preoperative Consultation   Anna Underwood   60 y.o.  female for preop consultation.    Date of visit: 2020  Physician: Ayan Coreas MD    This is a preoperative consultation requested by Dr. Her in preparation for left ankle wound debridement on 2020 at [facility].  The plan is to also install a wound VAC, give antibiotics, and a few days later, do a skin graft.  She has had multiple procedures on this ankle over the past 12 years.       Assessment and Plan   Anna Underwood was seen in preoperative consultation in preparation for left ankle wound debridement..  This is a low risk surgery and the patient has no increased risk for major cardiac complications.  Please note patient has had multiple procedures on this left ankle in the past.   Currently, she is not having any fevers or chills.  She does have chronic discomfort for which she takes Advil.  Medically, she is stable we can proceed with the anesthesia and surgery as planned.    1. Preop general physical exam    2. Non-healing surgical wound, subsequent encounter    3. Gastroesophageal reflux disease with esophagitis without hemorrhage         Patient Profile   Social History     Social History Narrative    .  to Sher. 3 children        Past Medical History   Patient Active Problem List   Diagnosis     Iron deficiency anemia     Gastroesophageal reflux disease with esophagitis     Allergic rhinitis     Achilles tendon infection     Achilles tendinitis     Nonhealing surgical wound     Atopic dermatitis, unspecified type     Osteoporosis of multiple sites without pathological fracture     Vitamin D deficiency       Past Surgical History  She has a past surgical history that includes Ankle surgery;  section; Oophorectomy (Right, ); Hysterectomy (); and PICC Team Line Insertion (3/23/2018).     History of Present Illness   This 60 y.o. old female in for preop exam.  For 12  years she has had difficulty with the left ankle.  Initially, a Achilles tendon inflammation followed by rupture followed by surgical procedures and wound infections.    Recent antiplatelet use: yes Aspirin 81 mg daily  Personal or family history of bleeding or clotting disorders: no  Steroid use in the past year: no  Personal or family history of difficulty with anesthesia: no  Current cardiopulmonary symptoms: no  Last Menstrual Period: years    Review of Systems: A comprehensive review of systems was negative except as noted.     Medications and Allergies   Current Outpatient Medications   Medication Sig Dispense Refill     aspirin 81 mg chewable tablet Chew 81 mg daily.       ergocalciferol (ERGOCALCIFEROL) 1,250 mcg (50,000 unit) capsule Take 1 capsule (50,000 Units total) by mouth once a week. 13 capsule 3     ibuprofen (ADVIL) 200 MG tablet Take 600 mg by mouth every 6 (six) hours as needed for pain.              pantoprazole (PROTONIX) 40 MG tablet TAKE 1 TABLET(40 MG) BY MOUTH TWICE DAILY 180 tablet 1     sucralfate (CARAFATE) 100 mg/mL suspension Take 1 g by mouth 4 (four) times a day as needed.       VASCULERA 630 mg Tab Take 1 tablet by mouth 2 (two) times a day.       Current Facility-Administered Medications   Medication Dose Route Frequency Provider Last Rate Last Dose     denosumab 60 mg (PROLIA 60 mg/ml)  60 mg Subcutaneous Q6 Months Jame Dunn MD   60 mg at 09/04/20 0902     Allergies   Allergen Reactions     Silver-Calcium Alginate Dermatitis     Skin blisters- in the past     Morphine Hives     Adhesive Tape-Silicones Rash     Other Drug Allergy (See Comments) Other (See Comments)     CHLORAPREP Blisters.        Family and Social History   Family History   Problem Relation Age of Onset     Heart attack Mother      Thyroid disease Mother      Heart attack Father      Alzheimer's disease Sister      Stroke Sister      No Medical Problems Brother      No Medical Problems Sister      No  "Medical Problems Brother      No Medical Problems Daughter      No Medical Problems Son      No Medical Problems Son      Breast cancer Neg Hx         Social History     Tobacco Use     Smoking status: Former Smoker     Types: Cigarettes     Quit date: 3/14/2003     Years since quittin.6     Smokeless tobacco: Never Used     Tobacco comment: Quit    Substance Use Topics     Alcohol use: No     Drug use: No        Physical Exam   General Appearance:   Healthy-appearing woman in no distress.  She has a boot on the left foot.  Blood pressure 132/84.  Pulse is 82.  Oxygen saturations 99%    Ht 5' 4\" (1.626 m)   Wt 119 lb (54 kg)   BMI 20.43 kg/m      EYES: Eyelids, conjunctiva, and sclera were normal. Pupils were normal. Cornea, iris, and lens were normal bilaterally.  HEAD, EARS, NOSE, MOUTH, AND THROAT: Head and face were normal. Hearing was normal to voice and the ears were normal to external exam. Nose appearance was normal and there was no discharge. Oropharynx was normal.  NECK: Neck appearance was normal. There were no neck masses and the thyroid was not enlarged.  No bruits.  RESPIRATORY: Breathing pattern was normal and the chest moved symmetrically.  Percussion/auscultatory percussion was normal.  Lung sounds were normal and there were no abnormal sounds.  CARDIOVASCULAR: Heart rate and rhythm were normal.  S1 and S2 were normal and there were no extra sounds or murmurs. Peripheral pulses in arms and legs were normal.  Jugular venous pressure was normal.  There was no peripheral edema.  GASTROINTESTINAL: The abdomen was normal in contour.  Bowel sounds were present.  Percussion detected no organ enlargement or tenderness.  Palpation detected no tenderness, mass, or enlarged organs.   MUSCULOSKELETAL: Skeletal configuration was normal and muscle mass was normal for age. Joint appearance was overall normal.  Did not remove the left foot boot or dressing.  LYMPHATIC: There were no enlarged " nodes.  SKIN/HAIR/NAILS: Skin color was normal.  There were no skin lesions.  Hair and nails were normal.  NEUROLOGIC: The patient was alert and oriented to person, place, time, and circumstance. Speech was normal. Cranial nerves were normal. Motor strength was normal for age. The patient was normally coordinated.  PSYCHIATRIC:  Mood and affect were normal and the patient had normal recent and remote memory. The patient's judgment and insight were normal.    ADDITIONAL VITAL SIGNS: Oxygen saturation 98%  CHEST WALL/BREASTS: Normal female  RECTAL: Deferred  GENITAL/URINARY: Deferred     Additional Tests   Laboratory: na    Radiology: na    Electrocardiogram: na    Total time spent with the patient today was 40 minutes of which > 50% was spent in counseling and coordination of care     Ayan Coreas MD  Internal Medicine  Contact me at [phone]

## 2021-06-13 NOTE — ANESTHESIA PROCEDURE NOTES
Peripheral Block    Patient location during procedure: pre-op  Start time: 10/19/2017 12:03 PM  End time: 10/19/2017 12:05 PM  post-op analgesia per surgeon order as noted in medical record  Staffing:  Performing  Anesthesiologist: GUI MAYBERRY  Preanesthetic Checklist  Completed: patient identified, site marked, risks, benefits, and alternatives discussed, timeout performed, consent obtained, at patient's request, airway assessed, oxygen available, suction available, emergency drugs available and hand hygiene performed  Peripheral Block  Block type: sciatic, popliteal  Prep: ChloraPrep  Patient position: supine  Patient monitoring: cardiac monitor, continuous pulse oximetry, heart rate and blood pressure  Laterality: left  Injection technique: ultrasound guided    Ultrasound used to visualize needle placement in proximity to nerve being blocked: yes   Permanent ultrasound image captured for medical record    Needle  Needle type: Stimuplex   Needle gauge: 20G  Needle length: 4 in  no peripheral nerve catheter placed  Assessment  Injection assessment: no difficulty with injection, negative aspiration for heme, no paresthesia on injection and incremental injection

## 2021-06-13 NOTE — ANESTHESIA PREPROCEDURE EVALUATION
Anesthesia Evaluation      Patient summary reviewed   History of anesthetic complications (PONV)     Airway   Mallampati: I  Neck ROM: full   Pulmonary - normal exam    breath sounds clear to auscultation  (+) shortness of breath, sleep apnea,                          Cardiovascular - negative ROS  (-) murmur  ECG reviewed  Rhythm: regular  Rate: normal,    no murmur      Neuro/Psych    (+) neuromuscular disease,      Endo/Other - negative ROS      GI/Hepatic/Renal    (+) GERD,        Other findings:                Fatigue     Dyspnea on exertion    Sleep apnea    Open wound of foot, complicated    Iron deficiency anemia    GERD (gastroesophageal reflux disease)    Family history of premature coronary artery disease    Chronic urticaria    Allergic rhinitis             Dental - normal exam                        Anesthesia Plan  Planned anesthetic: general endotracheal and peripheral nerve block  Popliteal & adductor canal blocks - R/B/A discussed, patient consented.  ASA 2   Induction: intravenous   Anesthetic plan and risks discussed with: patient and spouse  Anesthesia plan special considerations: antiemetics,   Post-op plan: routine recovery

## 2021-06-13 NOTE — PROGRESS NOTES
Preoperative Consultation   Anna Underwood   57 y.o.  female    Date of visit: 10/6/2017  Physician: Mayito Sosa MD    This is a preoperative consultation requested by Dr. Carie Wiley in preparation for incision and drainage of the left ankle on 2017 at [facility], fax [phone].       Assessment and Plan   Anna Underwood was seen in preoperative consultation in preparation for incision is drainage of the left ankle.  This is a low risk surgery and the patient has no increased risk for major cardiac complications.  There is previously concern for obstructive sleep apnea with 1 of her previous surgeries but she reports she had a negative sleep study.  He has a mild iron deficiency anemia which should not be problematic for her surgery.    1. Preoperative examination    2. Gastroesophageal reflux disease without esophagitis    3. Achilles tendon infection    4. Iron deficiency anemia, unspecified iron deficiency anemia type         Patient Profile   Social History     Social History Narrative          to Sher    3 children            Past Medical History   Patient Active Problem List   Diagnosis     Iron deficiency anemia     GERD (gastroesophageal reflux disease)     Family history of premature coronary artery disease     Allergic rhinitis     Achilles tendon infection       Past Surgical History  She has a past surgical history that includes Ankle surgery;  section; Oophorectomy (Right, ); and Hysterectomy ().     History of Present Illness   This 57 y.o. old woman comes in for preoperative evaluation.  This is a first time I am meeting the patient and her medical history was reviewed, electronic medical record was updated and it is reflected in this note.  She unfortunately has chronic wound and periodic infection of left ankle.  She is on Keflex for this and is going to have this I&D it.  She is otherwise feeling okay, bit of  nausea from the antibiotics.  No chest pain.  She was feeling short of breath earlier this year and had a exercise stress test which was negative for inducible ischemia.  She is anemic, iron deficient.  She does not tolerate oral iron and she gets iron infusions intermittently.  She states she has been anemic since her childbirth.  She has had upper and lower endoscopies and biopsies for celiac disease were negative.    Recent antiplatelet use: yes She stopped ibuprofen in preparation for the surgery  Personal or family history of bleeding or clotting disorders: no  Steroid use in the past year: no  Personal or family history of difficulty with anesthesia: no  Current cardiopulmonary symptoms: no    Review of Systems: A comprehensive review of systems was negative except as noted.     Medications and Allergies   Current Outpatient Prescriptions   Medication Sig Dispense Refill     cephalexin (KEFLEX) 500 MG capsule TK 1 C PO Q 6 H  5     ibuprofen (ADVIL,MOTRIN) 200 MG tablet Take 400 mg by mouth 3 (three) times a day as needed for pain.       ondansetron (ZOFRAN) 4 MG tablet Take 4 mg by mouth every 8 (eight) hours as needed for nausea.       pantoprazole (PROTONIX) 40 MG tablet Take 1 tablet (40 mg total) by mouth 2 times a day at 9:00am and 5:00pm. 180 tablet 3     sucralfate (CARAFATE) 100 mg/mL suspension Take 1 g by mouth 4 (four) times a day as needed (for heartburn, spasms).       No current facility-administered medications for this visit.      Allergies   Allergen Reactions     Morphine Hives     Adhesive Tape-Silicones Rash     Other Drug Allergy (See Comments) Rash     CHLORAPREP        Family and Social History   Family History   Problem Relation Age of Onset     Heart attack Mother      Thyroid disease Mother      Heart attack Father      Alzheimer's disease Sister      Stroke Sister      No Medical Problems Brother      No Medical Problems Sister      No Medical Problems Brother      No Medical  "Problems Daughter      No Medical Problems Son      No Medical Problems Son      Breast cancer Neg Hx         Social History   Substance Use Topics     Smoking status: Former Smoker     Types: Cigarettes     Quit date: 3/14/2003     Smokeless tobacco: Never Used      Comment: Quit 2003     Alcohol use No        Physical Exam   General Appearance:   No acute distress    /74 (Patient Site: Left Arm, Patient Position: Sitting, Cuff Size: Adult Regular)  Pulse 66  Ht 5' 5\" (1.651 m)  Wt 116 lb (52.6 kg)  SpO2 97%  BMI 19.3 kg/m2    EYES: Eyelids, conjunctiva, and sclera were normal. Pupils were normal. Cornea, iris, and lens were normal bilaterally.  HEAD, EARS, NOSE, MOUTH, AND THROAT: Head and face were normal. Hearing was normal to voice and the ears were normal to external exam. Nose appearance was normal and there was no discharge. Oropharynx was normal.  NECK: Neck appearance was normal. There were no neck masses and the thyroid was not enlarged.  RESPIRATORY: Breathing pattern was normal and the chest moved symmetrically.  Percussion/auscultatory percussion was normal.  Lung sounds were normal and there were no abnormal sounds.  CARDIOVASCULAR: Heart rate and rhythm were normal.  S1 and S2 were normal and there were no extra sounds or murmurs. Peripheral pulses in arms and legs were normal.  Jugular venous pressure was normal.  There was no peripheral edema.  GASTROINTESTINAL: The abdomen was normal in contour.  Bowel sounds were present.  Percussion detected no organ enlargement or tenderness.  Palpation detected no tenderness, mass, or enlarged organs.   MUSCULOSKELETAL: Skeletal configuration was normal and muscle mass was normal for age. Joint appearance was overall normal.  Her left ankle is bandaged  LYMPHATIC: There were no enlarged nodes.  SKIN/HAIR/NAILS: Skin color was normal.  There were no skin lesions.  Hair and nails were normal.  NEUROLOGIC: The patient was alert and oriented to person, " place, time, and circumstance. Speech was normal. Cranial nerves were normal. Motor strength was normal for age. The patient was normally coordinated.  PSYCHIATRIC:  Mood and affect were normal and the patient had normal recent and remote memory. The patient's judgment and insight were normal.       Additional Tests   Laboratory: Reviewed her recent hemogram, CRP and culture    Radiology: I reviewed her exercise stress test    Electrocardiogram: Exercise stress test    Total time spent with the patient today was 40 minutes of which > 50% was spent in counseling and coordination of care     Mayito Sosa MD  Internal Medicine  Contact me at [phone]

## 2021-06-13 NOTE — ANESTHESIA CARE TRANSFER NOTE
Last vitals:   Vitals:    10/19/17 1434   BP: 134/74   Pulse: (!) 110   Resp: 12   Temp: 36  C (96.8  F)   SpO2: 100%     Patient's level of consciousness is drowsy  Spontaneous respirations: yes  Maintains airway independently: yes  Dentition unchanged: yes  Oropharynx: oropharynx clear of all foreign objects    QCDR Measures:  ASA# 20 - Surgical Safety Checklist: WHO surgical safety checklist completed prior to induction  PQRS# 430 - Adult PONV Prevention: 4558F - Pt received => 2 anti-emetic agents (different classes) preop & intraop  ASA# 8 - Peds PONV Prevention: NA - Not pediatric patient, not GA or 2 or more risk factors NOT present  PQRS# 424 - [facility]-op Temp Management: 4559F - At least one body temp DOCUMENTED => 35.5C or 95.9F within required timeframe  PQRS# 426 - PACU Transfer Protocol: - Transfer of care checklist used  ASA# 14 - Acute Post-op Pain: ASA14B - Patient did NOT experience pain >= 7 out of 10

## 2021-06-13 NOTE — ANESTHESIA PROCEDURE NOTES
Peripheral Block    Patient location during procedure: pre-op  Start time: 10/19/2017 12:01 PM  End time: 10/19/2017 12:03 PM  post-op analgesia per surgeon order as noted in medical record  Staffing:  Performing  Anesthesiologist: GUI MAYBERRY  Preanesthetic Checklist  Completed: patient identified, site marked, risks, benefits, and alternatives discussed, timeout performed, consent obtained, at patient's request, airway assessed, oxygen available, suction available, emergency drugs available and hand hygiene performed  Peripheral Block  Block type: saphenous, adductor canal block  Prep: ChloraPrep  Patient position: supine  Patient monitoring: cardiac monitor, continuous pulse oximetry, heart rate and blood pressure  Laterality: left  Injection technique: ultrasound guided    Ultrasound used to visualize needle placement in proximity to nerve being blocked: yes   Permanent ultrasound image captured for medical record    Needle  Needle type: Stimuplex   Needle gauge: 20G  Needle length: 4 in  no peripheral nerve catheter placed  Assessment  Injection assessment: no difficulty with injection, negative aspiration for heme, no paresthesia on injection and incremental injection

## 2021-06-13 NOTE — ANESTHESIA POSTPROCEDURE EVALUATION
Patient: Anna PARRIS Underwood  IRRIGATION AND DEBRIDEMENT,LEFT ACHILLES, SPLIT THICKNESS SKIN GRAFT  Anesthesia type: general    Patient location: PACU  Last vitals:   Vitals:    10/19/17 1540   BP: 156/80   Pulse: 74   Resp: 16   Temp: 37.2  C (98.9  F)   SpO2: 99%     Post vital signs: stable  Level of consciousness: alert and conversant  Post-anesthesia pain: pain controlled  Post-anesthesia nausea and vomiting: no  Pulmonary: unassisted, return to baseline  Cardiovascular: stable and blood pressure at baseline  Hydration: adequate  Anesthetic events: no    QCDR Measures:  ASA# 11 - [facility]-op Cardiac Arrest: ASA11B - Patient did NOT experience unanticipated cardiac arrest  ASA# 12 - [facility]-op Mortality Rate: ASA12B - Patient did NOT die  ASA# 13 - PACU Re-Intubation Rate: ASA13B - Patient did NOT require a new airway mgmt  ASA# 10 - Composite Anes Safety: ASA10A - No serious adverse event    Additional Notes:

## 2021-06-14 ENCOUNTER — TRANSFERRED RECORDS (OUTPATIENT)
Dept: HEALTH INFORMATION MANAGEMENT | Facility: CLINIC | Age: 61
End: 2021-06-14

## 2021-06-14 NOTE — PROGRESS NOTES
Service: Infectious Disease Clinic  Note: Progress Note  Date: 11/13/2017    ASSESSMENT:  Wound infection-- due to slow wound healing serving as portal of entry of pathogens  10/19/17: IRRIGATION AND DEBRIDEMENT,LEFT ACHILLES, SPLIT THICKNESS SKIN GRAFT (Left)  Most recent wound culture taken on 10/19/17: Polymicrobial with skin, /GI pathogens--likely contaminated wound:    3+ Bacteroides fragilis  3+ Pseudomonas aeruginosa (!)      3+ Citrobacter freundii (!)      1+ Streptococcus viridans group (!)      1+ Coagulase negative Staphylococcus (!)      1+ Enterococcus faecalis     Improved with Ciprofloxacin and Doxycycline, and wound vac has been removed. No longer has odor. Wound size decreased. Skin graft with central area of discoloration that appears non viable. Slough      8/16/17: IRRIGATION AND DEBRIDEMENT, LEFT ACHILLIES (Left)-- Staphylococcus aureus.   Wound dehiscence. No abscess, or osteomyelitis  Previous hx of left Achilles tendon region infection with Staph lugdenensis in 2015. Treated  Patient has small opening left, and no clinical recurrence of infection from 2015 to about 8/10/2017  Patient has been on IV Ancef and IV Vancomycin while inpatient      PLAN:    Repeat wound cultures in clinic on 11/13/17-- superficial as the wound does not appear to have any tracking. According to patient the size has improved.   Empiric Cefdinir and Doxycycline and   We repeated cultures of the wound in the clinic today-- results should be available within 5 days (bacterial).   Labs today  Depending on results, will consider MRI/CT imaging.  ?Wound clinic/recommendations-- defer to Orthopedics.  Follow up with ID within 2-4 weeks    Literature review: higher risk of graft failure in lower limb skin grafts-- ?Orthopedic following. We may need prolonged antibiotic course and may need to repeat IV antiinfective therapy, prophylactic abx if skin graft is repeated.     Orders Placed This Encounter   Procedures     Wound  "culture, gram stain     Culture, Fungus, Dermal     Comprehensive metabolic panel     Sedimentation rate, automated     C-reactive protein     HM1 (CBC with Diff)     Discussed with the patient that this is a challenging situation with wound healing dependent on wound care, no contamination, and good blood supply to the wound area.       Paulina Ruggiero MD  [facility]  [phone]      Dermatology Research and Practice  Volume 2014 (2014), Article ID [id number]  \"[email or web address].doi.[email or web address]\">[email or web address].doi.[email or web address]  The Incidence and Risk Factors for Lower Limb Skin Graft Failure    ________________________________________________________________________    Notes / labs / vitals reviewed.    SUBJECTIVE / INTERVAL HISTORY:     Open wound on ankle, central discolored area-- slough. No systemic signs/symptoms.   Wound is improved on Ciprofloxacin and Doxycycline. Completed Ciprofloxacin and doxycycline is extended.       ROS: A complete 12 point ROS is negative except as listed above.    PMHx/FHx/SHx: Reviewed. Interval changes noted.        OBJECTIVE:  Vitals:    17 1457   BP: 142/82   Pulse: 60   Temp: 98  F (36.7  C)             Temp (24hrs), Av.3  F (36.8  C), Min:97.7  F (36.5  C), Max:98.9  F (37.2  C)      GEN: NAD  EYES:  Anicteric, EOM intact.  HEAD, EARS, NOSE, MOUTH, AND THROAT:  Moist mucosa  NECK:  Supple.   RESPIRATORY:  Normal breathing pattern.  CARDIOVASCULAR:  . No excess JVD.  ABDOMEN:  Non distended  MUSCULOSKELETAL:  Dressing on left foot/ankle removed-- slough.   Circular wound-- central discoloration  LYMPHATIC:  Dressing on left foot  SKIN/HAIR/NAILS:  Dressing on left foot  NEUROLOGIC:  Gross neurological exam non-focal. Ambulating  PSYCHIATRIC:  A&Ox3, appropriate, mentation normal.    Antibiotics:  Doxycycline  Ciprofloxacin    Pertinent labs:    Reviewed  Recent CBC, ESR and CRP normal    MICROBIOLOGY DATA:    Cultures reviewed  AFB " Stain [[id number]] Collected: 08/16/17 1319        Order Status: Completed Specimen: Swab from Ankle, Left Updated: 08/17/17 1448        AFB Stain No Acid-fast bacilli seen on smear        Org/Slide --       Culture and Gram Stain, Surgical Site [[id number]] (Abnormal) Collected: 08/16/17 1319       Order Status: Completed Specimen: Swab from Ankle, Left Updated: 08/17/17 1305        Culture 1+ Gram Positive Cocci (!)       Culture/Gram Stain: Tissue [[id number]] (Abnormal) Collected: 08/16/17 1320       Order Status: Completed Specimen: Tissue from Ankle, Left Updated: 08/17/17 1034        Culture 3+ Coagulase positive Staphylococcus (!)       Culture, Anaerobic [[id number]] Collected: 08/16/17 1319       Order Status: Sent Specimen: Swab from Ankle, Left Updated: 08/17/17 0715       Stat Gram Stain [[id number]] Collected: 08/16/17 1320       Order Status: Completed Specimen: Tissue from Ankle, Left Updated: 08/16/17 1533        Gram Stain Result 1+ Polymorphonuclear leukocytes         2+ Gram positive cocci in pairs       Stat Gram Stain [[id number]] Collected: 08/16/17 1319       Order Status: Completed Specimen: Swab from Ankle, Left Updated: 08/16/17 1441        Gram Stain Result 1+ Polymorphonuclear leukocytes         No organisms seen       Culture, Fungus, Fluid/Tissue [[id number]] Collected: 08/16/17 1320       Order Status: Sent Specimen: Tissue from Ankle, Left Updated: 08/16/17 1431       Culture, Mycobacterium, Bdy Fluid/Tissue [[id number]] Collected: 08/16/17 1320       Order Status: Sent Specimen: Tissue from Ankle, Left Updated: 08/16/17 1431       Culture, Mycobacterium, Bdy Fluid/Tissue [[id number]] Collected: 08/16/17 1319       Order Status: Sent Specimen: Swab from Ankle, Left Updated: 08/16/17 1420       Culture, Anaerobic [[id number]] Collected: 08/16/17 1320       Order Status: Sent Specimen: Tissue from Ankle, Left Updated: 08/16/17 1417       Culture/Gram Stain: Tissue [[id number]] Collected: 08/16/17 1319        Order Status: Canceled Specimen: Swab from Ankle, Left              IMAGING/RADIOLOGY:  Reviewed

## 2021-06-14 NOTE — TELEPHONE ENCOUNTER
Refill Approved    Rx renewed per Medication Renewal Policy. Medication was last renewed on 6/29/20, last OV 11/2/20.    Lorri Morales, [facility] Triage/Med Refill 1/9/2021     Requested Prescriptions   Pending Prescriptions Disp Refills     pantoprazole (PROTONIX) 40 MG tablet [Pharmacy Med Name: PANTOPRAZOLE 40MG TABLETS] 180 tablet 1     Sig: TAKE 1 TABLET(40 MG) BY MOUTH TWICE DAILY       GI Medications Refill Protocol Passed - 1/9/2021  4:01 AM        Passed - PCP or prescribing provider visit in last 12 or next 3 months.     Last office visit with prescriber/PCP: 1/21/2020 Jame Dunn MD OR same dept: 3/3/2020 Ayan Coreas MD OR same specialty: 3/3/2020 Ayan Coreas MD  Last physical: 9/28/2020 Last MTM visit: Visit date not found   Next visit within 3 mo: Visit date not found  Next physical within 3 mo: Visit date not found  Prescriber OR PCP: Jame Dunn MD  Last diagnosis associated with med order: 1. GERD (gastroesophageal reflux disease)  - pantoprazole (PROTONIX) 40 MG tablet [Pharmacy Med Name: PANTOPRAZOLE 40MG TABLETS]; TAKE 1 TABLET(40 MG) BY MOUTH TWICE DAILY  Dispense: 180 tablet; Refill: 1    If protocol passes may refill for 12 months if within 3 months of last provider visit (or a total of 15 months).

## 2021-06-14 NOTE — PROGRESS NOTES
ID note:    Anna NYE Yasmine  11/16/2017  12:50 PM    Chart reviewed    Cultures from ankle wound reviewed.  Ordered Ciprofloxacin  Called and updated the patient.  Patient is having GI adverse effects with Doxycycline + Cefdinir. Ok to stop Cefdinir and Doxycycline and start Ciprofloxacin  Follow up with Orthopedics    Paulina Ruggiero MD  [facility]  [phone]      Culture   1+ Pseudomonas aeruginosa (!)      2+ Coagulase negative Staphylococcus (!)      Stain   No polymorphonuclear leukocytes seen      No organisms seen      Resulting Agency: [facility]   Susceptibility    Pseudomonas aeruginosa     ZORAIDA     Aztreonam <=2  Sensitive     Cefepime <=1  Sensitive     Ceftazidime <=2  Sensitive     Ciprofloxacin <=0.5  Sensitive     Gentamicin <=2  Sensitive     Levofloxacin <=1  Sensitive     Meropenem <=0.25  Sensitive     Tobramycin <=2  Sensitive              Specimen Collected: 11/13/17  6:31 PM   Last Resulted: 11/16/17 10:30 AM

## 2021-06-15 ENCOUNTER — HOSPITAL ENCOUNTER (OUTPATIENT)
Dept: WOUND CARE | Facility: CLINIC | Age: 61
End: 2021-06-15
Attending: SURGERY
Payer: COMMERCIAL

## 2021-06-15 DIAGNOSIS — L97.323 SKIN ULCER OF LEFT ANKLE WITH NECROSIS OF MUSCLE (H): ICD-10-CM

## 2021-06-15 DIAGNOSIS — T81.89XD NON-HEALING SURGICAL WOUND, SUBSEQUENT ENCOUNTER: ICD-10-CM

## 2021-06-15 PROCEDURE — 99212 OFFICE O/P EST SF 10 MIN: CPT | Mod: 95 | Performed by: SURGERY

## 2021-06-15 NOTE — PROGRESS NOTES
Assessment & Plan     Essential hypertension  Blood pressure inadequately controlled at her recent physical and started on Toprol-XL 25 mg daily.  Tolerating medication without side effects.  She is having less palpitations.  Blood pressure has come under control and is 134/82 today.  She will continue current dose.  - Urinalysis-UC if Indicated    Iron deficiency anemia, unspecified iron deficiency anemia type  Mild anemia noted at recent physical with hemoglobin 11.7.  She already takes multivitamin with iron and is not able to tolerate iron supplements causing too much GI side effects.  In the past, she has needed iron infusions.  That is not indicated at this time but hemoglobin should be monitored closely and if continues to drop, infusion may be necessary.  - multivitamin (ONE A DAY) per tablet  Dispense: 120 tablet; Refill: 2    Impaired fasting glucose  Noted to have fasting glucose of 119 and a recent physical raising concern for possible prediabetes.  Will obtain A1c.  She does not have obvious risk factors for diabetes.  - Glycosylated Hemoglobin A1c    Abnormal TSH  Noted to have suppressed TSH raising concern for possible hyperthyroidism.  May explain recent blood pressure elevation, mild tachycardia and palpitations.  If TSH remains suppressed, proceed for work-up for hyperthyroidism with nuc med study of thyroid  - Thyroid Stimulating Hormone (TSH)  - T4, Free    Osteoporosis of multiple sites without pathological fracture  She is due for her third injection of Prolia but this will need to be postponed until March 26 as she is scheduled for her second Covid vaccine on March 11.  Appointment will be made.    Recent URI responding to Z-Zaire and steroids               No follow-ups on file.    Jame Dunn MD  [facility]    Subjective       HPI 60-year-old woman here to follow-up osteoporosis, hypertension, impaired fasting glucose, suppressed TSH.  See  "assessment and plan for details of visit    Current Outpatient Medications on File Prior to Visit   Medication Sig Dispense Refill     ergocalciferol (ERGOCALCIFEROL) 1,250 mcg (50,000 unit) capsule Take 1 capsule (50,000 Units total) by mouth once a week. 13 capsule 3     ibuprofen (ADVIL) 200 MG tablet Take 600 mg by mouth every 6 (six) hours as needed for pain.              metoprolol succinate (TOPROL XL) 25 MG Take 1 tablet (25 mg total) by mouth daily. 90 tablet 3     pantoprazole (PROTONIX) 40 MG tablet TAKE 1 TABLET(40 MG) BY MOUTH TWICE DAILY 180 tablet 2     sucralfate (CARAFATE) 100 mg/mL suspension Take 1 g by mouth 4 (four) times a day as needed.       [DISCONTINUED] ferrous gluconate (FERGON) 324 MG tablet Take 1 tablet (324 mg total) by mouth daily with breakfast.  0     [DISCONTINUED] azithromycin (ZITHROMAX) 250 MG tablet Take 500mg (2 tablets) day one, and then 250mg (1 tablet) days 2-5 6 tablet 1     [DISCONTINUED] dexAMETHasone (DECADRON) 6 MG tablet Take 6 mg by mouth 2 (two) times a day with meals. 5 tablet 1     Current Facility-Administered Medications on File Prior to Visit   Medication Dose Route Frequency Provider Last Rate Last Admin     denosumab 60 mg (PROLIA 60 mg/ml)  60 mg Subcutaneous Q6 Months Jame Dunn MD   60 mg at 09/04/20 0902        Review of Systems    12 point ROS is negative other than what is described in assessment and plan and above      Objective    Vitals:    03/05/21 1601   BP: 134/82   Patient Site: Right Arm   Patient Position: Sitting   Cuff Size: Adult Regular   Pulse: 100   Temp: 97.4  F (36.3  C)   TempSrc: Tympanic   SpO2: 100%   Weight: 116 lb (52.6 kg)   Height: 5' 3.25\" (1.607 m)        Physical Exam        "

## 2021-06-15 NOTE — PROGRESS NOTES
Service: Infectious Disease Clinic  Note: Progress Note  Date: 1/8/2018    ASSESSMENT:  Wound infection-- due to slow wound healing serving as portal of entry of pathogens  10/19/17: IRRIGATION AND DEBRIDEMENT,LEFT ACHILLES, SPLIT THICKNESS SKIN GRAFT (Left)  Most recent wound culture taken on 10/19/17: Polymicrobial with skin, /GI pathogens--likely contaminated wound:    3+ Bacteroides fragilis  3+ Pseudomonas aeruginosa (!)      3+ Citrobacter freundii (!)      1+ Streptococcus viridans group (!)      1+ Coagulase negative Staphylococcus (!)      1+ Enterococcus faecalis     Improved with Ciprofloxacin and Doxycycline, and wound vac has been removed. No longer has odor. Wound size decreased. Skin graft with central area of discoloration that appears non viable. Slough      8/16/17: IRRIGATION AND DEBRIDEMENT, LEFT ACHILLIES (Left)-- Staphylococcus aureus.   Wound dehiscence. No abscess, or osteomyelitis  Previous hx of left Achilles tendon region infection with Staph lugdenensis in 2015. Treated  Patient has small opening left, and no clinical recurrence of infection from 2015 to about 8/10/2017  Patient has been on IV Ancef and IV Vancomycin while inpatient    Repeat wound culture: 11/16/17:    1+ Pseudomonas aeruginosa (!)      2+ Coagulase negative Staphylococcus (!)       PLAN:    Patient was treated with Ciprofloxacin, and dated 11/30/2017.. Wound base clean, and scant drainage.   Referral to Plastic Surgery at [facility].    Follow up with ID in 4 weeks    Literature review: higher risk of graft failure in lower limb skin grafts-- ?Orthopedic following. We may need prolonged antibiotic course and may need to repeat IV antiinfective therapy, prophylactic abx if skin graft is repeated.     Orders Placed This Encounter   Procedures     Ambulatory referral to Plastic Surgery     Discussed with the patient that this is a challenging situation with wound healing dependent on wound care, no  "contamination, and good blood supply to the wound area.       Paulina Ruggiero MD  [facility]  [phone]        Patient Instructions   Hi Anna,  Let's plan on getting a second opinion from Plastic Surgery, Archie Parker MD  , Department of Surgery , [facility].  Clinical Information   For general inquiries: [phone] or   [phone] (toll-free)    Adult appointments: [phone]  Adult referrals: [phone]    As we discussed, Dr. Parker, specializes in wound care and may have recommendations regarding next steps. At this time, the wound itself does not appear infected. We still recommend having Ciprofloxacin, available, if you notice any signs of infections, please start antibiotics (Ciprofloxacin, based on previous culture results), and contact us.     Follow up as needed.    Paulina Ruggiero MD  [facility]  [phone]          Dermatology Research and Practice  Volume 2014 (2014), Article ID [id number]  \"[email or web address].[email or web address]\">[email or web address].[email or web address]/[email or web address]  The Incidence and Risk Factors for Lower Limb Skin Graft Failure    ________________________________________________________________________    Notes / labs / vitals reviewed.    SUBJECTIVE / INTERVAL HISTORY: No increased drainage from the wound, did not tolerate doxycycline and cefdinir.   Patient is busy with work and taking care of her granddaughter Sera Hernandez, who is now 2 months old.  Patient's options for wound healing include repeating a graft, repeating a myocutaneous flap, and referral to plastic surgery.  Patient has been seeing Dr. Wiley orthopedics.  Previous attempts at skin graft have failed.  Discussed options about referral to [facility].      11/16/17: Open wound on ankle, central discolored area-- slough. No systemic signs/symptoms.   Wound is improved on Ciprofloxacin and Doxycycline. Completed " Ciprofloxacin and doxycycline is extended.       ROS: A complete 12 point ROS is negative except as listed above.    PMHx/FHx/SHx: Reviewed. Interval changes noted.        OBJECTIVE:  Vitals:    18 1419   BP: 120/70   Pulse: 64   Temp: 97.9  F (36.6  C)             Temp (24hrs), Av.3  F (36.8  C), Min:97.7  F (36.5  C), Max:98.9  F (37.2  C)      GEN: NAD  EYES:  Anicteric, EOM intact.  HEAD, EARS, NOSE, MOUTH, AND THROAT:  Moist mucosa  NECK:  Supple.   RESPIRATORY:  Normal breathing pattern.  CARDIOVASCULAR:  . No excess JVD.  ABDOMEN:  Non distended  MUSCULOSKELETAL:  Dressing on left foot/ankle removed-and drainage, clear.  Serous.  Circular wound--pink, smooth tissue.  No signs of cellulitis.    LYMPHATIC:  Dressing on left foot  SKIN/HAIR/NAILS:  Dressing on left foot  NEUROLOGIC:  Gross neurological exam non-focal. Ambulating  PSYCHIATRIC:  A&Ox3, appropriate, mentation normal.    Antibiotics:  Doxycycline stopped  Ciprofloxacin 2017 to 2017    Pertinent labs:    Reviewed  Recent CBC, ESR and CRP normal    MICROBIOLOGY DATA:  Wound culture, gram stain   Status:  Final result   . Order: [id number]     Culture      1+ Pseudomonas aeruginosa (!)          2+ Coagulase negative Staphylococcus (!)            Stain      No polymorphonuclear leukocytes seen          No organisms seen              Resulting Agency: [facility]       Susceptibility       Pseudomonas aeruginosa       ZORAIDA       Aztreonam <=2  Sensitive       Cefepime <=1  Sensitive       Ceftazidime <=2  Sensitive       Ciprofloxacin <=0.5  Sensitive       Gentamicin <=2  Sensitive       Levofloxacin <=1  Sensitive       Meropenem <=0.25  Sensitive       Tobramycin <=2  Sensitive                    Specimen Collected: 17  6:31 PM Last Resulted: 17 10:30                 Cultures reviewed  AFB Stain [[id number]] Collected: 17 1319        Order Status: Completed Specimen: Swab from Ankle, Left Updated: 17 1448        AFB  Stain No Acid-fast bacilli seen on smear        Org/Slide --       Culture and Gram Stain, Surgical Site [[id number]] (Abnormal) Collected: 08/16/17 1319       Order Status: Completed Specimen: Swab from Ankle, Left Updated: 08/17/17 1305        Culture 1+ Gram Positive Cocci (!)       Culture/Gram Stain: Tissue [[id number]] (Abnormal) Collected: 08/16/17 1320       Order Status: Completed Specimen: Tissue from Ankle, Left Updated: 08/17/17 1034        Culture 3+ Coagulase positive Staphylococcus (!)       Culture, Anaerobic [[id number]] Collected: 08/16/17 1319       Order Status: Sent Specimen: Swab from Ankle, Left Updated: 08/17/17 0715       Stat Gram Stain [[id number]] Collected: 08/16/17 1320       Order Status: Completed Specimen: Tissue from Ankle, Left Updated: 08/16/17 1533        Gram Stain Result 1+ Polymorphonuclear leukocytes         2+ Gram positive cocci in pairs       Stat Gram Stain [[id number]] Collected: 08/16/17 1319       Order Status: Completed Specimen: Swab from Ankle, Left Updated: 08/16/17 1441        Gram Stain Result 1+ Polymorphonuclear leukocytes         No organisms seen       Culture, Fungus, Fluid/Tissue [[id number]] Collected: 08/16/17 1320       Order Status: Sent Specimen: Tissue from Ankle, Left Updated: 08/16/17 1431       Culture, Mycobacterium, Bdy Fluid/Tissue [[id number]] Collected: 08/16/17 1320       Order Status: Sent Specimen: Tissue from Ankle, Left Updated: 08/16/17 1431       Culture, Mycobacterium, Bdy Fluid/Tissue [[id number]] Collected: 08/16/17 1319       Order Status: Sent Specimen: Swab from Ankle, Left Updated: 08/16/17 1420       Culture, Anaerobic [[id number]] Collected: 08/16/17 1320       Order Status: Sent Specimen: Tissue from Ankle, Left Updated: 08/16/17 1417       Culture/Gram Stain: Tissue [[id number]] Collected: 08/16/17 1319       Order Status: Canceled Specimen: Swab from Ankle, Left              IMAGING/RADIOLOGY:  Reviewed

## 2021-06-15 NOTE — PROGRESS NOTES
Telehealth visit, telephone, start time 1:29 PM, end time 1:36 PM  Discussed review of Dr. 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  Hematology consult also pending to assure that the patient does not have a underlying hypercoagulable disorder that may have contributed.  I also discussed that we would involve plastic surgery at [facility],Dr Olivarez, when appropriate.  She continues to irrigate the wound 3 times a day with hypochlorous acid and remains on oral antibiotics.  She will be following up with infectious disease in the [facility] system.  We will follow-up with us via telehealth in 2 weeks.

## 2021-06-15 NOTE — PROGRESS NOTES
Anna Underwood is a 60 y.o. female who is being evaluated via a billable telephone visit.      What phone number would you like to be contacted at? [phone]  How would you like to obtain your AVS? AVS Preference: MyChart.    Assessment & Plan     Cough with fever likely pneumonia.  Prior history of pneumonia.  Z-Zaire plus Decadron 6 mg daily for the latter for 5 days.  1 refill on both.  Push fluids rest Tylenol plus Robitussin-DM cough syrup.  Call in 5 days time regarding status if symptoms worsen emergency room evaluation.    Review of external notes as documented in note  11 minutes spent on the date of the encounter doing chart review, patient visit and documentation        No follow-ups on file.    Aquiles Lee MD  [facility]   Anna Underwood is 60 y.o. and presents today for the following health issues   HPI       Hypertension Follow-up      Do you check your blood pressure regularly outside of the clinic? Yes     Are you following a low salt diet? No    Are your blood pressures ever more than 140 on the top number (systolic) OR more       than 90 on the bottom number (diastolic), for example 140/90? No      How many servings of fruits and vegetables do you eat daily?  0-1    On average, how many sweetened beverages do you drink each day (Examples: soda, juice, sweet tea, etc.  Do NOT count diet or artificially sweetened beverages)?   1    How many days per week do you exercise enough to make your heart beat faster? 3 or less    How many minutes a day do you exercise enough to make your heart beat faster? 9 or less    How many days per week do you miss taking your medication? 0        Review of Systems  No blood in stool or urine no blood in sputum.    Fever with cough yellow sputum.  Covid test Saturday no results yet this is Monday.  Since Thursday symptoms began Wednesday afternoon.  99.8 99.9.  Got first Covid vaccine tried over-the-counter  cough syrup.  Has had pneumonia in the past several times.    128/79 pulse 105.  No oximetry reading available.      Objective    Vitals - Patient Reported  Systolic (Patient Reported): 128  Diastolic (Patient Reported): 79  Temperature (Patient Reported): 99.9  F (37.7  C)  Pulse (Patient Reported): 105    Physical Exam  No physical examination was done blood pressure 128/79 per patient's report plus pulse 105.  May need emergency room evaluation if symptoms do not improve.  In the meantime Decadron 6 mg tablets 1 daily    No physical examination was done as this was a telephone visit.  Virtual visit.            Phone call duration: 11 minutes

## 2021-06-16 ENCOUNTER — RECORDS - HEALTHEAST (OUTPATIENT)
Dept: ADMINISTRATIVE | Facility: OTHER | Age: 61
End: 2021-06-16

## 2021-06-16 ASSESSMENT — MIFFLIN-ST. JEOR
SCORE: 1081.64
SCORE: 1081.64

## 2021-06-16 NOTE — ANESTHESIA PROCEDURE NOTES
Peripheral Block    Start time: 3/23/2018 7:23 AM  End time: 3/23/2018 7:25 AM    Staffing:  Performing  Anesthesiologist: HAILE RIVERS  Performing CRNA: SICARD, SHANNON M  Preanesthetic Checklist  Completed: patient identified, site marked, risks, benefits, and alternatives discussed, timeout performed, consent obtained, at patient's request, airway assessed, oxygen available, suction available, emergency drugs available and hand hygiene performed  Peripheral Block  Block type: saphenous, adductor canal block  Prep: ChloraPrep  Patient position: supine  Patient monitoring: cardiac monitor, continuous pulse oximetry, heart rate and blood pressure  Laterality: left  Injection technique: ultrasound guided    Ultrasound used to visualize needle placement in proximity to nerve being blocked: yes   Permanent ultrasound image captured for medical record  Sterile gel and probe cover used for ultrasound.    Needle  Needle type: Stimuplex   Needle gauge: 21 G  Needle length: 3 in  no peripheral nerve catheter placed  Assessment  Injection assessment: no difficulty with injection, negative aspiration for heme, no paresthesia on injection and incremental injection  Additional Notes  stephani well
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
## 2021-06-16 NOTE — ANESTHESIA CARE TRANSFER NOTE
Last vitals:   Vitals:    03/23/18 0859   BP: 145/87   Pulse: (!) 133   Resp: 16   Temp: 36.8  C (98.2  F)   SpO2: 94%       Patient's level of consciousness is drowsy  Spontaneous respirations: yes  Maintains airway independently: yes  Dentition unchanged: yes  Oropharynx: oropharynx clear of all foreign objects    QCDR Measures:  ASA# 20 - Surgical Safety Checklist: WHO surgical safety checklist completed prior to induction  PQRS# 430 - Adult PONV Prevention: 4558F - Pt received => 2 anti-emetic agents (different classes) preop & intraop  ASA# 8 - Peds PONV Prevention: NA - Not pediatric patient, not GA or 2 or more risk factors NOT present  PQRS# 424 - [facility]-op Temp Management: 4559F - At least one body temp DOCUMENTED => 35.5C or 95.9F within required timeframe  PQRS# 426 - PACU Transfer Protocol: - Transfer of care checklist used  ASA# 14 - Acute Post-op Pain: ASA14B - Patient did NOT experience pain >= 7 out of 10

## 2021-06-16 NOTE — PROGRESS NOTES
Her cultures are all growin e coli (pansensitive) and strep viridans.    Plan: dc zosyn  Ceftriaxone 2 gm/day x 7 days then reassess after she sees Dr. Wiley.    Orders call to [facility].  Chucho Cameron

## 2021-06-16 NOTE — ANESTHESIA PREPROCEDURE EVALUATION
Anesthesia Evaluation      Patient summary reviewed     Airway   Mallampati: II  Neck ROM: full   Pulmonary - negative ROS and normal exam    breath sounds clear to auscultation                         Cardiovascular - negative ROS  Rhythm: regular  Rate: normal,         Neuro/Psych    (+) chronic pain    Endo/Other - negative ROS      GI/Hepatic/Renal            Dental - normal exam                        Anesthesia Plan  Planned anesthetic: peripheral nerve block and MAC    ASA 2   Induction: intravenous   Anesthetic plan and risks discussed with: patient    Post-op plan: routine recovery

## 2021-06-16 NOTE — PROGRESS NOTES
"Prolia Injection Phone Screen      Screening questions have been asked 2-3 days prior to administration visit for Prolia. If any questions are answered with \"Yes,\" this phone encounter were will routed to ordering provider for further evaluation.     1.  When was the last injection?  9/4/2020    2.  Has insurance for this injection been verified?  Yes    3.  Did you experience any new onset achiness or rashes that lasted for over a month with your previous Prolia injection?   No    4.  Do you have a fever over 101?F or a new deep cough that is unusual for you today? No    5.  Have you started any new medications in the last 6 months that you were told could affect your immune system? These may have been prescribed by oncologist, transplant, rheumatology, or dermatology.   No    6.  In the last 6 months have you have gastric bypass or parathyroid surgery?   No    7.  Do you plan dental work requiring drilling into the bone such as implants/extractions or oral surgery in the next 2-3 months?   No    8. Do you have new insurance since the last injection? No    9. Have you received the Covid-19 vaccine? Yes  If No - Proceed with Prolia injection  If Yes - Date of vaccination 3/11/21. Will need to wait until 2 weeks after 2nd dose of Covid-19 vaccine before administering Prolia       Patient informed if symptoms discussed above present prior to their administration appointment, they are to notify clinic immediately.     Cherelle Gil              "

## 2021-06-16 NOTE — ANESTHESIA POSTPROCEDURE EVALUATION
Patient: Anna RobertsonLing  IRRIGATION AND DEBRIDEMENT LEFT ANKLE  Anesthesia type: regional    Patient location: PACU  Last vitals:   Vitals:    03/23/18 1200   BP: 129/62   Pulse: 79   Resp: 16   Temp: 37  C (98.6  F)   SpO2: 95%     Post vital signs: stable  Level of consciousness: awake and responds to simple questions  Post-anesthesia pain: pain controlled  Post-anesthesia nausea and vomiting: no  Pulmonary: unassisted, return to baseline  Cardiovascular: stable and blood pressure at baseline  Hydration: adequate  Anesthetic events: no    QCDR Measures:  ASA# 11 - [facility]-op Cardiac Arrest: ASA11B - Patient did NOT experience unanticipated cardiac arrest  ASA# 12 - [facility]-op Mortality Rate: ASA12B - Patient did NOT die  ASA# 13 - PACU Re-Intubation Rate: ASA13B - Patient did NOT require a new airway mgmt  ASA# 10 - Composite Anes Safety: ASA10A - No serious adverse event    Additional Notes:

## 2021-06-16 NOTE — TELEPHONE ENCOUNTER
Telephone Encounter by Oxana Kamara at 4/10/2019 12:41 PM     Author: Oxana Kamara Service: -- Author Type: --    Filed: 4/10/2019 12:41 PM Encounter Date: 4/9/2019 Status: Signed    : Oxana Kamara APPROVED:    Approval start date:04/01/2019  Approval end date:04/01/2020    Pharmacy has been notified of approval and will contact patient when medication is ready for pickup.
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
## 2021-06-16 NOTE — ANESTHESIA PROCEDURE NOTES
Peripheral Block    Patient location during procedure: pre-op  Start time: 3/23/2018 7:18 AM  End time: 3/23/2018 7:25 AM  post-op analgesia per surgeon order as noted in medical record  Staffing:  Performing  Anesthesiologist: HAILE RIVERS  Performing CRNA: SICARD, SHANNON M  Preanesthetic Checklist  Completed: patient identified, site marked, risks, benefits, and alternatives discussed, timeout performed, consent obtained, at patient's request, airway assessed, oxygen available, suction available, emergency drugs available and hand hygiene performed  Peripheral Block  Block type: sciatic, popliteal  Prep: ChloraPrep  Patient position: supine  Patient monitoring: continuous pulse oximetry, cardiac monitor, blood pressure and heart rate  Injection technique: ultrasound guided    Ultrasound used to visualize needle placement in proximity to nerve being blocked: yes   Permanent ultrasound image captured for medical record  Sterile gel and probe cover used for ultrasound.    Needle  Needle type: Stimuplex   Needle gauge: 21 G  Needle length: 3 in  no peripheral nerve catheter placed  Assessment  Injection assessment: no difficulty with injection, negative aspiration for heme, no paresthesia on injection and incremental injection  Additional Notes  stephani well

## 2021-06-17 ENCOUNTER — COMMUNICATION - HEALTHEAST (OUTPATIENT)
Dept: SCHEDULING | Facility: CLINIC | Age: 61
End: 2021-06-17

## 2021-06-17 ENCOUNTER — RECORDS - HEALTHEAST (OUTPATIENT)
Dept: ADMINISTRATIVE | Facility: OTHER | Age: 61
End: 2021-06-17

## 2021-06-17 LAB
BACTERIA SPEC CULT: ABNORMAL
Lab: ABNORMAL
SPECIMEN SOURCE: ABNORMAL

## 2021-06-17 NOTE — PROGRESS NOTES
"    Assessment & Plan     Acute bronchitis, unspecified organism  Cough  Patient coming in for few days of cough, chest pain, subjective/low-grade fever.  She has been fully vaccinated for COVID-19 for several months now and has no known exposures, we did obtain Covid swab to rule out.  She has a history of community-acquired pneumonia and thus a chest x-ray and CBC are completed both of which were completely normal.  I suspect patient has acute bronchitis due to viral infection.  Will treat with pseudoephedrine and guaifenesin.  - Symptomatic COVID-19 Virus (CORONAVIRUS) PCR  - HM1(CBC and Differential)  - XR Chest 2 Views  - pseudoephedrine-guaifenesin (MUCINEX D)  mg per tablet  Dispense: 20 tablet; Refill: 0    Follow-up with PCP next week, or sooner if worsening.    Galileo Cottrell MD  [facility]    Subjective   Anna Underwood is 61 y.o. and presents today for the following health issues     HPI   Patient presents today stating she is \"not feeling well\".  Patient clarifies that she is having symptoms of cough, chest pain, fever, chills.  Patient symptoms started on Thursday and have been consistent though not necessarily getting worse.  She qualifies that her cough is productive and she is having coughing fits so hard that she vomits at times.  She also states that she has burning lung pain and gestures to the central airway.  She measured a fever to 100 at home states she has been having chills.  She has had no hemoptysis, no congestion, no change in taste or smell.  Patient states she has had diffuse joint pain.  She has had no Covid exposures and has been fully vaccinated since March.    Patient has a 20 pack year smoking history, she quit smoking 20 years ago.  She has a history of abnormal chest CT with inflammatory obstruction and community-acquired pneumonias.  She was last treated for a community-acquired pneumonia with a Z-Zaire and Decadron in " February.  She has no antibiotic allergies.    Review of Systems  10 point review of systems negative except per HPI      Objective    There were no vitals taken for this visit.  There is no height or weight on file to calculate BMI.  Physical Exam  General: Healthy alert, in no acute distress  Skin: Warm dry no rashes or lesions  Lungs: Clear to auscultation, symmetrical air expansion  Heart: Regular rate and rhythm, no murmurs appreciated  Mouth: Mucous membranes moist  Nose: No rhinorrhea  Neuro: Grossly intact  Psych: Normal wide range of affect    Recent Results (from the past 24 hour(s))   HM1 (CBC with Diff)   Result Value Ref Range    WBC 7.0 4.0 - 11.0 thou/uL    RBC 4.25 3.80 - 5.40 mill/uL    Hemoglobin 11.7 (L) 12.0 - 16.0 g/dL    Hematocrit 37.7 35.0 - 47.0 %    MCV 89 80 - 100 fL    MCH 27.5 27.0 - 34.0 pg    MCHC 31.0 (L) 32.0 - 36.0 g/dL    RDW 14.4 11.0 - 14.5 %    Platelets 301 140 - 440 thou/uL    MPV 9.2 7.0 - 10.0 fL    Neutrophils % 59 50 - 70 %    Lymphocytes % 26 20 - 40 %    Monocytes % 10 2 - 10 %    Eosinophils % 5 0 - 6 %    Basophils % 1 0 - 2 %    Immature Granulocyte % 0 <=0 %    Neutrophils Absolute 4.1 2.0 - 7.7 thou/uL    Lymphocytes Absolute 1.8 0.8 - 4.4 thou/uL    Monocytes Absolute 0.7 0.0 - 0.9 thou/uL    Eosinophils Absolute 0.3 0.0 - 0.4 thou/uL    Basophils Absolute 0.0 0.0 - 0.2 thou/uL    Immature Granulocyte Absolute 0.0 <=0.0 thou/uL       EXAM: XR CHEST 2 VIEWS  LOCATION: [facility]  DATE/TIME: 05/23/2021, 11:28 AM     INDICATION: Cough.  COMPARISON: 11/29/2019.     IMPRESSION:   Heart and mediastinal size are normal. Lungs are hyperexpanded. There are no acute airspace opacities. No pleural effusion or pneumothorax.

## 2021-06-17 NOTE — PROGRESS NOTES
[facility] called.  On meropenem 1 q 8 hr for chonic Achilles infection.  Culture 3/23 with alpha strep, e coli and several anaerobic GNB.  PICC fell out. PIV - getting bad tape allergy. She is refusing IVs.    Plan: augmentin 500 mg tid x 2 weeks and f/u with Dr. Ruggiero.    Chucho Cameron

## 2021-06-17 NOTE — PROGRESS NOTES
Office Visit - Follow Up   Anna Underwood   57 y.o. female    Date of Visit: 2018    Chief Complaint   Patient presents with     Emergency room follow up        Assessment and Plan   1. Acute deep vein thrombosis (DVT) of brachial vein of left upper extremity  We will arrange for follow-up venous Doppler ultrasound of left upper extremity to confirm that DVT has not propagated.  No anticoagulation indicated at this time.  PICC line has been removed.    - US Venous Arm Left; Future    2. Achilles tendon infection    Recent incision and drainage with 4 weeks of IV antibiotics with meropenem.  Now PICC line removed and switch to Augmentin.  Will follow up with infectious disease.    Return in about 6 months (around 10/17/2018) for Annual physical.     History of Present Illness   This 57 y.o. old woman with infection involving posterior ankle requiring I&D and 4 weeks of IV antibiotics with meropenem.  Developed pain and swelling in her left upper extremity and was evaluated in the emergency room last week.  Ultrasound revealed nonobstructive deep venous thrombosis involving left upper extremity.  PICC line removed.  Peripheral IV started but has subsequently been switched to Augmentin to complete a two-week course.  Left arm is improved with decreased swelling and pain.  There is also erythematous rash associated with PICC line site appears to be related to Betadine that was being applied.    Review of Systems: No fevers or chills.  No chest pain or dyspnea      Medications, Allergies and Problem List   Patient Active Problem List   Diagnosis     Iron deficiency anemia     GERD (gastroesophageal reflux disease)     Family history of premature coronary artery disease     Allergic rhinitis     Achilles tendon infection     Deep vein thrombosis (DVT) of brachial vein of left upper extremity       She has a past surgical history that includes Ankle surgery;  section; Oophorectomy (Right, );  "Hysterectomy (2002); and PICC Team Line Insertion (3/23/2018).    Allergies   Allergen Reactions     Silver-Calcium Alginate Dermatitis     Skin blisters- in the past     Morphine Hives     Adhesive Tape-Silicones Rash     Other Drug Allergy (See Comments) Rash     CHLORAPREP       Current Outpatient Prescriptions   Medication Sig Dispense Refill     amoxicillin-clavulanate (AUGMENTIN) 500-125 mg per tablet Take 1 tablet (500 mg total) by mouth 3 (three) times a day for 14 days. 42 tablet 0     ondansetron (ZOFRAN) 4 MG tablet Take 4 mg by mouth every 8 (eight) hours as needed for nausea.       pantoprazole (PROTONIX) 40 MG tablet Take 40 mg by mouth 2 (two) times a day.       sucralfate (CARAFATE) 100 mg/mL suspension Take 1 g by mouth 4 (four) times a day as needed.       Current Facility-Administered Medications   Medication Dose Route Frequency Provider Last Rate Last Dose     lidocaine 10 mg/mL (1 %) injection 1-5 mL  1-5 mL Intradermal Once Paulina Ruggiero MD            Physical Exam   General Appearance:     Well-appearing middle-aged woman    /82 (Patient Site: Left Arm, Patient Position: Sitting, Cuff Size: Adult Regular)  Pulse 94  Ht 5' 4\" (1.626 m)  SpO2 96%    Erythematous rash consistent with dermatitis involving left upper extremity  No significant swelling or tenderness involving arm         Additional Information   Social History   Substance Use Topics     Smoking status: Former Smoker     Types: Cigarettes     Quit date: 3/14/2003     Smokeless tobacco: Never Used      Comment: Quit 2003     Alcohol use No           Review and/or order of radiology tests: Ordering left upper extremity venous Doppler ultrasound to reassess DVT    Review and summarization of old records and/or obtaining history from someone other than the patient and.or discussion of case with another health care provider: Reviewed records from recent ER evaluation presenting with left upper extremity swelling and pain, " diagnosed with nonobstructive deep venous thrombosis       Jame Dunn MD

## 2021-06-17 NOTE — TELEPHONE ENCOUNTER
Telephone Encounter by Raquel Rodriguez at 10/7/2020  3:17 PM     Author: Raquel Rodriguez Service: -- Author Type: --    Filed: 10/7/2020  3:18 PM Encounter Date: 10/6/2020 Status: Signed    : Raquel Rodriguez APPROVED:    Approval start date: 10/2/2020  Approval end date:  10/2/2021    Pharmacy has been notified of approval and will contact patient when medication is ready for pickup.

## 2021-06-17 NOTE — PROGRESS NOTES
Pt arrived at 13:44 - ambulatory, with left lower leg elevated on a wheeled scooter. Seated in chair 1. Reviewed plan of care and discussed the purpose pt is in OP Infusion. PICC line in left upper arm, and has an excellent blood return. Used NS as the primary IV solution, and infused the first dose of meropenem 1 gm over 31 minutes - at completion, the IV line was flushed with  mls. Monitored pt following the infusion with no s/sx of a reaction, and this information was given to Johanna cleary [facility] at 15:40 via a telephone call. Pt's VSS. The PICC line was flushed and site wrapped. Left unit ambulatory in stable condition at 15:20. Offered an AVS, which was declined. Gave pt a business card for this unit with phone number, and was instructed to call with any questions or concerns.     Anette Jean RN

## 2021-06-18 ENCOUNTER — ANESTHESIA - HEALTHEAST (OUTPATIENT)
Dept: SURGERY | Facility: HOSPITAL | Age: 61
End: 2021-06-18

## 2021-06-18 ENCOUNTER — AMBULATORY - HEALTHEAST (OUTPATIENT)
Dept: OTHER | Facility: CLINIC | Age: 61
End: 2021-06-18

## 2021-06-18 ENCOUNTER — SURGERY - HEALTHEAST (OUTPATIENT)
Dept: SURGERY | Facility: HOSPITAL | Age: 61
End: 2021-06-18
Payer: COMMERCIAL

## 2021-06-18 NOTE — PATIENT INSTRUCTIONS - HE
Patient Instructions by Jame Dunn MD at 2/1/2021  3:20 PM     Author: Jame Dunn MD Service: -- Author Type: Physician    Filed: 2/1/2021  4:15 PM Encounter Date: 2/1/2021 Status: Addendum    : Jame Dunn MD (Physician)    Related Notes: Original Note by Jame Dunn MD (Physician) filed at 2/1/2021  3:57 PM       Continue annual flu shots    Schedule mammogram and continue to get completed every year    Next Prolia shot will be due after March 4, 2021.  This will be her third injection.  After your fourth, DEXA will be repeated to assess response    Recommending Cologuard as alternative to colonoscopy for colon cancer screening    You will be contacted regarding the follow-up CT chest    Continue to see your eye doctor every year    Continue to follow-up with your OB/GYN every year      Patient Education   Understanding [employer] MyPlate  The USDA (US Department of Agriculture) has guidelines to help you make healthy food choices. These are called MyPlate. MyPlate shows the food groups that make up healthy meals using the image of a place setting. Before you eat, think about the healthiest choices for what to put onto your plate or into your cup or bowl. To learn more about building a healthy plate, visit www.choosemyplate.gov.       The Food Groups    Fruits: Any fruit or 100% fruit juice counts as part of the Fruit Group. Fruits may be fresh, canned, frozen, or dried, and may be whole, cut-up, or pureed. Make half your plate fruits and vegetables.    Vegetables: Any vegetable or 100% vegetable juice counts as a member of the Vegetable Group. Vegetables may be fresh, frozen, canned, or dried. They can be served raw or cooked and may be whole, cut-up, or mashed. Make half your plate fruits and vegetables.     Grains: All foods made from grains are part of the Grains Group. These include wheat, rice, oats, cornmeal, and barley such as bread, pasta, oatmeal, cereal,  tortillas, and grits. Grains should be no more than a quarter of your plate. At least half of your grains should be whole grains.    Protein: This group includes meat, poultry, seafood, beans and peas, eggs, processed soy products (like tofu), nuts (including nut butters), and seeds. Make protein choices no more than a quarter of your plate. Meat and poultry choices should be lean or low fat.    Dairy: All fluid milk products and foods made from milk that contain calcium, like yogurt and cheese are part of the Dairy Group. (Foods that have little calcium, such as cream, butter, and cream cheese, are not part of the group.) Most dairy choices should be low-fat or fat-free.    Oils: These are fats that are liquid at room temperature. They include canola, corn, olive, soybean, and sunflower oil. Foods that are mainly oil include mayonnaise, certain salad dressings, and soft margarines. You should have only 5 to 7 teaspoons of oils a day. You probably already get this much from the food you eat.  Use [employer] to Help Build Your Meals  The [employer] can help you plan and track your meals and activity. You can look up individual foods to see or compare their nutritional value. You can get guidelines for what and how much you should eat. You can compare your food choices. And you can assess personal physical activities and see ways you can improve. Go to www.Eatplate.gov/supertracker/.    8380-5026 [employer]. [address] [place] [address], [place] [address]. All rights reserved. This information is not intended as a substitute for professional medical care. Always follow your healthcare professional's instructions.

## 2021-06-18 NOTE — PATIENT INSTRUCTIONS - HE
Patient Instructions by Galileo Cottrell MD at 5/23/2021 11:00 AM     Author: Galileo Cottrell MD Service: -- Author Type: Physician    Filed: 5/23/2021 12:11 PM Encounter Date: 5/23/2021 Status: Signed    : Galileo Cottrell MD (Physician)       Patient Education     Acute Bronchitis  Your healthcare provider has told you that you have acute bronchitis. Bronchitis is infection or inflammation of the bronchial tubes (airways in the lungs). Normally, air moves easily in and out of the airways. Bronchitis narrows the airways, making it harder for air to flow in and out of the lungs. This causes symptoms such as shortness of breath, coughing up yellow or green mucus, and wheezing. Bronchitis can be acute or chronic. Acute means the condition comes on quickly and goes away in a short time, usually within 3 to 10 days. Chronic means a condition lasts a long time and often comes back.    What causes acute bronchitis?  Acute bronchitis almost always starts as a viral respiratory infection, such as a cold or the flu. Certain factors make it more likely for a cold or flu to turn into bronchitis. These include being very young, being elderly, having a heart or lung problem, or having a weak immune system. Cigarette smoking also makes bronchitis more likely.  When bronchitis develops, the airways become swollen. The airways may also become infected with bacteria. This is known as a secondary infection.  Diagnosing acute bronchitis  Your healthcare provider will examine you and ask about your symptoms and health history. You may also have a sputum culture to test the fluid in your lungs. Chest X-rays may be done to look for infection in the lungs.  Treating acute bronchitis  Bronchitis usually clears up as the cold or flu goes away. You can help feel better faster by doing the following:    Take medicine as directed. You may be told to take ibuprofen or other over-the-counter medicines. These help relieve  inflammation in your bronchial tubes. Your healthcare provider may prescribe an inhaler to help open up the bronchial tubes. Most of the time, acute bronchitis is caused by a viral infection. Antibiotics are usually not prescribed for viral infections.    Drink plenty of fluids, such as water, juice, or warm soup. Fluids loosen mucus so that you can cough it up. This helps you breathe more easily. Fluids also prevent dehydration.    Make sure you get plenty of rest.    Do not smoke. Do not allow anyone else to smoke in your home.  Recovery and follow-up  Follow up with your doctor as you are told. You will likely feel better in a week or two. But a dry cough can linger beyond that time. Let your doctor know if you still have symptoms (other than a dry cough) after 2 weeks, or if youre prone to getting bronchial infections. Take steps to protect yourself from future infections. These steps include stopping smoking and avoiding tobacco smoke, washing your hands often, and getting a yearly flu shot.  When to call your healthcare provider  Call the healthcare provider if you have any of the following:    Fever of 100.4 F (38.0 C) or higher, or as advised    Symptoms that get worse, or new symptoms    Trouble breathing    Symptoms that dont start to improve within a week, or within 3 days of taking antibiotics   Date Last Reviewed: 12/1/2016 2000-2019 [employer]. [address] [place] [address], [place] [address]. All rights reserved. This information is not intended as a substitute for professional medical care. Always follow your healthcare professional's instructions.

## 2021-06-18 NOTE — PATIENT INSTRUCTIONS - HE
Patient Instructions by Bree Middleton LPN at 7/31/2020  2:00 PM     Author: Bree Middleton LPN Service: -- Author Type: Licensed Nurse    Filed: 8/5/2020  4:04 PM Encounter Date: 7/31/2020 Status: Signed    : Bree Middleton LPN (Licensed Nurse)         Varicose Vein Pre-Procedure Instructions/Vein Ablation     You are scheduled for a varicose vein treatment on your legs. The following is some helpful information for you, in regards to your treatment.    **Important:  A  will be needed post procedure.  (Unable to use Taxi or Uber).     We will supply a thigh high compression stocking for you. Please bring your compression sock as we only have sizes medium to X-large.    Please be aware, it is not advised to fly within 3 weeks post procedure    Please wear comfortable clothing.  We recommend that you bring a change of under clothes; they may get stained by the cleansing solution.    Feel free to bring a personal music player or a CD to listen to during your procedure.    Take your routine medications as you normally would with exception to blood thinners. Aspirin is ok to continue.    If you take Warfarin, Xarelto, or Eliquis this will need to be HELD prior to procedure according to primary care provider/doctor or cardiology who prescribes this medication.    Please notify us if you take this medication.    It is ok to eat prior to this procedure.    Please allow 1- 2 hours for your appointment.    For any questions regarding your procedure please call   [phone] to speak with the nurse.    If you would like a Good Eleonora Estimate for your upcoming service/procedure contact Cost of Care Estimates at [phone], advocates are available Monday through Friday 8am - 5pm.    Radiofrequency Ablation Codes  49138 for first vein in either leg  65271 for second vein    Please have the following information available:  1. Patient name and date of birth  2. Insurance company, plan name, ID and  group numbers  3. Description of the service/procedure and the associated procedure code numbers, if available. If more than one (1) procedure code, indicate which will be the primary procedure code.   4. The facility where the service/procedure will be performed.  5. The name of the physician involved with the service/procedure.  6. Appointment date of service.  7. Telephone number to call with the information.    Radiofrequency Ablation (RFA) Treatment for Varicose Veins    Radiofrequency ablation (RFA) is a procedure to treat varicose veins. It uses heat created from radiofrequency (RF).    Varicose veins are swollen, enlarged veins. They happen most often in the legs. Varicose veins can develop when valves in your veins become damaged. This causes problems with blood flow. Over time, too much blood collects in your veins. The veins may bulge, twist, and stand out under your skin. They can also cause symptoms such as aching, cramping, or swelling in your legs.    During RFA treatment, RF heat is sent into your vein through a thin, flexible tube (catheter). This closes off blood flow in the main problem vein.         With RFA treatment, a catheter that contains RF heat is used to seal off the main problem vein.      Getting ready for your treatment  Follow any instructions from your healthcare provider.    Tell your provider if you:    Are pregnant or think you may be pregnant    Are breastfeeding    Smoke or use alcohol on a regular basis    Have any allergies or intolerances to certain medicines. Explain what reaction you have had to these medicines in the past.    Tell your provider about any medicines you are taking. You may need to stop taking all or some of these before the test. This includes:    Medicines that can thin your blood or prevent clotting (anticoagulants)    All prescription medicines    Over-the-counter medicines such as aspirin or ibuprofen    Street drugs    Herbs, vitamins, and other  supplements    Follow any directions you're given for not eating or drinking before the procedure.    The day of your treatment  The treatment takes 45 to 60 minutes. The entire treatment (including time to prepare and recover) takes about 1 to 3 hours. You can go home the same day. For the treatment:     You'll lie down on a hospital bed.    An imaging method, such as ultrasound, is used to guide the procedure.    The leg to be treated is injected with numbing medicine.    Once your leg is numb, a needle makes a small hole (puncture) in the vein to be treated.    The catheter with the RF heat source is inserted into your vein.    More numbing medicine may be injected around your vein.    Once the catheter is in the right position, it is then slowly drawn backward. As the catheter sends out heat, the vein is closed off.    In some cases, other side branch varicose veins may be removed or tied off through a few small cuts (incisions).    When the treatment is done, the catheter is removed. Pressure is applied to the insertion site to stop any bleeding. An elastic compression stocking or a bandage may then be put on your leg.    Recovering at home  Once at home, follow all the instructions you've been given. Be sure to:    Take all medicines as directed    Care for the catheter insertion site as directed    Check for signs of infection at the catheter insertion site (see below)    Wear elastic stockings or bandages as directed    Keep your legs raised (elevated) as directed    Walk a few times a day    Avoid heavy exercise, lifting, and standing for long periods as advised    Avoid air travel, hot baths, saunas, or whirlpools as advised    Call your healthcare provider  Call your healthcare provider if you have any of the following:    Fever of 100.4 F (38 C) or higher, or as directed by your provider    Chest pain or trouble breathing    Signs of infection at the catheter insertion site. These include increased  redness or swelling (inflammation), warmth, increasing pain, bleeding, or bad-smelling discharge.    Severe numbness or tingling in the treated leg    Severe pain or swelling in the treated leg       Follow-up  You'll have a follow-up visit with your healthcare provider within a week. An ultrasound will be done to check for problems, such as blood clots. Your provider will discuss further treatments with you, if needed.  Risks and possible complications   These include the following:    Bleeding    Infection    Blood clots    Damage to the nerves in the treated area    Irritation or burning of the skin over the treated vein    Treatment doesn't improve the look or the symptoms of the problem veins    Risks of any medicines used during the treatment      Date Last Reviewed: 5/1/2016 2000-2017 [employer]. [address] [place] [address]. All rights reserved. This information is not intended as a substitute for professional medical care. Always follow your healthcare professional's instructions.    Self-Care for Spider and Varicose Veins  Your healthcare provider may suggest that you try self-care. Exercising and maintaining a healthy weight may keep problem veins from getting worse. Wearing elastic stockings and elevating your legs can help improve blood flow. Taking breaks when you sit or stand helps, too.         The top of the elastic stocking should be below the bend in your knee for a proper fit.      Exercising  Exercising is good for your veins because it improves blood flow. Walking, cycling, or swimming are great exercises for vein health. But be sure to check with your healthcare provider before starting any exercise program. Also, keep these hints in mind:    When exercising, start out slowly and try to build up to 30 minutes on most days.    Elevate your legs above heart level after exercise to keep blood from pooling in veins.    Maintaining a healthy weight  Being overweight  puts extra pressure on your veins. To maintain a healthy weight, try these tips:    Choose lean meats, fish, and skinless chicken.    Use low-fat dairy products.    Eat foods high in fiber, such as whole grains, fruits, and vegetables.    Cut down on sugar, salt, and saturated and trans fats.    Exercise regularly.    Wearing elastic stockings  Elastic stockings gently squeeze veins so blood flows upward. If you need elastic stockings, your healthcare provider can prescribe them for you. Follow your healthcare provider's advice about how and when to wear them. Elastic stockings come in several different levels of pressure. Ask your healthcare provider which level of pressure would benefit you the most.     Elevating your legs  Raising your legs above heart level will help relieve swelling and keep blood from pooling in veins. Try to elevate your legs for 15 to 20 minutes at the end of the day, and whenever you're relaxing. To make sure your legs are raised above heart level, prop them up on cushions or large pillows.    When sitting and standing  To keep blood moving when you have to sit or stand for long periods, try these tips:    At work, take walking breaks instead of coffee breaks. Walk during your lunch hour. Or try flexing your feet up and down 10 times each hour.    When standing, raise yourself up and down on your toes, or rock back and forth on your heels.    Date Last Reviewed: 5/1/2016 2000-2017 [employer]. [address] [place] [address]. All rights reserved. This information is not intended as a substitute for professional medical care. Always follow your healthcare professional's instructions.    [facility]  [address]  [place] [address]    [place] [phone]  [phone]  [place] [phone]  [email or web address]    [facility]   [address]             [address]                  [address]     [place] [address]      [place] [address]         [place] [address]  [phone]  [phone]            [phone]                Fax: [phone]  [email or web address]                                              [facility]  [address]  [place], [place] [address] [phone]  [phone]  [email or web address]    [employer]  [phone]  [email or web address]    [facility]   [phone]    [facility]  [address] [place] [address]  [phone]

## 2021-06-19 ENCOUNTER — HOME INFUSION (PRE-WILLOW HOME INFUSION) (OUTPATIENT)
Dept: PHARMACY | Facility: CLINIC | Age: 61
End: 2021-06-19

## 2021-06-19 NOTE — PROGRESS NOTES
Service: Infectious Disease Clinic  Note: Progress Note  Date: 8/13/2018    ASSESSMENT:  Recurrent wound dehiscence in Achilles area  S/P Debridement on 7/27/18  No infection seen intraop-- no abscess, no purulence. Fibrous tissue seen  Wound dehiscence again, without any signs of infection initially.   Open wound, slough  No intraoperative cultures as no drainage    Per previous notes:  4/16/18 per Dr. 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  Culture 3/23 with alpha strep, e coli and several anaerobic GNB.  Switched to Augmentin on 4/16/18, x 2 weeks  Improved clinically, until wound dehiscence in July 2018      10/19/17: IRRIGATION AND DEBRIDEMENT,LEFT ACHILLES, SPLIT THICKNESS SKIN GRAFT (Left)  Most recent wound culture taken on 10/19/17: Polymicrobial with skin, /GI pathogens--likely contaminated wound:    3+ Bacteroides fragilis  3+ Pseudomonas aeruginosa (!)      3+ Citrobacter freundii (!)      1+ Streptococcus viridans group (!)      1+ Coagulase negative Staphylococcus (!)      1+ Enterococcus faecalis     Improved with Ciprofloxacin and Doxycycline, and wound vac has been removed. No longer has odor. Wound size decreased. Skin graft with central area of discoloration that appears non viable. Slough      8/16/17: IRRIGATION AND DEBRIDEMENT, LEFT ACHILLIES (Left)-- Staphylococcus aureus.   Wound dehiscence. No abscess, or osteomyelitis  Previous hx of left Achilles tendon region infection with Staph lugdenensis in 2015. Treated  Patient has small opening left, and no clinical recurrence of infection from 2015 to about 8/10/2017  Patient has been on IV Ancef and IV Vancomycin while inpatient    Repeat wound culture: 11/16/17:    1+ Pseudomonas aeruginosa (!)      2+ Coagulase negative Staphylococcus (!)       PLAN:  Repeat wound culture  Wound base clean, and dry.   Empiric Augmentin and  "Ciprofloxacin and focus based on final culture results    Referral to Plastic Surgery at [facility].    Follow up with ID in 4 weeks    Literature review: higher risk of graft failure in lower limb skin grafts--    Orders Placed This Encounter   Procedures     Wound culture, gram stain     Ambulatory referral to Plastic Surgery     Discussed with the patient, again,  that this is a challenging situation with wound healing dependent on wound care, no contamination, and good blood supply to the wound area.   Patient expressed understanding and agreeable to the plan  Reviewed notes from [facility]-- sent to be scanned in the chart    Total Time Spent >35 minutes with >50% of the time spent in counseling, education and care coordination.        Paulina Ruggiero MD  [facility]  [phone]        Patient Instructions   Colt Castillo    Thank you for taking the time to see us today.  We took some superficial swabs of the open wound. No drainage or pus seen.   Empirically, Augmentin and Ciprofloxacin and referral to Plastic surgeon:     Let's plan on getting a recommendation from Plastic Surgery, Archie Parker MD  , Department of Surgery , [facility].  Clinical Information   For general inquiries: [phone] or   [phone] (toll-free)    Adult appointments: [phone]  Adult referrals: [phone]     As we discussed, Dr. Parker, specializes in wound care and may have recommendations regarding next steps.     Will release culture results via [employer]-- final in 5 days. We will focus antibiotics based on final culture results    Paulina Ruggiero MD  [facility]  [phone]        Dermatology Research and Practice  Volume 2014 (2014), Article ID [id number]  \"[email or web address].doi.org/[email or web address]\">[email or web address].doi.org/10.1155/[email or web address]  The Incidence and Risk Factors for Lower Limb Skin Graft " Failure    ________________________________________________________________________    Notes / labs / vitals reviewed.    SUBJECTIVE / INTERVAL HISTORY:  Wound opened up a few days after 18 I&D of Achilles  Operative note reviewed. No abscess, no fistula.  Wound without any drainage  Patient does not have any systemic s/s of fever, fatigue  Working full time, and taking care of grand daughter, Sera Hernandez      Per previous note:  No increased drainage from the wound, did not tolerate doxycycline and cefdinir.   Patient is busy with work and taking care of her granddaughter Sera Hernandez, who is now 2 months old.  Patient's options for wound healing include repeating a graft, repeating a myocutaneous flap, and referral to plastic surgery.  Patient has been seeing Dr. Wiley orthopedics.  Previous attempts at skin graft have failed.  Discussed options about referral to [facility].      17: Open wound on ankle, central discolored area-- slough. No systemic signs/symptoms.   Wound is improved on Ciprofloxacin and Doxycycline. Completed Ciprofloxacin and doxycycline is extended.       ROS: A complete 12 point ROS is negative except as listed above.    PMHx/FHx/SHx: Reviewed. Interval changes noted.        OBJECTIVE:  Vitals:    18 1434   BP: 124/80   Pulse: 87   Resp: 8   Temp: 98.4  F (36.9  C)   SpO2: 98%             Temp (24hrs), Av.3  F (36.8  C), Min:97.7  F (36.5  C), Max:98.9  F (37.2  C)      GEN: NAD  EYES:  Anicteric, EOM intact.  HEAD, EARS, NOSE, MOUTH, AND THROAT:  Moist mucosa  NECK:  Supple.   RESPIRATORY:  Normal breathing pattern.  CARDIOVASCULAR:  . No excess JVD.  ABDOMEN:  Non distended  MUSCULOSKELETAL:  Dressing on left foot/ankle removed-open wound, base with few areas of scar tissue/discoloration, no pus, no necrosis, no drainage, non tender  Circular wound--pink, smooth tissue.  No signs of cellulitis.    LYMPHATIC:  Dressing on left foot  SKIN/HAIR/NAILS:  Wound on  the L achilles regions  NEUROLOGIC:  Gross neurological exam non-focal. Ambulating  PSYCHIATRIC:  A&Ox3, appropriate, mentation normal.    Antibiotics:  reviewed    Pertinent labs:    Reviewed  Recent CBC, ESR and CRP normal    MICROBIOLOGY DATA:  No new culture results from 7/27/18 surgery    Previous cultures reviewed- L ankle  Wound culture, gram stain   Status:  Final result   . Order: [id number]     Culture      1+ Pseudomonas aeruginosa (!)          2+ Coagulase negative Staphylococcus (!)            Stain      No polymorphonuclear leukocytes seen          No organisms seen              Resulting Agency: [facility]       Susceptibility       Pseudomonas aeruginosa       ZORAIDA       Aztreonam <=2  Sensitive       Cefepime <=1  Sensitive       Ceftazidime <=2  Sensitive       Ciprofloxacin <=0.5  Sensitive       Gentamicin <=2  Sensitive       Levofloxacin <=1  Sensitive       Meropenem <=0.25  Sensitive       Tobramycin <=2  Sensitive                    Specimen Collected: 11/13/17  6:31 PM Last Resulted: 11/16/17 10:30                 Cultures reviewed  AFB Stain [[id number]] Collected: 08/16/17 1319        Order Status: Completed Specimen: Swab from Ankle, Left Updated: 08/17/17 1448        AFB Stain No Acid-fast bacilli seen on smear        Org/Slide --       Culture and Gram Stain, Surgical Site [[id number]] (Abnormal) Collected: 08/16/17 1319       Order Status: Completed Specimen: Swab from Ankle, Left Updated: 08/17/17 1305        Culture 1+ Gram Positive Cocci (!)       Culture/Gram Stain: Tissue [[id number]] (Abnormal) Collected: 08/16/17 1320       Order Status: Completed Specimen: Tissue from Ankle, Left Updated: 08/17/17 1034        Culture 3+ Coagulase positive Staphylococcus (!)       Culture, Anaerobic [[id number]] Collected: 08/16/17 1319       Order Status: Sent Specimen: Swab from Ankle, Left Updated: 08/17/17 0715       Stat Gram Stain [[id number]] Collected: 08/16/17 1320       Order Status: Completed Specimen:  Tissue from Ankle, Left Updated: 08/16/17 1533        Gram Stain Result 1+ Polymorphonuclear leukocytes         2+ Gram positive cocci in pairs       Stat Gram Stain [[id number]] Collected: 08/16/17 1319       Order Status: Completed Specimen: Swab from Ankle, Left Updated: 08/16/17 1441        Gram Stain Result 1+ Polymorphonuclear leukocytes         No organisms seen       Culture, Fungus, Fluid/Tissue [[id number]] Collected: 08/16/17 1320       Order Status: Sent Specimen: Tissue from Ankle, Left Updated: 08/16/17 1431       Culture, Mycobacterium, Bdy Fluid/Tissue [[id number]] Collected: 08/16/17 1320       Order Status: Sent Specimen: Tissue from Ankle, Left Updated: 08/16/17 1431       Culture, Mycobacterium, Bdy Fluid/Tissue [[id number]] Collected: 08/16/17 1319       Order Status: Sent Specimen: Swab from Ankle, Left Updated: 08/16/17 1420       Culture, Anaerobic [[id number]] Collected: 08/16/17 1320       Order Status: Sent Specimen: Tissue from Ankle, Left Updated: 08/16/17 1417       Culture/Gram Stain: Tissue [[id number]] Collected: 08/16/17 1319       Order Status: Canceled Specimen: Swab from Ankle, Left              IMAGING/RADIOLOGY:  Reviewed

## 2021-06-19 NOTE — PROGRESS NOTES
Preoperative Exam    Scheduled Procedure: Achilles tendon cleanout and repair   Surgery Date:  7/27/18  Surgery Location: [facility], fax [phone]    Surgeon:  Dr. Rivera    Assessment/Plan:     1. Preop examination    Patient is cleared for surgery, she has had the surgery couple of times before and has always done well with anesthesia that was given.  She is a very low risk at this point and should do very well.      - Hemoglobin    2. Achilles tendon infection          Surgical Procedure Risk: Low (reported cardiac risk generally < 1%)  Have you had prior anesthesia?: Yes  Have you or any family members had a previous anesthesia reaction:  Yes: nausea  Do you or any family members have a history of a clotting or bleeding disorder?: No  Cardiac Risk Assessment: no increased risk for major cardiac complications    Patient approved for surgery with general or local anesthesia.      Functional Status: Independent  Patient plans to recover at home with family.     Subjective:      Anna Underwood is a 58 y.o. female who presents for a preoperative consultation.  Patient is having her third surgery on his Achilles tendon.  Injury goes back 10 years where she was hit by a grocery cart and suffered an Achilles tendon injury.  She has had multiple ruptures and repairs over the course of the years.  She has problems with it rupturing and getting infected in the knee requiring antibiotics and then having to have it repaired again.  This is where she is at now, she is taking some Cipro and finishing that up tomorrow, and he plans to have surgery done tomorrow to repair at.    She is otherwise fairly healthy.  She has some mild GERD and she takes medication for that.  She has had a history of iron deficiency anemia, but is doing fine on that and just monitoring at this point.    She did have a history of a DVT in her left upper arm many years ago, but she states that this was  from a PICC line and there is no sense that she is at high risk for DVT otherwise.    All other systems reviewed and are negative, other than those listed in the HPI.    Pertinent History  Do you have difficulty breathing or chest pain after walking up a flight of stairs: No  History of obstructive sleep apnea: No  Steroid use in the last 6 months: No  Frequent Aspirin/NSAID use: Yes:    Prior Blood Transfusion: No  Prior Blood Transfusion Reaction: No  If for some reason prior to, during or after the procedure, if it is medically indicated, would you be willing to have a blood transfusion?:  There is no transfusion refusal.    Current Outpatient Prescriptions   Medication Sig Dispense Refill     ondansetron (ZOFRAN) 4 MG tablet Take 4 mg by mouth every 8 (eight) hours as needed for nausea.       pantoprazole (PROTONIX) 40 MG tablet Take 40 mg by mouth 2 (two) times a day.       sucralfate (CARAFATE) 100 mg/mL suspension Take 1 g by mouth 4 (four) times a day as needed.       Current Facility-Administered Medications   Medication Dose Route Frequency Provider Last Rate Last Dose     lidocaine 10 mg/mL (1 %) injection 1-5 mL  1-5 mL Intradermal Once Paulina Ruggiero MD            Allergies   Allergen Reactions     Silver-Calcium Alginate Dermatitis     Skin blisters- in the past     Morphine Hives     Adhesive Tape-Silicones Rash     Other Drug Allergy (See Comments) Rash     CHLORAPREP       Patient Active Problem List   Diagnosis     Iron deficiency anemia     GERD (gastroesophageal reflux disease)     Family history of premature coronary artery disease     Allergic rhinitis     Achilles tendon infection     Deep vein thrombosis (DVT) of brachial vein of left upper extremity (H)       Past Medical History:   Diagnosis Date     Achilles tendon infection      Deep vein thrombosis (DVT) of brachial vein of left upper extremity (H) 04/17/2018    Associated with PICC line, follow-up ultrasound showing resolution of  thrombus     Dermatitis      Family history of myocardial infarction     dad 35  mother 70     Fatigue 2017     GERD (gastroesophageal reflux disease)     EGD with esophagitis , experiencing chest pain and dysphagia     History of anesthesia complications      Iron deficiency anemia 2015    Has required iron infusions     Kidney stones      Open wound of foot, complicated     Achilles tendon rupture complicated by infected open wound with multiple surgeries including skin grafting     Ovarian cyst      PONV (postoperative nausea and vomiting)        Past Surgical History:   Procedure Laterality Date     ANKLE SURGERY      Multiple surgeries [id number] after complications from Achilles rupture and repair with nonhealing open wound including skin grafting      SECTION      x3     HYSTERECTOMY  2002     OOPHORECTOMY Right 2002     PICC AND MIDLINE TEAM LINE INSERTION  3/23/2018            Social History     Social History     Marital status:      Spouse name: N/A     Number of children: N/A     Years of education: N/A     Occupational History     Not on file.     Social History Main Topics     Smoking status: Former Smoker     Types: Cigarettes     Quit date: 3/14/2003     Smokeless tobacco: Never Used      Comment: Quit      Alcohol use No     Drug use: No     Sexual activity: Not on file     Other Topics Concern     Not on file     Social History Narrative    .  to Sher. 3 children               Objective:     There were no vitals filed for this visit.      Physical Exam:  General: Awake, Alert and Cooperative   Head: Normocephalic and Atraumatic   Eyes: PERRL, EOMI.   ENT: Normal pearly TMs bilaterally and Oropharynx clear   Neck: Supple and Thyroid without enlargement or nodules   Chest: Chest wall normal   Lungs: Clear to auscultation bilaterally   Heart:: Regular rate and rhythm and no murmurs.  No significant LE edema.   Abdomen: Soft, nontender,  nondistended and no hepatosplenomegaly   Musculoskeletal: Moving all extremities and No pain in the extremities   Neuro: Alert and oriented times 3 and Grossly normal   Skin: No rashes or lesions noted         There are no Patient Instructions on file for this visit.      Labs:  Labs pending at this time.  Results will be reviewed when available.    Immunization History   Administered Date(s) Administered     Influenza, inj, historic,unspecified 09/15/2016     Td,adult,historic,unspecified 02/27/2004     Tdap 02/24/2017           Electronically signed by Dilshad Rojo MD 07/26/18 2:59 PM

## 2021-06-20 ENCOUNTER — HOME INFUSION (PRE-WILLOW HOME INFUSION) (OUTPATIENT)
Dept: PHARMACY | Facility: CLINIC | Age: 61
End: 2021-06-20

## 2021-06-20 NOTE — LETTER
"Letter by Shahla Alcocer RN at      Author: Shahla Alcocer RN Service: -- Author Type: --    Filed:  Encounter Date: 8/3/2020 Status: (Other)       08/04/2020    Anna Underwood    We are prescribing some compression stockings for you. I have included different suppliers that should help you get measured and fitting to ensure proper fitting socks. You should wear this socks as much as you can. It is especially important to wear them with long periods of sitting/standing, long car rides or if you will be flying. Compression socks should get refilled every 4-6 months. They do not need to be worn at night while in bed.    If you do a lot of standing it is good to do calf raises to help keep the blood pumping. If you sit a lot at work it is good to get up periodically to walk around. Elevation of the foot of your bed 4-6\" helps the blood return back to where it is needed.          [facility]  [place] [phone]   [facility]  [place]  [address]    [place] [phone]    [facility] [phone]    [place] [phone]  [address] [place] [address] Bldg.   [address]  [place] [address]  Phone: [phone]  Fax: [phone]    [facility] Bldg.  [address]  [place] [address]  Phone: [phone]  Fax: [phone]    [facility]  [address]  [place] [address]  Phone: [phone]  Fax: [phone]    [facility]  [address]  [address]   [place] [address]   Phone: [phone]  Fax: [phone]    [place]   [address] [place] [address]   Phone: [phone]  Fax: [phone]        [facility]    [address]  [place] [address]    [place] " [phone]  [phone]  [place] [phone]  [email or web address]      [facility]   [address]             [address]                 [address]     [place] [address]      [place] [address]         [place], [place] [id number] [phone]  [phone]     [phone]               Fax: [phone]  [email or web address]  [address] [place] [address] [phone]  [phone]  [email or web address]    [place]  [phone]  [email or web address]    [facility]   [phone]    [facility]  [address] [place] [address] [phone]        Understanding Spider and Varicose Veins      Do you often hide your legs because of the way they look? You may have noticed tiny red or blue bursts (spider veins). Or maybe you have veins that bulge or look twisted (varicose veins). If so, there are treatments that can help.    What Are the Symptoms?  Spider veins or varicose veins may never be a problem. But sometimes they can cause legs to ache or swell. Your legs may also feel heavy and tired, or like theyre burning. These symptoms may be more severe at the end of the day. Prolonged sitting or standing can also make your symptoms worse.        Who Gets Spider and Varicose Veins?  Anyone can get spider or varicose veins. But vein problems tend to be hereditary (run in families). Other factors that can affect veins include:    Pregnancy, hormones, and birth control pills    A job where you stand or sit a lot    Extra weight or lack of exercise    Age    What Can Be Done?  Spider and varicose veins can affect the way you feel about yourself. Talk to your doctor about your concerns. There are treatments that can ease symptoms and make your legs look  better.  Your Treatment Options  Treatment may include self-care, sclerotherapy (injecting veins with a chemical), or surgery. Spider veins and some varicose veins can be treated with sclerotherapy. Large varicose veins may need surgery.    0368-0344 [employer]. [address] [place] [address] 41996. All rights reserved. This information is not intended as a substitute for professional medical care. Always follow your healthcare professional's instructions.

## 2021-06-21 ENCOUNTER — RECORDS - HEALTHEAST (OUTPATIENT)
Dept: LAB | Facility: CLINIC | Age: 61
End: 2021-06-21

## 2021-06-21 ENCOUNTER — HOME INFUSION (PRE-WILLOW HOME INFUSION) (OUTPATIENT)
Dept: PHARMACY | Facility: CLINIC | Age: 61
End: 2021-06-21

## 2021-06-21 LAB
ALBUMIN SERPL-MCNC: 3.1 G/DL (ref 3.5–5)
ALP SERPL-CCNC: 58 U/L (ref 45–120)
ALT SERPL W P-5'-P-CCNC: <9 U/L (ref 0–45)
ANION GAP SERPL CALCULATED.3IONS-SCNC: 12 MMOL/L (ref 5–18)
AST SERPL W P-5'-P-CCNC: 17 U/L (ref 0–40)
BILIRUB SERPL-MCNC: 0.2 MG/DL (ref 0–1)
BUN SERPL-MCNC: 11 MG/DL (ref 8–22)
C REACTIVE PROTEIN LHE: <0.1 MG/DL (ref 0–0.8)
CALCIUM SERPL-MCNC: 8 MG/DL (ref 8.5–10.5)
CHLORIDE BLD-SCNC: 107 MMOL/L (ref 98–107)
CO2 SERPL-SCNC: 21 MMOL/L (ref 22–31)
CREAT SERPL-MCNC: 0.55 MG/DL (ref 0.6–1.1)
GFR SERPL CREATININE-BSD FRML MDRD: >60 ML/MIN/1.73M2
GLUCOSE BLD-MCNC: 86 MG/DL (ref 70–125)
POTASSIUM BLD-SCNC: 4.2 MMOL/L (ref 3.5–5)
PROT SERPL-MCNC: 5.8 G/DL (ref 6–8)
SODIUM SERPL-SCNC: 140 MMOL/L (ref 136–145)
VANCOMYCIN SERPL-MCNC: 11 MG/L

## 2021-06-21 NOTE — LETTER
Letter by Luis Wright MD at      Author: Luis Wright MD Service: -- Author Type: --    Filed:  Encounter Date: 11/2/2020 Status: (Other)         11/02/20      Anna RobertsonKin  [address] [place] [address]    Dear Anna,    As a valued [facility] patient, your healthcare needs are our priority. Our clinic records indicate we have attempted to contact you to reschedule a consultation with Dr. Luis Wright, but we have not heard back from you. If you wish to schedule your appointment please contact our office at your convenience. If you have already made an appointment, please disregard this letter. We can be reached at: [phone]    Sincerely,    [facility]

## 2021-06-22 NOTE — PROGRESS NOTES
This is a recent snapshot of the patient's [facility] medical record.  For current drug dose and complete information and questions, call [phone] or In Basket pool, [facility] home infusion ([id number])  CSN Number:  [id number]

## 2021-06-22 NOTE — PROGRESS NOTES
This is a recent snapshot of the patient's [facility] medical record.  For current drug dose and complete information and questions, call [phone] or In Basket pool, [facility] home infusion ([id number])  CSN Number:  [id number]

## 2021-06-24 ENCOUNTER — RECORDS - HEALTHEAST (OUTPATIENT)
Dept: ADMINISTRATIVE | Facility: OTHER | Age: 61
End: 2021-06-24

## 2021-06-24 ENCOUNTER — HOME INFUSION (PRE-WILLOW HOME INFUSION) (OUTPATIENT)
Dept: PHARMACY | Facility: CLINIC | Age: 61
End: 2021-06-24
Payer: COMMERCIAL
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
## 2021-06-25 ENCOUNTER — HOME INFUSION (PRE-WILLOW HOME INFUSION) (OUTPATIENT)
Dept: PHARMACY | Facility: CLINIC | Age: 61
End: 2021-06-25

## 2021-06-25 ENCOUNTER — OFFICE VISIT - HEALTHEAST (OUTPATIENT)
Dept: INTERNAL MEDICINE | Facility: CLINIC | Age: 61
End: 2021-06-25

## 2021-06-25 ENCOUNTER — RECORDS - HEALTHEAST (OUTPATIENT)
Dept: LAB | Facility: CLINIC | Age: 61
End: 2021-06-25

## 2021-06-25 DIAGNOSIS — T81.89XS NON-HEALING SURGICAL WOUND, SEQUELA: ICD-10-CM

## 2021-06-25 DIAGNOSIS — D50.9 IRON DEFICIENCY ANEMIA, UNSPECIFIED IRON DEFICIENCY ANEMIA TYPE: ICD-10-CM

## 2021-06-25 DIAGNOSIS — I73.9 PAD (PERIPHERAL ARTERY DISEASE) (H): ICD-10-CM

## 2021-06-25 LAB
CREAT SERPL-MCNC: 0.61 MG/DL (ref 0.6–1.1)
GFR SERPL CREATININE-BSD FRML MDRD: >60 ML/MIN/1.73M2
VANCOMYCIN SERPL-MCNC: 18 MG/L

## 2021-06-25 NOTE — TELEPHONE ENCOUNTER
This is too complex for outpatient and the wound discoloration is very concerning. I would recommend patient go to the [facility] or the [facility] ER for evaluation, admission and IV antibiotics.   I am concerned that oral antibiotics may not suffice.     Paulina Ruggiero MD   [facility]   [phone]
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
## 2021-06-25 NOTE — TELEPHONE ENCOUNTER
I called the pt and informed of the below. The pt will go to [facility] tomorrow. The pt was recently hospitalized on 6/4 and ID was not consulted. I advise she request an ID consult, she will do so. I will also ask that the ID doctors at [facility] keep an eye out for her. Pt understands.

## 2021-06-25 NOTE — TELEPHONE ENCOUNTER
Per Dr Ruggiero's review schedule pt for 6/14/21 in clinic.  Provider advised if PCP is unable to arrange IR I&D of abscess for tomorrow by noon for pt to go to ER to facilitate efficiency. Pt understands to not wait on it. Will call back tomorrow once it is planned.

## 2021-06-25 NOTE — ED NOTES
Introduced self to patient and white board updated.  Hourly rounding explained.  Call light in reach.  Plan of care and expected length of stay explained.  Will continue to monitor.

## 2021-06-25 NOTE — TELEPHONE ENCOUNTER
See pt's mychart note with PCP, IR believes may not be an abscess. Will had Surgeon at [facility] refer to their IR Dr Ng for second opinion. May just be inflammation. Pt will update ID on report and if if it will be drained otherwise wait to see pt in clinic on Monday 6/14/21.

## 2021-06-25 NOTE — ED TRIAGE NOTES
Patient has chronic wound to L ankle for the past 10 years.  Patient reports that swelling has increased and purulent pus.  Liliana Davis RN.......6/4/2021 12:25 PM

## 2021-06-25 NOTE — TELEPHONE ENCOUNTER
Authorizing: Jame Dunn MD in [facility] INTERNAL MEDICINE    Referral: [id number] (Not Needed)           Priority: Routine   Diagnosis: Abscess of left lower leg [L02.416]   Comments   Dr Ruggiero ASAP for chronic wound left lower leg now with abscess     Dr. Ruggiero patient. Please advise on scheduling.

## 2021-06-25 NOTE — ED NOTES
Hourly rounding completed on patient.  Updated on plan of care.  Will continue to monitor.  Call light in reach.    IV patent and infusing

## 2021-06-26 ENCOUNTER — HOME INFUSION (PRE-WILLOW HOME INFUSION) (OUTPATIENT)
Dept: PHARMACY | Facility: CLINIC | Age: 61
End: 2021-06-26

## 2021-06-26 NOTE — PROGRESS NOTES
Assessment & Plan     Abscess of left lower leg  61-year-old woman with chronic nonhealing wound on posterior left leg.  She has been on antibiotics including Cipro and Bactrim.  However, increasing pain with malaise.  Was evaluated in the ER last week with MRI demonstrating open wound in the posterior aspect of lower leg near the ankle superficial to and extending into the Achilles tendon defect with a communicating abscess measuring 1 X 1.5 cm in axial dimensions and 2 to 3 cm in length.  Hospitalization was recommended in the ER but Anna declined and left AMA.  Mild leukocytosis was seen.  Blood cultures drawn and are pending at this time.  She is followed by Dr. Her at the wound care clinic.  I personally spoke with him this morning regarding her care.  He was able to review the MRI with radiology.  He is recommending making arrangements for interventional radiology to drain the abscess sending the fluid for culture.  We will plan to keep her on the combination of Cipro, Bactrim and Keflex until culture results are back but will also get her an appointment with her infectious disease specialist, Dr. Ruggiero.  Dr. Her will arrange for follow-up at his office in the near future.  She also understands the need to go to the ER if any progression of symptoms including increasing pain, development of fever or chills, weakness.  - HM2(CBC w/o Differential)  - Ambulatory referral to Interventional Radiology  - Ambulatory referral to Infectious Disease - [place]    WBC 9.4      Abnormal TSH  We will recheck thyroid cascade with suppressed TSH and mildly elevated T4 which both seem to be improving on last check.  - Thyroid Cascade    TSH returns unchanged at 0.20 as was her free T4 at 1.1.  We will continue to monitor.  Subclinical hyperthyroidism.    Nausea  Cipro is causing her some nausea and will get her prescription for Zofran  - ondansetron (ZOFRAN) 4 MG tablet  Dispense: 30 tablet; Refill: 1        40  minutes spent on the date of the encounter doing chart review, history and exam, documentation and further activities per the note       Return in about 8 months (around 2/7/2022) for Annual physical.    Jame Dunn MD  [facility]    Subjective       HPI 61-year-old woman with nonhealing wound on posterior left leg now with abscess formation.  See assessment and plan for details of visit    Current Outpatient Medications on File Prior to Visit   Medication Sig Dispense Refill     cephalexin (KEFLEX) 500 MG capsule Take 1 capsule (500 mg total) by mouth 4 (four) times a day for 7 days. 28 capsule 0     ciprofloxacin HCl (CIPRO) 500 MG tablet Take 1 tablet by mouth 2 (two) times a day.       ergocalciferol (ERGOCALCIFEROL) 1,250 mcg (50,000 unit) capsule Take 1 capsule (50,000 Units total) by mouth once a week. 13 capsule 3     ibuprofen (ADVIL) 200 MG tablet Take 600 mg by mouth every 6 (six) hours as needed for pain.              ketorolac (TORADOL) 10 mg tablet Take 1 tablet by mouth at bedtime.       metoprolol succinate (TOPROL XL) 25 MG Take 1 tablet (25 mg total) by mouth daily. 90 tablet 3     multivitamin (ONE A DAY) per tablet Take 1 tablet by mouth daily. 120 tablet 2     pantoprazole (PROTONIX) 40 MG tablet TAKE 1 TABLET(40 MG) BY MOUTH TWICE DAILY 180 tablet 2     sucralfate (CARAFATE) 100 mg/mL suspension Take 1 g by mouth 4 (four) times a day as needed.       sulfamethoxazole-trimethoprim (SEPTRA DS) 800-160 mg per tablet Take 1 tablet by mouth 2 (two) times a day.       [DISCONTINUED] pseudoephedrine-guaifenesin (MUCINEX D)  mg per tablet Take 1 tablet by mouth every 12 (twelve) hours. 20 tablet 0     Current Facility-Administered Medications on File Prior to Visit   Medication Dose Route Frequency Provider Last Rate Last Admin     denosumab 60 mg (PROLIA 60 mg/ml)  60 mg Subcutaneous Q6 Months Jame Dunn MD   60 mg at 03/26/21 1550     "    Review of Systems  She has been feeling malaise although slightly better the past 2 days.  Denies any fevers or chills.  There is significant pain in the area of concern.    Nausea      Objective    Vitals:    06/07/21 0954   BP: 124/80   Patient Site: Right Arm   Patient Position: Sitting   Cuff Size: Adult Regular   Pulse: 100   Temp: 98.6  F (37  C)   TempSrc: Tympanic   SpO2: 100%   Weight: 115 lb (52.2 kg)   Height: 5' 3.25\" (1.607 m)        Physical Exam  Well-appearing middle-aged woman who does not appear acutely ill  Reviewed photo of open wound with surrounding erythema      "

## 2021-06-26 NOTE — ANESTHESIA PREPROCEDURE EVALUATION
Anesthesia Evaluation      History of anesthetic complications (ponv)     Airway   Mallampati: II   Pulmonary - normal exam   (+) sleep apnea on CPAP, ,                          Cardiovascular - normal exam  Exercise tolerance: > or = 4 METS  (+) hypertension well controlled, ,     Rhythm: regular  Rate: normal,         Neuro/Psych      Endo/Other    (+) hyperthyroidism,      GI/Hepatic/Renal    (+) GERD well controlled,   chronic renal disease,      Other findings: Results for AMY HUNT (MRN [id number]) as of 6/18/2021 11:48    6/18/2021 07:01  Sodium: 141  Potassium: 4.1  Chloride: 109 (H)  CO2: 26  Anion Gap, Calculation: 6  BUN: 16  Creatinine: 0.63  GFR MDRD Af Amer: >60  GFR MDRD Non Af Amer: >60  Calcium: 8.6  Glucose: 82  INR: 0.95  WBC: 5.2  RBC: 2.99 (L)  Hemoglobin: 8.2 (L)  Hematocrit: 26.7 (L)  MCV: 89  MCH: 27.4  MCHC: 30.7 (L)  RDW: 13.8  Platelets: 265        Dental    (+) poor dentition                       Anesthesia Plan  Planned anesthetic: peripheral nerve block  NO CHLORAPREP FOR BLOCK  Pop/saph block for POP  GATIVA O2 facemask  Antiemetics    ASA 3   Induction: intravenous   Anesthetic plan and risks discussed with: patient  Anesthesia plan special considerations: antiemetics,   Post-op plan: routine recovery

## 2021-06-26 NOTE — ANESTHESIA POSTPROCEDURE EVALUATION
Patient: Anna RobertsonLing  Procedure(s):  IRRIGATION AND DEBRIDEMENT LEFT ACHILLES (Left)  Anesthesia type: regional    Patient location: PACU  Last vitals:   Vitals Value Taken Time   /60 06/18/21 1515   Temp 36.4  C (97.5  F) 06/18/21 1515   Pulse 62 06/18/21 1515   Resp 18 06/18/21 1515   SpO2 97 % 06/18/21 1515     Post vital signs: stable  Level of consciousness: awake and responds to simple questions  Post-anesthesia pain: pain controlled  Post-anesthesia nausea and vomiting: no  Pulmonary: unassisted, return to baseline  Cardiovascular: stable and blood pressure at baseline  Hydration: adequate  Anesthetic events: no    QCDR Measures:  ASA# 11 - [place]-op Cardiac Arrest: ASA11B - Patient did NOT experience unanticipated cardiac arrest  ASA# 12 - [place]-op Mortality Rate: ASA12B - Patient did NOT die  ASA# 13 - PACU Re-Intubation Rate: ASA13B - Patient did NOT require a new airway mgmt  ASA# 10 - Composite Anes Safety: ASA10A - No serious adverse event    Additional Notes:

## 2021-06-26 NOTE — ED PROVIDER NOTES
eMERGENCY dEPARTMENT PROGRESS NOTE      Patient was signed out to me by Dm Blankenship PA-C at 4:04 PM.      Anna Underwood is a 61 y.o. female with a pertinent history of s/p ankle surgery 2009 with nonhealing surgical wound, DVT, MRSA, and osteoporosis, who presents for evaluation of increased drainage and pain in a chronic infection. States she has a chronic wound to her left ankle that occasionally flares up. She has noticed increased odorous discharge from the wound for the past week, with associating low grade fevers which began 3-4 days ago. She started on Cipro and doxycycline 4 weeks ago but has switched to Cipro and Bactrim. Patient reports associating left foot pain with weight bearing. She follows with the [facility] and has consulted infectious disease who put her on IV antibiotics in the past.       LABS  Pertinent lab results reviewed in chart.  Results for orders placed or performed during the hospital encounter of 06/04/21   Sedimentation Rate   Result Value Ref Range    Sed Rate 52 (H) 0 - 20 mm/hr   C-Reactive Protein   Result Value Ref Range    CRP 2.4 (H) 0.0 - 0.8 mg/dL   Comprehensive Metabolic Panel   Result Value Ref Range    Sodium 138 136 - 145 mmol/L    Potassium 4.0 3.5 - 5.0 mmol/L    Chloride 105 98 - 107 mmol/L    CO2 24 22 - 31 mmol/L    Anion Gap, Calculation 9 5 - 18 mmol/L    Glucose 99 70 - 125 mg/dL    BUN 10 8 - 22 mg/dL    Creatinine 0.64 0.60 - 1.10 mg/dL    GFR MDRD Af Amer >60 >60 mL/min/1.73m2    GFR MDRD Non Af Amer >60 >60 mL/min/1.73m2    Bilirubin, Total 0.4 0.0 - 1.0 mg/dL    Calcium 8.7 8.5 - 10.5 mg/dL    Protein, Total 7.3 6.0 - 8.0 g/dL    Albumin 3.4 (L) 3.5 - 5.0 g/dL    Alkaline Phosphatase 73 45 - 120 U/L    AST 11 0 - 40 U/L    ALT <9 0 - 45 U/L   HM1 (CBC with Diff)   Result Value Ref Range    WBC 12.0 (H) 4.0 - 11.0 thou/uL    RBC 4.25 3.80 - 5.40 mill/uL    Hemoglobin 11.5 (L) 12.0 - 16.0 g/dL    Hematocrit 36.8 35.0 - 47.0 %    MCV 87 80  - 100 fL    MCH 27.1 27.0 - 34.0 pg    MCHC 31.3 (L) 32.0 - 36.0 g/dL    RDW 14.0 11.0 - 14.5 %    Platelets 501 (H) 140 - 440 thou/uL    MPV 9.2 8.5 - 12.5 fL    Neutrophils % 82 (H) 50 - 70 %    Lymphocytes % 10 (L) 20 - 40 %    Monocytes % 6 2 - 10 %    Eosinophils % 1 0 - 6 %    Basophils % 1 0 - 2 %    Immature Granulocyte % 1 (H) <=0 %    Neutrophils Absolute 9.9 (H) 2.0 - 7.7 thou/uL    Lymphocytes Absolute 1.2 0.8 - 4.4 thou/uL    Monocytes Absolute 0.7 0.0 - 0.9 thou/uL    Eosinophils Absolute 0.2 0.0 - 0.4 thou/uL    Basophils Absolute 0.1 0.0 - 0.2 thou/uL    Immature Granulocyte Absolute 0.1 (H) <=0.0 thou/uL       EKG  None    RADIOLOGY  Mr Ankle With Without Contrast Left    Result Date: 6/4/2021  EXAM: MR ANKLE W WO CONTRAST LEFT LOCATION: [facility] DATE/TIME: 6/4/2021 2:17 PM INDICATION: Remote history of ankle surgery for Achilles tendon rupture, nonhealing surgical wound, increasing drainage and pain in chronic infection. COMPARISON: 7/28/2020 TECHNIQUE: Routine. Additional postgadolinium T1 sequences were obtained. IV CONTRAST: Gadavist 5.5ml FINDINGS: TENDONS: Previous Achilles tendon repair and presumed flexor hallucis longus tendon transfer. There is a longitudinal defect within the Achilles tendon, and underlying chronic thickening and tendinopathy or inflammation of the tendon. Remainder of the visualized ankle tendons are intact and negative. LIGAMENTS: Anterior talofibular ligament intact. Calcaneofibular ligament intact. Posterior talofibular ligament intact. Syndesmotic inferior tibiofibular ligaments are intact. Deltoid ligament complex intact. JOINTS AND BONES: There is mild marrow edema in the distal tibia along the lateral aspect of the tibial plafond and the medial talus. No focal T1 marrow signal abnormality or bony destruction. No focal osteochondral lesion. Articular cartilage is preserved. No effusion. There is mild synovitis in the tibiotalar and  posterior subtalar joints.. SOFT TISSUES: Chronic open wound on the posterior aspect of the lower leg overlying the Achilles tendon, extending through the longitudinal defects in the tendon. There is a communicating complex fluid collection within the tendon defect in extending partially deep to the tendon, measuring 1 x 1.5 cm in axial dimensions and 2 to 3 cm in length, compatible with abscess. There is surrounding soft tissue edema and inflammation extending into the flexor hallucis longus muscle and Achilles fat pad.     1.  Previous Achilles tendon repair with chronic longitudinal defects in the tendon. There is chronic thickening and tendinopathy or edema of the tendon. 2.  Open wound over the posterior aspect of the lower leg near the ankle superficial to and extending into the Achilles tendon defect with a communicating abscess. There is surrounding deep soft tissue inflammation-infection and myositis. 3.  No specific MR evidence of osteomyelitis. 4.  Mild marrow edema distal tibial plafond laterally and talus medially is probably related to abnormal stress. 5.  Mild ankle joint synovitis is most likely reactive. No significant effusion.       ED COURSE & MEDICAL DECISION MAKING    Pertinent Labs and Imagaing reviewed (see chart for details)  Patient was signed out to me by Dm Blankenship PA-C at 4:04 PM.    5:23 PM I met with the patient to gather history, perform my initial exam, and discuss treatment plan.  5:36 PM Patient signed out AMA.       61 y.o. female whose care was signed out to me pending MRI of the foot/ankle.    CBC is remarkable for leukocytosis with white blood cell count of 12.0, anemia also noted with hemoglobin of 11.5.  CMP is unremarkable with no significant electrolyte derangements, normal liver and kidney function.  Sed rate is elevated at 52, CRP also elevated at 2.4.  Blood cultures are pending.  MRI of the ankle shows the superficial wound with communication of internal abscess,  there is surrounding soft tissue inflammation and myositis.  No evidence of osteomyelitis at this point.    The patient was given IV fluids and Toradol for her symptoms.    I made multiple attempts to contact wound care at [facility] as well as plastic surgery, unsuccessful.  I did advise admission to the hospital given that the patient has essentially failed outpatient antibiotic therapy and she has an abscess associated with her infection, patient chose to leave AGAINST MEDICAL ADVICE.  Risks of this were discussed including worsening infection, osteomyelitis, sepsis, loss of limb, or death.  Patient was given 1 dose of IV Rocephin, Keflex added to her outpatient prescription regiment.  Advised to follow-up immediately with her wound care clinic.  Recommend return to the ER if she develops any new or worsening symptoms like severe pain, fever, worsening clinical symptoms of infection, persistent vomiting, or any other concerning symptoms.  Patient is amenable to this treatment plan and verbalized her understanding.    At the conclusion of the encounter I discussed  the results of all of the tests and the disposition.   The questions were answered.  The patient or family acknowledged understanding and was agreeable with the care plan.       FINAL IMPRESSION        Final diagnoses:   Pain of left lower leg   Cellulitis and abscess of leg         CRITICAL CARE    None     Yee Morrison PA-C  06/04/21 1498

## 2021-06-26 NOTE — ANESTHESIA PROCEDURE NOTES
Peripheral Block    Patient location during procedure: pre-op  Start time: 6/18/2021 12:55 PM  End time: 6/18/2021 12:57 PM  post-op analgesia per surgeon order as noted in medical record  Staffing:  Performing  Anesthesiologist: Ayan Martines MD  Preanesthetic Checklist  Completed: patient identified, site marked, risks, benefits, and alternatives discussed, timeout performed, consent obtained, airway assessed, oxygen available, suction available, emergency drugs available and hand hygiene performed  Peripheral Block  Block type: saphenous, adductor canal block  Prep: ChloraPrep  Patient position: supine  Patient monitoring: cardiac monitor, continuous pulse oximetry, heart rate and blood pressure  Laterality: left  Injection technique: ultrasound guided    Ultrasound used to visualize needle placement in proximity to nerve being blocked: yes   US used to visualize anesthetic spread  Visualized anatomic structures normal  No Pathological Findings  Permanent ultrasound image captured for medical record  Sterile gel and probe cover used for ultrasound.  Needle  Needle type: Stimuplex   Needle gauge: 20G  Needle length: 4 in  no peripheral nerve catheter placed  Assessment  Injection assessment: no difficulty with injection, negative aspiration for heme, no paresthesia on injection and incremental injection

## 2021-06-26 NOTE — ANESTHESIA PROCEDURE NOTES
Peripheral Block    Patient location during procedure: pre-op  Start time: 6/18/2021 12:50 PM  End time: 6/18/2021 12:54 PM  post-op analgesia per surgeon order as noted in medical record  Staffing:  Performing  Anesthesiologist: Ayan Martines MD  Preanesthetic Checklist  Completed: patient identified, site marked, risks, benefits, and alternatives discussed, timeout performed, consent obtained, airway assessed, oxygen available, suction available, emergency drugs available and hand hygiene performed  Peripheral Block  Block type: sciatic, popliteal  Prep: ChloraPrep  Patient position: supine  Patient monitoring: cardiac monitor, continuous pulse oximetry, heart rate and blood pressure  Laterality: left  Injection technique: ultrasound guided    Ultrasound used to visualize needle placement in proximity to nerve being blocked: yes   US used to visualize anesthetic spread  Visualized anatomic structures normal  No Pathological Findings  Permanent ultrasound image captured for medical record  Sterile gel and probe cover used for ultrasound.  Needle  Needle type: Stimuplex   Needle gauge: 20G  Needle length: 4 in  no peripheral nerve catheter placed  Assessment  Injection assessment: no difficulty with injection, negative aspiration for heme, no paresthesia on injection and incremental injection

## 2021-06-26 NOTE — ANESTHESIA CARE TRANSFER NOTE
Last vitals:   Vitals:    06/18/21 1432   BP: 114/56   Pulse: 71   Resp: 16   Temp:    SpO2: 100%     Patient's level of consciousness is drowsy  Spontaneous respirations: yes  Maintains airway independently: yes  Dentition unchanged: yes  Oropharynx: oropharynx clear of all foreign objects    QCDR Measures:  ASA# 20 - Surgical Safety Checklist: WHO surgical safety checklist completed prior to induction    PQRS# 430 - Adult PONV Prevention: 4558F - Pt received => 2 anti-emetic agents (different classes) preop & intraop  ASA# 8 - Peds PONV Prevention: NA - Not pediatric patient, not GA or 2 or more risk factors NOT present  PQRS# 424 - [place]-op Temp Management: 4559F - At least one body temp DOCUMENTED => 35.5C or 95.9F within required timeframe  PQRS# 426 - PACU Transfer Protocol:NA - Patient did not go to PACU  ASA# 14 - Acute Post-op Pain: ASA14B - Patient did NOT experience pain >= 7 out of 10

## 2021-06-28 ENCOUNTER — TRANSFERRED RECORDS (OUTPATIENT)
Dept: HEALTH INFORMATION MANAGEMENT | Facility: CLINIC | Age: 61
End: 2021-06-28

## 2021-06-28 ENCOUNTER — RECORDS - HEALTHEAST (OUTPATIENT)
Dept: LAB | Facility: HOSPITAL | Age: 61
End: 2021-06-28

## 2021-06-28 ENCOUNTER — HOME INFUSION (PRE-WILLOW HOME INFUSION) (OUTPATIENT)
Dept: PHARMACY | Facility: CLINIC | Age: 61
End: 2021-06-28

## 2021-06-28 LAB
ALBUMIN SERPL-MCNC: 3.3 G/DL (ref 3.5–5)
ALP SERPL-CCNC: 56 U/L (ref 45–120)
ALT SERPL W P-5'-P-CCNC: 13 U/L (ref 0–45)
ANION GAP SERPL CALCULATED.3IONS-SCNC: 8 MMOL/L (ref 5–18)
AST SERPL W P-5'-P-CCNC: 14 U/L (ref 0–40)
BASOPHILS # BLD AUTO: 0.1 THOU/UL (ref 0–0.2)
BASOPHILS NFR BLD AUTO: 1 % (ref 0–2)
BILIRUB SERPL-MCNC: 0.2 MG/DL (ref 0–1)
BUN SERPL-MCNC: 10 MG/DL (ref 8–22)
C REACTIVE PROTEIN LHE: <0.1 MG/DL (ref 0–0.8)
CALCIUM SERPL-MCNC: 8.7 MG/DL (ref 8.5–10.5)
CHLORIDE BLD-SCNC: 109 MMOL/L (ref 98–107)
CO2 SERPL-SCNC: 23 MMOL/L (ref 22–31)
CREAT SERPL-MCNC: 0.69 MG/DL (ref 0.6–1.1)
EOSINOPHIL # BLD AUTO: 0.3 THOU/UL (ref 0–0.4)
EOSINOPHIL NFR BLD AUTO: 5 % (ref 0–6)
ERYTHROCYTE [DISTWIDTH] IN BLOOD BY AUTOMATED COUNT: 14.4 % (ref 11–14.5)
ERYTHROCYTE [SEDIMENTATION RATE] IN BLOOD BY WESTERGREN METHOD: 15 MM/HR (ref 0–20)
GFR SERPL CREATININE-BSD FRML MDRD: >60 ML/MIN/1.73M2
GLUCOSE BLD-MCNC: 78 MG/DL (ref 70–125)
HCT VFR BLD AUTO: 32.9 % (ref 35–47)
HGB BLD-MCNC: 10 G/DL (ref 12–16)
IMM GRANULOCYTES # BLD: 0 THOU/UL
IMM GRANULOCYTES NFR BLD: 0 %
LYMPHOCYTES # BLD AUTO: 2.2 THOU/UL (ref 0.8–4.4)
LYMPHOCYTES NFR BLD AUTO: 32 % (ref 20–40)
MCH RBC QN AUTO: 26.9 PG (ref 27–34)
MCHC RBC AUTO-ENTMCNC: 30.4 G/DL (ref 32–36)
MCV RBC AUTO: 88 FL (ref 80–100)
MONOCYTES # BLD AUTO: 0.7 THOU/UL (ref 0–0.9)
MONOCYTES NFR BLD AUTO: 10 % (ref 2–10)
NEUTROPHILS # BLD AUTO: 3.6 THOU/UL (ref 2–7.7)
NEUTROPHILS NFR BLD AUTO: 52 % (ref 50–70)
PLATELET # BLD AUTO: 391 THOU/UL (ref 140–440)
PMV BLD AUTO: 9.8 FL (ref 8.5–12.5)
POTASSIUM BLD-SCNC: 4.4 MMOL/L (ref 3.5–5)
PROT SERPL-MCNC: 6.3 G/DL (ref 6–8)
RBC # BLD AUTO: 3.72 MILL/UL (ref 3.8–5.4)
SODIUM SERPL-SCNC: 140 MMOL/L (ref 136–145)
VANCOMYCIN SERPL-MCNC: 16.1 MG/L
WBC: 7 THOU/UL (ref 4–11)

## 2021-06-28 NOTE — PROGRESS NOTES
Progress Notes by Jame Dunn MD at 11/5/2019  8:00 AM     Author: Jame Dunn MD Service: -- Author Type: Physician    Filed: 11/5/2019 12:01 PM Encounter Date: 11/5/2019 Status: Signed    : Jame Dunn MD (Physician)       FEMALE PREVENTATIVE EXAM    Assessment and Plan:       1. Routine general medical examination at a health care facility  Immunizations are reviewed and providing first Shingrix vaccine return for second in 2 to 6 months.  Living will discussed.  Non-smoker.  Uses alcohol in moderation.  Regular exercise discussed.  She declines having a colonoscopy but is willing to do Cologuard and this will be scheduled.  Continue annual mammograms.  Continue to follow-up with OB/GYN annually.  We will schedule DEXA for osteoporosis screening.  Recommending annual eye exam.  Skin exam performed and recommending regular use of sunblock.  Hepatitis C antibody for screening.  Will screen for diabetes with fasting glucose.  Checking fasting lipid profile.    2. Achilles tendon infection  Nonhealing wound and ankle with multiple surgeries followed by Dr. Wiley    3. Abdominal pain, epigastric  Recurrent episodes of epigastric pain radiating to back associated with meals.  Already on pantoprazole 40 mg twice daily and Carafate.  Checking appropriate labs and obtaining right upper quadrant abdominal ultrasound for gallstones.  Consider EGD.  - HM2(CBC w/o Differential)  - Comprehensive Metabolic Panel  - Lipase  - Urinalysis-UC if Indicated  - US Abdomen Limited; Future    4. Iron deficiency anemia, unspecified iron deficiency anemia type  Recent iron infusion.  Recheck hemoglobin and iron studies  - Iron and Transferrin Iron Binding Capacity  - Ferritin    5. Gastroesophageal reflux disease with esophagitis  Continues on PPI    6. Encounter for colorectal cancer screening    - Cologuard; Future    7. Visit for screening mammogram    - Mammo Screening Bilateral; Future    8.  Encounter for screening for lipoid disorders    - Lipid Cascade- FASTING    9. Postmenopausal status    - DXA Bone Density Scan; Future    10. Screening for HIV (human immunodeficiency virus)    - HIV Antigen/Antibody Screening [place]    11. Encounter for hepatitis C screening test for low risk patient    - Hepatitis C Antibody (Anti-HCV) (pts born [id number])    Over 25 minutes was spent addressing these chronic and new medical problems beyond time spent performing annual wellness visit with over 50% of the time spent counseling and coordination of care discussing recurrent epigastric abdominal pain radiating to back associated with nausea and emesis along with chronic nonhealing wound and ankle and chronic iron deficiency anemia    Next follow up:  Return in 1 year (on 11/5/2020) for Annual physical.    Immunization Review  Adult Imm Review: Providing first Shingrix      I discussed the following with the patient:   Adult Healthy Living: Importance of regular exercise  Healthy nutrition    I have had an Advance Directives discussion with the patient.    Subjective:   Chief Complaint: Anna Underwood is an 59 y.o. female here for a preventative health visit.     HPI: In addition to her annual physical, chronic medical complaints discussed and new concerns    She is having recurrent episodes of epigastric pain radiating to her back.  This is worse after meals especially certain foods such as peanut butter.  She is already on pantoprazole 40 mg twice daily and takes Carafate.  She feels that her symptoms are partially better after she takes Carafate.  No history of gallstones.  There is associated nausea and occasional emesis.  No change in bowel movements.  No melena or hematochezia.  She does have chronic iron deficiency anemia.  Recent iron infusion.  Several years since her last EGD.  She has never had a colonoscopy and declines having this done.    Healthy Habits  Are you taking a daily aspirin? No  Do  "you typically exercising at least 40 min, 3-4 times per week?  NO  Do you usually eat at least 4 servings of fruit and vegetables a day, include whole grains and fiber and avoid regularly eating high fat foods? Yes  Have you had an eye exam in the past two years? Yes  Do you see a dentist twice per year? NO  Do you have any concerns regarding sleep? No    Safety Screen  If you own firearms, are they secured in a locked gun cabinet or with trigger locks? The patient does not own any firearms  Do you feel you are safe where you are living?: Yes (11/5/2019  8:01 AM)  Do you feel you are safe in your relationship(s)?: Yes (11/5/2019  8:01 AM)      Review of Systems:  Please see above.  The rest of the review of systems are negative for all systems.     Pap History:   Last 3 PAP and HPV Results:   Cancer Screening       Status Date      PAP SMEAR Overdue 4/27/1987     MAMMOGRAM Overdue 2/28/2019      Done 2/28/2017 MAMMO SCREENING BILATERAL    COLONOSCOPY Next Due 2/24/2027      Declined 2/24/2017               History     Reviewed By Date/Time Sections Reviewed    Jame Dunn MD 11/5/2019  8:17 AM Medical, Surgical, Tobacco, Alcohol, Drug Use, Sexual Activity, Family, Social Documentation    Romy Gates CMA 11/5/2019  8:01 AM Tobacco, Alcohol, Drug Use, Sexual Activity            Objective:   Vital Signs:   Visit Vitals  /72 (Patient Site: Left Arm, Patient Position: Sitting, Cuff Size: Adult Large)   Ht 5' 4\" (1.626 m)   Wt 118 lb (53.5 kg)   BMI 20.25 kg/m           PHYSICAL EXAM  EYES: Eyelids, conjunctiva, and sclera were normal. Pupils were normal. Cornea, iris, and lens were normal bilaterally.  HEAD, EARS, NOSE, MOUTH, AND THROAT: Head and face were normal. Nose appearance was normal and there was no discharge. Oropharynx was normal.  NECK: Neck appearance was normal. There were no neck masses and the thyroid was not enlarged and no nodules are felt.  No lymphadenopathy.  RESPIRATORY: " Breathing pattern was normal and the chest moved symmetrically.  Percussion/auscultatory percussion was normal.  Lung sounds were normal and there were no rales or wheezes.  CARDIOVASCULAR: Heart rate and rhythm were normal.  S1 and S2 were normal and there were no extra sounds or murmurs. Peripheral pulses in arms and legs were normal.  Jugular venous pressure was normal.  There was no peripheral edema.  No carotid bruits.  GASTROINTESTINAL: The abdomen was normal in contour.  Bowel sounds were present.   No tenderness to palpation in epigastric region and no hepatosplenomegaly.  MUSCULOSKELETAL: Skeletal configuration was normal and muscle mass was normal for age. Joint appearance was overall normal.  LYMPHATIC: There were no enlarged nodes.  SKIN/HAIR/NAILS: Skin color was normal.  Hair and nails were normal.There were no skin lesions.  NEUROLOGIC: The patient was alert and oriented to person, place, time, and circumstance. Speech was normal. Cranial nerves were normal. Motor strength was normal for age. The patient was normally coordinated.  Sensation intact.  PSYCHIATRIC:  Mood and affect were normal and the patient had normal recent and remote memory. The patient's judgment and insight were normal.         Medication List          Accurate as of November 5, 2019 12:01 PM. If you have any questions, ask your nurse or doctor.            CONTINUE taking these medications    ADVIL 200 MG tablet  INSTRUCTIONS:  Take 600 mg by mouth every 6 (six) hours as needed for pain.   Generic drug:  ibuprofen        pantoprazole 40 MG tablet  Also known as:  PROTONIX  INSTRUCTIONS:  Take 1 tablet (40 mg total) by mouth 2 (two) times a day.        sucralfate 100 mg/mL suspension  Also known as:  CARAFATE  INSTRUCTIONS:  Take 1 g by mouth 4 (four) times a day as needed.        triamcinolone 0.5 % ointment  Also known as:  KENALOG  INSTRUCTIONS:  Apply topically 2 (two) times a day. Rash on arms after tape contacts skin            STOP taking these medications    ciprofloxacin HCl 500 MG tablet  Also known as:  CIPRO  Stopped by:  Jame Dunn MD            Additional Screenings Completed Today:

## 2021-06-29 NOTE — PROGRESS NOTES
"Progress Notes by Paulina Ruggiero MD at 7/20/2020  2:30 PM     Author: Paulina Ruggiero MD Service: -- Author Type: Physician    Filed: 7/20/2020  3:07 PM Encounter Date: 7/20/2020 Status: Signed    : Paulina Ruggiero MD (Physician)       Anna Underwood is a 60 y.o. female who is being evaluated via a billable video visit.      The patient has been notified of following:     \"This video visit will be conducted via a call between you and your physician/provider. We have found that certain health care needs can be provided without the need for an in-person physical exam.  This service lets us provide the care you need with a video conversation.  If a prescription is necessary we can send it directly to your pharmacy.  If lab work is needed we can place an order for that and you can then stop by our lab to have the test done at a later time.    Video visits are billed at different rates depending on your insurance coverage. Please reach out to your insurance provider with any questions.    If during the course of the call the physician/provider feels a video visit is not appropriate, you will not be charged for this service.\"    Patient has given verbal consent to a Video visit? Yes  How would you like to obtain your AVS? AVS Preference: [email or web address].  If dropped by the video visit, the video invitation should be sent to: [email or web address]  Will anyone else be joining your video visit? No        Video Start Time: 2:30 pm    Additional provider notes:  Pt sent photos via email taken 7/19/20            Video-Visit Details    Type of service:  Video Visit    Video End Time (time video stopped): 3:04 PM  Originating Location (pt. Location): Office/Home    Distant Location (provider location):   [facility] INFECTIOUS DISE[facility]     Platform used for Video Visit: Nell Ruggiero MD  [facility]  [phone]      S: Wound dehiscence, about 1 month ago.  Has history of recurrent wound opening and " multiple flaps in the past.  Has had multiple courses of antibiotics.  The wound had remained closed for about 8 months and then patient stated that she twisted her ankle and it opened up.  And antibiotic bead placement      GENERAL: Healthy, alert and no distress  EYES: Eyes grossly normal to inspection. No discharge or erythema, or obvious scleral/conjunctival abnormalities.  RESP: No audible wheeze, cough, or visible cyanosis.  No visible retractions or increased work of breathing.    NEURO: Cranial nerves grossly intact. Mentation and speech appropriate for age.  SKIN: open wound on left ankle-- see photo above  PSYCH: Mentation appears normal, affect normal/bright, judgement and insight intact, normal speech and appearance well-groomed  Cultures reviewed    A:  Wound infection, Pseudomonas, coagulase-negative staph  Has been on ciprofloxacin and clindamycin  History of MRSA infection  History of flap failure in the past      Recommendation:  As there is still drainage from the wound we will repeat culture.  Infectious disease clinic will call you to set up a nurse visit to obtain wound cultures  We will change clindamycin to doxycycline and extend course of ciprofloxacin.  Depending on the culture results will determine if we need to give IV antibiotics to continue current course of therapy.  We will also request a referral for vascular clinic to determine blood supply in the region.  Patient has follow-up scheduled with Dr. Wiley.  She is also obtain a second opinion regarding recurrent dehiscence at [facility], however this was in January.  May need to revisit if the wound does not improve    Paulina Ruggiero MD  [facility]  [phone]

## 2021-06-29 NOTE — PROGRESS NOTES
"Progress Notes by Paulina Ruggiero MD at 7/27/2020  2:00 PM     Author: Paulina Ruggiero MD Service: -- Author Type: Physician    Filed: 7/27/2020  3:01 PM Encounter Date: 7/27/2020 Status: Signed    : Paulina Ruggiero MD (Physician)       Anna Underwood is a 60 y.o. female who is being evaluated via a billable video visit.        INFECTIOUS DISEASE  [facility]  VIRTUAL VISIT    7/27/2020   2:00 pm    The patient has been notified of following:     \"This video visit will be conducted via a call between you and your physician/provider. We have found that certain health care needs can be provided without the need for an in-person physical exam.  This service lets us provide the care you need with a video conversation.  If a prescription is necessary we can send it directly to your pharmacy.  If lab work is needed we can place an order for that and you can then stop by our lab to have the test done at a later time.    Video visits are billed at different rates depending on your insurance coverage. Please reach out to your insurance provider with any questions.    If during the course of the call the physician/provider feels a video visit is not appropriate, you will not be charged for this service.\"    Patient has given verbal consent to a Video visit? Yes  How would you like to obtain your AVS? AVS Preference: MyChart.  If dropped by the video visit, the video invitation should be sent to: Text to cell phone: [phone]  Will anyone else be joining your video visit? No      Video Start Time: 2:06 PM    Additional provider notes: see below    Video-Visit Details    Type of service:  Video Visit    Video End Time (time video stopped): 2:35 PM  Originating Location (pt. Location): Home    Distant Location (provider location):  [place], [place]    Platform used for Video Visit: [employer] Locate    Additional provider notes:  Pt sent photos via email taken 7/19/20                   S: Wound " dehiscence, about 1 month ago.  Has history of recurrent wound opening and multiple flaps in the past.  Has had multiple courses of antibiotics.  The wound had remained closed for about 8 months and then patient stated that she twisted her ankle and it opened up.  And antibiotic bead placement      O/E:   Video Visit:      GENERAL: Healthy, alert and no distress  EYES: Eyes grossly normal to inspection. No discharge or erythema, or obvious scleral/conjunctival abnormalities.  RESP: No audible wheeze, cough, or visible cyanosis.  No visible retractions or increased work of breathing.    NEURO: Cranial nerves grossly intact. Mentation and speech appropriate for age.  SKIN: open wound on left ankle-- see photo above  PSYCH: Mentation appears normal, affect normal/bright, judgement and insight intact, normal speech and appearance well-groomed  Cultures reviewed    Per Chart review:  7/22/2020:    Wound located on pt's L achillis measured at 5.5cm x 5.5cm. Depth about 0.5-1.0cm     Redness and yellow/green discoloration similarly indicated in video visit 7/20/20 however secretions are lessened and wound looks clean. Pt states changes wound dressing daily.      Wound culture:    MRSA   Pseudomonas  Wound culture, gram stain     Specimen Information:  Ankle, Left; Swab          Culture  4+ Pseudomonas aeruginosaAbnormal         4+ MRSA Coagulase Positive StaphAbnormal      Methicillin resistant Staph aureus, infection status already documented.             Gram Stain Result   No polymorphonuclear leukocytes seen       2+ Gram negative bacilli       1+ Gram positive cocci in pairs             Resulting Agency: [facility]    Susceptibility      Pseudomonas aeruginosa  MRSA Coagulase Positive Staph      ZORAIDA  ZORAIDA      Aztreonam  4   Sensitive        Cefazolin    >16   Resistant      Cefepime  8   Sensitive        Ceftazidime  <=2   Sensitive        Ciprofloxacin  <=0.5   Sensitive        Clindamycin    <=0.5   Sensitive       Daptomycin    <=1   Sensitive      Doxycycline    <=0.5   Sensitive      Gentamicin  <=2   Sensitive        Levofloxacin  <=1   Sensitive        Linezolid    2   Sensitive      Meropenem  <=0.25   Sensitive        Oxacillin    >2   Resistant      Piperacillin + Tazobactam  4/4   Sensitive        Tobramycin  <=2   Sensitive        Trimethoprim + Sulfamethoxazole    <=1/19   Sensitive      Vancomycin    1   Sensitive                    Specimen Collected: 07/22/20 16:07  Last Resulted: 07/25/20 10:57                A:  Wound infection, Pseudomonas, coagulase-negative staph, MRSA  Has been on ciprofloxacin and clindamycin  History of MRSA infection  History of flap failure in the past  Vascular and Plastic Surgery consultations obtained and work up in process  Dr. Wiley will be updated.      Recommendation:    Continue Doxycycline and Ciprofloxacin    We discussed the cultures results, and continuing the treatment for MRSA and Pseudomonas.   We will obtain and MRI of left ankle and repeat labs. May need IV antibiotics     If there is no improvement or worsening over the next 48 hours, we recommend going to [facility] or [facility] for emergent evaluation and consultations with Orthopedics and Plastic Surgery (as the current Plastic Surgery appointment is not until 8/4/2020)    Please call with questions. ID nurse will be calling you to assist with labs and MRI appointments.    Paulina Ruggiero MD  [facility]  [phone]

## 2021-06-30 NOTE — PROGRESS NOTES
Progress Notes by Jame Dunn MD at 2/1/2021  3:20 PM     Author: Jame Dunn MD Service: -- Author Type: Physician    Filed: 2/1/2021  4:24 PM Encounter Date: 2/1/2021 Status: Signed    : Jame Dunn MD (Physician)       FEMALE PREVENTATIVE EXAM    Assessment and Plan:     Patient has been advised of split billing requirements and indicates understanding: Yes    1. Routine general medical examination at a health care facility  Immunizations are reviewed and everything is up-to-date.. Former smoker.  Uses alcohol in moderation.  Regular exercise discussed.  She declines having a colonoscopy but is open to performing Cologuard.  Recommending annual mammogram and she will schedule.  Continue to follow-up with OB/GYN annually.   She sees an ophthalmologist every year. Skin exam performed and recommending regular use of sunblock.  Hepatitis C antibody for screening was normal.  Will screen for diabetes with fasting glucose.  Checking fasting lipid profile.     2. Essential hypertension  Blood pressure has been persistently elevated outside of the office and is found elevated today at her appointment.  Strong family history of hypertension.  She gets regular exercise with walking.  Normal BMI.  She avoids excessive salt use.  She does take ibuprofen.  Recommending that she start Toprol-XL 25 mg daily.  - Comprehensive Metabolic Panel  - HM2(CBC w/o Differential)  - Urinalysis-UC if Indicated  - metoprolol succinate (TOPROL XL) 25 MG; Take 1 tablet (25 mg total) by mouth daily.  Dispense: 90 tablet; Refill: 3  - Thyroid Stimulating Hormone (TSH)    3. Headache disorder  Increasing pounding headaches in the top of her head rated as 6/10 in severity.  She does have history of migraines.  May be related to blood pressure.  Choosing beta-blocker to help with both blood pressure management and for prophylaxis against migraines.    4. Non-healing surgical wound, sequela  Chronic nonhealing  wound in posterior ankle with multiple surgeries.  Recent peripheral angiogram demonstrating inadequate perfusion to this area.  Further surgery completed early this winter.  Continues with wound care including using topical vancomycin.  Her surgeon has recommended using Vasculera.    5. Osteoporosis of multiple sites without pathological fracture  Diagnosed with osteoporosis in early 2020 with T score of -3.2 involving left total hip.  Started on Prolia and received her second injection on September 4, 2020.  She will be due for her third in March 2021.  We will plan to repeat DEXA after her fourth injection.  We will encourage her to get plenty of calcium in her diet and she continues on vitamin D replacement.    6. Vitamin D deficiency  Recheck vitamin D level on replacement  - ergocalciferol (ERGOCALCIFEROL) 1,250 mcg (50,000 unit) capsule; Take 1 capsule (50,000 Units total) by mouth once a week.  Dispense: 13 capsule; Refill: 3  - Vitamin D, Total (25-Hydroxy)    7. Abnormal CT of the chest  Persistent cough in 2020 of unclear etiology.  Abnormal CT showing nonspecific findings.  Will arrange for 12-month follow-up especially with her smoking history.  - CT Chest Without Contrast; Future    8. Iron deficiency anemia, unspecified iron deficiency anemia type  Longstanding history of iron deficiency anemia.  Currently not on iron replacement.  We will recheck CBC along with iron studies  - Iron and Transferrin Iron Binding Capacity    9. Gastroesophageal reflux disease with esophagitis without hemorrhage  Continues on pantoprazole along with Carafate as needed with symptoms generally well controlled.  No recurrent epigastric abdominal pain    10. Visit for screening mammogram  She will make arrangements for screening mammogram    11. Encounter for colorectal cancer screening    - Cologuard; Future    12. Encounter for screening for lipoid disorders    - Lipid Cascade- FASTING    Over 30 minutes was spent  addressing these chronic and new medical problems beyond time spent performing annual physical with over 50% of the time spent counseling and coordination of care    Next follow up:  Return in 5 weeks (on 3/8/2021) for Recheck.    Immunization Review  Adult Imm Review: No immunizations due today      I discussed the following with the patient:   Adult Healthy Living: Importance of regular exercise  Healthy nutrition      The patient was counseled and encouraged to consider modifying their diet and eating habits. She was provided with information on recommended healthy diet options.    Subjective:   Chief Complaint: Anna Underwood is an 60 y.o. female here for a preventative health visit.    Patient has been advised of split billing requirements and indicates understanding: Yes  HPI: In addition to her annual preventive visit, multiple medical concerns addressed during today's visit.  Persistently elevated blood pressure with diagnosis of hypertension today.  Also discussed chronic nonhealing wound and posterior ankle, management of osteoporosis, previous persistent cough with abnormal CT of chest, and GERD with history of iron deficiency anemia.  See assessment and plan for details.    Healthy Habits  Are you taking a daily aspirin? No  Do you typically exercising at least 40 min, 3-4 times per week?  Yes  Do you usually eat at least 4 servings of fruit and vegetables a day, include whole grains and fiber and avoid regularly eating high fat foods? NO  Have you had an eye exam in the past two years? Yes  Do you see a dentist twice per year? NO  Do you have any concerns regarding sleep? No    Safety Screen  If you own firearms, are they secured in a locked gun cabinet or with trigger locks? The patient does not own any firearms  Do you feel you are safe where you are living?: Yes (2/1/2021  3:11 PM)  Do you feel you are safe in your relationship(s)?: Yes (2/1/2021  3:11 PM)      Review of Systems:  Please see  "above.  The rest of the review of systems are negative for all systems.     Pap History:   Last 3 PAP and HPV Results:   Cancer Screening       Status Date      PAP SMEAR Overdue 4/27/1983     MAMMOGRAM Overdue 2/28/2019      Done 2/28/2017 MAMMO SCREENING BILATERAL              History     Reviewed By Date/Time Sections Reviewed    Jame Dunn MD 2/1/2021  3:25 PM Medical, Surgical, Tobacco, Alcohol, Drug Use, Sexual Activity, Family, Social Documentation    Bertha Falcon CMA 2/1/2021  3:15 PM Tobacco            Objective:   Vital Signs:   Visit Vitals  /90   Pulse (!) 112   Temp 98  F (36.7  C) (Tympanic)   Ht 5' 3.25\" (1.607 m)   Wt 119 lb (54 kg)   SpO2 100%   BMI 20.91 kg/m           PHYSICAL EXAM  EYES: Eyelids, conjunctiva, and sclera were normal. Pupils were normal. Cornea, iris, and lens were normal bilaterally.  HEAD, EARS, NOSE, MOUTH, AND THROAT: Head and face were normal. TMs and external auditory canals are normal  NECK: Neck appearance was normal. There were no neck masses and the thyroid was not enlarged and no nodules are felt.  No lymphadenopathy.  RESPIRATORY: Breathing pattern was normal and the chest moved symmetrically.   Lung sounds were normal and there were no rales or wheezes.  CARDIOVASCULAR: Heart rate and rhythm were normal.  S1 and S2 were normal and there were no extra sounds or murmurs. Peripheral pulses in arms and legs were normal.  Jugular venous pressure was normal.  There was no peripheral edema.  No carotid bruits.  GASTROINTESTINAL:  Bowel sounds were present.   Palpation detected no tenderness, mass, or enlarged organs.   MUSCULOSKELETAL: Skeletal configuration was normal and muscle mass was normal for age. Joint appearance was overall normal.  LYMPHATIC: There were no enlarged nodes.  SKIN/HAIR/NAILS: Skin color was normal.  Hair and nails were normal.There were no skin lesions.  NEUROLOGIC: The patient was alert and oriented to person, place, time, and " circumstance. Speech was normal. Cranial nerves were normal. Motor strength was normal for age. The patient was normally coordinated.  Sensation intact.  PSYCHIATRIC:  Mood and affect were normal and the patient had normal recent and remote memory. The patient's judgment and insight were normal.    The 10-year ASCVD risk score (Ly ISIDRO Jr., et al., 2013) is: 3.1%    Values used to calculate the score:      Age: 60 years      Sex: Female      Is Non- : No      Diabetic: No      Tobacco smoker: No      Systolic Blood Pressure: 148 mmHg      Is BP treated: No      HDL Cholesterol: 58 mg/dL      Total Cholesterol: 181 mg/dL         Medication List          Accurate as of February 1, 2021  4:23 PM. If you have any questions, ask your nurse or doctor.            START taking these medications    metoprolol succinate 25 MG  Also known as: Toprol XL  INSTRUCTIONS: Take 1 tablet (25 mg total) by mouth daily.  Started by: Jame Dunn MD           CONTINUE taking these medications    AdviL 200 MG tablet  INSTRUCTIONS: Take 600 mg by mouth every 6 (six) hours as needed for pain.   Generic drug: ibuprofen        ergocalciferol 1,250 mcg (50,000 unit) capsule  Also known as: ERGOCALCIFEROL  INSTRUCTIONS: Take 1 capsule (50,000 Units total) by mouth once a week.        pantoprazole 40 MG tablet  Also known as: PROTONIX  INSTRUCTIONS: TAKE 1 TABLET(40 MG) BY MOUTH TWICE DAILY        sucralfate 100 mg/mL suspension  Also known as: CARAFATE  INSTRUCTIONS: Take 1 g by mouth 4 (four) times a day as needed.        Vasculera 630 mg Tab  INSTRUCTIONS: Take 1 tablet by mouth 2 (two) times a day.  Generic drug: diosmin complex no.1           STOP taking these medications    aspirin 81 mg chewable tablet  Stopped by: Jame Dunn MD           Where to Get Your Medications      These medications were sent to [id number] - [place], [place] - [address] AT [facility]  [facility] [address]  [address] [facility] [address]    Phone: [phone]     ergocalciferol 1,250 mcg (50,000 unit) capsule    metoprolol succinate 25 MG         Additional Screenings Completed Today:

## 2021-07-01 ENCOUNTER — RECORDS - HEALTHEAST (OUTPATIENT)
Dept: ADMINISTRATIVE | Facility: OTHER | Age: 61
End: 2021-07-01

## 2021-07-01 NOTE — PROGRESS NOTES
This is a recent snapshot of the patient's [facility] medical record.  For current drug dose and complete information and questions, call [phone] or In Basket pool, [facility] home infusion ([id number])  CSN Number:  [id number]

## 2021-07-03 NOTE — ADDENDUM NOTE
Addendum Note by Nikky Briggs CMA at 3/20/2018  1:50 PM     Author: Nikky Briggs CMA Service: -- Author Type: Certified Medical Assistant    Filed: 3/20/2018  1:50 PM Encounter Date: 3/20/2018 Status: Signed    : Nikky Briggs CMA (Certified Medical Assistant)    Addended by: NIKKY ROGERS on: 3/20/2018 01:50 PM        Modules accepted: Orders

## 2021-07-03 NOTE — ADDENDUM NOTE
Addendum Note by Ayan Coreas MD at 11/2/2020  3:20 PM     Author: Ayan Coreas MD Service: -- Author Type: Physician    Filed: 11/2/2020  3:40 PM Encounter Date: 11/2/2020 Status: Signed    : Ayan Coreas MD (Physician)    Addended by: AYAN COREAS on: 11/2/2020 03:40 PM        Modules accepted: Level of Service

## 2021-07-03 NOTE — ADDENDUM NOTE
Addendum Note by Briana West at 2/1/2021  3:20 PM     Author: Briana West Service: -- Author Type:     Filed: 2/1/2021  8:07 PM Encounter Date: 2/1/2021 Status: Signed    : Briana West ()    Addended by: BRIANA WEST on: 2/1/2021 08:07 PM        Modules accepted: Orders

## 2021-07-03 NOTE — ADDENDUM NOTE
Addendum Note by Deb Rivero RN at 8/4/2020  9:49 AM     Author: Deb Rivero RN Service: -- Author Type: Registered Nurse    Filed: 8/4/2020  9:49 AM Encounter Date: 8/3/2020 Status: Signed    : Deb Rivero RN (Registered Nurse)    Addended by: DEB RIVERO on: 8/4/2020 09:49 AM        Modules accepted: Orders

## 2021-07-03 NOTE — ADDENDUM NOTE
Addendum Note by Ethan Middleton LPN at 7/31/2020  2:00 PM     Author: Ethan Middleton LPN Service: -- Author Type: Licensed Nurse    Filed: 8/5/2020  4:05 PM Encounter Date: 7/31/2020 Status: Signed    : Ethan Middleton LPN (Licensed Nurse)    Addended by: ETHAN MIDDLETON on: 8/5/2020 04:05 PM        Modules accepted: Orders, SmartSet

## 2021-07-04 NOTE — ADDENDUM NOTE
Addendum Note by Karissa Easley at 2/1/2021  3:20 PM     Author: Karissa Easley Service: -- Author Type: --    Filed: 2/19/2021 12:42 PM Encounter Date: 2/1/2021 Status: Signed    : Karissa Easley    Addended by: KARISSA EASLEY on: 2/19/2021 12:42 PM        Modules accepted: Orders

## 2021-07-04 NOTE — ADDENDUM NOTE
Addendum Note by Jame Dunn MD at 2/1/2021  3:20 PM     Author: Jame Dunn MD Service: -- Author Type: Physician    Filed: 2/2/2021  7:34 AM Encounter Date: 2/1/2021 Status: Signed    : Jame Dunn MD (Physician)    Addended by: JAME DUNN on: 2/2/2021 07:34 AM        Modules accepted: Orders

## 2021-07-04 NOTE — TELEPHONE ENCOUNTER
Telephone Encounter by Tabitha Gomez at 6/15/2021  8:28 AM     Author: Tabitha Gomez Service: -- Author Type: --    Filed: 6/15/2021  8:29 AM Encounter Date: 6/7/2021 Status: Signed    : Tabitha Gomez       You  w Infectious Disease Support Pool Yesterday (8:07 AM)     Ok to wait until next week Monday, 6/21? Or should she been seen this week w/ someone else?    Message text       Anna Underwood  Patient Appointment Cancel Request Pool 2 days ago        Appointment canceled for Anna Underwood ([id number])  Visit Type: OFFICE VISIT  Date        Time      Length    Provider                  Department  6/14/2021    3:20 PM  20 mins.  Paulina LANGLEY                  INF INFECTIOUS DISEASE     Reason for Cancellation: Canceled via [employer]     Patient Comments: Dr bowman scheduled me with Interventional Radiologist at same time.  I feel it is urgent as well for an appt with ID as I still feel horrible.   Please let me know options.Anna

## 2021-07-04 NOTE — ED PROVIDER NOTES
ED Provider Notes by Mitchell Blankenship PA-C at 6/4/2021 12:31 PM     Author: Mitchell Blankenship PA-C Service: Emergency Medicine Author Type: Physician Assistant    Filed: 6/4/2021  4:03 PM Date of Service: 6/4/2021 12:31 PM Status: Signed    : Mitchell Blankenship PA-C (Physician Assistant)       EMERGENCY DEPARTMENT ENCOUnter      NAME: Anna Underwood  AGE: 61 y.o. female  YOB: 1960  MRN: [id number]  EVALUATION DATE & TIME: 6/4/2021 12:27 PM    PCP: Jame Dunn MD Matthew Beatty PA-C      Chief Complaint   Patient presents with   ? Wound Infection         FINAL IMPRESSION:  1. Pain of left lower leg          ED COURSE & MEDICAL DECISION MAKING:    Pertinent Labs & Imaging studies reviewed. (See chart for details)  61 y.o. female presents to the Emergency Department for evaluation of acute concern regarding infection involving chronic postoperative wound.  Patient concerned about possibility of abscess or osteomyelitis.    After obtaining history of present illness, examining the patient reviewing vitals plan to assess with screening labs and also obtain MRI to rule out osteomyelitis or deep space tissue infection.  Once these results return I will discuss the case with her wound clinic team from Dr. Taina Sims.    12:12 PM I met with the patient, obtained an initial history, performed an examination and discussed the plan. PPE worn throughout all interactions with the patient, including surgical mask, N95 mask, gloves, safety glasses.   1:13 PM RN requests pain medication for the patient.   3:09 PM Awaiting final radiologist read of MRI.     3:51PM I did contact MRI department to make sure that images were sent to appropriate radiologist for reading, we are currently awaiting MRI results after this we will discuss with her wound management team.    Patient and final disposition will be signed out to Yee Morrison PA-C.    At the conclusion of the encounter I discussed  the results of all of the tests and the disposition. The questions were answered. The patient or family acknowledged understanding and was agreeable with the care plan.         MEDICATIONS GIVEN IN THE EMERGENCY:  Medications   sodium chloride 0.9% 1,000 mL (1,000 mL Intravenous New Bag 6/4/21 1312)   ketorolac injection 15 mg (TORADOL) (15 mg Intravenous Given 6/4/21 1317)   gadobutroL 7.5 mmol/7.5 mL (1 mmol/mL) injection 5.5 mL (GADAVIST) (5.5 mL Intravenous Given 6/4/21 1416)       NEW PRESCRIPTIONS STARTED AT TODAY'S ER VISIT  Current Discharge Medication List      CONTINUE these medications which have NOT CHANGED    Details   ergocalciferol (ERGOCALCIFEROL) 1,250 mcg (50,000 unit) capsule Take 1 capsule (50,000 Units total) by mouth once a week.  Qty: 13 capsule, Refills: 3    Associated Diagnoses: Vitamin D deficiency      ibuprofen (ADVIL) 200 MG tablet Take 600 mg by mouth every 6 (six) hours as needed for pain.             metoprolol succinate (TOPROL XL) 25 MG Take 1 tablet (25 mg total) by mouth daily.  Qty: 90 tablet, Refills: 3    Associated Diagnoses: Essential hypertension      multivitamin (ONE A DAY) per tablet Take 1 tablet by mouth daily.  Qty: 120 tablet, Refills: 2    Associated Diagnoses: Iron deficiency anemia, unspecified iron deficiency anemia type      pantoprazole (PROTONIX) 40 MG tablet TAKE 1 TABLET(40 MG) BY MOUTH TWICE DAILY  Qty: 180 tablet, Refills: 2    Associated Diagnoses: GERD (gastroesophageal reflux disease)      pseudoephedrine-guaifenesin (MUCINEX D)  mg per tablet Take 1 tablet by mouth every 12 (twelve) hours.  Qty: 20 tablet, Refills: 0    Associated Diagnoses: Cough      sucralfate (CARAFATE) 100 mg/mL suspension Take 1 g by mouth 4 (four) times a day as needed.                =================================================================    HPI    Patient information was obtained from: Patient    Use of Intrepreter: N/A     Anna Underwood is a 61 y.o.  female with a pertinent history of s/p ankle surgery 2009 with nonhealing surgical wound, DVT, MRSA, osteoporosis, who presents to this ED by private car for evaluation of increased drainage and pain in a chronic infection.    Patient has had a chronic wound on her left ankle since she ruptured her achilles and had surgery in 2009. She notes the wound occasionally flares up, and she has been on antibiotics for the past 4 weeks. Initially, she was on Cipro and doxycycline, but is now taking Cipro and Bactrim. Patient endorses increased odorous discharge that began last week, and a low grade fevers that began 3-4 days ago. She reports difficulty with weight bearing secondary to pain.     Patient initially followed with [facility], however she was referred to the [facility], as they did not have any other treatment options. Her provider at the [facility] is Dr. Her. She has consulted with Infectious Disease and has undergone IV antibiotics for this. Patient was told she needs an MRI of the left lower extremity, but her insurance denied this. She was told that without the MRI, there would be nothing else to be done.     REVIEW OF SYSTEMS   Review of Systems   Constitutional: Positive for fever (low-grade).   Musculoskeletal:        Positive for left leg pain, difficulty ambulating (secondary to pain).   Skin: Positive for wound (chronic, left leg, with odorous drainage).   All other systems reviewed and are negative.       PAST MEDICAL HISTORY:  Past Medical History:   Diagnosis Date   ? Abdominal pain, epigastric 11/05/2019   ? Abnormal CT of the chest 02/01/2021   ? Abnormal TSH     TSH suppressed 0.18 and normal free T4, recheck in 3 months   ? Achilles tendon infection    ? Chronic cough 03/23/2020    CT chest with mild bronchiectasis and small area of consolidation   ? Deep vein thrombosis (DVT) of brachial vein of left upper extremity (H) 04/17/2018    Associated with PICC  line, follow-up ultrasound showing resolution of thrombus   ? Dermatitis    ? Family history of myocardial infarction     dad 35  mother 70   ? Fatigue 2017   ? GERD (gastroesophageal reflux disease)     EGD with esophagitis , experiencing chest pain and dysphagia   ? History of anesthesia complications    ? Impaired fasting glucose     Glucose 119 2021.  A1c 5.3%   ? Iron deficiency anemia 2015    Has required iron infusions   ? Kidney stones    ? Microscopic hematuria 2021    Longstanding finding   ? Open wound of foot, complicated     Achilles tendon rupture complicated by infected open wound with multiple surgeries including skin grafting   ? Osteoporosis of multiple sites without pathological fracture     DEXA with T score -2.4 L1 L4, -3.2 left femoral neck and -2.6 right femoral neck   ? Ovarian cyst    ? PONV (postoperative nausea and vomiting)    ? SIRS (systemic inflammatory response syndrome) (H) 2019   ? Vitamin D deficiency 2020       PAST SURGICAL HISTORY:  Past Surgical History:   Procedure Laterality Date   ? ANKLE SURGERY      Multiple surgeries [id number] after complications from Achilles rupture and repair with nonhealing open wound including skin grafting   ?  SECTION      x3   ? HYSTERECTOMY  2002   ? OOPHORECTOMY Right    ? PICC AND MIDLINE TEAM LINE INSERTION  3/23/2018                CURRENT MEDICATIONS:    Current Facility-Administered Medications on File Prior to Encounter   Medication   ? denosumab 60 mg (PROLIA 60 mg/ml)     Current Outpatient Medications on File Prior to Encounter   Medication Sig   ? ergocalciferol (ERGOCALCIFEROL) 1,250 mcg (50,000 unit) capsule Take 1 capsule (50,000 Units total) by mouth once a week.   ? ibuprofen (ADVIL) 200 MG tablet Take 600 mg by mouth every 6 (six) hours as needed for pain.          ? metoprolol succinate (TOPROL XL) 25 MG Take 1 tablet (25 mg total) by mouth daily.   ? multivitamin (ONE A  DAY) per tablet Take 1 tablet by mouth daily.   ? pantoprazole (PROTONIX) 40 MG tablet TAKE 1 TABLET(40 MG) BY MOUTH TWICE DAILY   ? pseudoephedrine-guaifenesin (MUCINEX D)  mg per tablet Take 1 tablet by mouth every 12 (twelve) hours.   ? sucralfate (CARAFATE) 100 mg/mL suspension Take 1 g by mouth 4 (four) times a day as needed.       ALLERGIES:  Allergies   Allergen Reactions   ? Silver-Calcium Alginate Dermatitis     Skin blisters- in the past   ? Morphine Hives   ? Adhesive Tape-Silicones Rash   ? Other Drug Allergy (See Comments) Other (See Comments)     CHLORAPREP Blisters.       FAMILY HISTORY:  Family History   Problem Relation Age of Onset   ? Heart attack Mother    ? Thyroid disease Mother    ? Heart attack Father    ? Alzheimer's disease Sister    ? Stroke Sister    ? Hypertension Sister    ? No Medical Problems Brother    ? No Medical Problems Sister    ? No Medical Problems Brother    ? No Medical Problems Daughter    ? No Medical Problems Son    ? No Medical Problems Son    ? Breast cancer Neg Hx        SOCIAL HISTORY:   Social History     Socioeconomic History   ? Marital status:      Spouse name: None   ? Number of children: None   ? Years of education: None   ? Highest education level: None   Occupational History   ? None   Social Needs   ? Financial resource strain: None   ? Food insecurity     Worry: None     Inability: None   ? Transportation needs     Medical: None     Non-medical: None   Tobacco Use   ? Smoking status: Former Smoker     Types: Cigarettes     Quit date: 3/14/2003     Years since quittin.2   ? Smokeless tobacco: Never Used   ? Tobacco comment: Quit    Substance and Sexual Activity   ? Alcohol use: No     Comment: rare   ? Drug use: No   ? Sexual activity: None   Lifestyle   ? Physical activity     Days per week: None     Minutes per session: None   ? Stress: None   Relationships   ? Social connections     Talks on phone: None     Gets together: None      Attends Jewish service: None     Active member of club or organization: None     Attends meetings of clubs or organizations: None     Relationship status: None   ? Intimate partner violence     Fear of current or ex partner: None     Emotionally abused: None     Physically abused: None     Forced sexual activity: None   Other Topics Concern   ? None   Social History Narrative    .  to Sher. 3 children       VITALS:  Patient Vitals for the past 24 hrs:   BP Temp Temp src Pulse Resp SpO2 Weight   06/04/21 1456 124/65 -- -- 82 -- 98 % --   06/04/21 1225 163/89 99  F (37.2  C) Oral (!) 118 20 98 % 115 lb (52.2 kg)       PHYSICAL EXAM    Constitutional: Well developed, Well nourished, NAD, GCS 15  HENT: Normocephalic, Atraumatic, Bilateral external ears normal,  Respiratory: Normal breath sounds, No respiratory distress  Musculoskeletal:  Good range of motion in all major joints. No tenderness to palpation or major deformities noted.  Patient does have pain with plantar flexion of the left lower extremity  Integument: Warm, Dry, patient reports discharge coming from the wound but nothing is obviously losing at this time, please see picture below.  Everything appears fairly chronic there is no acute erythema or active induration or red streaks up the leg.  Patient does have increased pain in the area.  Neurologic: Alert & oriented x 3, Normal motor function, Normal sensory function, No focal deficits noted. Normal gait.   Psychiatric: Affect normal, Judgment normal, Mood normal. Cooperative.              LAB:  All pertinent labs reviewed and interpreted.  Results for orders placed or performed during the hospital encounter of 06/04/21   Sedimentation Rate   Result Value Ref Range    Sed Rate 52 (H) 0 - 20 mm/hr   C-Reactive Protein   Result Value Ref Range    CRP 2.4 (H) 0.0 - 0.8 mg/dL   Comprehensive Metabolic Panel   Result Value Ref Range    Sodium 138 136 - 145 mmol/L    Potassium 4.0 3.5 -  5.0 mmol/L    Chloride 105 98 - 107 mmol/L    CO2 24 22 - 31 mmol/L    Anion Gap, Calculation 9 5 - 18 mmol/L    Glucose 99 70 - 125 mg/dL    BUN 10 8 - 22 mg/dL    Creatinine 0.64 0.60 - 1.10 mg/dL    GFR MDRD Af Amer >60 >60 mL/min/1.73m2    GFR MDRD Non Af Amer >60 >60 mL/min/1.73m2    Bilirubin, Total 0.4 0.0 - 1.0 mg/dL    Calcium 8.7 8.5 - 10.5 mg/dL    Protein, Total 7.3 6.0 - 8.0 g/dL    Albumin 3.4 (L) 3.5 - 5.0 g/dL    Alkaline Phosphatase 73 45 - 120 U/L    AST 11 0 - 40 U/L    ALT <9 0 - 45 U/L   HM1 (CBC with Diff)   Result Value Ref Range    WBC 12.0 (H) 4.0 - 11.0 thou/uL    RBC 4.25 3.80 - 5.40 mill/uL    Hemoglobin 11.5 (L) 12.0 - 16.0 g/dL    Hematocrit 36.8 35.0 - 47.0 %    MCV 87 80 - 100 fL    MCH 27.1 27.0 - 34.0 pg    MCHC 31.3 (L) 32.0 - 36.0 g/dL    RDW 14.0 11.0 - 14.5 %    Platelets 501 (H) 140 - 440 thou/uL    MPV 9.2 8.5 - 12.5 fL    Neutrophils % 82 (H) 50 - 70 %    Lymphocytes % 10 (L) 20 - 40 %    Monocytes % 6 2 - 10 %    Eosinophils % 1 0 - 6 %    Basophils % 1 0 - 2 %    Immature Granulocyte % 1 (H) <=0 %    Neutrophils Absolute 9.9 (H) 2.0 - 7.7 thou/uL    Lymphocytes Absolute 1.2 0.8 - 4.4 thou/uL    Monocytes Absolute 0.7 0.0 - 0.9 thou/uL    Eosinophils Absolute 0.2 0.0 - 0.4 thou/uL    Basophils Absolute 0.1 0.0 - 0.2 thou/uL    Immature Granulocyte Absolute 0.1 (H) <=0.0 thou/uL       RADIOLOGY:  Reviewed all pertinent imaging. Please see official radiology report.  No results found.      I, Shanae Driver, am serving as a scribe to document services personally performed by Mitchell Blankenship PA-C  based on my observation and the provider's statements to me. I, Mitchell Blankenship PA-C attest that Shanae Driver is acting in a scribe capacity, has observed my performance of the services and has documented them in accordance with my direction.    Mitchell Blankenship PA-C  Emergency Medicine  [facility]  [place] EMERGENCY ROOM  [facility] [facility] [address]  Dept: [phone]  Loc: [phone]             Mitchell Blankenship PAJARVIS  06/04/21 1601

## 2021-07-05 NOTE — PROGRESS NOTES
This is a recent snapshot of the patient's [facility] medical record.  For current drug dose and complete information and questions, call [phone] or In Basket pool, [facility] home infusion ([id number])  CSN Number:  [id number]

## 2021-07-06 ENCOUNTER — COMMUNICATION - HEALTHEAST (OUTPATIENT)
Dept: INFECTIOUS DISEASES | Facility: CLINIC | Age: 61
End: 2021-07-06

## 2021-07-06 ENCOUNTER — RECORDS - HEALTHEAST (OUTPATIENT)
Dept: ADMINISTRATIVE | Facility: OTHER | Age: 61
End: 2021-07-06

## 2021-07-06 VITALS
BODY MASS INDEX: 19.74 KG/M2 | HEIGHT: 64 IN | BODY MASS INDEX: 19.74 KG/M2 | HEIGHT: 64 IN | BODY MASS INDEX: 19.74 KG/M2 | WEIGHT: 115 LBS | WEIGHT: 115 LBS | BODY MASS INDEX: 19.74 KG/M2

## 2021-07-06 VITALS — HEART RATE: 76 BPM | SYSTOLIC BLOOD PRESSURE: 122 MMHG | DIASTOLIC BLOOD PRESSURE: 72 MMHG | TEMPERATURE: 98.2 F

## 2021-07-06 VITALS — BODY MASS INDEX: 19.74 KG/M2 | WEIGHT: 115 LBS

## 2021-07-06 VITALS — BODY MASS INDEX: 20.21 KG/M2 | WEIGHT: 115 LBS

## 2021-07-06 DIAGNOSIS — R21 RASH: ICD-10-CM

## 2021-07-06 NOTE — TELEPHONE ENCOUNTER
Telephone Encounter by Beata Ponce CMA at 7/6/2021  2:17 PM     Author: Beata Ponce CMA Service: -- Author Type: Certified Medical Assistant    Filed: 7/6/2021  2:29 PM Encounter Date: 7/6/2021 Status: Signed    : Beata Ponce CMA (Certified Medical Assistant)       Pt states hives, redness and itching on back and shoulder previously when on iv abx vanco. Started to worsening after starting 2 doses of bactrim. Previously tolerated bactrim, unsure if new allergic reaction caused from stopping vanco. Pt currently taking benadryl without help to itching. Asking if change in oral abx or prescribing a higher dose of antihistamine?    Informed Dr Pleitez is OOO. Refer to Dr Mcneil covering for provider

## 2021-07-06 NOTE — TELEPHONE ENCOUNTER
Telephone Encounter by Tabitha Gomez at 7/6/2021  2:13 PM     Author: Tabitha Gomez Service: -- Author Type: --    Filed: 7/6/2021  2:14 PM Encounter Date: 7/6/2021 Status: Signed    : Tabitha Gomez Dr    Pt calling about medication reaction, with big hives.    Please call her back @ [phone]

## 2021-07-06 NOTE — PROGRESS NOTES
This is a recent snapshot of the patient's [facility] medical record.  For current drug dose and complete information and questions, call [phone] or In Basket pool, fv home infusion ([id number])  CSN Number:  [id number]

## 2021-07-06 NOTE — TELEPHONE ENCOUNTER
Telephone Encounter by Aquiles Mo MD at 7/6/2021  3:10 PM     Author: Aquiles Mo MD Service: -- Author Type: Physician    Filed: 7/6/2021  3:13 PM Encounter Date: 7/6/2021 Status: Signed    : Aquiles Mo MD (Physician)       I spoke with her. She would like to try another anti-histamine. Plan hydroxyzine -- I will send to pharmacy, to replace benadryl. Let us know if not better or if getting worse this week.     Aquiles Mo

## 2021-07-07 ENCOUNTER — HOME INFUSION (PRE-WILLOW HOME INFUSION) (OUTPATIENT)
Dept: PHARMACY | Facility: CLINIC | Age: 61
End: 2021-07-07

## 2021-07-07 NOTE — PROGRESS NOTES
Assessment & Plan     Non-healing surgical wound, sequela  61-year-old woman with chronic nonhealing wound on posterior left leg here to follow-up after hospitalization with increasing pain and drainage of purulent matter from the wound despite treatment with ciprofloxacin.  MRI prior to hospitalization suggested possible abscess.  Wound has been nonhealing for over 10 years and there is concern for peripheral arterial disease as a contributing factor.  Underwent I&D of wound.  No abscess found.  Cultures mixture of organisms.  Infectious disease consulted.  PICC line placed and plans to continue ceftriaxone and vancomycin for 14 days.  She has follow-up with ID next week and saw her orthopedic surgeon yesterday.    PAD (peripheral artery disease) (H)  Peripheral arterial disease thought to be contributing to nonhealing wound.  She met with a vascular surgeon in [place], Dr. Field this week with plans to further discuss options next week.  Depending on that meeting, we also discussed getting a second opinion with Dr. Mcgowan    Iron deficiency anemia, unspecified iron deficiency anemia type  Hemoglobin is down to 8.9 following hospitalization.  She is taking multivitamin with iron.  Hemoglobin should be monitored.  She has needed iron infusions in the past.    Post Discharge Medication Reconciliation Status: discharge medications reconciled, continue medications without change           Return in about 8 months (around 2/25/2022) for Annual physical.    Jame Dunn MD  [facility]    Subjective       HPI 61-year-old woman here to follow-up after hospitalization for nonhealing wound on left posterior ankle requiring incision and drainage and initiation of IV antibiotics after PICC line placed.  See assessment and plan for details of visit    Current Outpatient Medications on File Prior to Visit   Medication Sig Dispense Refill     acetaminophen (TYLENOL) 500 MG tablet  Take 1-2 tablets (500-1,000 mg total) by mouth every 4 (four) hours as needed.  0     cefTRIAXone 2 g in NaCl 0.9 % 0.9 % 50 mL IVPB Infuse 2 g into a venous catheter daily for 13 days. 1 each 0     ergocalciferol (ERGOCALCIFEROL) 1,250 mcg (50,000 unit) capsule Take 1 capsule (50,000 Units total) by mouth once a week. 13 capsule 3     ibuprofen (ADVIL) 200 MG tablet Take 800 mg by mouth 3 (three) times a day.        Lactobacillus rhamnosus GG (CULTURELLE) 15 billion cell CpSP Take 1 capsule by mouth 2 (two) times a day with meals for 10 days.  0     metoprolol succinate (TOPROL XL) 25 MG Take 1 tablet (25 mg total) by mouth daily. 90 tablet 3     multivitamin (ONE A DAY) per tablet Take 1 tablet by mouth daily. 120 tablet 2     ondansetron (ZOFRAN) 4 MG tablet Take 1 tablet (4 mg total) by mouth every 8 (eight) hours as needed for nausea. 30 tablet 1     pantoprazole (PROTONIX) 40 MG tablet TAKE 1 TABLET(40 MG) BY MOUTH TWICE DAILY 180 tablet 2     vancomycin 750 mg SolR Infuse 750 mg into a venous catheter every 12 (twelve) hours for 13 days.       [] ketorolac (TORADOL) 10 mg tablet Take 1 tablet (10 mg total) by mouth at bedtime for 5 days. 5 tablet 0     Current Facility-Administered Medications on File Prior to Visit   Medication Dose Route Frequency Provider Last Rate Last Admin     denosumab 60 mg (PROLIA 60 mg/ml)  60 mg Subcutaneous Q6 Months Jame Dunn MD   60 mg at 21 1550        Review of Systems  No fevers or chills        Objective    Vitals:    21 1618 21 1639   BP: 144/85 128/70   Pulse: 91    SpO2: 99%         Physical Exam  Well-appearing middle-aged woman

## 2021-07-07 NOTE — PROGRESS NOTES
This is a recent snapshot of the patient's [facility] medical record.  For current drug dose and complete information and questions, call [phone] or In Basket pool, fv home infusion ([id number])  CSN Number:  [id number]

## 2021-07-14 NOTE — PROGRESS NOTES
This is a recent snapshot of the patient's [facility] medical record.  For current drug dose and complete information and questions, call [phone] or In Basket pool, fv home infusion ([id number])  CSN Number:  [id number]

## 2021-07-29 ENCOUNTER — TELEPHONE (OUTPATIENT)
Dept: INFECTIOUS DISEASES | Facility: CLINIC | Age: 61
End: 2021-07-29

## 2021-07-29 ENCOUNTER — MYC MEDICAL ADVICE (OUTPATIENT)
Dept: INFECTIOUS DISEASES | Facility: CLINIC | Age: 61
End: 2021-07-29

## 2021-07-29 NOTE — TELEPHONE ENCOUNTER
"I called the pt, she states that there is \"green slime\" on the wound, which started yesterday. The pt states usually when this occurs she has a pseudomonas infection. The pt states that she has been off of abx x 2 wks. The pt denies fever, chills, or redness at the wound. The pt states the site is always warm. The pt dies have increased soreness at the site. Pt states she does not feel horrible, however she feels \"blah\" and nauseated. The pt states she has a picture of the wound, which she will send via [employer] I informed I will speak to Dr Pleitez and contact her back.  "

## 2021-07-29 NOTE — TELEPHONE ENCOUNTER
Dr Pleitez    Believes infection is back in the wound. States she has No fever, but doesn't feel well. Nauseated. Wound is green.    Patient can be reached @ [phone]

## 2021-08-03 ENCOUNTER — TRANSFERRED RECORDS (OUTPATIENT)
Dept: HEALTH INFORMATION MANAGEMENT | Facility: CLINIC | Age: 61
End: 2021-08-03

## 2021-08-03 DIAGNOSIS — Z11.52 ENCOUNTER FOR SCREENING FOR COVID-19: Primary | ICD-10-CM

## 2021-08-03 PROBLEM — I82.622: Status: RESOLVED | Noted: 2018-04-17 | Resolved: 2019-08-26

## 2021-08-03 PROBLEM — R65.10 SIRS (SYSTEMIC INFLAMMATORY RESPONSE SYNDROME) (H): Status: RESOLVED | Noted: 2019-08-16 | Resolved: 2019-08-26

## 2021-08-04 ENCOUNTER — TRANSFERRED RECORDS (OUTPATIENT)
Dept: HEALTH INFORMATION MANAGEMENT | Facility: CLINIC | Age: 61
End: 2021-08-04
Payer: COMMERCIAL

## 2021-08-04 PROCEDURE — 88305 TISSUE EXAM BY PATHOLOGIST: CPT | Mod: TC,ORL | Performed by: ORTHOPAEDIC SURGERY

## 2021-08-05 ENCOUNTER — LAB REQUISITION (OUTPATIENT)
Dept: LAB | Facility: CLINIC | Age: 61
End: 2021-08-05
Payer: COMMERCIAL

## 2021-08-05 DIAGNOSIS — M76.62 ACHILLES TENDINITIS, LEFT LEG: ICD-10-CM

## 2021-08-06 LAB
PATH REPORT.COMMENTS IMP SPEC: NORMAL
PATH REPORT.COMMENTS IMP SPEC: NORMAL
PATH REPORT.FINAL DX SPEC: NORMAL
PATH REPORT.GROSS SPEC: NORMAL
PATH REPORT.MICROSCOPIC SPEC OTHER STN: NORMAL
PATH REPORT.RELEVANT HX SPEC: NORMAL
PHOTO IMAGE: NORMAL

## 2021-08-06 PROCEDURE — 88305 TISSUE EXAM BY PATHOLOGIST: CPT | Mod: 26 | Performed by: PATHOLOGY

## 2021-08-11 ENCOUNTER — TRANSFERRED RECORDS (OUTPATIENT)
Dept: HEALTH INFORMATION MANAGEMENT | Facility: CLINIC | Age: 61
End: 2021-08-11

## 2021-08-16 ENCOUNTER — TRANSFERRED RECORDS (OUTPATIENT)
Dept: HEALTH INFORMATION MANAGEMENT | Facility: CLINIC | Age: 61
End: 2021-08-16

## 2021-08-31 ENCOUNTER — TRANSFERRED RECORDS (OUTPATIENT)
Dept: HEALTH INFORMATION MANAGEMENT | Facility: CLINIC | Age: 61
End: 2021-08-31

## 2021-09-04 ENCOUNTER — HEALTH MAINTENANCE LETTER (OUTPATIENT)
Age: 61
End: 2021-09-04

## 2021-09-13 ENCOUNTER — TRANSFERRED RECORDS (OUTPATIENT)
Dept: HEALTH INFORMATION MANAGEMENT | Facility: CLINIC | Age: 61
End: 2021-09-13

## 2021-09-20 DIAGNOSIS — T88.7XXS UNSPECIFIED ADVERSE EFFECT OF DRUG OR MEDICAMENT, SEQUELA (CODE): ICD-10-CM

## 2021-09-20 DIAGNOSIS — M81.0 OSTEOPOROSIS OF MULTIPLE SITES WITHOUT PATHOLOGICAL FRACTURE: Primary | ICD-10-CM

## 2021-09-20 NOTE — PROGRESS NOTES
Patient has an appointment on the [facility] CSS, patient is scheduled for a Prolia injection on 09/29/2021, patient has no orders within their chart. Routed to PCP/ordering provider for review/placement of orders.

## 2021-09-29 ENCOUNTER — ALLIED HEALTH/NURSE VISIT (OUTPATIENT)
Dept: FAMILY MEDICINE | Facility: CLINIC | Age: 61
End: 2021-09-29
Payer: COMMERCIAL

## 2021-09-29 ENCOUNTER — TELEPHONE (OUTPATIENT)
Dept: INTERNAL MEDICINE | Facility: CLINIC | Age: 61
End: 2021-09-29

## 2021-09-29 ENCOUNTER — LAB (OUTPATIENT)
Dept: LAB | Facility: CLINIC | Age: 61
End: 2021-09-29

## 2021-09-29 DIAGNOSIS — R79.89 LOW TSH LEVEL: Primary | ICD-10-CM

## 2021-09-29 DIAGNOSIS — R79.89 LOW TSH LEVEL: ICD-10-CM

## 2021-09-29 DIAGNOSIS — M81.0 OSTEOPOROSIS: Primary | ICD-10-CM

## 2021-09-29 LAB
T4 FREE SERPL-MCNC: 1.75 NG/DL (ref 0.7–1.8)
TSH SERPL DL<=0.005 MIU/L-ACNC: <0.01 UIU/ML (ref 0.3–5)

## 2021-09-29 PROCEDURE — 84439 ASSAY OF FREE THYROXINE: CPT

## 2021-09-29 PROCEDURE — 36415 COLL VENOUS BLD VENIPUNCTURE: CPT

## 2021-09-29 PROCEDURE — 99207 PR NO CHARGE NURSE ONLY: CPT

## 2021-09-29 PROCEDURE — 96372 THER/PROPH/DIAG INJ SC/IM: CPT | Performed by: INTERNAL MEDICINE

## 2021-09-29 PROCEDURE — 84443 ASSAY THYROID STIM HORMONE: CPT

## 2021-09-29 NOTE — TELEPHONE ENCOUNTER
Patient was in the lab requesting lab work, unclear type of lab work.  Possible thyroid test?  I did review the chart and there was no mention of plans for lab work, did not see any appropriate future orders.  Patient is not on thyroid medication.  Patient's primary physician is not in clinic.  Patient was told she would have to wait for orders from her primary physician.

## 2021-09-29 NOTE — TELEPHONE ENCOUNTER
There was a MyChart message in June with a TSH of 0.2 but normal T4.  Plan to recheck thyroid test in 3 months as stated.    I will place orders for TSH and T4

## 2021-09-29 NOTE — PROGRESS NOTES
"Prolia Injection Phone Screen      Screening questions have been asked 2-3 days prior to administration visit for Prolia. If any questions are answered with \"Yes,\" this phone encounter were will routed to ordering provider for further evaluation.     1.  When was the last injection?  3/26/2021    2.  Has insurance for this injection been verified?  Yes    3.  Did you experience any new onset achiness or rashes that lasted for over a month with your previous Prolia injection?   No    4.  Do you have a fever over 101?F or a new deep cough that is unusual for you today? No    5.  Have you started any new medications in the last 6 months that you were told could affect your immune system? These may have been prescribed by oncologist, transplant, rheumatology, or dermatology.   No    6.  In the last 6 months have you have gastric bypass or parathyroid surgery?   No    7.  Do you plan dental work requiring drilling into the bone such as implants/extractions or oral surgery in the next 2-3 months?   No    8. Do you have new insurance since the last injection?    9. Have you received the Covid-19 vaccine? Yes  If No - Proceed with Prolia injection  If Yes - Date of vaccination 3/11/2021, 2/10/2021 . Will need to wait until 2 weeks after 2nd dose of Covid-19 vaccine before administering Prolia       Patient informed if symptoms discussed above present prior to their administration appointment, they are to notify clinic immediately.     Subhash Das            "

## 2021-09-30 DIAGNOSIS — E05.90 HYPERTHYROIDISM: Primary | ICD-10-CM

## 2021-10-02 NOTE — PROGRESS NOTES
[facility] VASCULAR [facility] CENTER INITIAL VASCULAR SURGERY CONSULT    Impression:   Complex wound of the distal posterior left calf ( 5 x 5 x 1.5 cm) overlying the Achilles tendon region.  Remote history of left ankle fracture.  History of Achilles tendon rupture approximately 12 years ago.  Status post failed left radial forearm free flap.  Status post failed left gastroc rotational flap.  Status post multiple Achilles procedures including debridements and an FHL tendon transfer.  Status post skin grafting of the affected area in 2017.  Prior hospitalization for SIRS felt to be secondary to a pseudomonal infection of the above-noted wound in August 2019.  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  Her wound is in the angiosomal distribution of that vessel.    Plan:   I had a lengthy discussion with Anna reviewing all the above.  She has been seen by multiple specialist and currently is followed by doctors Taina and Elena through our [facility].  Local wound care is being maximized and multiple options are being considered including SPY real-time angiography in the OR, hyperbaric oxygen, and VAC Veraflo.    It is very difficult to interpret her distal left leg posterior tibial anatomy due to multiple surgical clips and multiple prior procedures.  I feel it would be very reasonable to perform a formal limited left leg angiogram to once and for all define her left posterior tibial artery anatomy.  I doubt that there are percutaneous interventional options but certainly that could also be offered if it were technically possible.  I described the specifics of the above-noted procedure to Anna.  She verbalizes full understanding to the above and all of her questions were answered.  She is in complete agreement with this management plan.  Limited left leg selective angiography with possible intervention will  Pt. had soft formed bowel movement in bed pan. Pt. cleaned and turned to her 
right side. 

-------------------------------------------------------------------------------

Addendum: 10/02/21 at 1718 by SCOTT

-------------------------------------------------------------------------------

BM was large be arranged in a timely manner at [facility].    HPI:   Anna Sanderson is a 60-year-old female who presents for evaluation of a chronic left Achilles wound.  She has a remote history of a left ankle fracture.  Roughly 12 years ago she was struck from behind by a shopping cart with a resultant left Achilles rupture.  She ultimately had 2 failed free flaps and a failed gastroc rotational flap.  She has been seen by multiple plastic surgeons.  She has required multiple Achilles procedures including debridements and an FHL tendon transfer.  She has undergone skin grafting of the area in 2017.  She is not diabetic.  She is a former smoker who quit some 15 years ago.  She remains very active and is an  for substance abuse centers around the [facility].  She utilizes a cam walker to get around.    Previous vascular work-up has included a CT scan that ruled out May Thurner syndrome.  Venous ultrasound shows incompetence of the left greater saphenous vein and she has been considered for radiofrequency ablation of that structure.  Prior left leg arterial ultrasound showed inline flow to the level of her mid posterior tibial artery which appeared occluded.  This has subsequently been verified by recent CTA.  She is referred to me today by Dr. Her for vascular surgical evaluation.      CURRENT MEDICATIONS  CARAFATE 1 GM/10ML suspension, SW AND TK 10ML PO QID  ciprofloxacin (CIPRO) 500 MG tablet,   denosumab (PROLIA) 60 MG/ML SOSY injection, Inject 60 mg Subcutaneous  Dietary Management Product (VASCULERA) TABS, Take 1 tablet by mouth 2 times daily  doxycycline monohydrate (MONODOX) 100 MG capsule,   IBUPROFEN PO, Take 200 mg by mouth 3 times daily  pantoprazole (PROTONIX) 40 MG EC tablet, Take 40 mg by mouth  vitamin D2 (ERGOCALCIFEROL) 00324 units (1250 mcg) capsule,     No current facility-administered medications on file prior to visit.         PAST MEDICAL HISTORY  History reviewed.  "No pertinent past medical history.      PAST SURGICAL HISTORY:  History reviewed. No pertinent surgical history.    ALLERGIES     Allergies   Allergen Reactions     No Clinical Screening - See Comments Dermatitis and Rash     CHLORAPREP  Skin blisters- in the past     Morphine Hives     Adhesive Tape Rash     Other Drug Allergy (See Comments) Other (See Comments)     CHLORAPREP Blisters.       FAMILY HISTORY  History reviewed. No pertinent family history.    SOCIAL HISTORY  Social History     Tobacco Use     Smoking status: Former Smoker     Smokeless tobacco: Never Used   Substance Use Topics     Alcohol use: No     Drug use: No       ROS:   Review of Systems   All other systems reviewed and are negative.        EXAM:  /87 (BP Location: Left arm, Patient Position: Chair, Cuff Size: Adult Regular)   Pulse 93   Resp 18   Ht 5' 4\" (1.626 m)   Wt 120 lb (54.4 kg)   SpO2 98%   Breastfeeding No   BMI 20.60 kg/m    Physical Exam  Vitals signs reviewed.   Constitutional:       Appearance: Normal appearance. She is normal weight.   Eyes:      General: No scleral icterus.     Extraocular Movements: Extraocular movements intact.      Pupils: Pupils are equal, round, and reactive to light.   Cardiovascular:      Rate and Rhythm: Normal rate and regular rhythm.      Pulses:           Femoral pulses are 2+ on the right side and 2+ on the left side.       Popliteal pulses are 1+ on the right side and 1+ on the left side.        Dorsalis pedis pulses are 2+ on the right side and 2+ on the left side.        Posterior tibial pulses are 2+ on the right side and detected w/ Doppler on the left side.   Musculoskeletal:      Right lower leg: No edema.      Left lower leg: No edema.   Skin:     General: Skin is warm and dry.      Comments: See photo below.   Neurological:      General: No focal deficit present.      Mental Status: She is alert and oriented to person, place, and time. Mental status is at baseline. "   Psychiatric:         Mood and Affect: Mood normal.         Behavior: Behavior normal.         Thought Content: Thought content normal.             Labs:  LIPID RESULTS:  No results found for: CHOL, HDL, LDL, TRIG, CHOLHDLRATIO    CBC RESULTS:  Lab Results   Component Value Date    WBC 6.0 04/11/2018    RBC 3.78 (L) 04/11/2018    HGB 9.9 (L) 04/11/2018    HCT 32.5 (L) 04/11/2018    MCV 86 04/11/2018    MCH 26.2 (L) 04/11/2018    MCHC 30.5 (L) 04/11/2018    RDW 14.5 04/11/2018     04/11/2018       BMP RESULTS:  Lab Results   Component Value Date     01/20/2010    POTASSIUM 4.4 01/20/2010    CHLORIDE 106 01/20/2010    CO2 26 01/20/2010    ANIONGAP 6 01/20/2010    GLC 81 01/20/2010    BUN 13 04/11/2018    CR 0.54 04/11/2018    GFRESTIMATED >90 04/11/2018    GFRESTBLACK >90 04/11/2018    NILESH 9.3 01/20/2010        A1C RESULTS:  No results found for: A1C      Imaging:  ABIs performed at [facility] on 7/24/2020:    Result Narrative       RESTING ANKLE-BRACHIAL INDICES (AMY'S)    Indication: Left calf ulcer non-healing     Previous: None    History: Previous Smoker and Vascular Ulcers           Right: mmHg Index     Brachial: 126    Ankle-(PT): 159 1.18   Ankle-(DP): 141 1.04            Digit: 127 0.94                  Left: mmHg Index      Brachial: 135    Ankle-(PT): 143 1.06   Ankle-(DP): 143 1.06           Digit:  106 0.79     Resting ankle-brachial index of 1.18 on the right. Toe Pressures of 127   mmHg and TBI of 0.94    Resting ankle-brachial index of 1.06 on the left. Toe Pressures of 106   mmHg and TBI of 0.79    WAVEFORMS: The right dorsalis pedis and posterior tibial arteries show   triphasic waveforms.   The left dorsalis pedis and posterior tibial arteries show triphasic   waveforms.    Impression:      Right AMY is  Normal .    Left AMY is Normal        Total face-to-face time was 45 minutes, greater than 50% spent providing counseling and education.        Marino Stacy MD

## 2021-10-12 ENCOUNTER — HOSPITAL ENCOUNTER (OUTPATIENT)
Dept: NUCLEAR MEDICINE | Facility: HOSPITAL | Age: 61
Discharge: HOME | End: 2021-10-12
Attending: INTERNAL MEDICINE | Admitting: INTERNAL MEDICINE
Payer: COMMERCIAL

## 2021-10-12 DIAGNOSIS — E05.90 HYPERTHYROIDISM: ICD-10-CM

## 2021-10-12 PROCEDURE — 343N000001 HC RX 343: Performed by: INTERNAL MEDICINE

## 2021-10-12 PROCEDURE — 78014 THYROID IMAGING W/BLOOD FLOW: CPT

## 2021-10-12 PROCEDURE — A9516 IODINE I-123 SOD IODIDE MIC: HCPCS | Performed by: INTERNAL MEDICINE

## 2021-10-12 RX ADMIN — Medication 170 UCI.: at 14:46

## 2021-10-13 ENCOUNTER — HOSPITAL ENCOUNTER (OUTPATIENT)
Dept: NUCLEAR MEDICINE | Facility: HOSPITAL | Age: 61
End: 2021-10-13
Attending: INTERNAL MEDICINE
Payer: COMMERCIAL

## 2021-10-15 DIAGNOSIS — E06.1 SUBACUTE THYROIDITIS: Primary | ICD-10-CM

## 2021-10-18 DIAGNOSIS — R00.2 PALPITATIONS: ICD-10-CM

## 2021-10-18 DIAGNOSIS — E06.1 SUBACUTE THYROIDITIS: Primary | ICD-10-CM

## 2021-10-18 RX ORDER — METOPROLOL SUCCINATE 50 MG/1
50 TABLET, EXTENDED RELEASE ORAL DAILY
Qty: 90 TABLET | Refills: 3 | Status: ON HOLD | OUTPATIENT
Start: 2021-10-18 | End: 2021-12-09

## 2021-10-30 ENCOUNTER — HEALTH MAINTENANCE LETTER (OUTPATIENT)
Age: 61
End: 2021-10-30

## 2021-11-01 ENCOUNTER — MYC MEDICAL ADVICE (OUTPATIENT)
Dept: INTERNAL MEDICINE | Facility: CLINIC | Age: 61
End: 2021-11-01

## 2021-11-04 ENCOUNTER — OFFICE VISIT (OUTPATIENT)
Dept: INTERNAL MEDICINE | Facility: CLINIC | Age: 61
End: 2021-11-04
Payer: COMMERCIAL

## 2021-11-04 VITALS
DIASTOLIC BLOOD PRESSURE: 78 MMHG | HEIGHT: 64 IN | BODY MASS INDEX: 20.49 KG/M2 | WEIGHT: 120 LBS | SYSTOLIC BLOOD PRESSURE: 128 MMHG

## 2021-11-04 DIAGNOSIS — E06.1 SUBACUTE THYROIDITIS: ICD-10-CM

## 2021-11-04 DIAGNOSIS — R00.2 PALPITATIONS: ICD-10-CM

## 2021-11-04 DIAGNOSIS — D50.9 IRON DEFICIENCY ANEMIA, UNSPECIFIED IRON DEFICIENCY ANEMIA TYPE: ICD-10-CM

## 2021-11-04 DIAGNOSIS — R06.02 SOB (SHORTNESS OF BREATH): Primary | ICD-10-CM

## 2021-11-04 PROBLEM — R10.13 ABDOMINAL PAIN, EPIGASTRIC: Status: RESOLVED | Noted: 2019-11-05 | Resolved: 2021-11-04

## 2021-11-04 PROBLEM — I73.9 PAD (PERIPHERAL ARTERY DISEASE) (H): Status: ACTIVE | Noted: 2021-06-25

## 2021-11-04 PROBLEM — R05.3 CHRONIC COUGH: Status: RESOLVED | Noted: 2020-03-23 | Resolved: 2021-11-04

## 2021-11-04 PROBLEM — E05.90 SUBCLINICAL HYPERTHYROIDISM: Status: ACTIVE | Noted: 2021-06-16

## 2021-11-04 PROBLEM — J30.9 ALLERGIC RHINITIS: Status: RESOLVED | Noted: 2020-09-03 | Resolved: 2021-11-04

## 2021-11-04 PROBLEM — E05.90 SUBCLINICAL HYPERTHYROIDISM: Status: RESOLVED | Noted: 2021-06-16 | Resolved: 2021-11-04

## 2021-11-04 PROBLEM — G47.33 OSA (OBSTRUCTIVE SLEEP APNEA): Status: ACTIVE | Noted: 2021-11-04

## 2021-11-04 PROBLEM — K21.9 GASTROESOPHAGEAL REFLUX DISEASE WITHOUT ESOPHAGITIS: Status: ACTIVE | Noted: 2021-06-16

## 2021-11-04 LAB
BNP SERPL-MCNC: 27 PG/ML (ref 0–91)
ERYTHROCYTE [DISTWIDTH] IN BLOOD BY AUTOMATED COUNT: 14 % (ref 10–15)
HCT VFR BLD AUTO: 35.7 % (ref 35–47)
HGB BLD-MCNC: 10.7 G/DL (ref 11.7–15.7)
MCH RBC QN AUTO: 24.8 PG (ref 26.5–33)
MCHC RBC AUTO-ENTMCNC: 30 G/DL (ref 31.5–36.5)
MCV RBC AUTO: 83 FL (ref 78–100)
PLATELET # BLD AUTO: 409 10E3/UL (ref 150–450)
RBC # BLD AUTO: 4.32 10E6/UL (ref 3.8–5.2)
T4 FREE SERPL-MCNC: 1.87 NG/DL (ref 0.7–1.8)
TSH SERPL DL<=0.005 MIU/L-ACNC: <0.01 UIU/ML (ref 0.3–5)
WBC # BLD AUTO: 7.9 10E3/UL (ref 4–11)

## 2021-11-04 PROCEDURE — 85027 COMPLETE CBC AUTOMATED: CPT | Performed by: INTERNAL MEDICINE

## 2021-11-04 PROCEDURE — 83880 ASSAY OF NATRIURETIC PEPTIDE: CPT | Performed by: INTERNAL MEDICINE

## 2021-11-04 PROCEDURE — 36415 COLL VENOUS BLD VENIPUNCTURE: CPT | Performed by: INTERNAL MEDICINE

## 2021-11-04 PROCEDURE — 99214 OFFICE O/P EST MOD 30 MIN: CPT | Performed by: INTERNAL MEDICINE

## 2021-11-04 PROCEDURE — 84439 ASSAY OF FREE THYROXINE: CPT | Performed by: INTERNAL MEDICINE

## 2021-11-04 PROCEDURE — 84443 ASSAY THYROID STIM HORMONE: CPT | Performed by: INTERNAL MEDICINE

## 2021-11-04 ASSESSMENT — MIFFLIN-ST. JEOR: SCORE: 1104.32

## 2021-11-04 NOTE — PROGRESS NOTES
Assessment & Plan     SOB (shortness of breath)  Recurrent episodes of shortness of breath over the past few weeks.  Noticeable when she is trying to talk.  Giving presentation on Friday and had to stop during the presentation as she was running out of breath while talking.  No associated chest pain or palpitations.  Interestingly, she is not feeling dyspnea with activity including prolonged walking.  No associated cough.  Exam unremarkable including lungs clear bilaterally and heart regular rate and rhythm although heart rate in the 90s.  Will obtain chest x-ray and check labs including hemoglobin and BNP.  Unsure if this is related to subacute thyroiditis.  May want to get ENT evaluation completed as well.  We did discuss that anxiety/hyperventilation could be a contributing factor but she is not feeling any increased stress or anxiety.  - XR Chest 2 Views  - CBC with platelets  - B-Type Natriuretic Peptide ([facility] Only)    Subacute thyroiditis  Suppressed TSH and abnormal thyroid scan consistent with subacute thyroiditis.  Metoprolol increased to 50 mg.  Still having intermittent palpitations and now with above dyspnea associated with talking.  We will recheck TSH.  We discussed that she is at risk for developing hypothyroidism at some point.  May want to increase metoprolol to 75 mg as well.  - TSH with free T4 reflex    Palpitations  Recurrent palpitations sounding like SVT although cannot exclude atrial fibrillation and arrangements are made for cardiac monitor.  Consider increasing metoprolol to 75 mg as above.              Return in about 3 months (around 2/4/2022) for Routine preventive.    Jame Dunn MD  [facility]          Subjective       HPI 61-year-old woman with increasing dyspnea and recurrent palpitations  See assessment and plan for details of visit    Current Outpatient Medications   Medication     ibuprofen (ADVIL/MOTRIN) 200 MG tablet      "metoprolol succinate ER (TOPROL-XL) 25 MG 24 hr tablet     metoprolol succinate ER (TOPROL-XL) 50 MG 24 hr tablet     multivitamin w/minerals (MULTI-VITAMIN) tablet     ondansetron (ZOFRAN) 4 MG tablet     pantoprazole (PROTONIX) 40 MG EC tablet     rivaroxaban ANTICOAGULANT (XARELTO ANTICOAGULANT) 2.5 MG TABS tablet     vitamin D2 (ERGOCALCIFEROL) 96017 units (1250 mcg) capsule     Current Facility-Administered Medications   Medication     denosumab (PROLIA) injection 60 mg        Review of Systems  No chest pain.  No edema.  Weight is up 5 pounds.  No difficulty swallowing.  No throat pain.  12 point ROS is negative other than what is described in assessment and plan and above      Objective    Vitals:    11/04/21 0918   BP: 128/78   BP Location: Right arm   Patient Position: Sitting   Cuff Size: Adult Regular   Weight: 54.4 kg (120 lb)   Height: 1.626 m (5' 4\")        Physical Exam  Well-appearing middle-aged woman  Lungs clear bilaterally no rales or wheezes  Heart regular rate and rhythm without murmur gallop      "

## 2021-11-05 ENCOUNTER — HOSPITAL ENCOUNTER (OUTPATIENT)
Dept: CARDIOLOGY | Facility: CLINIC | Age: 61
Discharge: HOME | End: 2021-11-05
Attending: INTERNAL MEDICINE | Admitting: INTERNAL MEDICINE
Payer: COMMERCIAL

## 2021-11-05 DIAGNOSIS — E06.1 SUBACUTE THYROIDITIS: ICD-10-CM

## 2021-11-05 DIAGNOSIS — R00.2 PALPITATIONS: ICD-10-CM

## 2021-11-05 PROCEDURE — 93272 ECG/REVIEW INTERPRET ONLY: CPT | Performed by: INTERNAL MEDICINE

## 2021-11-05 PROCEDURE — 93270 REMOTE 30 DAY ECG REV/REPORT: CPT

## 2021-11-09 NOTE — PROGRESS NOTES
This is a recent snapshot of the patient's [facility] medical record.  For current drug dose and complete information and questions, call [phone] or In Basket pool, [facility] home infusion ([id number])  CSN Number:  [id number]

## 2021-11-24 PROBLEM — R00.2 PALPITATIONS: Status: RESOLVED | Noted: 2021-11-04 | Resolved: 2021-11-24

## 2021-11-24 PROBLEM — R00.2 PALPITATIONS: Status: ACTIVE | Noted: 2021-11-04

## 2021-12-08 ENCOUNTER — LAB (OUTPATIENT)
Dept: LAB | Facility: CLINIC | Age: 61
End: 2021-12-08
Payer: COMMERCIAL

## 2021-12-08 DIAGNOSIS — T81.31XA: Primary | ICD-10-CM

## 2021-12-08 LAB
C REACTIVE PROTEIN LHE: 0.5 MG/DL (ref 0–0.8)
ERYTHROCYTE [DISTWIDTH] IN BLOOD BY AUTOMATED COUNT: 14.8 % (ref 10–15)
ERYTHROCYTE [SEDIMENTATION RATE] IN BLOOD BY WESTERGREN METHOD: 62 MM/HR (ref 0–20)
HCT VFR BLD AUTO: 36 % (ref 35–47)
HGB BLD-MCNC: 10.7 G/DL (ref 11.7–15.7)
MCH RBC QN AUTO: 23.8 PG (ref 26.5–33)
MCHC RBC AUTO-ENTMCNC: 29.7 G/DL (ref 31.5–36.5)
MCV RBC AUTO: 80 FL (ref 78–100)
PLATELET # BLD AUTO: 565 10E3/UL (ref 150–450)
RBC # BLD AUTO: 4.49 10E6/UL (ref 3.8–5.2)
WBC # BLD AUTO: 13.4 10E3/UL (ref 4–11)

## 2021-12-08 PROCEDURE — 36415 COLL VENOUS BLD VENIPUNCTURE: CPT

## 2021-12-08 PROCEDURE — 85652 RBC SED RATE AUTOMATED: CPT

## 2021-12-08 PROCEDURE — 85027 COMPLETE CBC AUTOMATED: CPT

## 2021-12-08 PROCEDURE — 86141 C-REACTIVE PROTEIN HS: CPT

## 2021-12-09 ENCOUNTER — TRANSFERRED RECORDS (OUTPATIENT)
Dept: HEALTH INFORMATION MANAGEMENT | Facility: CLINIC | Age: 61
End: 2021-12-09

## 2021-12-09 ENCOUNTER — APPOINTMENT (OUTPATIENT)
Dept: MRI IMAGING | Facility: HOSPITAL | Age: 61
End: 2021-12-09
Attending: INTERNAL MEDICINE
Payer: COMMERCIAL

## 2021-12-09 ENCOUNTER — HOSPITAL ENCOUNTER (INPATIENT)
Facility: HOSPITAL | Age: 61
LOS: 5 days | Discharge: HOME HEALTH | End: 2021-12-14
Attending: INTERNAL MEDICINE | Admitting: INTERNAL MEDICINE
Payer: COMMERCIAL

## 2021-12-09 DIAGNOSIS — T81.89XD NON-HEALING SURGICAL WOUND, SUBSEQUENT ENCOUNTER: ICD-10-CM

## 2021-12-09 DIAGNOSIS — Z15.89 MTHFR GENE MUTATION: ICD-10-CM

## 2021-12-09 DIAGNOSIS — I73.9 PAD (PERIPHERAL ARTERY DISEASE) (H): ICD-10-CM

## 2021-12-09 DIAGNOSIS — I10 BENIGN ESSENTIAL HYPERTENSION: ICD-10-CM

## 2021-12-09 DIAGNOSIS — M76.62 ACHILLES TENDINITIS OF LEFT LOWER EXTREMITY: ICD-10-CM

## 2021-12-09 DIAGNOSIS — L97.323 SKIN ULCER OF LEFT ANKLE WITH NECROSIS OF MUSCLE (H): ICD-10-CM

## 2021-12-09 DIAGNOSIS — L02.416 ABSCESS OF LEFT LOWER LEG: ICD-10-CM

## 2021-12-09 DIAGNOSIS — K21.9 GASTROESOPHAGEAL REFLUX DISEASE WITHOUT ESOPHAGITIS: ICD-10-CM

## 2021-12-09 DIAGNOSIS — E06.1 SUBACUTE THYROIDITIS: ICD-10-CM

## 2021-12-09 DIAGNOSIS — M65.10 ACHILLES TENDON INFECTION: Primary | ICD-10-CM

## 2021-12-09 LAB
ALBUMIN SERPL-MCNC: 3.2 G/DL (ref 3.5–5)
ALP SERPL-CCNC: 77 U/L (ref 45–120)
ALT SERPL W P-5'-P-CCNC: <9 U/L (ref 0–45)
ANION GAP SERPL CALCULATED.3IONS-SCNC: 11 MMOL/L (ref 5–18)
AST SERPL W P-5'-P-CCNC: 12 U/L (ref 0–40)
BASOPHILS # BLD AUTO: 0.1 10E3/UL (ref 0–0.2)
BASOPHILS NFR BLD AUTO: 0 %
BILIRUB SERPL-MCNC: 0.4 MG/DL (ref 0–1)
BUN SERPL-MCNC: 11 MG/DL (ref 8–22)
C REACTIVE PROTEIN LHE: 4.5 MG/DL (ref 0–0.8)
CALCIUM SERPL-MCNC: 8.7 MG/DL (ref 8.5–10.5)
CHLORIDE BLD-SCNC: 107 MMOL/L (ref 98–107)
CO2 SERPL-SCNC: 19 MMOL/L (ref 22–31)
CREAT SERPL-MCNC: 0.66 MG/DL (ref 0.6–1.1)
EOSINOPHIL # BLD AUTO: 0 10E3/UL (ref 0–0.7)
EOSINOPHIL NFR BLD AUTO: 0 %
ERYTHROCYTE [DISTWIDTH] IN BLOOD BY AUTOMATED COUNT: 15 % (ref 10–15)
GFR SERPL CREATININE-BSD FRML MDRD: >90 ML/MIN/1.73M2
GLUCOSE BLD-MCNC: 148 MG/DL (ref 70–125)
HCT VFR BLD AUTO: 34.9 % (ref 35–47)
HGB BLD-MCNC: 10.4 G/DL (ref 11.7–15.7)
IMM GRANULOCYTES # BLD: 0.1 10E3/UL
IMM GRANULOCYTES NFR BLD: 1 %
LYMPHOCYTES # BLD AUTO: 0.9 10E3/UL (ref 0.8–5.3)
LYMPHOCYTES NFR BLD AUTO: 5 %
MCH RBC QN AUTO: 23.8 PG (ref 26.5–33)
MCHC RBC AUTO-ENTMCNC: 29.8 G/DL (ref 31.5–36.5)
MCV RBC AUTO: 80 FL (ref 78–100)
MONOCYTES # BLD AUTO: 0.5 10E3/UL (ref 0–1.3)
MONOCYTES NFR BLD AUTO: 3 %
NEUTROPHILS # BLD AUTO: 15.7 10E3/UL (ref 1.6–8.3)
NEUTROPHILS NFR BLD AUTO: 91 %
NRBC # BLD AUTO: 0 10E3/UL
NRBC BLD AUTO-RTO: 0 /100
PLATELET # BLD AUTO: 484 10E3/UL (ref 150–450)
POTASSIUM BLD-SCNC: 3.7 MMOL/L (ref 3.5–5)
PROT SERPL-MCNC: 6.6 G/DL (ref 6–8)
RBC # BLD AUTO: 4.37 10E6/UL (ref 3.8–5.2)
SARS-COV-2 RNA RESP QL NAA+PROBE: NEGATIVE
SODIUM SERPL-SCNC: 137 MMOL/L (ref 136–145)
WBC # BLD AUTO: 17.2 10E3/UL (ref 4–11)

## 2021-12-09 PROCEDURE — A9585 GADOBUTROL INJECTION: HCPCS | Performed by: INTERNAL MEDICINE

## 2021-12-09 PROCEDURE — 255N000002 HC RX 255 OP 636: Performed by: INTERNAL MEDICINE

## 2021-12-09 PROCEDURE — 87040 BLOOD CULTURE FOR BACTERIA: CPT | Performed by: INTERNAL MEDICINE

## 2021-12-09 PROCEDURE — 85025 COMPLETE CBC W/AUTO DIFF WBC: CPT | Performed by: INTERNAL MEDICINE

## 2021-12-09 PROCEDURE — 87635 SARS-COV-2 COVID-19 AMP PRB: CPT | Performed by: INTERNAL MEDICINE

## 2021-12-09 PROCEDURE — 99222 1ST HOSP IP/OBS MODERATE 55: CPT | Performed by: INTERNAL MEDICINE

## 2021-12-09 PROCEDURE — 73720 MRI LWR EXTREMITY W/O&W/DYE: CPT | Mod: LT

## 2021-12-09 PROCEDURE — 87077 CULTURE AEROBIC IDENTIFY: CPT | Performed by: INTERNAL MEDICINE

## 2021-12-09 PROCEDURE — 120N000001 HC R&B MED SURG/OB

## 2021-12-09 PROCEDURE — 250N000011 HC RX IP 250 OP 636: Performed by: INTERNAL MEDICINE

## 2021-12-09 PROCEDURE — 250N000013 HC RX MED GY IP 250 OP 250 PS 637: Performed by: INTERNAL MEDICINE

## 2021-12-09 PROCEDURE — 87075 CULTR BACTERIA EXCEPT BLOOD: CPT | Performed by: INTERNAL MEDICINE

## 2021-12-09 PROCEDURE — 36415 COLL VENOUS BLD VENIPUNCTURE: CPT | Performed by: INTERNAL MEDICINE

## 2021-12-09 PROCEDURE — 999N000127 HC STATISTIC PERIPHERAL IV START W US GUIDANCE

## 2021-12-09 PROCEDURE — 86141 C-REACTIVE PROTEIN HS: CPT | Performed by: INTERNAL MEDICINE

## 2021-12-09 PROCEDURE — 258N000003 HC RX IP 258 OP 636: Performed by: INTERNAL MEDICINE

## 2021-12-09 PROCEDURE — 0KBT0ZZ EXCISION OF LEFT LOWER LEG MUSCLE, OPEN APPROACH: ICD-10-PCS | Performed by: ORTHOPAEDIC SURGERY

## 2021-12-09 PROCEDURE — 80053 COMPREHEN METABOLIC PANEL: CPT | Performed by: INTERNAL MEDICINE

## 2021-12-09 RX ORDER — SODIUM CHLORIDE 9 MG/ML
INJECTION, SOLUTION INTRAVENOUS CONTINUOUS
Status: DISCONTINUED | OUTPATIENT
Start: 2021-12-09 | End: 2021-12-10

## 2021-12-09 RX ORDER — ONDANSETRON 4 MG/1
4 TABLET, ORALLY DISINTEGRATING ORAL EVERY 6 HOURS PRN
Status: DISCONTINUED | OUTPATIENT
Start: 2021-12-09 | End: 2021-12-14 | Stop reason: HOSPADM

## 2021-12-09 RX ORDER — ERGOCALCIFEROL 1.25 MG/1
50000 CAPSULE, LIQUID FILLED ORAL WEEKLY
Status: DISCONTINUED | OUTPATIENT
Start: 2021-12-11 | End: 2021-12-14 | Stop reason: HOSPADM

## 2021-12-09 RX ORDER — PANTOPRAZOLE SODIUM 40 MG/1
40 TABLET, DELAYED RELEASE ORAL 2 TIMES DAILY
Status: DISCONTINUED | OUTPATIENT
Start: 2021-12-09 | End: 2021-12-14 | Stop reason: HOSPADM

## 2021-12-09 RX ORDER — ONDANSETRON 2 MG/ML
4 INJECTION INTRAMUSCULAR; INTRAVENOUS EVERY 6 HOURS PRN
Status: DISCONTINUED | OUTPATIENT
Start: 2021-12-09 | End: 2021-12-14 | Stop reason: HOSPADM

## 2021-12-09 RX ORDER — LIDOCAINE 40 MG/G
CREAM TOPICAL
Status: DISCONTINUED | OUTPATIENT
Start: 2021-12-09 | End: 2021-12-14 | Stop reason: HOSPADM

## 2021-12-09 RX ORDER — GADOBUTROL 604.72 MG/ML
5 INJECTION INTRAVENOUS ONCE
Status: COMPLETED | OUTPATIENT
Start: 2021-12-09 | End: 2021-12-09

## 2021-12-09 RX ORDER — METOPROLOL SUCCINATE 25 MG/1
25 TABLET, EXTENDED RELEASE ORAL 2 TIMES DAILY
Status: DISCONTINUED | OUTPATIENT
Start: 2021-12-10 | End: 2021-12-14 | Stop reason: HOSPADM

## 2021-12-09 RX ORDER — ACETAMINOPHEN 325 MG/1
650 TABLET ORAL EVERY 6 HOURS PRN
Status: DISCONTINUED | OUTPATIENT
Start: 2021-12-09 | End: 2021-12-14 | Stop reason: HOSPADM

## 2021-12-09 RX ORDER — POLYETHYLENE GLYCOL 3350 17 G/17G
17 POWDER, FOR SOLUTION ORAL DAILY PRN
Status: DISCONTINUED | OUTPATIENT
Start: 2021-12-09 | End: 2021-12-14 | Stop reason: HOSPADM

## 2021-12-09 RX ORDER — PIPERACILLIN SODIUM, TAZOBACTAM SODIUM 3; .375 G/15ML; G/15ML
3.38 INJECTION, POWDER, LYOPHILIZED, FOR SOLUTION INTRAVENOUS EVERY 8 HOURS
Status: DISCONTINUED | OUTPATIENT
Start: 2021-12-10 | End: 2021-12-12

## 2021-12-09 RX ORDER — CEFAZOLIN SODIUM 1 G/50ML
1250 SOLUTION INTRAVENOUS ONCE
Status: COMPLETED | OUTPATIENT
Start: 2021-12-09 | End: 2021-12-09

## 2021-12-09 RX ORDER — PIPERACILLIN SODIUM, TAZOBACTAM SODIUM 3; .375 G/15ML; G/15ML
3.38 INJECTION, POWDER, LYOPHILIZED, FOR SOLUTION INTRAVENOUS ONCE
Status: COMPLETED | OUTPATIENT
Start: 2021-12-09 | End: 2021-12-09

## 2021-12-09 RX ORDER — IBUPROFEN 600 MG/1
600 TABLET, FILM COATED ORAL EVERY 8 HOURS PRN
Status: DISCONTINUED | OUTPATIENT
Start: 2021-12-09 | End: 2021-12-14 | Stop reason: HOSPADM

## 2021-12-09 RX ORDER — ACETAMINOPHEN 650 MG/1
650 SUPPOSITORY RECTAL EVERY 6 HOURS PRN
Status: DISCONTINUED | OUTPATIENT
Start: 2021-12-09 | End: 2021-12-14 | Stop reason: HOSPADM

## 2021-12-09 RX ORDER — BISACODYL 10 MG
10 SUPPOSITORY, RECTAL RECTAL DAILY PRN
Status: DISCONTINUED | OUTPATIENT
Start: 2021-12-09 | End: 2021-12-14 | Stop reason: HOSPADM

## 2021-12-09 RX ORDER — CALCIUM CARBONATE 500 MG/1
1000 TABLET, CHEWABLE ORAL 4 TIMES DAILY PRN
Status: DISCONTINUED | OUTPATIENT
Start: 2021-12-09 | End: 2021-12-14 | Stop reason: HOSPADM

## 2021-12-09 RX ORDER — CEPHALEXIN 500 MG/1
500 CAPSULE ORAL 2 TIMES DAILY
Status: ON HOLD | COMMUNITY
End: 2021-12-14

## 2021-12-09 RX ADMIN — PANTOPRAZOLE SODIUM 40 MG: 40 TABLET, DELAYED RELEASE ORAL at 23:33

## 2021-12-09 RX ADMIN — IBUPROFEN 600 MG: 600 TABLET ORAL at 20:58

## 2021-12-09 RX ADMIN — PIPERACILLIN SODIUM AND TAZOBACTAM SODIUM 3.38 G: 3; .375 INJECTION, POWDER, LYOPHILIZED, FOR SOLUTION INTRAVENOUS at 20:29

## 2021-12-09 RX ADMIN — VANCOMYCIN HYDROCHLORIDE 1250 MG: 5 INJECTION, POWDER, LYOPHILIZED, FOR SOLUTION INTRAVENOUS at 21:02

## 2021-12-09 RX ADMIN — GADOBUTROL 5 ML: 604.72 INJECTION INTRAVENOUS at 19:56

## 2021-12-09 RX ADMIN — SODIUM CHLORIDE: 9 INJECTION, SOLUTION INTRAVENOUS at 22:45

## 2021-12-09 ASSESSMENT — ACTIVITIES OF DAILY LIVING (ADL)
ADLS_ACUITY_SCORE: 12

## 2021-12-09 NOTE — H&P
Admission History and Physical   Anna Fish,  1960, MRN [id number]    [facility]  infection left achilles, chronic wound    PCP: Jame Dunn, [address], [place] [address], [phone]   Code status:  Full code        Extended Emergency Contact Information  Primary Emergency Contact: yadira fish  Home Phone: [phone]  Relation: Son  Secondary Emergency Contact: Sher Fish  Address: [address] [place], [place] [id number] [place] [address]  Home Phone: [phone]  Mobile Phone: [phone]  Relation: Spouse       Assessment and Plan   61 year old old female with past medical history of subacute thyroiditis, PAD with recent intervention of the left lower extremity on Xarelto, hypertension, left Achilles tendon trauma 12 years ago s/p surgical repair, nonhealing wound with multiple wound infections, large tissue defect repaired with skin flap presenting for evaluation of worsening swelling, malodorous discharge from the wound with associated fever, chills and emesis    1.  Chronic nonhealing left lower leg wound with infection.  Patient is nontoxic-appearing.  Obtain wound culture.  Check CBC, CRP, MRI of the left lower extremity to evaluate for abscess/osteomyelitis.  Start IV Zosyn and vancomycin.  N.p.o. after midnight for anticipated debridement in the OR tomorrow.  Ibuprofen as needed for pain.  ID consult   2.  History of subacute thyroiditis.  Check TSH.  Continue metoprolol.  3.  PAD with recent intervention of the left lower extremity.  On Xarelto - took last dose 12/7 AM.  Continue to hold in anticipation of surgery  4.  Osteoporosis on Prolia as an outpatient      DVT Prophylaxis: SCD on the right lower extremity, ambulation     Chief Complaint:  Left lower extremity wound drainage, swelling, pain, fever, chills     HPI:    Anna Fish is a 61 year old old female with past medical history of subacute thyroiditis, PAD with recent  intervention of the left lower extremity on Xarelto, hypertension, left Achilles tendon trauma 12 years ago s/p surgical repair, nonhealing wound with multiple wound infections, large tissue defect repaired with skin flap presenting for evaluation of worsening swelling, malodorous discharge from the wound with associated fever, chills and emesis  History is provided by patient and medical records  Patient reports that she was seen at the clinic on 12/1/2021.  Due to concerns about wound infection she was started on Keflex.  She started feeling unwell yesterday with fever up to 100.6, shaking chills, nausea and vomiting.  She feels better today.  She noticed malodorous discharge from the wound with increased surrounding erythema, swelling, warmth.  She has been taking ibuprofen at home.  She was seen by orthopedic surgery Dr. Wiley in the clinic today and direct admission to the hospital was recommended       Medical History  Past Medical History:   Diagnosis Date     Abdominal pain, epigastric 11/05/2019     Abnormal CT of the chest 02/01/2021     Abnormal TSH     TSH suppressed 0.18 and normal free T4, recheck in 3 months     Abscess of left lower leg 06/07/2021     Achilles tendon infection      Allergic rhinitis 09/03/2020     Chronic cough 03/23/2020    CT chest with mild bronchiectasis and small area of consolidation     Deep vein thrombosis (DVT) of brachial vein of left upper extremity (H) 04/17/2018    Associated with PICC line, follow-up ultrasound showing resolution of thrombus     Dermatitis      Family history of myocardial infarction     dad 35  mother 70     Fatigue 02/24/2017     GERD (gastroesophageal reflux disease)     EGD with esophagitis 2015, experiencing chest pain and dysphagia     History of anesthesia complications      Impaired fasting glucose     Glucose 119 February 2021.  A1c 5.3%     Iron deficiency anemia 12/18/2015    Has required iron infusions     Kidney stones      Microscopic  hematuria 2021    Longstanding finding     MRSA infection 2020    Left leg wound     Open wound of foot, complicated     Achilles tendon rupture complicated by infected open wound with multiple surgeries including skin grafting     Osteoporosis of multiple sites without pathological fracture     DEXA with T score -2.4 L1 L4, -3.2 left femoral neck and -2.6 right femoral neck     Ovarian cyst      Palpitations 2021    Event monitor showing sinus tachycardia no arrhythmia 2021     PONV (postoperative nausea and vomiting)      SIRS (systemic inflammatory response syndrome) (H) 2019     SOB (shortness of breath) 2021     Subacute thyroiditis 10/15/2021    TSH less than 0.1 and abnormal thyroid uptake scan 2021, asymptomatic     Vitamin D deficiency 2020        Surgical History  She  has a past surgical history that includes Soft tissue surgery; IR Lower Extremity Angiogram Left (10/2/2020); GYN surgery (); GYN surgery; Irrigation and debridement lower extremity, combined (Left, 2020); Apply Wound Vac (Left, 2020); Graft skin split thickness from extremity (Left, 11/10/2020); Apply Wound Vac (Left, 11/10/2020); IR Foreign Body Removal (2008); Ankle surgery;  Section; Oophorectomy (Right, ); Hysterectomy (); Picc And Midline Team Line Insertion (3/23/2018); and Picc (2021).       Social History  Reviewed, and she  reports that she quit smoking about 18 years ago. Her smoking use included cigarettes and cigarettes. She has never used smokeless tobacco. She reports that she does not drink alcohol and does not use drugs.   Social History     Tobacco Use     Smoking status: Former Smoker     Types: Cigarettes, Cigarettes     Quit date: 3/14/2003     Years since quittin.7     Smokeless tobacco: Never Used     Tobacco comment: Quit    Substance Use Topics     Alcohol use: No     Comment: Alcoholic Drinks/day: rare           Allergies  Allergies   Allergen Reactions     No Clinical Screening - See Comments Dermatitis and Rash     CHLORAPREP  Skin blisters- in the past     Ciprofloxacin GI Disturbance     vomiting     Doxycycline GI Disturbance     vomiting     Morphine Hives     Adhesive Tape Rash     Other Drug Allergy (See Comments) Other (See Comments)     CHLORAPREP Blisters.    Family History  Reviewed, and   Family History   Problem Relation Age of Onset     Coronary Artery Disease Mother      Thyroid Disease Mother      Coronary Artery Disease Father      Alzheimer Disease Sister      Cerebrovascular Disease Sister      Hypertension Sister      No Known Problems Brother      No Known Problems Sister      No Known Problems Brother      No Known Problems Daughter      No Known Problems Son      No Known Problems Son      Breast Cancer No family hx of              Prior to Admission Medications   Facility-Administered Medications Prior to Admission   Medication Dose Route Frequency Provider Last Rate Last Admin     denosumab (PROLIA) injection 60 mg  60 mg Subcutaneous Q6 Months Jame Dunn MD   60 mg at 09/29/21 1323     Medications Prior to Admission   Medication Sig Dispense Refill Last Dose     ibuprofen (ADVIL/MOTRIN) 200 MG tablet Take 200-800 mg by mouth 2 times daily         metoprolol succinate ER (TOPROL-XL) 25 MG 24 hr tablet Take 25 mg by mouth        metoprolol succinate ER (TOPROL-XL) 50 MG 24 hr tablet Take 1 tablet (50 mg) by mouth daily 90 tablet 3      multivitamin w/minerals (MULTI-VITAMIN) tablet Take 1 tablet by mouth        ondansetron (ZOFRAN) 4 MG tablet Take 4 mg by mouth        pantoprazole (PROTONIX) 40 MG EC tablet Take 40 mg by mouth 2 times daily         rivaroxaban ANTICOAGULANT (XARELTO ANTICOAGULANT) 2.5 MG TABS tablet Take 1 tablet by mouth 2 times daily        vitamin D2 (ERGOCALCIFEROL) 73582 units (1250 mcg) capsule Take 50,000 Units by mouth once a week Saturdays             Review  of Systems:  A 12 point comprehensive review of systems was negative except as noted. Physical Exam:  Pulse:  [116] 116  Resp:  [18] 18  BP: (127)/(74) 127/74  SpO2:  [98 %] 98 %    /74 (BP Location: Left arm)   Pulse 116   Resp 18   Wt 54.1 kg (119 lb 3.2 oz)   SpO2 98%   BMI 20.46 kg/m      General Appearance:   No acute distress, nontoxic appearing   Head:    Normocephalic, without obvious abnormality, atraumatic   Eyes:    PERRL, conjunctiva/corneas clear, EOM's intact,both eyes    Ears:    Normal external ear canals no drainage or erythema bilat.   Nose:   Nares normal by gross inspection,  mucosa normal, no drainage or sinus tenderness   Throat:   Lips, mucosa, and tongue normal; teeth and gums normal   Neck:   Supple, symmetrical, trachea midline, no adenopathy;        thyroid:  No enlargement/tenderness/nodules   Back:     Symmetric, no curvature, ROM normal, no CVA tenderness   Lungs:    Clear   Chest wall:    No tenderness or deformity   Heart:    Regular rate and rhythm, S1 and S2 normal, no murmur, no rubs, no JVD, no edema   Abdomen:     Soft, non-tender, bowel sounds active all four quadrants,     no masses, no hepatosplenomegaly   Musculoskeletal:   Extremities are warm and non-tender, atraumatic, no joint swelling or tenderness   Pulses:   2+ and symmetric all extremities   Skin:   LLE: Open wound over posterior aspect of the left lower leg with moderate amount of purulent drainage, surrounding erythema and edema with fullness/questionable fluctuance above the wound   Neurologic:  Awake and alert, grossly nonfocal        Pertinent Labs  Lab Results: personally reviewed.   No results for input(s): NA, CO2, BUN, CREATININE, ALBUMIN, BILITOT, ALKPHOS, ALT, AST, GLUCOSE in the last 168 hours.    Invalid input(s): K, CL, CALCIUM, LABALBU, PROT, MG  Recent Labs   Lab 12/08/21  0924   WBC 13.4*   HGB 10.7*   HCT 36.0   *     No results for input(s): CKTOTAL, TROPONINI in the last 168  hours.    Invalid input(s): TROPONINT, CKMBINDEX    MOST RECENT A1c, Iron, TIBC, Coags, TFTs  Lab Results   Component Value Date    INR 0.95 06/18/2021    PTT 31 10/02/2020     Lab Results   Component Value Date    IRON 28 (L) 02/01/2021     Lab Results   Component Value Date    TSH <0.01 (L) 11/04/2021         Pertinent Radiology  No results found for this visit on 12/09/21.    Advanced Care Planning  Treatment and discharge Planning discussed with patient and nursing staff  Anticipated Length of Stay in midnights (including a midnight in the Emergency Department after triage if applicable): At least 2 midnights for evaluation and treatment of infected left lower extremity wound      Valerie Atkins MD  Internal Medicine Hospitalist  12/9/2021

## 2021-12-10 ENCOUNTER — ANESTHESIA EVENT (OUTPATIENT)
Dept: SURGERY | Facility: HOSPITAL | Age: 61
End: 2021-12-10
Payer: COMMERCIAL

## 2021-12-10 ENCOUNTER — ANESTHESIA (OUTPATIENT)
Dept: SURGERY | Facility: HOSPITAL | Age: 61
End: 2021-12-10
Payer: COMMERCIAL

## 2021-12-10 LAB
ANION GAP SERPL CALCULATED.3IONS-SCNC: 8 MMOL/L (ref 5–18)
BUN SERPL-MCNC: 11 MG/DL (ref 8–22)
CALCIUM SERPL-MCNC: 7.9 MG/DL (ref 8.5–10.5)
CHLORIDE BLD-SCNC: 112 MMOL/L (ref 98–107)
CO2 SERPL-SCNC: 22 MMOL/L (ref 22–31)
CREAT SERPL-MCNC: 0.6 MG/DL (ref 0.6–1.1)
ERYTHROCYTE [DISTWIDTH] IN BLOOD BY AUTOMATED COUNT: 14.8 % (ref 10–15)
GFR SERPL CREATININE-BSD FRML MDRD: >90 ML/MIN/1.73M2
GLUCOSE BLD-MCNC: 83 MG/DL (ref 70–125)
GRAM STAIN RESULT: ABNORMAL
GRAM STAIN RESULT: ABNORMAL
HCT VFR BLD AUTO: 30 % (ref 35–47)
HGB BLD-MCNC: 9.1 G/DL (ref 11.7–15.7)
INR PPP: 1.09 (ref 0.9–1.15)
MCH RBC QN AUTO: 24.5 PG (ref 26.5–33)
MCHC RBC AUTO-ENTMCNC: 30.3 G/DL (ref 31.5–36.5)
MCV RBC AUTO: 81 FL (ref 78–100)
PLATELET # BLD AUTO: 458 10E3/UL (ref 150–450)
POTASSIUM BLD-SCNC: 3.8 MMOL/L (ref 3.5–5)
RBC # BLD AUTO: 3.71 10E6/UL (ref 3.8–5.2)
SODIUM SERPL-SCNC: 142 MMOL/L (ref 136–145)
T4 FREE SERPL-MCNC: 0.83 NG/DL (ref 0.7–1.8)
TSH SERPL DL<=0.005 MIU/L-ACNC: 0.12 UIU/ML (ref 0.3–5)
WBC # BLD AUTO: 13.3 10E3/UL (ref 4–11)

## 2021-12-10 PROCEDURE — 84439 ASSAY OF FREE THYROXINE: CPT | Performed by: INTERNAL MEDICINE

## 2021-12-10 PROCEDURE — 999N000141 HC STATISTIC PRE-PROCEDURE NURSING ASSESSMENT: Performed by: ORTHOPAEDIC SURGERY

## 2021-12-10 PROCEDURE — 87116 MYCOBACTERIA CULTURE: CPT | Performed by: ORTHOPAEDIC SURGERY

## 2021-12-10 PROCEDURE — 258N000003 HC RX IP 258 OP 636: Performed by: NURSE ANESTHETIST, CERTIFIED REGISTERED

## 2021-12-10 PROCEDURE — 84443 ASSAY THYROID STIM HORMONE: CPT | Performed by: INTERNAL MEDICINE

## 2021-12-10 PROCEDURE — 99233 SBSQ HOSP IP/OBS HIGH 50: CPT | Performed by: FAMILY MEDICINE

## 2021-12-10 PROCEDURE — 87205 SMEAR GRAM STAIN: CPT | Performed by: ORTHOPAEDIC SURGERY

## 2021-12-10 PROCEDURE — 120N000001 HC R&B MED SURG/OB

## 2021-12-10 PROCEDURE — 87102 FUNGUS ISOLATION CULTURE: CPT | Performed by: ORTHOPAEDIC SURGERY

## 2021-12-10 PROCEDURE — 99254 IP/OBS CNSLTJ NEW/EST MOD 60: CPT | Performed by: INTERNAL MEDICINE

## 2021-12-10 PROCEDURE — 250N000011 HC RX IP 250 OP 636: Performed by: NURSE ANESTHETIST, CERTIFIED REGISTERED

## 2021-12-10 PROCEDURE — 258N000003 HC RX IP 258 OP 636: Performed by: INTERNAL MEDICINE

## 2021-12-10 PROCEDURE — 370N000017 HC ANESTHESIA TECHNICAL FEE, PER MIN: Performed by: ORTHOPAEDIC SURGERY

## 2021-12-10 PROCEDURE — 87206 SMEAR FLUORESCENT/ACID STAI: CPT | Performed by: ORTHOPAEDIC SURGERY

## 2021-12-10 PROCEDURE — 87070 CULTURE OTHR SPECIMN AEROBIC: CPT | Performed by: ORTHOPAEDIC SURGERY

## 2021-12-10 PROCEDURE — C1765 ADHESION BARRIER: HCPCS | Performed by: ORTHOPAEDIC SURGERY

## 2021-12-10 PROCEDURE — 80048 BASIC METABOLIC PNL TOTAL CA: CPT | Performed by: INTERNAL MEDICINE

## 2021-12-10 PROCEDURE — 85027 COMPLETE CBC AUTOMATED: CPT | Performed by: INTERNAL MEDICINE

## 2021-12-10 PROCEDURE — 360N000075 HC SURGERY LEVEL 2, PER MIN: Performed by: ORTHOPAEDIC SURGERY

## 2021-12-10 PROCEDURE — 87075 CULTR BACTERIA EXCEPT BLOOD: CPT | Performed by: ORTHOPAEDIC SURGERY

## 2021-12-10 PROCEDURE — 258N000003 HC RX IP 258 OP 636: Performed by: PHYSICIAN ASSISTANT

## 2021-12-10 PROCEDURE — 250N000013 HC RX MED GY IP 250 OP 250 PS 637: Performed by: PHYSICIAN ASSISTANT

## 2021-12-10 PROCEDURE — 272N000001 HC OR GENERAL SUPPLY STERILE: Performed by: ORTHOPAEDIC SURGERY

## 2021-12-10 PROCEDURE — 36415 COLL VENOUS BLD VENIPUNCTURE: CPT | Performed by: INTERNAL MEDICINE

## 2021-12-10 PROCEDURE — 250N000011 HC RX IP 250 OP 636: Performed by: INTERNAL MEDICINE

## 2021-12-10 PROCEDURE — 250N000013 HC RX MED GY IP 250 OP 250 PS 637: Performed by: INTERNAL MEDICINE

## 2021-12-10 PROCEDURE — 250N000009 HC RX 250: Performed by: NURSE ANESTHETIST, CERTIFIED REGISTERED

## 2021-12-10 PROCEDURE — 87077 CULTURE AEROBIC IDENTIFY: CPT | Performed by: ORTHOPAEDIC SURGERY

## 2021-12-10 PROCEDURE — 250N000009 HC RX 250: Performed by: ANESTHESIOLOGY

## 2021-12-10 PROCEDURE — 250N000011 HC RX IP 250 OP 636: Performed by: ANESTHESIOLOGY

## 2021-12-10 PROCEDURE — 85610 PROTHROMBIN TIME: CPT | Performed by: INTERNAL MEDICINE

## 2021-12-10 RX ORDER — BISACODYL 10 MG
10 SUPPOSITORY, RECTAL RECTAL DAILY PRN
Status: DISCONTINUED | OUTPATIENT
Start: 2021-12-10 | End: 2021-12-10

## 2021-12-10 RX ORDER — SODIUM CHLORIDE, SODIUM LACTATE, POTASSIUM CHLORIDE, CALCIUM CHLORIDE 600; 310; 30; 20 MG/100ML; MG/100ML; MG/100ML; MG/100ML
INJECTION, SOLUTION INTRAVENOUS CONTINUOUS
Status: DISCONTINUED | OUTPATIENT
Start: 2021-12-10 | End: 2021-12-10

## 2021-12-10 RX ORDER — FENTANYL CITRATE 50 UG/ML
INJECTION, SOLUTION INTRAMUSCULAR; INTRAVENOUS PRN
Status: DISCONTINUED | OUTPATIENT
Start: 2021-12-10 | End: 2021-12-10

## 2021-12-10 RX ORDER — ONDANSETRON 2 MG/ML
4 INJECTION INTRAMUSCULAR; INTRAVENOUS EVERY 30 MIN PRN
Status: CANCELLED | OUTPATIENT
Start: 2021-12-10

## 2021-12-10 RX ORDER — HYDROXYZINE HYDROCHLORIDE 25 MG/1
25 TABLET, FILM COATED ORAL EVERY 6 HOURS PRN
Status: DISCONTINUED | OUTPATIENT
Start: 2021-12-10 | End: 2021-12-14 | Stop reason: HOSPADM

## 2021-12-10 RX ORDER — ONDANSETRON 4 MG/1
4 TABLET, ORALLY DISINTEGRATING ORAL EVERY 30 MIN PRN
Status: CANCELLED | OUTPATIENT
Start: 2021-12-10

## 2021-12-10 RX ORDER — FENTANYL CITRATE 50 UG/ML
25 INJECTION, SOLUTION INTRAMUSCULAR; INTRAVENOUS EVERY 5 MIN PRN
Status: CANCELLED | OUTPATIENT
Start: 2021-12-10

## 2021-12-10 RX ORDER — ONDANSETRON 2 MG/ML
INJECTION INTRAMUSCULAR; INTRAVENOUS PRN
Status: DISCONTINUED | OUTPATIENT
Start: 2021-12-10 | End: 2021-12-10

## 2021-12-10 RX ORDER — HYDROMORPHONE HCL IN WATER/PF 6 MG/30 ML
0.2 PATIENT CONTROLLED ANALGESIA SYRINGE INTRAVENOUS
Status: DISCONTINUED | OUTPATIENT
Start: 2021-12-10 | End: 2021-12-14 | Stop reason: HOSPADM

## 2021-12-10 RX ORDER — BUPIVACAINE HYDROCHLORIDE AND EPINEPHRINE 5; 5 MG/ML; UG/ML
INJECTION, SOLUTION PERINEURAL
Status: DISCONTINUED | OUTPATIENT
Start: 2021-12-10 | End: 2021-12-10

## 2021-12-10 RX ORDER — LIDOCAINE HYDROCHLORIDE 20 MG/ML
INJECTION, SOLUTION INFILTRATION; PERINEURAL PRN
Status: DISCONTINUED | OUTPATIENT
Start: 2021-12-10 | End: 2021-12-10

## 2021-12-10 RX ORDER — ONDANSETRON 2 MG/ML
4 INJECTION INTRAMUSCULAR; INTRAVENOUS EVERY 6 HOURS PRN
Status: DISCONTINUED | OUTPATIENT
Start: 2021-12-10 | End: 2021-12-10

## 2021-12-10 RX ORDER — OXYCODONE HYDROCHLORIDE 5 MG/1
5 TABLET ORAL EVERY 4 HOURS PRN
Status: DISCONTINUED | OUTPATIENT
Start: 2021-12-10 | End: 2021-12-14 | Stop reason: HOSPADM

## 2021-12-10 RX ORDER — NALOXONE HYDROCHLORIDE 0.4 MG/ML
0.4 INJECTION, SOLUTION INTRAMUSCULAR; INTRAVENOUS; SUBCUTANEOUS
Status: DISCONTINUED | OUTPATIENT
Start: 2021-12-10 | End: 2021-12-14 | Stop reason: HOSPADM

## 2021-12-10 RX ORDER — PROCHLORPERAZINE MALEATE 10 MG
10 TABLET ORAL EVERY 6 HOURS PRN
Status: DISCONTINUED | OUTPATIENT
Start: 2021-12-10 | End: 2021-12-14 | Stop reason: HOSPADM

## 2021-12-10 RX ORDER — NALOXONE HYDROCHLORIDE 0.4 MG/ML
0.2 INJECTION, SOLUTION INTRAMUSCULAR; INTRAVENOUS; SUBCUTANEOUS
Status: DISCONTINUED | OUTPATIENT
Start: 2021-12-10 | End: 2021-12-14 | Stop reason: HOSPADM

## 2021-12-10 RX ORDER — PROPOFOL 10 MG/ML
INJECTION, EMULSION INTRAVENOUS PRN
Status: DISCONTINUED | OUTPATIENT
Start: 2021-12-10 | End: 2021-12-10

## 2021-12-10 RX ORDER — SODIUM CHLORIDE, SODIUM LACTATE, POTASSIUM CHLORIDE, CALCIUM CHLORIDE 600; 310; 30; 20 MG/100ML; MG/100ML; MG/100ML; MG/100ML
INJECTION, SOLUTION INTRAVENOUS CONTINUOUS PRN
Status: DISCONTINUED | OUTPATIENT
Start: 2021-12-10 | End: 2021-12-10

## 2021-12-10 RX ORDER — SODIUM CHLORIDE, SODIUM LACTATE, POTASSIUM CHLORIDE, CALCIUM CHLORIDE 600; 310; 30; 20 MG/100ML; MG/100ML; MG/100ML; MG/100ML
INJECTION, SOLUTION INTRAVENOUS CONTINUOUS
Status: CANCELLED | OUTPATIENT
Start: 2021-12-16

## 2021-12-10 RX ORDER — ACETAMINOPHEN 325 MG/1
650 TABLET ORAL EVERY 4 HOURS PRN
Status: DISCONTINUED | OUTPATIENT
Start: 2021-12-13 | End: 2021-12-14 | Stop reason: HOSPADM

## 2021-12-10 RX ORDER — LIDOCAINE 40 MG/G
CREAM TOPICAL
Status: DISCONTINUED | OUTPATIENT
Start: 2021-12-10 | End: 2021-12-10 | Stop reason: HOSPADM

## 2021-12-10 RX ORDER — AMOXICILLIN 250 MG
1 CAPSULE ORAL 2 TIMES DAILY
Status: DISCONTINUED | OUTPATIENT
Start: 2021-12-10 | End: 2021-12-14 | Stop reason: HOSPADM

## 2021-12-10 RX ORDER — FENTANYL CITRATE 50 UG/ML
50 INJECTION, SOLUTION INTRAMUSCULAR; INTRAVENOUS
Status: DISCONTINUED | OUTPATIENT
Start: 2021-12-10 | End: 2021-12-10 | Stop reason: HOSPADM

## 2021-12-10 RX ORDER — LIDOCAINE 40 MG/G
CREAM TOPICAL
Status: DISCONTINUED | OUTPATIENT
Start: 2021-12-10 | End: 2021-12-10

## 2021-12-10 RX ORDER — OXYCODONE HYDROCHLORIDE 5 MG/1
10 TABLET ORAL EVERY 4 HOURS PRN
Status: DISCONTINUED | OUTPATIENT
Start: 2021-12-10 | End: 2021-12-14 | Stop reason: HOSPADM

## 2021-12-10 RX ORDER — ACETAMINOPHEN 325 MG/1
975 TABLET ORAL EVERY 8 HOURS
Status: DISPENSED | OUTPATIENT
Start: 2021-12-10 | End: 2021-12-13

## 2021-12-10 RX ORDER — PROPOFOL 10 MG/ML
INJECTION, EMULSION INTRAVENOUS CONTINUOUS PRN
Status: DISCONTINUED | OUTPATIENT
Start: 2021-12-10 | End: 2021-12-10

## 2021-12-10 RX ORDER — POLYETHYLENE GLYCOL 3350 17 G/17G
17 POWDER, FOR SOLUTION ORAL DAILY
Status: DISCONTINUED | OUTPATIENT
Start: 2021-12-11 | End: 2021-12-14 | Stop reason: HOSPADM

## 2021-12-10 RX ORDER — HYDROMORPHONE HCL IN WATER/PF 6 MG/30 ML
0.4 PATIENT CONTROLLED ANALGESIA SYRINGE INTRAVENOUS
Status: DISCONTINUED | OUTPATIENT
Start: 2021-12-10 | End: 2021-12-14 | Stop reason: HOSPADM

## 2021-12-10 RX ORDER — ONDANSETRON 4 MG/1
4 TABLET, ORALLY DISINTEGRATING ORAL EVERY 6 HOURS PRN
Status: DISCONTINUED | OUTPATIENT
Start: 2021-12-10 | End: 2021-12-10

## 2021-12-10 RX ADMIN — FENTANYL CITRATE 25 MCG: 50 INJECTION, SOLUTION INTRAMUSCULAR; INTRAVENOUS at 11:33

## 2021-12-10 RX ADMIN — FENTANYL CITRATE 25 MCG: 50 INJECTION, SOLUTION INTRAMUSCULAR; INTRAVENOUS at 11:19

## 2021-12-10 RX ADMIN — PANTOPRAZOLE SODIUM 40 MG: 40 TABLET, DELAYED RELEASE ORAL at 21:20

## 2021-12-10 RX ADMIN — VANCOMYCIN HYDROCHLORIDE 750 MG: 1 INJECTION, POWDER, LYOPHILIZED, FOR SOLUTION INTRAVENOUS at 21:25

## 2021-12-10 RX ADMIN — SODIUM CHLORIDE, POTASSIUM CHLORIDE, SODIUM LACTATE AND CALCIUM CHLORIDE: 600; 310; 30; 20 INJECTION, SOLUTION INTRAVENOUS at 10:46

## 2021-12-10 RX ADMIN — METOPROLOL SUCCINATE 25 MG: 25 TABLET, EXTENDED RELEASE ORAL at 08:12

## 2021-12-10 RX ADMIN — PANTOPRAZOLE SODIUM 40 MG: 40 TABLET, DELAYED RELEASE ORAL at 08:12

## 2021-12-10 RX ADMIN — PIPERACILLIN AND TAZOBACTAM 3.38 G: 3; .375 INJECTION, POWDER, FOR SOLUTION INTRAVENOUS at 02:03

## 2021-12-10 RX ADMIN — OXYCODONE HYDROCHLORIDE 5 MG: 5 TABLET ORAL at 13:10

## 2021-12-10 RX ADMIN — BUPIVACAINE HYDROCHLORIDE AND EPINEPHRINE 8 ML: 5; 5 INJECTION, SOLUTION PERINEURAL at 11:29

## 2021-12-10 RX ADMIN — LIDOCAINE HYDROCHLORIDE 40 MG: 20 INJECTION, SOLUTION INFILTRATION; PERINEURAL at 11:19

## 2021-12-10 RX ADMIN — ACETAMINOPHEN 650 MG: 325 TABLET ORAL at 02:10

## 2021-12-10 RX ADMIN — MIDAZOLAM HYDROCHLORIDE 1 MG: 1 INJECTION, SOLUTION INTRAMUSCULAR; INTRAVENOUS at 10:58

## 2021-12-10 RX ADMIN — IBUPROFEN 600 MG: 600 TABLET ORAL at 16:40

## 2021-12-10 RX ADMIN — PIPERACILLIN AND TAZOBACTAM 3.38 G: 3; .375 INJECTION, POWDER, FOR SOLUTION INTRAVENOUS at 11:01

## 2021-12-10 RX ADMIN — ACETAMINOPHEN 975 MG: 325 TABLET ORAL at 21:18

## 2021-12-10 RX ADMIN — PROPOFOL 30 MG: 10 INJECTION, EMULSION INTRAVENOUS at 11:19

## 2021-12-10 RX ADMIN — SODIUM CHLORIDE, POTASSIUM CHLORIDE, SODIUM LACTATE AND CALCIUM CHLORIDE 100 ML/HR: 600; 310; 30; 20 INJECTION, SOLUTION INTRAVENOUS at 13:08

## 2021-12-10 RX ADMIN — FENTANYL CITRATE 50 MCG: 50 INJECTION INTRAMUSCULAR; INTRAVENOUS at 10:59

## 2021-12-10 RX ADMIN — PROPOFOL 115 MCG/KG/MIN: 10 INJECTION, EMULSION INTRAVENOUS at 11:19

## 2021-12-10 RX ADMIN — IBUPROFEN 600 MG: 600 TABLET ORAL at 06:33

## 2021-12-10 RX ADMIN — BUPIVACAINE HYDROCHLORIDE AND EPINEPHRINE BITARTRATE 19 ML: 5; .005 INJECTION, SOLUTION PERINEURAL at 11:25

## 2021-12-10 RX ADMIN — PIPERACILLIN AND TAZOBACTAM 3.38 G: 3; .375 INJECTION, POWDER, FOR SOLUTION INTRAVENOUS at 17:16

## 2021-12-10 RX ADMIN — ONDANSETRON 4 MG: 2 INJECTION INTRAMUSCULAR; INTRAVENOUS at 11:19

## 2021-12-10 RX ADMIN — OXYCODONE HYDROCHLORIDE 5 MG: 5 TABLET ORAL at 21:20

## 2021-12-10 RX ADMIN — VANCOMYCIN HYDROCHLORIDE 750 MG: 1 INJECTION, POWDER, LYOPHILIZED, FOR SOLUTION INTRAVENOUS at 08:12

## 2021-12-10 ASSESSMENT — ACTIVITIES OF DAILY LIVING (ADL)
ADLS_ACUITY_SCORE: 6
ADLS_ACUITY_SCORE: 4
ADLS_ACUITY_SCORE: 6
ADLS_ACUITY_SCORE: 4
ADLS_ACUITY_SCORE: 6
ADLS_ACUITY_SCORE: 4

## 2021-12-10 ASSESSMENT — LIFESTYLE VARIABLES: TOBACCO_USE: 1

## 2021-12-10 NOTE — PLAN OF CARE
Problem: Adult Inpatient Plan of Care  Goal: Plan of Care Review  Outcome: Improving     Problem: Adult Inpatient Plan of Care  Goal: Absence of Hospital-Acquired Illness or Injury  Intervention: Identify and Manage Fall Risk  Recent Flowsheet Documentation  Taken 12/10/2021 0000 by Abimbola Raines RN  Safety Promotion/Fall Prevention:   nonskid shoes/slippers when out of bed   patient and family education   safety round/check completed     Problem: Pain Acute  Goal: Acceptable Pain Control and Functional Ability  Outcome: Improving     Problem: Surgical Site Infection  Goal: Improved Infection Symptoms  Outcome: Improving  Intervention: Prevent and Manage Infection  Recent Flowsheet Documentation  Taken 12/10/2021 0000 by Abimbola Raines RN  Isolation Precautions: contact precautions maintained     Patient alert and orientated, able to make needs known. NPO at midnight for I and D at 1020. Administered PRN tylenol at 0200 for 6/10, leg pain. Remains on contact precaution for MRSA.     Abimbola Raines, EDIL

## 2021-12-10 NOTE — PLAN OF CARE
Problem: Pain Acute  Goal: Acceptable Pain Control and Functional Ability  Outcome: Improving   Patient alert and orientated.  VSS.  Pain tolerable with PRN ibprofen.

## 2021-12-10 NOTE — OP NOTE
Operative Note    Name:  Anna Sanderson  PCP:  Jame Dunn  Procedure Date:  12/9/2021 - 12/10/2021    Pre-Procedure Diagnosis:  Achilles tendon infection [M65.10]  Non-healing surgical wound, subsequent encounter [T81.89XD]     Post-Procedure Diagnosis:    Same     Procedure: Procedure(s):  IRRIGATION AND DEBRIDEMENT, LOWER EXTREMITY WITH WOUND VAC PLACEMENT     Surgeon(s):  Carie Wiley MD Lindsay Erickson Pa-C    Circulator: Sophy Bonilla RN  Relief Scrub: Ramona Rai  Scrub Person: Patricia Chavez PAC was necessary to ensure safety of this patient and adequate progression of the procedure.    Anesthesia Type:  MAC with Block     Estimated Blood Loss:   5cc    Complications:    None    Brief clinical note:  This is a 61-year-old woman who has had a long and extensive history of recurrent wound formation over her left Achilles.  She has had multiple attempts at free flaps with all of which have failed leave open with soft tissue deficit over the posterior aspect with a chronic draining wound which over the years has closed off at times but will be open.  Her most recent debridement was in August 2021.  She started feeling nauseated and noticed increased drainage from her wound about a week ago.  She was admitted to hospital last night and there was noted to be significant elevation of her white blood cells as well as her CRP.  MRI suggested abscess formation anterior posterior and deep posterior compartments with possible myositis of the anterior and lateral compartments.  Clinically she did not seem to have symptoms are correlated with significant anterior or lateral involvement.  We discussed that this could potentially lead towards amputation of.  Over the years we have discussed amputation multiple times because of this chronic draining wound.  At this point I do not think that amputation is absolutely necessary but she understands that should her inflammatory markers and  clinical picture worsen or fail to improve that that certainly may be necessary.  I discussed that if there was clear connection between the abscess and either the lateral anterior compartment that I would open them but if there was not direct connection I felt it would be prudent to leave this alone in order to avoid contamination of those compartments.  All of her questions were answered and patient was in agreement with proceeding with surgical intervention.    Procedure: After being informed of the risks, benefits, and alternatives to the procedure, the patient desired to proceed and was brought to the operating suite where the patient was placed under general anesthetic.  A tourniquet was applied to the left upper thigh.  She was positioned in the prone position with adequate padding of all bony prominences.  The left leg was prepped and draped in usual sterile fashion with Betadine wash and paint.  The left leg was elevated for approximately 5 minutes.  Tourniquet was inflated to 250 mmHg after a timeout was called.    I began by marking and then recreating the previous central incision over the Achilles.  The defect distally measured approximately 8 x 4 cm and had fibrotic tissue at the base.  This did have purulence which was entering that open wound from proximal.  The incision was extended proximally.  There is significant extensive scarring.  There was fluid tracking up in the interval between the gastroc and soleus musculature down into the deep posterior compartment which was also exposed.  I extended the incision proximal towards the medial lateral heads of the gastroc.  The muscle was examined at all levels and found to be viable and contractile.  There was some central muscular necrosis right at the region proximal to the open wound which was debrided with a rongeur.  Some of this tissue was sent for pathology.  I also sent swab and multiple tissue samples for culture.  The tissue was then  aggressively debrided with a curette primarily and that distal region.  There is significant fibrosis of the open wound was resected back to bleeding tissue.  Care was taken to try and protect the neurovascular bundle.  This was not directly exposed.  There did not seem to be further extension of any tracking of fluid into the other compartments of the leg specifically the anterior compartment.  Both the deep posterior and posterior compartments were wide open.  The lateral compartment did not seem to have any extension to it either.  The wound was irrigated copiously with 9 L of normal saline.  The wound was loosely closed with a combination of 2-0 PDS and 3-0 Prolene.  I was able after removing some of the fibrotic scar tissue to bring the distal open wound together but it was unable to fully close the wound.  A wound VAC was placed along the incision length.    Postoperative plan:  We will keep her nonweightbearing and will consult with both infectious disease as well as wound care for management of her wound VAC.      Sterile dressing was applied. Patient tolerated the procedure well and without complications and was transferred to the [facility] in stable condition.    Carie Wiley MD    Date: 12/10/2021  Time: 12:28 PM      * No implants in log *

## 2021-12-10 NOTE — CONSULTS
Infectious Disease Consultation:  Requesting MD:Saurabh  Reason for consult:infection, ankle      HISTORY:  Pt is well known to me, long struggle to clear infection in the L ankle.  We saw her often over the summer.  I saw her yesterday in hospital [facility] and she told me she had infection back, despite keflex.      Our last time seeing her in mid summer gave 2 wks of both vanco and CTX.  In august she had outpatient clean up/debridement of wound.      Now admitted for ongoing infection, with plans for IV abx, culturing and MRI.  NPO for surgery today.  She has had innumberable surgeries prior, see ortho notes.    She had low grade fever PTA along with foul smell to the wound, and redness, warmth.  Saw Saurabh clinic yesterday, admitted.         Pertinent past history, past infectious disease history:  History  Past Medical History[]Expand by Default        Past Medical History:   Diagnosis Date     Abdominal pain, epigastric 11/05/2019     Abnormal CT of the chest 02/01/2021     Abnormal TSH       TSH suppressed 0.18 and normal free T4, recheck in 3 months     Abscess of left lower leg 06/07/2021     Achilles tendon infection       Allergic rhinitis 09/03/2020     Chronic cough 03/23/2020     CT chest with mild bronchiectasis and small area of consolidation     Deep vein thrombosis (DVT) of brachial vein of left upper extremity (H) 04/17/2018     Associated with PICC line, follow-up ultrasound showing resolution of thrombus     Dermatitis       Family history of myocardial infarction       dad 35  mother 70     Fatigue 02/24/2017     GERD (gastroesophageal reflux disease)       EGD with esophagitis 2015, experiencing chest pain and dysphagia     History of anesthesia complications       Impaired fasting glucose       Glucose 119 February 2021.  A1c 5.3%     Iron deficiency anemia 12/18/2015     Has required iron infusions     Kidney stones       Microscopic hematuria 03/07/2021     Longstanding finding     MRSA infection  2020     Left leg wound     Open wound of foot, complicated       Achilles tendon rupture complicated by infected open wound with multiple surgeries including skin grafting     Osteoporosis of multiple sites without pathological fracture       DEXA with T score -2.4 L1 L4, -3.2 left femoral neck and -2.6 right femoral neck     Ovarian cyst       Palpitations 2021     Event monitor showing sinus tachycardia no arrhythmia 2021     PONV (postoperative nausea and vomiting)       SIRS (systemic inflammatory response syndrome) (H) 2019     SOB (shortness of breath) 2021     Subacute thyroiditis 10/15/2021     TSH less than 0.1 and abnormal thyroid uptake scan 2021, asymptomatic     Vitamin D deficiency 2020              Surgical History  She  has a past surgical history that includes Soft tissue surgery; IR Lower Extremity Angiogram Left (10/2/2020); GYN surgery (); GYN surgery; Irrigation and debridement lower extremity, combined (Left, 2020); Apply Wound Vac (Left, 2020); Graft skin split thickness from extremity (Left, 11/10/2020); Apply Wound Vac (Left, 11/10/2020); IR Foreign Body Removal (2008); Ankle surgery;  Section; Oophorectomy (Right, ); Hysterectomy (); Picc And Midline Team Line Insertion (3/23/2018); and Picc (2021).          Social History  Reviewed, and she  reports that she quit smoking about 18 years ago. Her smoking use included cigarettes and cigarettes. She has never used smokeless tobacco. She reports that she does not drink alcohol and does not use drugs.   Social History            Tobacco Use     Smoking status: Former Smoker       Types: Cigarettes, Cigarettes       Quit date: 3/14/2003       Years since quittin.7     Smokeless tobacco: Never Used     Tobacco comment: Quit    Substance Use Topics     Alcohol use: No       Comment: Alcoholic Drinks/day: rare             Allergies        Allergies    Allergen Reactions     No Clinical Screening - See Comments Dermatitis and Rash       CHLORAPREP  Skin blisters- in the past     Ciprofloxacin GI Disturbance       vomiting     Doxycycline GI Disturbance       vomiting     Morphine Hives     Adhesive Tape Rash     Other Drug Allergy (See Comments) Other (See Comments)       CHLORAPREP Blisters.     Family History  Reviewed, and   Family History         Family History   Problem Relation Age of Onset     Coronary Artery Disease Mother       Thyroid Disease Mother       Coronary Artery Disease Father       Alzheimer Disease Sister       Cerebrovascular Disease Sister       Hypertension Sister       No Known Problems Brother       No Known Problems Sister       No Known Problems Brother       No Known Problems Daughter       No Known Problems Son       No Known Problems Son       Breast Cancer No family hx of                      Medications:  Reviewed prior to admission meds as applicable in chart review.  Current meds are reviewed in the EMR listed MAR.     ANTIBIOTICS:    Current:V/Z both on hold   Prior:   Allergy to:cipro and doxy are stomach issues not allergies    SH/FH and  travel history(if applicable to consult):above    REVIEW OF SYSTEMS:  All other systems negative    EXAMINATION:  /60   Pulse 84   Temp 98.5  F (36.9  C) (Oral)   Resp 16   Wt 54.1 kg (119 lb 3.2 oz)   SpO2 96%   BMI 20.46 kg/m    Alert, awake  Vitals tabulated above, reviewed  HEENT:  Neck supple without lymphadenopathy  Sclera clear  CARDIOVASCULAR regular rate and rhythm, no murmur  Lungs CLEAR TO AUSCULTATION   Abdomen soft, NT/ND, absent HEPATOSPLENOMEGALY  Skin normal  Joints normal  Neurologic exam non focal  Wound:  NA      Picture is July 29      CLINICAL DATABASE FOR---LAB/MICRO/CULTURES/IMAGING STUDIES:  CRP 4.5  ALL MICRO is pending, operative cx will probably be most helpful anyway    IMPRESSION:  1.  Skin ulcer in the posterior aspect of the lower calf/healed, with  a sinus tract projecting to the third of the Achilles tendon, and an abscess within the posterior muscle compartments of the lower calf, measuring 2.4 x 1.2 x 10 cm.     2.  Edema/infectious myositis involving the muscle fibers in the posterior, deep posterior, lateral, and anterior compartments as described above.     3.  No fascial enhancement to specifically indicate fasciitis.     4.  Soft tissue deficiency over the posterior and medial aspect lower calf suggesting prior surgical debridement.     5.  Skin edema and enhancement in the lower calf suggesting cellulitis.     6.  No evidence of osteomyelitis/reactive osteitis.    IMPRESSION:  Pt with chronically infected and chronically non healing L achilles wound, now with cellulitis, abscess, phlegmon, myositis as in MRI.  Micro pending  Surgery today  Her micro in late June 2021 was Klebsiella.  Nothing + in our EMR since...    PLAN:  Hold abx to optimize yield from OR  Then vanco, zosyn after  Unsure end point will ever allow leg salvage...but that is goal.             PELON VARELA MD  [facility]  Office [phone]

## 2021-12-10 NOTE — PROGRESS NOTES
Patient arrived to room 403 from surgery. Patient received a MAC.  Alert x4 and denies any discomfort.   Janet Lozano RN

## 2021-12-10 NOTE — PHARMACY-VANCOMYCIN DOSING SERVICE
"Pharmacy Vancomycin Initial Note  Date of Service 2021  Patient's  1960  61 year old, female    Indication: Skin and Soft Tissue Infection    Current estimated CrCl = Estimated Creatinine Clearance: 78.4 mL/min (based on SCr of 0.66 mg/dL).    Creatinine for last 3 days  2021:  7:28 PM Creatinine 0.66 mg/dL    Recent Vancomycin Level(s) for last 3 days  No results found for requested labs within last 72 hours.      Vancomycin IV Administrations (past 72 hours)      No vancomycin orders with administrations in past 72 hours.                Nephrotoxins and other renal medications (From now, onward)    Start     Dose/Rate Route Frequency Ordered Stop    12/10/21 0930  vancomycin (VANCOCIN) 750 mg in sodium chloride 0.9 % 250 mL intermittent infusion         750 mg  over 60-90 Minutes Intravenous EVERY 12 HOURS 21 21021 1830  vancomycin (VANCOCIN) 1,250 mg in sodium chloride 0.9 % 250 mL intermittent infusion         1,250 mg  over 90 Minutes Intravenous ONCE 21 1802      21 1800  piperacillin-tazobactam (ZOSYN) 3.375 g vial to attach to  mL bag        Note to Pharmacy: For [facility], [facility] and [facility]: For Zosyn-naive patients, use the \"Zosyn initial dose + extended infusion\" order panel.    3.375 g  over 240 Minutes Intravenous EVERY 8 HOURS 21 1754      21 175  ibuprofen (ADVIL/MOTRIN) tablet 600 mg         600 mg Oral EVERY 8 HOURS PRN 21 1757            Contrast Orders - past 72 hours (72h ago, onward)            Start     Dose/Rate Route Frequency Ordered Stop    21  gadobutrol (GADAVIST) injection 5 mL         5 mL Intravenous ONCE 21 19321          [employer] Prediction of Planned Initial Vancomycin Regimen  Loading dose: 1250 mg at 21:30 2021.  Regimen: 750 mg IV every 12 hours.  Start time: 21:04 on 2021  Exposure target: AUC24 (range)400-600 mg/L.hr   AUC24,ss: 465 mg/L.hr  Probability of AUC24 > " 400: 66 %  Ctrough,ss: 14.3 mg/L  Probability of Ctrough,ss > 20: 23 %  Probability of nephrotoxicity (Lodise CAMI 2009): 9 %          Plan:  1. Start vancomycin 1250mg IV once, followed by 750mg IV q12h.   2. Vancomycin monitoring method: AUC  3. Vancomycin therapeutic monitoring goal: 400-600 mg*h/L  4. Pharmacy will check vancomycin levels as appropriate in 1-3 Days.    5. Serum creatinine levels will be ordered daily for the first week of therapy and at least twice weekly for subsequent weeks.      Yuridia Davis, [facility]

## 2021-12-10 NOTE — ANESTHESIA PREPROCEDURE EVALUATION
Anesthesia Pre-Procedure Evaluation    Patient: Anna Sanderson   MRN: [id number] : 1960        Preoperative Diagnosis: Achilles tendon infection [M65.10]  Non-healing surgical wound, subsequent encounter [T81.89XD]    Procedure : Procedure(s):  IRRIGATION AND DEBRIDEMENT, LOWER EXTREMITY WITH WOUND VAC PLACEMENT          Past Medical History:   Diagnosis Date     Abdominal pain, epigastric 2019     Abnormal CT of the chest 2021     Abnormal TSH     TSH suppressed 0.18 and normal free T4, recheck in 3 months     Abscess of left lower leg 2021     Achilles tendon infection      Allergic rhinitis 2020     Chronic cough 2020    CT chest with mild bronchiectasis and small area of consolidation     Deep vein thrombosis (DVT) of brachial vein of left upper extremity (H) 2018    Associated with PICC line, follow-up ultrasound showing resolution of thrombus     Dermatitis      Family history of myocardial infarction     dad 35  mother 70     Fatigue 2017     GERD (gastroesophageal reflux disease)     EGD with esophagitis , experiencing chest pain and dysphagia     History of anesthesia complications      Impaired fasting glucose     Glucose 119 2021.  A1c 5.3%     Iron deficiency anemia 2015    Has required iron infusions     Kidney stones      Microscopic hematuria 2021    Longstanding finding     MRSA infection 2020    Left leg wound     Open wound of foot, complicated     Achilles tendon rupture complicated by infected open wound with multiple surgeries including skin grafting     Osteoporosis of multiple sites without pathological fracture     DEXA with T score -2.4 L1 L4, -3.2 left femoral neck and -2.6 right femoral neck     Ovarian cyst      Palpitations 2021    Event monitor showing sinus tachycardia no arrhythmia 2021     PONV (postoperative nausea and vomiting)      SIRS (systemic inflammatory response syndrome) (H)  2019     SOB (shortness of breath) 2021     Subacute thyroiditis 10/15/2021    TSH less than 0.1 and abnormal thyroid uptake scan 2021, asymptomatic     Vitamin D deficiency 2020      Past Surgical History:   Procedure Laterality Date     ANKLE SURGERY      Multiple surgeries [id number] after complications from Achilles rupture and repair with nonhealing open wound including skin grafting     APPLY WOUND VAC Left 2020    Procedure: VERAFLO WOUND VAC APPLICATION;  Surgeon: Mitchell Her MD;  Location:  OR     APPLY WOUND VAC Left 11/10/2020    Procedure: WOUND VAC APPLICATION;  Surgeon: Mitchell Her MD;  Location:  OR      SECTION      x3     GRAFT SKIN SPLIT THICKNESS FROM EXTREMITY Left 11/10/2020    Procedure: SURGICAL PROCUREMENT, SPLIT-THICKNESS SKIN GRAFT, HARVEST FROM LEFT THIGH TO LEFT LEG WOUND;  Surgeon: Mitchell Her MD;  Location:  OR     GYN SURGERY      hysterectomy,  x 3     GYN SURGERY      right oophorectomy     HYSTERECTOMY  2002     IR FOREIGN BODY REMOVAL  2008     IR LOWER EXTREMITY ANGIOGRAM LEFT  10/2/2020     IRRIGATION AND DEBRIDEMENT LOWER EXTREMITY, COMBINED Left 2020    Procedure: MISONIX IRRIGATION AND DEBRIDEMENT LOWER EXTREMITY WITH VASHE APPLICATION;  Surgeon: Mitchell Her MD;  Location:  OR     OOPHORECTOMY Right      PICC  2021          PICC AND MIDLINE TEAM LINE INSERTION  3/23/2018          SOFT TISSUE SURGERY      multiple left Achilles area surgeries      Allergies   Allergen Reactions     No Clinical Screening - See Comments Dermatitis and Rash     CHLORAPREP  Skin blisters- in the past     Morphine Hives     Adhesive Tape Rash     Ciprofloxacin GI Disturbance     Vomiting. Per patient 12/9 can sometimes tolerate this medication.     Doxycycline GI Disturbance     Vomiting. Per patient  can sometimes tolerate this medication.     Other Drug Allergy (See Comments) Other (See  Comments)     CHLORAPREP Blisters.      Social History     Tobacco Use     Smoking status: Former Smoker     Types: Cigarettes, Cigarettes     Quit date: 3/14/2003     Years since quittin.7     Smokeless tobacco: Never Used     Tobacco comment: Quit    Substance Use Topics     Alcohol use: No     Comment: Alcoholic Drinks/day: rare      Wt Readings from Last 1 Encounters:   21 54.1 kg (119 lb 3.2 oz)        Anesthesia Evaluation            ROS/MED HX  ENT/Pulmonary:     (+) tobacco use, Past use,     Neurologic:  - neg neurologic ROS     Cardiovascular: Comment: ROBEL  MTHFR    (+) hypertension-----    METS/Exercise Tolerance:     Hematologic:     (+) History of blood clots, pt is not anticoagulated, anemia,     Musculoskeletal: Comment: osteoporosis      GI/Hepatic: Comment: esophagitis    (+) GERD, Asymptomatic on medication,     Renal/Genitourinary:     (+) renal disease, Nephrolithiasis ,     Endo:     (+) thyroid problem,     Psychiatric/Substance Use:  - neg psychiatric ROS     Infectious Disease: Comment: Infected wound LLE      Malignancy:       Other:  - neg other ROS          Physical Exam    Airway        Mallampati: II   TM distance: > 3 FB   Neck ROM: full   Mouth opening: > 3 cm    Respiratory Devices and Support         Dental           Cardiovascular   cardiovascular exam normal       Rhythm and rate: regular and normal     Pulmonary   pulmonary exam normal        breath sounds clear to auscultation           OUTSIDE LABS:  CBC:   Lab Results   Component Value Date    WBC 13.3 (H) 12/10/2021    WBC 17.2 (H) 2021    HGB 9.1 (L) 12/10/2021    HGB 10.4 (L) 2021    HCT 30.0 (L) 12/10/2021    HCT 34.9 (L) 2021     (H) 12/10/2021     (H) 2021     BMP:   Lab Results   Component Value Date     12/10/2021     2021    POTASSIUM 3.8 12/10/2021    POTASSIUM 3.7 2021    CHLORIDE 112 (H) 12/10/2021    CHLORIDE 107 2021    CO2 22  12/10/2021    CO2 19 (L) 12/09/2021    BUN 11 12/10/2021    BUN 11 12/09/2021    CR 0.60 12/10/2021    CR 0.66 12/09/2021    GLC 83 12/10/2021     (H) 12/09/2021     COAGS:   Lab Results   Component Value Date    PTT 31 10/02/2020    INR 1.09 12/10/2021     POC:   Lab Results   Component Value Date    BGM 94 11/11/2020     HEPATIC:   Lab Results   Component Value Date    ALBUMIN 3.2 (L) 12/09/2021    PROTTOTAL 6.6 12/09/2021    ALT <9 12/09/2021    AST 12 12/09/2021    ALKPHOS 77 12/09/2021    BILITOTAL 0.4 12/09/2021     OTHER:   Lab Results   Component Value Date    LACT 0.7 08/16/2019    A1C 5.3 03/05/2021    NILESH 7.9 (L) 12/10/2021    MAG 2.1 06/16/2021    LIPASE 53 (H) 11/05/2019    TSH 0.12 (L) 12/10/2021    T4 1.87 (H) 11/04/2021    CRP 4.5 (H) 12/09/2021    SED 62 (H) 12/08/2021       Anesthesia Plan    ASA Status:  2      Anesthesia Type: General.   Induction: Intravenous.   Maintenance: TIVA.        Consents    Anesthesia Plan(s) and associated risks, benefits, and realistic alternatives discussed. Questions answered and patient/representative(s) expressed understanding.     - Discussed: Risks, Benefits and Alternatives for the PROCEDURE were discussed     - Discussed with:  Patient      - Extended Intubation/Ventilatory Support Discussed: No.      - Patient is DNR/DNI Status: No    Use of blood products discussed: No .     Postoperative Care    Pain management: Peripheral nerve block (Single Shot).   PONV prophylaxis: Ondansetron (or other 5HT-3), Dexamethasone or Solumedrol     Comments:                Helen Deluca MD

## 2021-12-10 NOTE — ANESTHESIA POSTPROCEDURE EVALUATION
Patient: Anna Sanderson    Procedure: Procedure(s):  IRRIGATION AND DEBRIDEMENT, LOWER EXTREMITY WITH WOUND VAC PLACEMENT       Diagnosis:Achilles tendon infection [M65.10]  Non-healing surgical wound, subsequent encounter [T81.89XD]  Diagnosis Additional Information: No value filed.    Anesthesia Type:  No value filed.    Note:  Disposition: Inpatient   Postop Pain Control: Uneventful            Sign Out: Well controlled pain   PONV:    Neuro/Psych: Uneventful            Sign Out: Acceptable/Baseline neuro status   Airway/Respiratory: Uneventful            Sign Out: Acceptable/Baseline resp. status   CV/Hemodynamics: Uneventful            Sign Out: Acceptable CV status; No obvious hypovolemia; No obvious fluid overload   Other NRE: NONE   DID A NON-ROUTINE EVENT OCCUR? No           Last vitals:  Vitals Value Taken Time   /37 12/10/21 1420   Temp 36.7  C (98  F) 12/10/21 1420   Pulse 79 12/10/21 1420   Resp 16 12/10/21 1420   SpO2 100 % 12/10/21 1420       Electronically Signed By: Helen Deluca MD  December 10, 2021  3:27 PM

## 2021-12-10 NOTE — PHARMACY-ADMISSION MEDICATION HISTORY
Pharmacy Note - Admission Medication History    Pertinent Provider Information: None     ______________________________________________________________________    Prior To Admission (PTA) med list completed and updated in EMR.       Current Facility-Administered Medications for the 12/9/21 encounter (Hospital Encounter)   Medication     denosumab (PROLIA) injection 60 mg     PTA Med List   Medication Sig Last Dose     cephALEXin (KEFLEX) 500 MG capsule Take 500 mg by mouth 2 times daily X 14 days, prescribed 12/1 12/8/2021 at Unknown time     ibuprofen (ADVIL/MOTRIN) 200 MG tablet Take 200-800 mg by mouth 2 times daily  12/9/2021 at am     metoprolol succinate ER (TOPROL-XL) 25 MG 24 hr tablet Take 25 mg by mouth 2 times daily  12/8/2021 at Unknown time     ondansetron (ZOFRAN) 4 MG tablet Take 4 mg by mouth every 6 hours as needed       pantoprazole (PROTONIX) 40 MG EC tablet Take 40 mg by mouth 2 times daily  12/9/2021 at am     rivaroxaban ANTICOAGULANT (XARELTO ANTICOAGULANT) 2.5 MG TABS tablet Take 1 tablet by mouth 2 times daily Past Week at Tuesday 12/7     vitamin D2 (ERGOCALCIFEROL) 26133 units (1250 mcg) capsule Take 50,000 Units by mouth once a week Saturdays Past Week at Unknown time       Information [facility](s[facility]  Method of interview communication: phone    Summary of Changes to PTA Med List  New: Cephalexin  Discontinued: MVI  Changed: Metporolol added bid instructions    Patient was asked about OTC/herbal products specifically.  PTA med list reflects this.    In the past week, patient estimated taking medication this percent of the time:  greater than 90%.    Allergies were reviewed, assessed, and updated with the patient.      Patient does not use any multi-dose medications prior to admission.    The information provided in this note is only as accurate as the sources available at the time of the update(s).    Thank you for the opportunity to participate in the  care of this patient.    Yuridia Davis, [facility]  12/9/2021 8:58 PM

## 2021-12-10 NOTE — ANESTHESIA PROCEDURE NOTES
Adductor canal Procedure Note    Pre-Procedure   Staff -        Anesthesiologist:  Helen Deluca MD       Performed By: anesthesiologist       Location: pre-op       Procedure Start/Stop Times: 12/10/2021 11:26 AM and 12/10/2021 11:29 AM       Pre-Anesthestic Checklist: patient identified, IV checked, site marked, risks and benefits discussed, informed consent, monitors and equipment checked, pre-op evaluation, at physician/surgeon's request and post-op pain management  Timeout:       Correct Patient: Yes        Correct Procedure: Yes        Correct Site: Yes        Correct Position: Yes        Correct Laterality: Yes        Site Marked: Yes  Procedure Documentation  Procedure: Adductor canal       Laterality: left       Patient Position: supine       Skin prep: Chloraprep      Local skin infiltrated with mL of 1% lidocaine.        Needle Gauge: 21.        Needle Length (Inches): 4        1. Ultrasound was used to identify targeted nerve, plexus, vascular marker, or fascial plane and place a needle adjacent to it in real-time.       2. Ultrasound was used to visualize the spread of anesthetic in close proximity to the above referenced structure.       3. A permanent image is entered into the patient's record.       4. The visualized anatomic structures appeared normal.       5. There were no apparent abnormal pathologic findings.    Assessment/Narrative         The placement was negative for: blood aspirated, painful injection and site bleeding       Paresthesias: No.     Bolus given via needle..        Secured via.        Insertion/Infusion Method: Single Shot       Injection made incrementally with aspirations every 3 mL.    Medication(s) Administered   Bupivacaine 0.5% w/ 1:200K Epi (Other), 8 mL  Medication Administration Time: 12/10/2021 11:29 AM     Comments:  RBO reviewed with patient. I-pad not available for on-line signed consent. Block consent obtained as a portion of the general anesthesia consent  process.

## 2021-12-10 NOTE — PROGRESS NOTES
Report given to Janet KEN RN at this time. IV Vanco currently infusing. Patient stable.  Janet Lozano RN

## 2021-12-10 NOTE — PLAN OF CARE
Problem: Adult Inpatient Plan of Care  Goal: Plan of Care Review  Outcome: No Change   Minimal discomfort LLE. IV antibiotics scheduled. ID and Podiatry following. I&D today at 1030am  Janet Lozano RN

## 2021-12-10 NOTE — ANESTHESIA CARE TRANSFER NOTE
Patient: Anna Sanderson    Procedure: Procedure(s):  IRRIGATION AND DEBRIDEMENT, LOWER EXTREMITY WITH WOUND VAC PLACEMENT       Diagnosis: Achilles tendon infection [M65.10]  Non-healing surgical wound, subsequent encounter [T81.89XD]  Diagnosis Additional Information: No value filed.    Anesthesia Type:   No value filed.     Note:    Oropharynx: oropharynx clear of all foreign objects  Level of Consciousness: awake  Oxygen Supplementation: room air    Independent Airway: airway patency satisfactory and stable  Dentition: dentition unchanged  Vital Signs Stable: post-procedure vital signs reviewed and stable  Report to RN Given: handoff report given  Patient transferred to: Phase II    Handoff Report: Identifed the Patient, Identified the Reponsible Provider, Reviewed the pertinent medical history, Discussed the surgical course, Reviewed Intra-OP anesthesia mangement and issues during anesthesia, Set expectations for post-procedure period and Allowed opportunity for questions and acknowledgement of understanding      Vitals:  Vitals Value Taken Time   /72 12/10/21 1243   Temp 36.7  C (98.1  F) 12/10/21 1243   Pulse 75 12/10/21 1243   Resp 16 12/10/21 1243   SpO2 98 % 12/10/21 1243       Electronically Signed By: SAWYER Vega CRNA  December 10, 2021  12:44 PM

## 2021-12-10 NOTE — UTILIZATION REVIEW
Admission Status; Secondary Review Determination   Under the authority of the Utilization Management Committee, the utilization review process indicated a secondary review on Anna Sanderson. The review outcome is based on review of the medical records, discussions with staff, and applying clinical experience noted on the date of the review.   (x) Inpatient Status Appropriate - This patient's medical care is consistent with medical management for inpatient care and reasonable inpatient medical practice.     RATIONALE FOR DETERMINATION   61 yr old female with PAD, chronic left Achilles wound s/p I&D of left Achilles 8/4/21 presented with nonhealing wound of left leg.  Infected wound.  MRI with skin ulcer and posterior half suspect of lower calf healed but a sinus tract projecting to the third of the Achilles tendon with an abscess in the lower calf 2.4 x 1.2 x 10cm  Infectious myositis seen.IV Vanco/Zosyn.  ID consultation.  I&D and intraop cultures.  Prelim cx from ER with pseudomonas.      At the time of admission with the information available to the attending physician more than 2 nights Hospital complex care was anticipated, based on patient risk of adverse outcome if treated as outpatient and complex care required. Inpatient admission is appropriate based on the Medicare guidelines.   The information on this document is developed by the utilization review team in order for the business office to ensure compliance. This only denotes the appropriateness of proper admission status and does not reflect the quality of care rendered.   The definitions of Inpatient Status and Observation Status used in making the determination above are those provided in the CMS Coverage Manual, Chapter 1 and Chapter 6, section 70.4.   Sincerely,   Sarah Guerrero MD  Utilization Review  Physician Advisor  [facility]

## 2021-12-10 NOTE — PROGRESS NOTES
[facility]    Medicine Progress Note - Hospitalist Service       Date of Admission:  12/9/2021    Assessment & Plan         61-year-old female with a history of peripheral arterial disease chronic left Achilles wound with recent I&D of left Achilles wound in August 4, 2021 as well as history of subacute thyroiditis, palpitations on metoprolol admitted to the hospital with nonhealing wound of the left leg    Infected left leg wound  --- Patient with history of chronic nonhealing leg wound, status post multiple interventions with orthopedic surgery. Last surgery 8/2021  --- MRI on admit showed skin ulcer and posterior half suspect of lower calf healed but a sinus tract projecting to the third of the Achilles tendon with an abscess in the lower calf 2.4 x 1.2 x 10.  Infectious myositis seen.  --- continue IV Zosyn and vancomycin  --- Wound cultured; growing Pseudomonas, Intra-Op cultures pending  --- ID consult  --- Was n.p.o. for debridement today  --- White blood cell count improved overnight  --- Labs reviewed and stable for surgical intervention today.  Electrolytes stable.  --- Covid negative    Subacute thyroiditis  --- TSH low, check free T4  --- Has had palpitations associated this.   --- Continue Toprol    Hypertension?  --- Sounds as if she was more started on metoprolol due to thyroiditis  ---continue Toprol    GERD--continue protonix       Diet: Regular Diet Adult    DVT Prophylaxis: per ortho post wsurgery  Foster Catheter: Not present  Central Lines: None  Code Status: Full Code      Disposition Plan   Expected Discharge: 12/12/2021     Anticipated discharge location: home with family    Delays:    may need home abx       The patient's care was discussed with the Bedside Nurse and Patient.    Laine Guadalupe MD  Hospitalist Service  [facility]  Securely message with the Vocera Web Console (learn more here)  Text page via [employer] Paging/[employer]                          ______________________________________________________________________    Interval History      Patient seen and chart reviewed.  Relates story of chronic wound after grocery cart injury 10 years ago.  She knew she was developing an infection has had chills vomiting and diarrhea few days prior to admission.  Has always tolerated surgery well.  Denies significant cardiac disease, able to get greater than 4 METS of activity without difficulty  --- Endorses that she was started on metoprolol 6 months ago when symptomatic from what is now felt to be thyroiditis.  No significant cardiac disease    Data reviewed today: I reviewed all medications, new labs and imaging results over the last 24 hours. I personally reviewed no images or EKG's today.    Physical Exam   Vital Signs: Temp: 98.7  F (37.1  C) Temp src: Oral BP: 100/63 Pulse: 73   Resp: 16 SpO2: 100 % O2 Device: None (Room air) Oxygen Delivery: 6 LPM  Weight: 119 lbs 3.2 oz  General Appearance: Does not female, no apparent distress  Respiratory: Clear to auscultation bilaterally  Cardiovascular: Regular rate and rhythm without murmurs rubs or gallops  GI: Soft and nontender without hepatosplenomegaly  Skin: Wound dressed on left leg.  Other: Neurologically grossly intact without focal deficits    Data   Recent Labs   Lab 12/10/21  0557 12/09/21  1928 12/08/21  0924   WBC 13.3* 17.2* 13.4*   HGB 9.1* 10.4* 10.7*   MCV 81 80 80   * 484* 565*   INR 1.09  --   --     137  --    POTASSIUM 3.8 3.7  --    CHLORIDE 112* 107  --    CO2 22 19*  --    BUN 11 11  --    CR 0.60 0.66  --    ANIONGAP 8 11  --    NILESH 7.9* 8.7  --    GLC 83 148*  --    ALBUMIN  --  3.2*  --    PROTTOTAL  --  6.6  --    BILITOTAL  --  0.4  --    ALKPHOS  --  77  --    ALT  --  <9  --    AST  --  12  --      Recent Results (from the past 24 hour(s))   MR Tibia Fibula Lower Leg Left wo & w Contr    Narrative    EXAM: MR TIBIA FIBULA LOWER LEG LEFT WO and W CONTR  LOCATION: [facility]  DATE/TIME: 12/9/2021 7:39 PM    INDICATION: Soft tissue infection.  COMPARISON: None.  TECHNIQUE: Routine. Additional postgadolinium T1 sequences were obtained.  IV CONTRAST: 5ml gadavist    FINDINGS:     There is an ulcer in the posterior aspect of the lower calf and ankle. A soft tissue defect extends from the skin to the center of the Achilles tendon, where there is an abscess but dissects into the muscles of the posterior compartment (axial image 45).   This abscess measures 2.4 x 1.2 cm in short axis, 10 cm from superior to inferior (axial postcontrast series 9 images 8-3 6).    There is infectious myositis throughout the posterior deep posterior compartments, with soft tissue edema extending along the fascial planes and the interosseous membrane. There is no abnormal fascial enhancement to specifically indicate acute fasciitis.    Soft tissue edema and enhancement in the lower calf is consistent with cellulitis.    There are defects in the superficial adipose tissue over the posterior medial aspect of the left calf, which are sequela of prior surgery and/or ulceration.    There is chronic severe atrophy and fatty infiltration of the gastrocnemius muscle, consistent with relative disuse. No atrophy elsewhere.    Mild reactive edema within the muscle fibers of the anterior compartment, related to infection/inflammation along the interosseous membrane. Of infectious myositis involving the muscles of the lateral compartment, inferiorly.    Aside from the central defect in the distal Achilles tendon, no other tendon tear. The flexor, extensor, and peroneal tendons are intact at the ankle. Proximal tendons appear intact.    No evidence of osteomyelitis or reactive osteitis.    No knee or ankle joint effusion. Normal joint alignment.      Impression    IMPRESSION:  1.  Skin ulcer in the posterior aspect of the lower calf/healed, with a sinus tract projecting to the third of the Achilles  "tendon, and an abscess within the posterior muscle compartments of the lower calf, measuring 2.4 x 1.2 x 10 cm.    2.  Edema/infectious myositis involving the muscle fibers in the posterior, deep posterior, lateral, and anterior compartments as described above.    3.  No fascial enhancement to specifically indicate fasciitis.    4.  Soft tissue deficiency over the posterior and medial aspect lower calf suggesting prior surgical debridement.    5.  Skin edema and enhancement in the lower calf suggesting cellulitis.    6.  No evidence of osteomyelitis/reactive osteitis.   POC US Guidance Needle Placement    Narrative    Ultrasound was performed as guidance to an anesthesia procedure.  Click   \"PACS images\" hyperlink below to view any stored images.  For specific   procedure details, view procedure note authored by anesthesia.     "

## 2021-12-10 NOTE — ANESTHESIA PROCEDURE NOTES
Popliteal Procedure Note    Pre-Procedure   Staff -        Anesthesiologist:  Helen Deluca MD       Performed By: anesthesiologist       Location: pre-op       Procedure Start/Stop Times: 12/10/2021 11:20 AM and 12/10/2021 11:25 AM       Pre-Anesthestic Checklist: patient identified, IV checked, site marked, risks and benefits discussed, informed consent, monitors and equipment checked, pre-op evaluation, at physician/surgeon's request and post-op pain management  Timeout:       Correct Patient: Yes        Correct Procedure: Yes        Correct Site: Yes        Correct Position: Yes        Correct Laterality: Yes        Site Marked: Yes  Procedure Documentation  Procedure: Popliteal       Laterality: left       Patient Position: supine       Skin prep: Chloraprep       Needle Type: insulated       Needle Gauge: 21.        Needle Length (Inches): 4        Ultrasound guided       1. Ultrasound was used to identify targeted nerve, plexus, vascular marker, or fascial plane and place a needle adjacent to it in real-time.       2. Ultrasound was used to visualize the spread of anesthetic in close proximity to the above referenced structure.       3. A permanent image is entered into the patient's record.       4. The visualized anatomic structures appeared normal.       5. There were no apparent abnormal pathologic findings.    Assessment/Narrative         The placement was negative for: blood aspirated, painful injection and site bleeding       Paresthesias: No.      Bolus given via needle. No blood aspirated via catheter.        Secured via.        Insertion/Infusion Method: Single Shot       Complications: none       Injection made incrementally with aspirations every 3 mL.    Medication(s) Administered   Bupivacaine 0.5% w/ 1:200K Epi (Other), 19 mL  Medication Administration Time: 12/10/2021 11:25 AM

## 2021-12-10 NOTE — CONSULTS
Care Management Initial Consult    General Information  Assessment completed with:  Chart review, Pt in OR for debridement         Primary Care Provider verified and updated as needed:     Readmission within the last 30 days:   None        Advance Care Planning:            Communication Assessment  Patient's communication style: spoken language (English or Bilingual)    Hearing Difficulty or Deaf: no   Wear Glasses or Blind: yes    Cognitive  Cognitive/Neuro/Behavioral: WDL                      Living Environment:   People in home: spouse  2  Current living Arrangements:   Lives in apartment with Spouse, daughter and grand-daughter.     Able to return to prior arrangements:  Yes       Family/Social Support:  Care provided by:  Pt is indep at baseline  Provides care for:                  Description of Support System:           Current Resources:   Patient receiving home care services:  Has had [facility] in the past     Community Resources:    Equipment currently used at home: has cane, boot and scooter  Supplies currently used at home:      Employment/Financial:  Employment Status:  Per chart review, Pt works full time     Financial Concerns:             Lifestyle & Psychosocial Needs:  Social Determinants of Health     Tobacco Use: Medium Risk     Smoking Tobacco Use: Former Smoker     Smokeless Tobacco Use: Never Used   Alcohol Use: Not on file   Financial Resource Strain: Not on file   Food Insecurity: Not on file   Transportation Needs: Not on file   Physical Activity: Not on file   Stress: Not on file   Social Connections: Not on file   Intimate Partner Violence: Not on file   Depression: Not at risk     PHQ-2 Score: 0   Housing Stability: Not on file       Functional Status:  Prior to admission patient needed assistance:    NA - pt was independent           Mental Health Status:          Chemical Dependency Status:                Values/Beliefs:  Spiritual, Cultural Beliefs, Rastafarian Practices, Values  that affect care:                 Additional Information:  Chart review completed. Pt's readmission risk is 7% at current time. Pt is in OR for debridement. Pt is from home with , daughter and grand daughter and is independent at baseline. Family likely to transport at discharge. Discharge plan is pending response to treatment, medical needs, recommendations and mobility closer to discharge. CM to follow.       FEMI PettitW

## 2021-12-11 LAB
C REACTIVE PROTEIN LHE: 2.1 MG/DL (ref 0–0.8)
ERYTHROCYTE [DISTWIDTH] IN BLOOD BY AUTOMATED COUNT: 15 % (ref 10–15)
ERYTHROCYTE [SEDIMENTATION RATE] IN BLOOD BY WESTERGREN METHOD: 27 MM/HR (ref 0–20)
GLUCOSE BLDC GLUCOMTR-MCNC: 77 MG/DL (ref 70–99)
HCT VFR BLD AUTO: 28.2 % (ref 35–47)
HGB BLD-MCNC: 8.4 G/DL (ref 11.7–15.7)
MCH RBC QN AUTO: 24.3 PG (ref 26.5–33)
MCHC RBC AUTO-ENTMCNC: 29.8 G/DL (ref 31.5–36.5)
MCV RBC AUTO: 82 FL (ref 78–100)
PLATELET # BLD AUTO: 396 10E3/UL (ref 150–450)
RBC # BLD AUTO: 3.45 10E6/UL (ref 3.8–5.2)
WBC # BLD AUTO: 9.6 10E3/UL (ref 4–11)

## 2021-12-11 PROCEDURE — 250N000011 HC RX IP 250 OP 636: Performed by: INTERNAL MEDICINE

## 2021-12-11 PROCEDURE — 36415 COLL VENOUS BLD VENIPUNCTURE: CPT | Performed by: PHYSICIAN ASSISTANT

## 2021-12-11 PROCEDURE — 99231 SBSQ HOSP IP/OBS SF/LOW 25: CPT | Performed by: INTERNAL MEDICINE

## 2021-12-11 PROCEDURE — 85027 COMPLETE CBC AUTOMATED: CPT | Performed by: PHYSICIAN ASSISTANT

## 2021-12-11 PROCEDURE — 99232 SBSQ HOSP IP/OBS MODERATE 35: CPT | Performed by: INTERNAL MEDICINE

## 2021-12-11 PROCEDURE — 258N000003 HC RX IP 258 OP 636: Performed by: INTERNAL MEDICINE

## 2021-12-11 PROCEDURE — 250N000013 HC RX MED GY IP 250 OP 250 PS 637: Performed by: INTERNAL MEDICINE

## 2021-12-11 PROCEDURE — 85652 RBC SED RATE AUTOMATED: CPT | Performed by: PHYSICIAN ASSISTANT

## 2021-12-11 PROCEDURE — 120N000001 HC R&B MED SURG/OB

## 2021-12-11 PROCEDURE — 86141 C-REACTIVE PROTEIN HS: CPT | Performed by: PHYSICIAN ASSISTANT

## 2021-12-11 PROCEDURE — 999N000127 HC STATISTIC PERIPHERAL IV START W US GUIDANCE

## 2021-12-11 PROCEDURE — 250N000013 HC RX MED GY IP 250 OP 250 PS 637: Performed by: PHYSICIAN ASSISTANT

## 2021-12-11 PROCEDURE — G0008 ADMIN INFLUENZA VIRUS VAC: HCPCS | Performed by: INTERNAL MEDICINE

## 2021-12-11 PROCEDURE — 90682 RIV4 VACC RECOMBINANT DNA IM: CPT | Performed by: INTERNAL MEDICINE

## 2021-12-11 RX ADMIN — PIPERACILLIN AND TAZOBACTAM 3.38 G: 3; .375 INJECTION, POWDER, FOR SOLUTION INTRAVENOUS at 01:25

## 2021-12-11 RX ADMIN — ENOXAPARIN SODIUM 40 MG: 40 INJECTION SUBCUTANEOUS at 17:41

## 2021-12-11 RX ADMIN — VANCOMYCIN HYDROCHLORIDE 750 MG: 1 INJECTION, POWDER, LYOPHILIZED, FOR SOLUTION INTRAVENOUS at 08:21

## 2021-12-11 RX ADMIN — ACETAMINOPHEN 975 MG: 325 TABLET ORAL at 20:11

## 2021-12-11 RX ADMIN — ERGOCALCIFEROL 50000 UNITS: 1.25 CAPSULE, LIQUID FILLED ORAL at 08:21

## 2021-12-11 RX ADMIN — DOCUSATE SODIUM 50 MG AND SENNOSIDES 8.6 MG 1 TABLET: 8.6; 5 TABLET, FILM COATED ORAL at 20:12

## 2021-12-11 RX ADMIN — PIPERACILLIN AND TAZOBACTAM 3.38 G: 3; .375 INJECTION, POWDER, FOR SOLUTION INTRAVENOUS at 17:40

## 2021-12-11 RX ADMIN — PANTOPRAZOLE SODIUM 40 MG: 40 TABLET, DELAYED RELEASE ORAL at 08:21

## 2021-12-11 RX ADMIN — VANCOMYCIN HYDROCHLORIDE 750 MG: 1 INJECTION, POWDER, LYOPHILIZED, FOR SOLUTION INTRAVENOUS at 20:09

## 2021-12-11 RX ADMIN — IBUPROFEN 600 MG: 600 TABLET ORAL at 08:31

## 2021-12-11 RX ADMIN — IBUPROFEN 600 MG: 600 TABLET ORAL at 20:12

## 2021-12-11 RX ADMIN — PIPERACILLIN AND TAZOBACTAM 3.38 G: 3; .375 INJECTION, POWDER, FOR SOLUTION INTRAVENOUS at 09:30

## 2021-12-11 RX ADMIN — PANTOPRAZOLE SODIUM 40 MG: 40 TABLET, DELAYED RELEASE ORAL at 20:12

## 2021-12-11 RX ADMIN — INFLUENZA A VIRUS A/WISCONSIN/588/2019 (H1N1) RECOMBINANT HEMAGGLUTININ ANTIGEN, INFLUENZA A VIRUS A/TASMANIA/503/2020 (H3N2) RECOMBINANT HEMAGGLUTININ ANTIGEN, INFLUENZA B VIRUS B/WASHINGTON/02/2019 RECOMBINANT HEMAGGLUTININ ANTIGEN, AND INFLUENZA B VIRUS B/PHUKET/3073/2013 RECOMBINANT HEMAGGLUTININ ANTIGEN 0.5 ML: 45; 45; 45; 45 INJECTION INTRAMUSCULAR at 09:40

## 2021-12-11 ASSESSMENT — ACTIVITIES OF DAILY LIVING (ADL)
ADLS_ACUITY_SCORE: 6
ADLS_ACUITY_SCORE: 6
ADLS_ACUITY_SCORE: 4
ADLS_ACUITY_SCORE: 6
ADLS_ACUITY_SCORE: 4
ADLS_ACUITY_SCORE: 6
ADLS_ACUITY_SCORE: 4
ADLS_ACUITY_SCORE: 6
ADLS_ACUITY_SCORE: 4
ADLS_ACUITY_SCORE: 6
ADLS_ACUITY_SCORE: 6

## 2021-12-11 NOTE — ADDENDUM NOTE
Addendum  created 12/11/21 1214 by Helen Deluca MD    Clinical Note Signed, Diagnosis association updated, Intraprocedure Blocks edited

## 2021-12-11 NOTE — PROGRESS NOTES
"Patient's wound vac alarming \"Internal temperature alert\"  Unable to reset. Spoke to [employer] support and was told the wound vac is \"over heating\" (serial #[id number])  Called store room to obtain the backup wound vac. Switched out the wound vac without difficulty. Broken wound vac placed in soiled utility room with ticket on it.  Janet Lozano RN  "

## 2021-12-11 NOTE — PROGRESS NOTES
[facility]    Medicine Progress Note - Hospitalist Service       Date of Admission:  12/9/2021    Assessment & Plan           61-year-old female with a history of peripheral arterial disease, chronic left Achilles wound with recent I&D in 8/4/2021 as well as history of subacute thyroiditis, admitted with nonhealing wound of the left leg.      Infected left leg wound: history of chronic nonhealing leg wound in the left achilles area, status post multiple interventions with orthopedic surgery. Last surgery 8/2021. MRI on admission showed a sinus tract projecting to the third of the Achilles tendon with an abscess in the lower calf 2.4 x 1.2 x 10.  Infectious myositis seen. Received I&D and wound vac placement on 12/10. Wound culture growing Pseudomonas.  - Continue IV Zosyn and vancomycin  - Follow up intra op culture  - Wound care  - PT/OT  - Supportive care    Acute blood loss anemia: hemoglobin on admission was 10.4, post op dropped to 8.4 today. Monitor.      Subacute thyroiditis: TSH low, free T4 normal. Continue metoprolol.     Essential hypertension: BP controlled. Continue metoprolol.     GERD: continue protonix       Diet: Regular Diet Adult    DVT Prophylaxis: Enoxaparin (Lovenox) SQ  Foster Catheter: Not present  Central Lines: None  Code Status: Full Code      Disposition Plan   Expected Discharge: 12/12/2021     Anticipated discharge location: home with family           The patient's care was discussed with the Bedside Nurse, Care Coordinator/ and Patient.    Dev Parry MD  Hospitalist Service  [facility]  Securely message with the Vocera Web Console (learn more here)  Text page via [employer] Paging/Directory      ______________________________________________________________________    Interval History   Patient is new to me. Previous progress notes, lab test results and imaging were reviewed in details.  Patient reports that she feels well  today. The post op pain is controlled.     Data reviewed today: I reviewed all medications, new labs and imaging results over the last 24 hours.    Physical Exam   Vital Signs: Temp: 98.4  F (36.9  C) Temp src: Oral BP: 113/56 Pulse: 85   Resp: 18 SpO2: 99 % O2 Device: None (Room air)    Weight: 119 lbs 3.2 oz    General appearance: not in acute distress  HEENT: PERRL, EOMI  Lungs: Clear breath sounds in bilateral lung fields  Cardiovascular: Regular rate and rhythm, normal S1-S2  Abdomen: Soft, non tender, no distension  Musculoskeletal: No joint swelling  Skin: No rash and no edema  Neurology: AAO ×3.  Cranial nerves II - XII normal.  Normal muscle strength in all four extremities.    Data   Recent Labs   Lab 12/11/21  0614 12/11/21  0612 12/10/21  0557 12/09/21  1928   WBC  --  9.6 13.3* 17.2*   HGB  --  8.4* 9.1* 10.4*   MCV  --  82 81 80   PLT  --  396 458* 484*   INR  --   --  1.09  --    NA  --   --  142 137   POTASSIUM  --   --  3.8 3.7   CHLORIDE  --   --  112* 107   CO2  --   --  22 19*   BUN  --   --  11 11   CR  --   --  0.60 0.66   ANIONGAP  --   --  8 11   NILESH  --   --  7.9* 8.7   GLC 77  --  83 148*   ALBUMIN  --   --   --  3.2*   PROTTOTAL  --   --   --  6.6   BILITOTAL  --   --   --  0.4   ALKPHOS  --   --   --  77   ALT  --   --   --  <9   AST  --   --   --  12

## 2021-12-11 NOTE — PLAN OF CARE
Problem: Adult Inpatient Plan of Care  Goal: Absence of Hospital-Acquired Illness or Injury    Problem: Pain Acute  Goal: Acceptable Pain Control and Functional Ability     Problem: Surgical Site Infection  Goal: Improved Infection Symptoms  Isolation Precautions: contact precautions maintained   Wound Vac intact to LLE with small serosang drainage in collection container. IV antibiotics scheduled. ID following. Waiting for final cultures before discharging to home. Regular diet. Transfers independently using a crutch. Pain controlled with prn ibuprofen oxycodone.  Janet Lozano RN

## 2021-12-11 NOTE — PLAN OF CARE
Problem: Adult Inpatient Plan of Care  Goal: Plan of Care Review  Outcome: Improving  Flowsheets (Taken 12/11/2021 0530)  Plan of Care Reviewed With: patient     Problem: Adult Inpatient Plan of Care  Goal: Optimal Comfort and Wellbeing  Outcome: Improving     Problem: Pain Acute  Goal: Acceptable Pain Control and Functional Ability  Outcome: Improving     Problem: Surgical Site Infection  Goal: Improved Infection Symptoms  Intervention: Prevent and Manage Infection  Recent Flowsheet Documentation  Taken 12/11/2021 0130 by Abimbola Raines, RN  Isolation Precautions: contact precautions maintained     Patient alert and orientated, able to make needs known. Pain controlled with scheduled tylenol and PRN oxycodone. Wound vac is patent and holding suction.     Abimbola Raines, RN

## 2021-12-11 NOTE — PROGRESS NOTES
Ortho Progress Note      Post-operative Day: #1 s/p LLE I&D with wound vac placement by Dr. Wiley      Subjective: Doing well this morning. Up in chair. Minimal pain. Vac in place.    Objective:  /56 (BP Location: Left arm)   Pulse 87   Temp 98.2  F (36.8  C) (Oral)   Resp 16   Wt 54.1 kg (119 lb 3.2 oz)   SpO2 100%   BMI 20.46 kg/m    Gen: A&O x 3. NAD.  MSK: Ace bandage covering L foot/ankle. Appears clean/dry/intact. Wiggles toes to command. Sensate to light touch. Wound vac in place w/ bloody output.    Pertinent Labs   Lab Results: personally reviewed.   Lab Results   Component Value Date    INR 1.09 12/10/2021    INR 0.95 06/18/2021    INR 0.97 06/17/2021     Lab Results   Component Value Date    WBC 9.6 12/11/2021    HGB 8.4 (L) 12/11/2021    HCT 28.2 (L) 12/11/2021    MCV 82 12/11/2021     12/11/2021     Lab Results   Component Value Date     12/10/2021    CO2 22 12/10/2021     Surg path and cultures from surgery 12/10 PENDING. Gram stain 1+ gram neg bacilli.  CRP 2.1    Assessment: Doing well s/p LLE I&D and wound vac placement 12/10/2021    Plan:   NWB LLE  Wound vac to remain in place (change per wound care RN).  ID consult -- appreciate assistance with following cultures and abx recommendations.  Dispo -- pending ID/abx plan, home health services, medical clearance.    Report completed by:  Jad Ortiz MD  Date: 12/11/2021  Time: 8:43 AM

## 2021-12-11 NOTE — PROGRESS NOTES
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  Micro prelim pre-op culture with pseudomonas  I&D 12/10 -- abscess at muscle. Gram stain GNR probably pseudomonas      PLAN:  Pip/tazo for pseudomonas  Vancomycin empiric for now, may stop soon.       Aquiles Mo MD  [facility]  [phone] [facility] paging    ______________________________________________________________________    SUBJECTIVE / INTERVAL HISTORY: OR yesterday -- note reviewed. Pain controlled. Tolerating antibiotics.  Discussed results.     ROS: All other systems negative except as listed above.        OBJECTIVE:  /56 (BP Location: Left arm)   Pulse 87   Temp 98.2  F (36.8  C) (Oral)   Resp 16   Wt 54.1 kg (119 lb 3.2 oz)   SpO2 100%   BMI 20.46 kg/m         Vital Signs  Temp: 98.2  F (36.8  C)  Temp src: Oral  Resp: 16  Pulse: 87  Pulse Rate Source: Monitor  BP: 117/56  BP Location: Left arm    Temp (24hrs), Av.2  F (36.8  C), Min:97.7  F (36.5  C), Max:98.7  F (37.1  C)      GEN: No acute distress.    RESPIRATORY:  Normal breathing pattern.   CARDIOVASCULAR:  Regular rate and rhythm.   ABDOMEN:  deferred  EXTREMITIES: wound vac in place  SKIN/HAIR/NAILS:  No rashes  IV: peripheral        Antibiotics:  Pip/tazo -  Vancomycin -    Pertinent labs:    Recent Labs   Lab 21  0612 12/10/21  0521   WBC 9.6 13.3* 17.2*   HGB 8.4* 9.1* 10.4*   HCT 28.2* 30.0* 34.9*    458* 484*        Recent Labs   Lab 12/10/21  0557 21    137   CO2 22 19*   BUN 11 11        Lab Results   Component Value Date    CRP 2.1 (H) 2021    CRP 4.5 (H) 2021    CRP 0.5 2021         Lab Results   Component Value Date    ALT <9 2021    AST 12 2021    ALKPHOS 77 2021         MICROBIOLOGY DATA:   blood negative to  date  12/9 wound pseudomonas  12/10 operative gram stain GNR    RADIOLOGY:  XR Chest 2 Views    Result Date: 11/4/2021  EXAM: XR CHEST 2 VW LOCATION: [facility] DATE/TIME: 11/4/2021 9:48 AM INDICATION: Shortness of breath COMPARISON: None.     IMPRESSION: Thoracolumbar scoliosis. Heart size appears normal. Hyperinflation suggests obstructive pulmonary disease. Lungs appear clear.    MR Tibia Fibula Lower Leg Left wo & w Contr    Result Date: 12/9/2021  EXAM: MR TIBIA FIBULA LOWER LEG LEFT WO and W CONTR LOCATION: [facility] DATE/TIME: 12/9/2021 7:39 PM INDICATION: Soft tissue infection. COMPARISON: None. TECHNIQUE: Routine. Additional postgadolinium T1 sequences were obtained. IV CONTRAST: 5ml gadavist FINDINGS: There is an ulcer in the posterior aspect of the lower calf and ankle. A soft tissue defect extends from the skin to the center of the Achilles tendon, where there is an abscess but dissects into the muscles of the posterior compartment (axial image 45).  This abscess measures 2.4 x 1.2 cm in short axis, 10 cm from superior to inferior (axial postcontrast series 9 images 8-3 6). There is infectious myositis throughout the posterior deep posterior compartments, with soft tissue edema extending along the fascial planes and the interosseous membrane. There is no abnormal fascial enhancement to specifically indicate acute fasciitis. Soft tissue edema and enhancement in the lower calf is consistent with cellulitis. There are defects in the superficial adipose tissue over the posterior medial aspect of the left calf, which are sequela of prior surgery and/or ulceration. There is chronic severe atrophy and fatty infiltration of the gastrocnemius muscle, consistent with relative disuse. No atrophy elsewhere. Mild reactive edema within the muscle fibers of the anterior compartment, related to infection/inflammation along the interosseous membrane. Of infectious  "myositis involving the muscles of the lateral compartment, inferiorly. Aside from the central defect in the distal Achilles tendon, no other tendon tear. The flexor, extensor, and peroneal tendons are intact at the ankle. Proximal tendons appear intact. No evidence of osteomyelitis or reactive osteitis. No knee or ankle joint effusion. Normal joint alignment.     IMPRESSION: 1.  Skin ulcer in the posterior aspect of the lower calf/healed, with a sinus tract projecting to the third of the Achilles tendon, and an abscess within the posterior muscle compartments of the lower calf, measuring 2.4 x 1.2 x 10 cm. 2.  Edema/infectious myositis involving the muscle fibers in the posterior, deep posterior, lateral, and anterior compartments as described above. 3.  No fascial enhancement to specifically indicate fasciitis. 4.  Soft tissue deficiency over the posterior and medial aspect lower calf suggesting prior surgical debridement. 5.  Skin edema and enhancement in the lower calf suggesting cellulitis. 6.  No evidence of osteomyelitis/reactive osteitis.    Cardiac Event Monitor Adult Pediatric    Result Date: 11/23/2021  [facility] MULTI-DAY PATIENT ACTIVATED MONITOR (KIRSTY) REPORT Results:   Indication for study: Evaluate for palpitations   A 14-day monitor was done from November 5, 2021 through November 18, 2021   Baseline transmission showed sinus rhythm with normal electrocardiographic intervals.  The average heart rate was 100 and ranged between  bpm   No bradycardia, pauses   1 symptomatic event for\" other than listed \" symptoms showed sinus tachycardia-104 bpm-no ectopics   Rare single PVC noted there is no complex ventricular ectopy   Atrial ectopy is not seen   Discussion   Elevated average heart rate of 100 bpm; relative sinus tachycardia   Otherwise normal multi day/14-day monitor without symptoms     POC US Guidance Needle Placement    Result Date: 12/10/2021  Ultrasound was performed as guidance " "to an anesthesia procedure.  Click \"PACS images\" hyperlink below to view any stored images.  For specific procedure details, view procedure note authored by anesthesia.     "

## 2021-12-12 LAB
BACTERIA TISS BX CULT: ABNORMAL
BACTERIA TISS BX CULT: ABNORMAL
BACTERIA WND CULT: ABNORMAL
C REACTIVE PROTEIN LHE: 2.2 MG/DL (ref 0–0.8)
CREAT SERPL-MCNC: 0.6 MG/DL (ref 0.6–1.1)
ERYTHROCYTE [DISTWIDTH] IN BLOOD BY AUTOMATED COUNT: 15.1 % (ref 10–15)
ERYTHROCYTE [SEDIMENTATION RATE] IN BLOOD BY WESTERGREN METHOD: 33 MM/HR (ref 0–20)
GFR SERPL CREATININE-BSD FRML MDRD: >90 ML/MIN/1.73M2
HCT VFR BLD AUTO: 27.5 % (ref 35–47)
HGB BLD-MCNC: 8.1 G/DL (ref 11.7–15.7)
MCH RBC QN AUTO: 23.9 PG (ref 26.5–33)
MCHC RBC AUTO-ENTMCNC: 29.5 G/DL (ref 31.5–36.5)
MCV RBC AUTO: 81 FL (ref 78–100)
PLATELET # BLD AUTO: 381 10E3/UL (ref 150–450)
RBC # BLD AUTO: 3.39 10E6/UL (ref 3.8–5.2)
WBC # BLD AUTO: 7 10E3/UL (ref 4–11)

## 2021-12-12 PROCEDURE — 85027 COMPLETE CBC AUTOMATED: CPT | Performed by: PHYSICIAN ASSISTANT

## 2021-12-12 PROCEDURE — 250N000013 HC RX MED GY IP 250 OP 250 PS 637: Performed by: INTERNAL MEDICINE

## 2021-12-12 PROCEDURE — 258N000003 HC RX IP 258 OP 636: Performed by: INTERNAL MEDICINE

## 2021-12-12 PROCEDURE — 120N000001 HC R&B MED SURG/OB

## 2021-12-12 PROCEDURE — 85652 RBC SED RATE AUTOMATED: CPT | Performed by: PHYSICIAN ASSISTANT

## 2021-12-12 PROCEDURE — 36415 COLL VENOUS BLD VENIPUNCTURE: CPT | Performed by: PHYSICIAN ASSISTANT

## 2021-12-12 PROCEDURE — 86141 C-REACTIVE PROTEIN HS: CPT | Performed by: PHYSICIAN ASSISTANT

## 2021-12-12 PROCEDURE — 250N000011 HC RX IP 250 OP 636: Performed by: INTERNAL MEDICINE

## 2021-12-12 PROCEDURE — 82565 ASSAY OF CREATININE: CPT | Performed by: INTERNAL MEDICINE

## 2021-12-12 PROCEDURE — 250N000013 HC RX MED GY IP 250 OP 250 PS 637: Performed by: PHYSICIAN ASSISTANT

## 2021-12-12 PROCEDURE — 99232 SBSQ HOSP IP/OBS MODERATE 35: CPT | Performed by: INTERNAL MEDICINE

## 2021-12-12 PROCEDURE — 99231 SBSQ HOSP IP/OBS SF/LOW 25: CPT | Performed by: INTERNAL MEDICINE

## 2021-12-12 RX ORDER — CIPROFLOXACIN 500 MG/1
500 TABLET, FILM COATED ORAL EVERY 12 HOURS SCHEDULED
Status: DISCONTINUED | OUTPATIENT
Start: 2021-12-12 | End: 2021-12-14 | Stop reason: HOSPADM

## 2021-12-12 RX ORDER — HYDROCORTISONE 10 MG/ML
LOTION TOPICAL 2 TIMES DAILY PRN
Status: DISCONTINUED | OUTPATIENT
Start: 2021-12-12 | End: 2021-12-14 | Stop reason: HOSPADM

## 2021-12-12 RX ADMIN — PANTOPRAZOLE SODIUM 40 MG: 40 TABLET, DELAYED RELEASE ORAL at 08:18

## 2021-12-12 RX ADMIN — IBUPROFEN 600 MG: 600 TABLET ORAL at 09:22

## 2021-12-12 RX ADMIN — ACETAMINOPHEN 975 MG: 325 TABLET ORAL at 05:28

## 2021-12-12 RX ADMIN — IBUPROFEN 600 MG: 600 TABLET ORAL at 21:40

## 2021-12-12 RX ADMIN — PIPERACILLIN AND TAZOBACTAM 3.38 G: 3; .375 INJECTION, POWDER, FOR SOLUTION INTRAVENOUS at 09:23

## 2021-12-12 RX ADMIN — DOCUSATE SODIUM 50 MG AND SENNOSIDES 8.6 MG 1 TABLET: 8.6; 5 TABLET, FILM COATED ORAL at 21:28

## 2021-12-12 RX ADMIN — HYDROCORTISONE: 1.14 LOTION TOPICAL at 17:51

## 2021-12-12 RX ADMIN — ENOXAPARIN SODIUM 40 MG: 40 INJECTION SUBCUTANEOUS at 17:47

## 2021-12-12 RX ADMIN — ACETAMINOPHEN 975 MG: 325 TABLET ORAL at 21:29

## 2021-12-12 RX ADMIN — PANTOPRAZOLE SODIUM 40 MG: 40 TABLET, DELAYED RELEASE ORAL at 21:30

## 2021-12-12 RX ADMIN — CIPROFLOXACIN HYDROCHLORIDE 500 MG: 500 TABLET, FILM COATED ORAL at 13:38

## 2021-12-12 RX ADMIN — VANCOMYCIN HYDROCHLORIDE 750 MG: 1 INJECTION, POWDER, LYOPHILIZED, FOR SOLUTION INTRAVENOUS at 08:18

## 2021-12-12 RX ADMIN — PIPERACILLIN AND TAZOBACTAM 3.38 G: 3; .375 INJECTION, POWDER, FOR SOLUTION INTRAVENOUS at 02:30

## 2021-12-12 RX ADMIN — CIPROFLOXACIN HYDROCHLORIDE 500 MG: 500 TABLET, FILM COATED ORAL at 21:28

## 2021-12-12 ASSESSMENT — ACTIVITIES OF DAILY LIVING (ADL)
ADLS_ACUITY_SCORE: 4

## 2021-12-12 NOTE — PROGRESS NOTES
Post-operative Day: 2 Days Post-Op  S/P Procedure(s):  IRRIGATION AND DEBRIDEMENT, LOWER EXTREMITY WITH WOUND VAC PLACEMENT      Subjective: Doing well, denies any pain at rest.  Ace wrap about the ankle provides feeling of security        Pain: Denies at rest  Denies Chest pain/shortness of breath/lightheadedness, or dizziness  No Nausea or vomiting      Objective:  /73 (BP Location: Right arm)   Pulse 75   Temp 98.1  F (36.7  C) (Oral)   Resp 16   Wt 54.1 kg (119 lb 3.2 oz)   SpO2 98%   BMI 20.46 kg/m    Gen: A&O x 3. NAD.   Wound status: CDI without surrounding erythema or active drainage  Circulation, motion and sensation: Dorsiflexion/plantarflexion intact and equal bilaterally; distal lower extremity sensation is intact and equal bilaterally   Swelling noted about the surgical site   Calf tenderness: calves are soft and non-tender bilaterally     Pertinent Labs   Lab Results: personally reviewed.   Lab Results   Component Value Date    INR 1.09 12/10/2021    INR 0.95 06/18/2021    INR 0.97 06/17/2021     Lab Results   Component Value Date    WBC 7.0 12/12/2021    HGB 8.1 (L) 12/12/2021    HCT 27.5 (L) 12/12/2021    MCV 81 12/12/2021     12/12/2021     Lab Results   Component Value Date     12/10/2021    CO2 22 12/10/2021       Assessment: Stable recheck postop day 2 right Achilles irrigation debridement    Plan:  Preop and Intra-Op cultures positive for Pseudomonas, ID started Cipro 500 mg p.o. plan 2 weeks  Weightbearing status: Nonweightbearing right lower extremity.  Cam boot as directed for transfers, may remove at rest  Discharge planning: Discharge home today/tomorrow as able  Arrange wound care at home for ongoing wound VAC  Follow-up with [facility] in 2 weeks, Dr. Carie Wiley. Call to schedule appointment if not already made. [phone]      Report completed by:  Sin Dowling PA-C PA-C  Date: 12/12/2021  Time: 1:29 PM

## 2021-12-12 NOTE — PLAN OF CARE
Problem: Adult Inpatient Plan of Care  Goal: Plan of Care Review  Outcome: No Change   Wound vac intact to LLE. IV antibiotics scheduled. Ibuprofen as needed for discomfort.  Janet Boykin RN

## 2021-12-12 NOTE — PROGRESS NOTES
Requested to assist with wound VAC order for home use.    Completed [employer] form, rental order #[id number].  Care Manager to f/u with [employer] tomorrow, 12/13/2021 for additional information.  Pt had wound VAC applied in the OR and has not been changed so no post-op measurements are available.  Measurements per Op Note are approximately 8 x 4 cm.  No WOC RN visits.    Pt will discharge with po abx.  Pt will need Home Care set up for wound care to manage wound VAC.

## 2021-12-12 NOTE — PLAN OF CARE
Pt has pain intermittent in her left foot relieved with PRN Ibuprofen and scheduled Tylenol.  Wound vac pump patent and set at 125.  Pt is tolerating IV antibiotics in PIV.    Problem: Adult Inpatient Plan of Care  Goal: Plan of Care Review  Outcome: Improving  Flowsheets (Taken 12/11/2021 1822)  Plan of Care Reviewed With: patient  Goal: Patient-Specific Goal (Individualized)  Outcome: Improving  Goal: Absence of Hospital-Acquired Illness or Injury  Outcome: Improving  Goal: Optimal Comfort and Wellbeing  Outcome: Improving  Goal: Readiness for Transition of Care  Outcome: Improving     Problem: Pain Acute  Goal: Acceptable Pain Control and Functional Ability  Outcome: Improving     Problem: Surgical Site Infection  Goal: Improved Infection Symptoms  Outcome: Improving

## 2021-12-12 NOTE — PLAN OF CARE
Problem: Pain Acute  Goal: Acceptable Pain Control and Functional Ability  Outcome: Improving   Patient denies pain at rest and reporting pain 0f 0/10 on the left ankle. Patient is able to sleep and stated that tylenol is taking care of the pain. Continue to assess and medicate as needed,  Problem: Surgical Site Infection  Goal: Improved Infection Symptoms  Outcome: Improving Patient vital signs WNL and afebrile. Continue with antibiotics as ordered.  Problem: Adult Inpatient Plan of Care  Goal: Absence of Hospital-Acquired Illness or Injury  Intervention: Identify and Manage Fall Risk  Recent Flowsheet Documentation  Taken 12/12/2021 0000 by Kandice Encinas, RN  Safety Promotion/Fall Prevention: clutter free environment maintained  Intervention: Prevent Skin Injury  Recent Flowsheet Documentation  Taken 12/12/2021 0000 by Kandice Encinas RN  Body Position: position changed independently

## 2021-12-12 NOTE — PROGRESS NOTES
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  Micro prelim pre-op culture with pseudomonas (final), operative cultures with the same so far.   I&D 12/10 -- abscess at muscle.   2 strains of pseudomonas -- final cultures likely another day or two, but with quinolone-sensitive isolate pre-op, ok with planning this.       PLAN:  Cipro 500mg PO BID until 2 weeks post-op.  Ok with discharge  Will follow up on final cultures and contact her if needed.       Aquiles Mo MD  [facility]  [phone] clinic  [employer] paging    ______________________________________________________________________    SUBJECTIVE / INTERVAL HISTORY: feels much better. Pain controlled. Hoping to go home.     She had nausea when taking cipro with doxy previous, but tolerated cipro alone and is willing to take this med.     ROS: All other systems negative except as listed above.        OBJECTIVE:  /73 (BP Location: Right arm)   Pulse 75   Temp 98.1  F (36.7  C) (Oral)   Resp 16   Wt 54.1 kg (119 lb 3.2 oz)   SpO2 98%   BMI 20.46 kg/m         Vital Signs  Temp: 98.2  F (36.8  C)  Temp src: Oral  Resp: 16  Pulse: 87  Pulse Rate Source: Monitor  BP: 117/56  BP Location: Left arm    Temp (24hrs), Av.2  F (36.8  C), Min:97.7  F (36.5  C), Max:98.7  F (37.1  C)      GEN: No acute distress.    RESPIRATORY:  Normal breathing pattern.   CARDIOVASCULAR:  Regular rate and rhythm.   ABDOMEN:  deferred  EXTREMITIES: wound vac in place  SKIN/HAIR/NAILS:  No rashes  IV: peripheral        Antibiotics:  Pip/tazo -  Vancomycin -    Pertinent labs:    Recent Labs   Lab 21  0630 21  0612 12/10/21  0557   WBC 7.0 9.6 13.3*   HGB 8.1* 8.4* 9.1*   HCT 27.5* 28.2* 30.0*    396 458*        Recent Labs   Lab 12/10/21  0557 218    137   CO2 22 19*   BUN  11 11        Lab Results   Component Value Date    CRP 2.2 (H) 12/12/2021    CRP 2.1 (H) 12/11/2021    CRP 4.5 (H) 12/09/2021         Lab Results   Component Value Date    ALT <9 12/09/2021    AST 12 12/09/2021    ALKPHOS 77 12/09/2021         MICROBIOLOGY DATA:  12/9 blood negative to date  12/9 wound pseudomonas (final)  12/10 operative gram stain GNR; culture pseudomonas x2, coag neg staph    RADIOLOGY:  XR Chest 2 Views    Result Date: 11/4/2021  EXAM: XR CHEST 2 VW LOCATION: [facility] DATE/TIME: 11/4/2021 9:48 AM INDICATION: Shortness of breath COMPARISON: None.     IMPRESSION: Thoracolumbar scoliosis. Heart size appears normal. Hyperinflation suggests obstructive pulmonary disease. Lungs appear clear.    MR Tibia Fibula Lower Leg Left wo & w Contr    Result Date: 12/9/2021  EXAM: MR TIBIA FIBULA LOWER LEG LEFT WO and W CONTR LOCATION: [facility] DATE/TIME: 12/9/2021 7:39 PM INDICATION: Soft tissue infection. COMPARISON: None. TECHNIQUE: Routine. Additional postgadolinium T1 sequences were obtained. IV CONTRAST: 5ml gadavist FINDINGS: There is an ulcer in the posterior aspect of the lower calf and ankle. A soft tissue defect extends from the skin to the center of the Achilles tendon, where there is an abscess but dissects into the muscles of the posterior compartment (axial image 45).  This abscess measures 2.4 x 1.2 cm in short axis, 10 cm from superior to inferior (axial postcontrast series 9 images 8-3 6). There is infectious myositis throughout the posterior deep posterior compartments, with soft tissue edema extending along the fascial planes and the interosseous membrane. There is no abnormal fascial enhancement to specifically indicate acute fasciitis. Soft tissue edema and enhancement in the lower calf is consistent with cellulitis. There are defects in the superficial adipose tissue over the posterior medial aspect of the left calf, which are sequela of  "prior surgery and/or ulceration. There is chronic severe atrophy and fatty infiltration of the gastrocnemius muscle, consistent with relative disuse. No atrophy elsewhere. Mild reactive edema within the muscle fibers of the anterior compartment, related to infection/inflammation along the interosseous membrane. Of infectious myositis involving the muscles of the lateral compartment, inferiorly. Aside from the central defect in the distal Achilles tendon, no other tendon tear. The flexor, extensor, and peroneal tendons are intact at the ankle. Proximal tendons appear intact. No evidence of osteomyelitis or reactive osteitis. No knee or ankle joint effusion. Normal joint alignment.     IMPRESSION: 1.  Skin ulcer in the posterior aspect of the lower calf/healed, with a sinus tract projecting to the third of the Achilles tendon, and an abscess within the posterior muscle compartments of the lower calf, measuring 2.4 x 1.2 x 10 cm. 2.  Edema/infectious myositis involving the muscle fibers in the posterior, deep posterior, lateral, and anterior compartments as described above. 3.  No fascial enhancement to specifically indicate fasciitis. 4.  Soft tissue deficiency over the posterior and medial aspect lower calf suggesting prior surgical debridement. 5.  Skin edema and enhancement in the lower calf suggesting cellulitis. 6.  No evidence of osteomyelitis/reactive osteitis.    Cardiac Event Monitor Adult Pediatric    Result Date: 11/23/2021  [facility] MULTI-DAY PATIENT ACTIVATED MONITOR (KIRSTY) REPORT Results:   Indication for study: Evaluate for palpitations   A 14-day monitor was done from November 5, 2021 through November 18, 2021   Baseline transmission showed sinus rhythm with normal electrocardiographic intervals.  The average heart rate was 100 and ranged between  bpm   No bradycardia, pauses   1 symptomatic event for\" other than listed \" symptoms showed sinus tachycardia-104 bpm-no ectopics   Rare " "single PVC noted there is no complex ventricular ectopy   Atrial ectopy is not seen   Discussion   Elevated average heart rate of 100 bpm; relative sinus tachycardia   Otherwise normal multi day/14-day monitor without symptoms     POC US Guidance Needle Placement    Result Date: 12/10/2021  Ultrasound was performed as guidance to an anesthesia procedure.  Click \"PACS images\" hyperlink below to view any stored images.  For specific procedure details, view procedure note authored by anesthesia.     "

## 2021-12-12 NOTE — PROGRESS NOTES
[facility]    Medicine Progress Note - Hospitalist Service       Date of Admission:  12/9/2021    Assessment & Plan           61-year-old female with a history of peripheral arterial disease, chronic left Achilles wound with recent I&D in 8/4/2021 as well as history of subacute thyroiditis, admitted with nonhealing wound of the left leg.      Infected left leg wound: history of chronic nonhealing leg wound in the left achilles area, status post multiple interventions with orthopedic surgery. Last surgery 8/2021. MRI on admission showed a sinus tract projecting to the third of the Achilles tendon with an abscess in the lower calf 2.4 x 1.2 x 10.  Infectious myositis seen. Received I&D and wound vac placement on 12/10. Wound culture sent on 12/9 growing Pseudomonas.   - Continue IV Zosyn and vancomycin  - Follow up intra op wound culture on 12/10: grows pseudomonas  - Follow up blood culture: no growth so far  - Wound care  - PT/OT  - Supportive care    Acute blood loss anemia: hemoglobin on admission was 10.4, post op dropped to 8.4 today. Monitor.      Subacute thyroiditis: TSH low, free T4 normal. Continue metoprolol.     Essential hypertension: BP controlled. Continue metoprolol.     GERD: continue protonix       Diet: Regular Diet Adult    DVT Prophylaxis: Enoxaparin (Lovenox) SQ  Foster Catheter: Not present  Central Lines: None  Code Status: Full Code      Disposition Plan   Expected Discharge: 12/12/2021     Anticipated discharge location: home with family           The patient's care was discussed with the Bedside Nurse, Care Coordinator/ and Patient.    Dev Parry MD  Hospitalist Service  [facility]  Securely message with the Vocera Web Console (learn more here)  Text page via [employer] Paging/Directory      ______________________________________________________________________    Interval History   Patient reports that she feels well in general.  The pain from her left leg wound is controlled. She reports having itchy skin rash after using the adhesive tape. She would like to use some topical steroid.    Data reviewed today: I reviewed all medications, new labs and imaging results over the last 24 hours.    Physical Exam   Vital Signs: Temp: 98.1  F (36.7  C) Temp src: Oral BP: 136/73 Pulse: 75   Resp: 16 SpO2: 98 % O2 Device: None (Room air)    Weight: 119 lbs 3.2 oz    General appearance: not in acute distress  HEENT: PERRL, EOMI  Lungs: Clear breath sounds in bilateral lung fields  Cardiovascular: Regular rate and rhythm, normal S1-S2  Abdomen: Soft, non tender, no distension  Musculoskeletal: No joint swelling  Skin: No edema. Mild erythematous skin rash on the right forearm.   Neurology: AAO ×3.  Cranial nerves II - XII normal.  Normal muscle strength in all four extremities.    Data   Recent Labs   Lab 12/12/21  0630 12/11/21  0614 12/11/21  0612 12/10/21  0557 12/09/21  1928   WBC 7.0  --  9.6 13.3* 17.2*   HGB 8.1*  --  8.4* 9.1* 10.4*   MCV 81  --  82 81 80     --  396 458* 484*   INR  --   --   --  1.09  --    NA  --   --   --  142 137   POTASSIUM  --   --   --  3.8 3.7   CHLORIDE  --   --   --  112* 107   CO2  --   --   --  22 19*   BUN  --   --   --  11 11   CR 0.60  --   --  0.60 0.66   ANIONGAP  --   --   --  8 11   NILESH  --   --   --  7.9* 8.7   GLC  --  77  --  83 148*   ALBUMIN  --   --   --   --  3.2*   PROTTOTAL  --   --   --   --  6.6   BILITOTAL  --   --   --   --  0.4   ALKPHOS  --   --   --   --  77   ALT  --   --   --   --  <9   AST  --   --   --   --  12

## 2021-12-13 LAB
BACTERIA TISS BX CULT: ABNORMAL
BACTERIA TISS BX CULT: ABNORMAL
C REACTIVE PROTEIN LHE: 1.4 MG/DL (ref 0–0.8)
ERYTHROCYTE [DISTWIDTH] IN BLOOD BY AUTOMATED COUNT: 15.2 % (ref 10–15)
ERYTHROCYTE [SEDIMENTATION RATE] IN BLOOD BY WESTERGREN METHOD: 41 MM/HR (ref 0–20)
HCT VFR BLD AUTO: 28.7 % (ref 35–47)
HGB BLD-MCNC: 8.6 G/DL (ref 11.7–15.7)
MCH RBC QN AUTO: 24.2 PG (ref 26.5–33)
MCHC RBC AUTO-ENTMCNC: 30 G/DL (ref 31.5–36.5)
MCV RBC AUTO: 81 FL (ref 78–100)
PATH REPORT.COMMENTS IMP SPEC: NORMAL
PATH REPORT.COMMENTS IMP SPEC: NORMAL
PATH REPORT.FINAL DX SPEC: NORMAL
PATH REPORT.GROSS SPEC: NORMAL
PATH REPORT.MICROSCOPIC SPEC OTHER STN: NORMAL
PATH REPORT.RELEVANT HX SPEC: NORMAL
PHOTO IMAGE: NORMAL
PLATELET # BLD AUTO: 402 10E3/UL (ref 150–450)
RBC # BLD AUTO: 3.55 10E6/UL (ref 3.8–5.2)
WBC # BLD AUTO: 5.8 10E3/UL (ref 4–11)

## 2021-12-13 PROCEDURE — 250N000011 HC RX IP 250 OP 636: Performed by: INTERNAL MEDICINE

## 2021-12-13 PROCEDURE — 250N000013 HC RX MED GY IP 250 OP 250 PS 637: Performed by: INTERNAL MEDICINE

## 2021-12-13 PROCEDURE — 99232 SBSQ HOSP IP/OBS MODERATE 35: CPT | Performed by: INTERNAL MEDICINE

## 2021-12-13 PROCEDURE — 88304 TISSUE EXAM BY PATHOLOGIST: CPT | Mod: 26 | Performed by: PATHOLOGY

## 2021-12-13 PROCEDURE — 85014 HEMATOCRIT: CPT | Performed by: PHYSICIAN ASSISTANT

## 2021-12-13 PROCEDURE — 36415 COLL VENOUS BLD VENIPUNCTURE: CPT | Performed by: PHYSICIAN ASSISTANT

## 2021-12-13 PROCEDURE — 120N000001 HC R&B MED SURG/OB

## 2021-12-13 PROCEDURE — 250N000013 HC RX MED GY IP 250 OP 250 PS 637: Performed by: PHYSICIAN ASSISTANT

## 2021-12-13 PROCEDURE — 85652 RBC SED RATE AUTOMATED: CPT | Performed by: PHYSICIAN ASSISTANT

## 2021-12-13 PROCEDURE — 86141 C-REACTIVE PROTEIN HS: CPT | Performed by: PHYSICIAN ASSISTANT

## 2021-12-13 RX ORDER — ACETAMINOPHEN 325 MG/1
650 TABLET ORAL EVERY 6 HOURS PRN
COMMUNITY
Start: 2021-12-13 | End: 2022-01-06

## 2021-12-13 RX ORDER — HYDROXYZINE HYDROCHLORIDE 25 MG/1
25 TABLET, FILM COATED ORAL EVERY 6 HOURS PRN
Qty: 20 TABLET | Refills: 0 | Status: SHIPPED | OUTPATIENT
Start: 2021-12-13 | End: 2022-02-07

## 2021-12-13 RX ORDER — POLYETHYLENE GLYCOL 3350 17 G/17G
17 POWDER, FOR SOLUTION ORAL DAILY
Qty: 510 G | COMMUNITY
Start: 2021-12-13 | End: 2022-01-06

## 2021-12-13 RX ORDER — AMOXICILLIN 250 MG
1 CAPSULE ORAL 2 TIMES DAILY
COMMUNITY
Start: 2021-12-13 | End: 2022-01-06

## 2021-12-13 RX ADMIN — CIPROFLOXACIN HYDROCHLORIDE 500 MG: 500 TABLET, FILM COATED ORAL at 21:33

## 2021-12-13 RX ADMIN — IBUPROFEN 600 MG: 600 TABLET ORAL at 17:00

## 2021-12-13 RX ADMIN — PANTOPRAZOLE SODIUM 40 MG: 40 TABLET, DELAYED RELEASE ORAL at 21:32

## 2021-12-13 RX ADMIN — ENOXAPARIN SODIUM 40 MG: 40 INJECTION SUBCUTANEOUS at 19:50

## 2021-12-13 RX ADMIN — PANTOPRAZOLE SODIUM 40 MG: 40 TABLET, DELAYED RELEASE ORAL at 08:09

## 2021-12-13 RX ADMIN — CIPROFLOXACIN HYDROCHLORIDE 500 MG: 500 TABLET, FILM COATED ORAL at 08:09

## 2021-12-13 RX ADMIN — ACETAMINOPHEN 975 MG: 325 TABLET ORAL at 05:01

## 2021-12-13 ASSESSMENT — ACTIVITIES OF DAILY LIVING (ADL)
ADLS_ACUITY_SCORE: 6
ADLS_ACUITY_SCORE: 4
ADLS_ACUITY_SCORE: 6
ADLS_ACUITY_SCORE: 6
ADLS_ACUITY_SCORE: 4
ADLS_ACUITY_SCORE: 4
ADLS_ACUITY_SCORE: 6
ADLS_ACUITY_SCORE: 4
ADLS_ACUITY_SCORE: 6
ADLS_ACUITY_SCORE: 4

## 2021-12-13 NOTE — PROGRESS NOTES
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  Micro prelim pre-op culture with pseudomonas (final), operative cultures with the same so far.   I&D 12/10 -- abscess at muscle. No mention of bone involevment  2 strains of pseudomonas     PLAN:  Cipro 500mg PO BID until 2 weeks post-op.  Side effects including phototoxicity, tendinitis, etc. were explained to the patient  She is not weightbearing.  Not on steroids.  Ok with discharge  followup dr Wiley    Will sign off, please call with questions  Baylee Gonzalez M.D.      ______________________________________________________________________    SUBJECTIVE / INTERVAL HISTORY:  Feels ok  Ready to go homr  ROS: All other systems negative except as listed above.        OBJECTIVE:  BP (!) 140/78 (BP Location: Right arm)   Pulse 72   Temp 97.7  F (36.5  C) (Oral)   Resp 18   Wt 54.1 kg (119 lb 3.2 oz)   SpO2 99%   BMI 20.46 kg/m         Vital Signs  Temp: 98.2  F (36.8  C)  Temp src: Oral  Resp: 16  Pulse: 87  Pulse Rate Source: Monitor  BP: 117/56  BP Location: Left arm    Temp (24hrs), Av.2  F (36.8  C), Min:97.7  F (36.5  C), Max:98.7  F (37.1  C)      GEN: No acute distress.    RESPIRATORY:  Normal breathing pattern.   CARDIOVASCULAR:  Regular rate and rhythm.   ABDOMEN:  deferred  EXTREMITIES: wound vac in place  SKIN/HAIR/NAILS:  No rashes  IV: peripheral      Pertinent labs:    Recent Labs   Lab 21  0605 21  0630 21  0612   WBC 5.8 7.0 9.6   HGB 8.6* 8.1* 8.4*   HCT 28.7* 27.5* 28.2*    381 396        Recent Labs   Lab 12/10/21  0557 21  1928    137   CO2 22 19*   BUN 11 11        Lab Results   Component Value Date    CRP 1.4 (H) 2021    CRP 2.2 (H) 2021    CRP 2.1 (H) 2021         Lab Results   Component Value Date    ALT <9 2021    AST 12  12/09/2021    ALKPHOS 77 12/09/2021         MICROBIOLOGY DATA:  12/9 blood negative to date  12/9 wound pseudomonas (final)  12/10 operative gram stain GNR; culture pseudomonas x2, coag neg staph    RADIOLOGY:  XR Chest 2 Views    Result Date: 11/4/2021  EXAM: XR CHEST 2 VW LOCATION: [facility] DATE/TIME: 11/4/2021 9:48 AM INDICATION: Shortness of breath COMPARISON: None.     IMPRESSION: Thoracolumbar scoliosis. Heart size appears normal. Hyperinflation suggests obstructive pulmonary disease. Lungs appear clear.    MR Tibia Fibula Lower Leg Left wo & w Contr    Result Date: 12/9/2021  EXAM: MR TIBIA FIBULA LOWER LEG LEFT WO and W CONTR LOCATION: [facility] DATE/TIME: 12/9/2021 7:39 PM INDICATION: Soft tissue infection. COMPARISON: None. TECHNIQUE: Routine. Additional postgadolinium T1 sequences were obtained. IV CONTRAST: 5ml gadavist FINDINGS: There is an ulcer in the posterior aspect of the lower calf and ankle. A soft tissue defect extends from the skin to the center of the Achilles tendon, where there is an abscess but dissects into the muscles of the posterior compartment (axial image 45).  This abscess measures 2.4 x 1.2 cm in short axis, 10 cm from superior to inferior (axial postcontrast series 9 images 8-3 6). There is infectious myositis throughout the posterior deep posterior compartments, with soft tissue edema extending along the fascial planes and the interosseous membrane. There is no abnormal fascial enhancement to specifically indicate acute fasciitis. Soft tissue edema and enhancement in the lower calf is consistent with cellulitis. There are defects in the superficial adipose tissue over the posterior medial aspect of the left calf, which are sequela of prior surgery and/or ulceration. There is chronic severe atrophy and fatty infiltration of the gastrocnemius muscle, consistent with relative disuse. No atrophy elsewhere. Mild reactive edema within  "the muscle fibers of the anterior compartment, related to infection/inflammation along the interosseous membrane. Of infectious myositis involving the muscles of the lateral compartment, inferiorly. Aside from the central defect in the distal Achilles tendon, no other tendon tear. The flexor, extensor, and peroneal tendons are intact at the ankle. Proximal tendons appear intact. No evidence of osteomyelitis or reactive osteitis. No knee or ankle joint effusion. Normal joint alignment.     IMPRESSION: 1.  Skin ulcer in the posterior aspect of the lower calf/healed, with a sinus tract projecting to the third of the Achilles tendon, and an abscess within the posterior muscle compartments of the lower calf, measuring 2.4 x 1.2 x 10 cm. 2.  Edema/infectious myositis involving the muscle fibers in the posterior, deep posterior, lateral, and anterior compartments as described above. 3.  No fascial enhancement to specifically indicate fasciitis. 4.  Soft tissue deficiency over the posterior and medial aspect lower calf suggesting prior surgical debridement. 5.  Skin edema and enhancement in the lower calf suggesting cellulitis. 6.  No evidence of osteomyelitis/reactive osteitis.    Cardiac Event Monitor Adult Pediatric    Result Date: 11/23/2021  [facility] MULTI-DAY PATIENT ACTIVATED MONITOR (KIRSTY) REPORT Results:   Indication for study: Evaluate for palpitations   A 14-day monitor was done from November 5, 2021 through November 18, 2021   Baseline transmission showed sinus rhythm with normal electrocardiographic intervals.  The average heart rate was 100 and ranged between  bpm   No bradycardia, pauses   1 symptomatic event for\" other than listed \" symptoms showed sinus tachycardia-104 bpm-no ectopics   Rare single PVC noted there is no complex ventricular ectopy   Atrial ectopy is not seen   Discussion   Elevated average heart rate of 100 bpm; relative sinus tachycardia   Otherwise normal multi day/14-day " "monitor without symptoms     POC US Guidance Needle Placement    Result Date: 12/10/2021  Ultrasound was performed as guidance to an anesthesia procedure.  Click \"PACS images\" hyperlink below to view any stored images.  For specific procedure details, view procedure note authored by anesthesia.     "

## 2021-12-13 NOTE — PLAN OF CARE
Pt reporting pain is minimal today using scheduled tylenol and PRN Ibuprofen.  She is tolerating wound vac.  Plan for discharge tomorrow after she can get a wound vac set up for home.    Problem: Adult Inpatient Plan of Care  Goal: Plan of Care Review  Outcome: Improving  Flowsheets (Taken 12/12/2021 4122)  Plan of Care Reviewed With: patient  Progress: improving     Problem: Pain Acute  Goal: Acceptable Pain Control and Functional Ability  Outcome: Improving     Problem: Surgical Site Infection  Goal: Improved Infection Symptoms  Outcome: Improving

## 2021-12-13 NOTE — PROGRESS NOTES
Care Management Follow Up    Length of Stay (days): 4    Expected Discharge Date: 12/13/2021     Concerns to be Addressed:       Patient plan of care discussed at interdisciplinary rounds: Yes    Anticipated Discharge Disposition:  Home with home care     Anticipated Discharge Services:  Home care RN Services  Anticipated Discharge DME:  Wound VAC    Patient/family educated on Medicare website which has current facility and service quality ratings:  Yes  Education Provided on the Discharge Plan:  Yes, per team  Patient/Family in Agreement with the Plan:  Yes    Referrals Placed by CM/SW:  Home care, [facility]-wound VAC  Private pay costs discussed: Not applicable    Additional Information:  Plan to discharge home with home care services and Wound VAC. [facility] home care and [facility] did not have RN availability for home care services for wound VAC. [facility] would be able to accept, but cannot see for start of care until Friday 12/17/21; will keep [facility] updated. Previous care manager submitted wound VAC information to [facility]. Spoke with Abimael with [facility] earlier this morning, she was going to have someone assigned to the case and would provide them with writers contact information for follow up if needed; awaiting update/wound vac approval. From home with spouse, daughter and granddaughter; independent at baseline. Family to likely transport. Care manager to follow.   1:35 PM Received update paperwork is not complete. Message left for Dr. Wiley's team at [facility] with call back extension [phone]. It does not appear a WO consult was ever placed. Will await call back for further instructions.   2:27 PM PA team for [facility] working on completing [facility] Paperwork. Faxed patient information in to [facility].   3:51 PM Still have not received approval for wound VAC. Working on home care; [facility] does not have any availability for RN services. [facility] potentially has availability  for Thursday. Patient will likely not be able to discharge until 12/14 due to wound VAC approval.     Anna Fish RN

## 2021-12-13 NOTE — PLAN OF CARE
Problem: Adult Inpatient Plan of Care  Goal: Plan of Care Review  Outcome: Improving     Problem: Pain Acute  Goal: Acceptable Pain Control and Functional Ability  Outcome: Improving   Patient has minimal complaints of pain. Vitals stable. Wound vac functioning properly.

## 2021-12-13 NOTE — PLAN OF CARE
Problem: Adult Inpatient Plan of Care  Goal: Optimal Comfort and Wellbeing  Outcome: Improving     Problem: Pain Acute  Goal: Acceptable Pain Control and Functional Ability  Outcome: Improving     Problem: Surgical Site Infection  Goal: Improved Infection Symptoms  Outcome: Improving  Intervention: Prevent and Manage Infection  Recent Flowsheet Documentation  Taken 12/13/2021 0845 by Nessa Garcia RN  Isolation Precautions: contact precautions maintained     Intervention: Develop Pain Management Plan  Recent Flowsheet Documentation  Taken 12/13/2021 0814 by Nessa Garcia RN  Pain Management Interventions:   declines   emotional support   Patient has wound vac in place and wound vac is functioning. Patient waiting for approval of insurance for home wound vac than she will be discharged.

## 2021-12-13 NOTE — PROGRESS NOTES
Orthopedic Progress Note      Assessment: 3 Days Post-Op  S/P Procedure(s):  IRRIGATION AND DEBRIDEMENT, LOWER EXTREMITY WITH WOUND VAC PLACEMENT     Plan:   - Doing well today, looking forward to discharge. Understands the plan moving forward.  - Preop and Intra-Op cultures positive for Pseudomonas, ID started Cipro 500 mg p.o. plan 2 weeks  - Weightbearing status: NWB RLE, in CAM boot unless at rest or for hygiene  - Anticoagulation: SCDs, wanda stockings and early ambulation.  - Discharge planning: Today, pending wound vac for home. Follow up with Dr. Wiley in 2 weeks at [facility]      Subjective:  Pain: mild  Nausea, Vomiting:  No  Lightheadedness, Dizziness:  No  Neuro:  Patient denies new onset numbness or paresthesias    Patient is doing well today. Denies any acute complaints, looking forward to going home. Denies numbness or tingling in the RLE, no calf pain.     Objective:  BP (!) 140/78 (BP Location: Right arm)   Pulse 72   Temp 97.7  F (36.5  C) (Oral)   Resp 18   Wt 54.1 kg (119 lb 3.2 oz)   SpO2 99%   BMI 20.46 kg/m    The patient is A&Ox3, resting comfortably in bed.  Sensation is intact in BLE.  Dorsiflexion and plantar flexion are intact in BLE.  Dorsalis pedis pulses intact.  Calves are soft and non-tender.   The incision is covered on her right lower extremity, wrapped in Ace bandage. Dressing C/D/I.      Pertinent Labs   Lab Results: personally reviewed.   Lab Results   Component Value Date    INR 1.09 12/10/2021    INR 0.95 06/18/2021    INR 0.97 06/17/2021     Lab Results   Component Value Date    WBC 5.8 12/13/2021    HGB 8.6 (L) 12/13/2021    HCT 28.7 (L) 12/13/2021    MCV 81 12/13/2021     12/13/2021     Lab Results   Component Value Date     12/10/2021    CO2 22 12/10/2021         Report completed by:  Ailyn Lambert PA-C  Date: 12/13/2021  Time: 10:33 AM

## 2021-12-13 NOTE — PROGRESS NOTES
S: Pt. seen at [facility]. Female. Fausto FELTON RX: CAM boot liner. Infectious post op drainage is on old liner. DX: Infection chronic.  O:A: Pt. presently wearing a Air cast CAM boot. Due to infection leaking into liner Pt. is in needed of new. Today I F/D a [employer] Airmax CAM boot right size Medium. Boot to protect infected area.  P: Pt. to be seen as needed.    Devan GALICIA,LO

## 2021-12-14 VITALS
DIASTOLIC BLOOD PRESSURE: 70 MMHG | OXYGEN SATURATION: 100 % | SYSTOLIC BLOOD PRESSURE: 124 MMHG | HEART RATE: 91 BPM | WEIGHT: 119.2 LBS | TEMPERATURE: 98.1 F | RESPIRATION RATE: 18 BRPM | BODY MASS INDEX: 20.46 KG/M2

## 2021-12-14 LAB
BACTERIA BLD CULT: NO GROWTH
BACTERIA BLD CULT: NO GROWTH

## 2021-12-14 PROCEDURE — 250N000013 HC RX MED GY IP 250 OP 250 PS 637: Performed by: INTERNAL MEDICINE

## 2021-12-14 PROCEDURE — 97605 NEG PRS WND THER DME<=50SQCM: CPT

## 2021-12-14 PROCEDURE — G0463 HOSPITAL OUTPT CLINIC VISIT: HCPCS | Mod: 25

## 2021-12-14 PROCEDURE — 250N000011 HC RX IP 250 OP 636: Performed by: INTERNAL MEDICINE

## 2021-12-14 RX ORDER — CIPROFLOXACIN 500 MG/1
500 TABLET, FILM COATED ORAL EVERY 12 HOURS
Qty: 24 TABLET | Refills: 0 | Status: SHIPPED | OUTPATIENT
Start: 2021-12-14 | End: 2021-12-26

## 2021-12-14 RX ADMIN — ENOXAPARIN SODIUM 40 MG: 40 INJECTION SUBCUTANEOUS at 20:11

## 2021-12-14 RX ADMIN — PANTOPRAZOLE SODIUM 40 MG: 40 TABLET, DELAYED RELEASE ORAL at 09:18

## 2021-12-14 RX ADMIN — CIPROFLOXACIN HYDROCHLORIDE 500 MG: 500 TABLET, FILM COATED ORAL at 09:26

## 2021-12-14 RX ADMIN — PANTOPRAZOLE SODIUM 40 MG: 40 TABLET, DELAYED RELEASE ORAL at 20:12

## 2021-12-14 RX ADMIN — IBUPROFEN 600 MG: 600 TABLET ORAL at 09:25

## 2021-12-14 RX ADMIN — CIPROFLOXACIN HYDROCHLORIDE 500 MG: 500 TABLET, FILM COATED ORAL at 20:11

## 2021-12-14 ASSESSMENT — ACTIVITIES OF DAILY LIVING (ADL)
ADLS_ACUITY_SCORE: 6

## 2021-12-14 NOTE — CONSULTS
Wound Ostomy  WOC Assessment with photograph      Allergies:  No Clinical Screening - See Comments  Morphine  Adhesive Tape  Ciprofloxacin  Doxycycline  Other Drug Allergy (See Comments)    Diagnosis:   Patient Active Problem List    Diagnosis Date Noted     SOB (shortness of breath) 11/04/2021     Priority: Medium     Palpitations 11/04/2021     Priority: Medium     Event monitor showing sinus tachycardia no arrhythmia November 2021       Subacute thyroiditis 10/15/2021     Priority: Medium     TSH less than 0.1 and abnormal thyroid uptake scan October 2021, asymptomatic       PAD (peripheral artery disease) (H) 06/25/2021     Priority: Medium     Benign essential hypertension 06/16/2021     Priority: Medium     Gastroesophageal reflux disease without esophagitis 06/16/2021     Priority: Medium     Abscess of left lower leg 06/07/2021     Priority: Medium     Abnormal TSH 05/13/2021     Priority: Medium     Formatting of this note might be different from the original.  TSH suppressed 0.18 and normal free T4, recheck in 3 months       MTHFR gene mutation (H) [facility]oyus April 2021[facility] 04/28/[facility]     Priority: Medium     Microscopic hematuria 03/07/2021     Priority: Medium     Formatting of this note might be different from the original.  Longstanding finding       Abnormal CT of the chest 02/01/2021     Priority: Medium     Non-healing surgical wound, subsequent encounter 10/02/2020     Priority: Medium     Added automatically from request for surgery [id number]       Achilles tendon infection 09/03/2020     Priority: Medium     Atopic dermatitis, unspecified type 09/03/2020     Priority: Medium     Gastroesophageal reflux disease with esophagitis 09/03/2020     Priority: Medium     Overview:   EGD with esophagitis 2015, experiencing chest pain and dysphagia       Osteoporosis of multiple sites without pathological fracture 09/03/2020     Priority: Medium     DEXA with T score -2.4 L1 L4, -3.2 left  "femoral neck and -2.6 right femoral neck       Skin ulcer of left ankle with necrosis of muscle (H) 09/03/2020     Priority: Medium     Vitamin D deficiency 01/22/2020     Priority: Medium     Achilles tendinitis 07/26/2019     Priority: Medium     Nonhealing surgical wound 11/14/2018     Priority: Medium     Iron deficiency anemia 12/18/2015     Priority: Medium     Overview:   Has required iron infusions         Height:  [unfilled]    Weight:  [unfilled]    Labs:  Recent Labs   Lab Test 12/13/21  0605 12/10/21  0557 12/09/21  1928   CRP 1.4*   < > 4.5*   HGB 8.6*   < > 10.4*   ALBUMIN  --   --  3.2*    < > = values in this interval not displayed.       Adin:  Adin Score: 20    Specialty Bed:       Wound culture obtained:       Edema:  No    Anatomic Site/Laterality: L achilles    Reason for ongoing care:   Wound assessment and plan of care    Encounter Type:  Initial Wound Type:   Denudement:  Foreign body present? No    Tissue Damage:   Breakdown of skin    Related trauma: Patient s/p IRRIGATION AND DEBRIDEMENT, LOWER EXTREMITY WITH WOUND VAC PLACEMENT on 12/10/21. WOC was not consulted until today 12/14/21. Pump was beeping \"blockage\" overnight and into today (blocked when WOC arrived for dressing change). Pump setting also on Prevena setting with traditional VAC dressing in place.    Assessment:    Entire incision is 13cm in length. Majority of incision is completely closed with sutures.    Distal end of incision is open at most 0.3cm with granular tissue.     Only applied wound VAC over the distal end of incision, so area measuring 3.5x0.3cm.     TSA: 1.05sqcm    Rest of incision covered with dry gauze and secured with the drape. Ok'd by ortho PA.    Tunneling/Undermining: No    Wound Bed: 100% Granular    Exudate: Yes Serosanguineous Small - had clotted off in the previous sponge, causing blockage    Periwound Skin: Scar Tissue - slight erythema from sponge being on upper portion of incision on intact " skin.    Treatment Plan: NPWT: 1 # pieces of foam removed, 1 # pieces of foam inserted           Nursing care provided was coordinated care with ortho PA.    Discussed plan of care with patient and ortho.    Outcomes and treatment recommendations are to promote skin integrity, contain exudate and promote wound healing.    Actions taken by WOC RN: 2 minutes of education and WOC Discharge recommendations entered.    Planned Follow Up: Later this week - if still here. Pt hoping to go home today pending VAC authorization. Updated care management with correct measurements. Paperwork not needed at this time as the express paperwork had been completed by care management over the weekend.    Plan for next visit: Reassess wound(s) and Reassess skin integrity

## 2021-12-14 NOTE — PLAN OF CARE
Problem: Adult Inpatient Plan of Care  Goal: Plan of Care Review  Outcome: Improving   Educated pt on treatment plan, pt voiced understanding.    Problem: Pain Acute  Goal: Acceptable Pain Control and Functional Ability  Outcome: Improving  Intervention: Develop Pain Management Plan  Recent Flowsheet Documentation  Taken 12/13/2021 1700 by Romi Dang RN  Pain Management Interventions:   medication (see MAR)   distraction   repositioned   Pain to LLE controlled with PRN ibuprofen & elevation.     Wound vac remains c/d/I to LLE & functioning.

## 2021-12-14 NOTE — PROGRESS NOTES
[facility]    Medicine Progress Note - Hospitalist Service       Date of Admission:  12/9/2021    Assessment & Plan            61-year-old female with a history of peripheral arterial disease, chronic left Achilles wound with recent I&D in 8/4/2021 as well as history of subacute thyroiditis, admitted with nonhealing wound of the left leg.      Infected left leg wound: history of chronic nonhealing leg wound in the left achilles area, status post multiple interventions with orthopedic surgery. Last surgery 8/2021. MRI on admission showed a sinus tract projecting to the third of the Achilles tendon with an abscess in the lower calf 2.4 x 1.2 x 10.  Infectious myositis seen. Received I&D and wound vac placement on 12/10. Wound culture sent on 12/9 growing Pseudomonas.   - Cipro 500 mg BID for 2 weeks post op (end date 12/25)  - Follow up intra op wound culture on 12/10: grows pseudomonas  - Follow up blood culture: no growth so far  - Wound care  - PT/OT  - Supportive care     Acute blood loss anemia: hemoglobin on admission was 10.4, post op dropped to 8.4 today. Monitor.      Subacute thyroiditis: TSH low, free T4 normal. Continue metoprolol.     Essential hypertension: BP controlled. Continue metoprolol.     GERD: continue protonix    Had planned for discharge today but were not able to get authorization for wound vac.        Diet: Regular Diet Adult    DVT Prophylaxis: Enoxaparin (Lovenox) SQ  Foster Catheter: Not present  Central Lines: None  Code Status: Full Code      Disposition Plan   Expected Discharge: 12/14/2021     Anticipated discharge location: home with family    Delays: wound vac approval       The patient's care was discussed with the Patient.    Elizabeth Berumen MD  Hospitalist Service  [facility]  Securely message with the Vocera Web Console (learn more here)  Text page via [facility] Paging/Directory        Clinically Significant Risk Factors Present on  Admission                ______________________________________________________________________    Interval History   Mrs. Ailyn Sanderson was feeling good and ready to go home today. She had no complaints.     Data reviewed today: I reviewed all medications, new labs and imaging results over the last 24 hours. I personally reviewed no images or EKG's today.    Physical Exam   Vital Signs: Temp: 98.4  F (36.9  C) Temp src: Oral BP: 137/79 Pulse: 93   Resp: 18 SpO2: 98 % O2 Device: None (Room air)    Weight: 119 lbs 3.2 oz  General Appearance: Awake, alert, no acute distress  Respiratory: clear bilaterally  Cardiovascular: RRR, no murmur noted  GI: soft, non tender, normal bowel sounds  Skin: no rash, wound vac in place on left leg      Data   Recent Labs   Lab 12/13/21  0605 12/12/21  0630 12/11/21  0614 12/11/21  0612 12/10/21  0557 12/09/21  1928   WBC 5.8 7.0  --  9.6 13.3* 17.2*   HGB 8.6* 8.1*  --  8.4* 9.1* 10.4*   MCV 81 81  --  82 81 80    381  --  396 458* 484*   INR  --   --   --   --  1.09  --    NA  --   --   --   --  142 137   POTASSIUM  --   --   --   --  3.8 3.7   CHLORIDE  --   --   --   --  112* 107   CO2  --   --   --   --  22 19*   BUN  --   --   --   --  11 11   CR  --  0.60  --   --  0.60 0.66   ANIONGAP  --   --   --   --  8 11   NILESH  --   --   --   --  7.9* 8.7   GLC  --   --  77  --  83 148*   ALBUMIN  --   --   --   --   --  3.2*   PROTTOTAL  --   --   --   --   --  6.6   BILITOTAL  --   --   --   --   --  0.4   ALKPHOS  --   --   --   --   --  77   ALT  --   --   --   --   --  <9   AST  --   --   --   --   --  12     No results found for this or any previous visit (from the past 24 hour(s)).  Medications       ciprofloxacin  500 mg Oral Q12H SHARAD     enoxaparin ANTICOAGULANT  40 mg Subcutaneous Q24H     metoprolol succinate ER  25 mg Oral BID     pantoprazole  40 mg Oral BID     polyethylene glycol  17 g Oral Daily     senna-docusate  1 tablet Oral BID     sodium chloride (PF)  3 mL  Intracatheter Q8H     sodium chloride (PF)  3 mL Intracatheter Q8H     vitamin D2  50,000 Units Oral Weekly

## 2021-12-14 NOTE — DISCHARGE INSTRUCTIONS
Wound location: L achilles  Change Days: T,F  Supplies: small kit  Cleanse with: Saline, pat dry.  Suction: Continuous at -125 mmHg pressure.  Back up plan: If VAC malfunctions or unable to maintain seal: VAC dressing must be removed and reapplied within 2 hours of the incident. If floor staff is not able to reapply, place NS moistened gauze in wound bed and cover with appropriate dressing to keep wound bed moist.   Change wet-to-dry dressing BID and notify home care

## 2021-12-14 NOTE — PROGRESS NOTES
Care Management Follow Up    Length of Stay (days): 5    Expected Discharge Date: 12/14/2021     Concerns to be Addressed:     Wound VAC approval   Patient plan of care discussed at interdisciplinary rounds: Yes    Anticipated Discharge Disposition:  Home with home care services     Anticipated Discharge Services:  Home Care RN services for wound VAC care  Anticipated Discharge DME:  Wound VAC from [employer]    Patient/family educated on Medicare website which has current facility and service quality ratings:  Yes  Education Provided on the Discharge Plan:  Per team  Patient/Family in Agreement with the Plan:  Yes    Referrals Placed by CM/SW:  Home care referrals, [facility]  Private pay costs discussed: Not applicable    Additional Information:  Awaiting approval for wound VAC through [facility]; Spoke with Abimael with [facility] who believes all require documentation had been received, will follow up. Will watch for approval, Abimael will also call writer once approval obtained. Will need home care RN services at discharge. [facility] can accept for start of care on 12/16, [facility] can accept for start of care 12/17; will keep agencies updated. Family to transport. Care Manager to follow.   1:16 PM Still awaiting approval from [facility] for wound VAC. Received call from WOC RN stating measurements were incorrect. Left message for Abimael with [facility] with call back extension [phone] in regards to updated measurements. Faxed updated WOC RN note to [facility].   2:40 PM Still waiting on [facility] approval for wound VAC. Left message for Abimael with call back extension [phone]. Called [facility] at [phone] to check in on status as approval has still not been obtained; form was not sent correctly and information is incomplete. Will call Ortho team to obtain [employer] form from them and verify completion and will refax.  4:06 PM Spoke with Abimael with [facility] she will send an Escript to ortho team to sign as the prescription was missing. Obtained email, first and last  name and NPI of signing provider for [facility], Hoda Wellington NPI [id number], and would be emailed to [email or web address]. Provide update to Abimael who will send Escript to ortho team and will work to get Wound VAC approved. Hand off given to Shavon SHEPHERD care manager who has wound VAC. Will await call from Abimael to confirm approval and have paperwork faxed to her. Once approved and paperwork obtained from Abimael, [facility] care manager will deliver wound VAC to patients room and have her sign the paperwork. Called and notified [facility] of plan for discharge home this evening if able to obtain wound VAC. Will fax orders if discharging this evening or will touch base again tomorrow. Start of care for nurse services will be on Friday 12/17; notified this was ok as patient will be seen in clinic by surgeon on Thursday 12/16.     Anna Fish RN

## 2021-12-14 NOTE — PLAN OF CARE
Problem: Pain Acute  Goal: Acceptable Pain Control and Functional Ability  Outcome: Improving   Pt's pain being managed with ibuprofen.  Problem: Surgical Site Infection  Goal: Improved Infection Symptoms  Outcome: Improving   Afebrile. PO antibiotic's. Wound Vac in place. [facility] called regarding wound vac alert of blockage. Many attempt's to resolve issue.

## 2021-12-14 NOTE — PROGRESS NOTES
Orthopedic Progress Note      Assessment: 4 Days Post-Op  S/P Procedure(s):  IRRIGATION AND DEBRIDEMENT, LEFT LOWER EXTREMITY WITH WOUND VAC PLACEMENT     Plan:   - Doing well today. No complaints, other than frustration with delay of wound vac.  - Preop and Intra-Op cultures positive for Pseudomonas, ID started Cipro 500 mg p.o. plan 2 weeks  - Weightbearing status: NWB RLE, in CAM boot unless at rest or for hygiene  - Anticoagulation: SCDs, wanda stockings and early ambulation.  - Discharge planning: Today, pending wound vac for home. Follow up with Dr. Wiley on 12/16/21 at [facility]      Subjective:  Pain: mild  Nausea, Vomiting:  No  Lightheadedness, Dizziness:  No  Neuro:  Patient denies new onset numbness or paresthesias    Patient is doing well today. She states her pain is minimal and swelling in the RLE has decreased significantly. She is frustrated with delay of discharge due to wound vac approval but understands that it can take time. No other complaints.    Objective:  /82 (BP Location: Right arm)   Pulse 96   Temp 98.4  F (36.9  C) (Oral)   Resp 18   Wt 54.1 kg (119 lb 3.2 oz)   SpO2 98%   BMI 20.46 kg/m    The patient is A&Ox3. Appears comfortable sitting in her chair.   Right foot is unwrapped during exam. Wound vac is in place and functioning. Minimal edema surrounding the incision, without other skin changes.  Sensation is intact to BLE  Dorsiflexion and plantar flexion are intact.  Dorsalis pedis pulses intact.  Calves are soft and non-tender.      Pertinent Labs   Lab Results: personally reviewed.   Lab Results   Component Value Date    INR 1.09 12/10/2021    INR 0.95 06/18/2021    INR 0.97 06/17/2021     Lab Results   Component Value Date    WBC 5.8 12/13/2021    HGB 8.6 (L) 12/13/2021    HCT 28.7 (L) 12/13/2021    MCV 81 12/13/2021     12/13/2021     Lab Results   Component Value Date     12/10/2021    CO2 22 12/10/2021         Report completed by:  Ailyn HANNA  ADAM Lambert  Date: 12/14/2021  Time: 11:31 AM

## 2021-12-14 NOTE — PLAN OF CARE
Problem: Adult Inpatient Plan of Care  Goal: Plan of Care Review  Outcome: No Change     Problem: Pain Acute  Goal: Acceptable Pain Control and Functional Ability  Outcome: No Change   Patient complains of minimal pain. VSS. Wound vac functioning properly over night.

## 2021-12-15 NOTE — PROGRESS NOTES
Patient discharged to self with personal vehicle at approx 2015.  Patient sent with protable woundvac and teaching provided.  Discharge paperwork signed and given to patient at that time.  Atarax given to patient from [facility].

## 2021-12-16 ENCOUNTER — TRANSFERRED RECORDS (OUTPATIENT)
Dept: HEALTH INFORMATION MANAGEMENT | Facility: CLINIC | Age: 61
End: 2021-12-16
Payer: COMMERCIAL

## 2021-12-17 LAB
BACTERIA TISS BX CULT: NORMAL
BACTERIA TISS BX CULT: NORMAL
BACTERIA WND CULT: NORMAL
BACTERIA WND CULT: NORMAL

## 2021-12-23 ENCOUNTER — TRANSFERRED RECORDS (OUTPATIENT)
Dept: HEALTH INFORMATION MANAGEMENT | Facility: CLINIC | Age: 61
End: 2021-12-23
Payer: COMMERCIAL

## 2021-12-24 DIAGNOSIS — E55.9 VITAMIN D DEFICIENCY: Primary | ICD-10-CM

## 2021-12-24 RX ORDER — ERGOCALCIFEROL 1.25 MG/1
50000 CAPSULE, LIQUID FILLED ORAL WEEKLY
Qty: 13 CAPSULE | Refills: 3 | Status: SHIPPED | OUTPATIENT
Start: 2021-12-24 | End: 2023-09-11

## 2021-12-24 NOTE — TELEPHONE ENCOUNTER
Refill Request    Medication name:   Pending Prescriptions:                       Disp   Refills    vitamin D2 (ERGOCALCIFEROL) 03479 units (*                    Sig: Take 1 capsule (50,000 Units) by mouth once a           week Saturdays

## 2022-01-03 ENCOUNTER — TRANSFERRED RECORDS (OUTPATIENT)
Dept: HEALTH INFORMATION MANAGEMENT | Facility: CLINIC | Age: 62
End: 2022-01-03
Payer: COMMERCIAL

## 2022-01-06 ENCOUNTER — OFFICE VISIT (OUTPATIENT)
Dept: INTERNAL MEDICINE | Facility: CLINIC | Age: 62
End: 2022-01-06
Payer: COMMERCIAL

## 2022-01-06 ENCOUNTER — LAB REQUISITION (OUTPATIENT)
Dept: LAB | Facility: CLINIC | Age: 62
End: 2022-01-06
Payer: COMMERCIAL

## 2022-01-06 VITALS
HEART RATE: 80 BPM | SYSTOLIC BLOOD PRESSURE: 122 MMHG | HEIGHT: 64 IN | BODY MASS INDEX: 20.32 KG/M2 | DIASTOLIC BLOOD PRESSURE: 74 MMHG | WEIGHT: 119 LBS

## 2022-01-06 DIAGNOSIS — E06.1 SUBACUTE THYROIDITIS: ICD-10-CM

## 2022-01-06 DIAGNOSIS — Z23 HIGH PRIORITY FOR 2019-NCOV VACCINE: ICD-10-CM

## 2022-01-06 DIAGNOSIS — T81.31XA DISRUPTION OF EXTERNAL OPERATION (SURGICAL) WOUND, NOT ELSEWHERE CLASSIFIED, INITIAL ENCOUNTER: ICD-10-CM

## 2022-01-06 DIAGNOSIS — L02.416 ABSCESS OF LEFT LOWER LEG: Primary | ICD-10-CM

## 2022-01-06 DIAGNOSIS — D50.9 IRON DEFICIENCY ANEMIA, UNSPECIFIED IRON DEFICIENCY ANEMIA TYPE: ICD-10-CM

## 2022-01-06 DIAGNOSIS — R00.2 PALPITATIONS: ICD-10-CM

## 2022-01-06 DIAGNOSIS — I73.9 PAD (PERIPHERAL ARTERY DISEASE) (H): ICD-10-CM

## 2022-01-06 DIAGNOSIS — I10 BENIGN ESSENTIAL HYPERTENSION: ICD-10-CM

## 2022-01-06 LAB
C REACTIVE PROTEIN LHE: 0.8 MG/DL (ref 0–0.8)
ERYTHROCYTE [DISTWIDTH] IN BLOOD BY AUTOMATED COUNT: 15.8 % (ref 10–15)
ERYTHROCYTE [SEDIMENTATION RATE] IN BLOOD BY WESTERGREN METHOD: 37 MM/HR (ref 0–20)
HCT VFR BLD AUTO: 32.5 % (ref 35–47)
HGB BLD-MCNC: 9.4 G/DL (ref 11.7–15.7)
MCH RBC QN AUTO: 23.4 PG (ref 26.5–33)
MCHC RBC AUTO-ENTMCNC: 28.9 G/DL (ref 31.5–36.5)
MCV RBC AUTO: 81 FL (ref 78–100)
PLATELET # BLD AUTO: 384 10E3/UL (ref 150–450)
RBC # BLD AUTO: 4.01 10E6/UL (ref 3.8–5.2)
WBC # BLD AUTO: 6.9 10E3/UL (ref 4–11)

## 2022-01-06 PROCEDURE — 99214 OFFICE O/P EST MOD 30 MIN: CPT | Performed by: INTERNAL MEDICINE

## 2022-01-06 PROCEDURE — 91306 COVID-19,PF,MODERNA (18+ YRS BOOSTER .25ML): CPT | Performed by: INTERNAL MEDICINE

## 2022-01-06 PROCEDURE — 36415 COLL VENOUS BLD VENIPUNCTURE: CPT | Mod: ORL | Performed by: ORTHOPAEDIC SURGERY

## 2022-01-06 PROCEDURE — 84443 ASSAY THYROID STIM HORMONE: CPT | Performed by: INTERNAL MEDICINE

## 2022-01-06 PROCEDURE — 85027 COMPLETE CBC AUTOMATED: CPT | Mod: ORL | Performed by: ORTHOPAEDIC SURGERY

## 2022-01-06 PROCEDURE — 85652 RBC SED RATE AUTOMATED: CPT | Mod: ORL | Performed by: ORTHOPAEDIC SURGERY

## 2022-01-06 PROCEDURE — 0064A COVID-19,PF,MODERNA (18+ YRS BOOSTER .25ML): CPT | Performed by: INTERNAL MEDICINE

## 2022-01-06 PROCEDURE — 86140 C-REACTIVE PROTEIN: CPT | Mod: ORL | Performed by: ORTHOPAEDIC SURGERY

## 2022-01-06 ASSESSMENT — MIFFLIN-ST. JEOR: SCORE: 1099.78

## 2022-01-07 DIAGNOSIS — E06.1 SUBACUTE THYROIDITIS: Primary | ICD-10-CM

## 2022-01-07 LAB
BACTERIA TISS BX CULT: NO GROWTH
BACTERIA TISS BX CULT: NO GROWTH
BACTERIA WND CULT: NO GROWTH
TSH SERPL DL<=0.005 MIU/L-ACNC: 0.95 UIU/ML (ref 0.3–5)

## 2022-01-07 NOTE — PROGRESS NOTES
Assessment & Plan     Abscess of left lower leg  Chronically infected and chronically nonhealing left Achilles wound with recent hospitalization with recurrent abscess.  Underwent incision and drainage on December 10.  No bone involvement.  Pseudomonas found.  Wound VAC was placed but subsequently discontinued.  Continues daily wound care and sees her orthopedic surgeon weekly.  Continues on ciprofloxacin.    Addendum-surgery is planned for January 12 and today's visit can serve as her preop clearance.  Her overall risk is low and no contraindication to proceeding with surgery and anesthesia.    PAD (peripheral artery disease) (H)  Evaluated by Dr. Aquiles Ng.  Underwent revascularization procedure last year.    Subacute thyroiditis  Continues on metoprolol but can only tolerate 50 mg daily dose.  TSH remains suppressed but improving when checked 4 weeks ago and T4 level is falling.  We will recheck TSH.  We discussed risk for hypothyroidism eventually developing  - TSH    Palpitations  Still having some intermittent palpitations attributed to subacute thyroiditis.  Unable to tolerate metoprolol beyond 50 mg.  Symptoms are slowly improving    Benign essential hypertension  Blood pressure well controlled    Iron deficiency anemia, unspecified iron deficiency anemia type  Chronic anemia.  Continues on iron replacement.  Last hemoglobin 9.4    High priority for 2019-nCoV vaccine  She is due for her Covid booster which is being provided today  - COVID-19,PF,MODERNA (18+ Yrs BOOSTER .25mL)              Return in about 4 weeks (around 2/3/2022) for Routine preventive.    Jame Dunn MD  [facility]          Subjective       HPI 61-year-old woman here to follow-up after hospitalization for chronic nonhealing wound involving left posterior leg found to have recurrent abscess needing I&D.  Also discussed subacute thyroiditis and chronic anemia.  See assessment and plan for  "details of visit    Current Outpatient Medications   Medication     hydrOXYzine (ATARAX) 25 MG tablet     ibuprofen (ADVIL/MOTRIN) 200 MG tablet     metoprolol succinate ER (TOPROL-XL) 25 MG 24 hr tablet     ondansetron (ZOFRAN) 4 MG tablet     pantoprazole (PROTONIX) 40 MG EC tablet     vitamin D2 (ERGOCALCIFEROL) 08888 units (1250 mcg) capsule     Current Facility-Administered Medications   Medication     denosumab (PROLIA) injection 60 mg        Review of Systems  No fevers or chills  Intermittent palpitations and dyspnea improving      Objective    Vitals:    01/06/22 1613   BP: 122/74   BP Location: Right arm   Patient Position: Sitting   Cuff Size: Adult Small   Pulse: 80   Weight: 54 kg (119 lb)   Height: 1.626 m (5' 4\")        Physical Exam    Healthy-appearing middle-aged woman  Walking boot applied not removed during today's visit      "

## 2022-01-07 NOTE — H&P (VIEW-ONLY)
Assessment & Plan     Abscess of left lower leg  Chronically infected and chronically nonhealing left Achilles wound with recent hospitalization with recurrent abscess.  Underwent incision and drainage on December 10.  No bone involvement.  Pseudomonas found.  Wound VAC was placed but subsequently discontinued.  Continues daily wound care and sees her orthopedic surgeon weekly.  Continues on ciprofloxacin.    Addendum-surgery is planned for January 12 and today's visit can serve as her preop clearance.  Her overall risk is low and no contraindication to proceeding with surgery and anesthesia.    PAD (peripheral artery disease) (H)  Evaluated by Dr. Aquiles Ng.  Underwent revascularization procedure last year.    Subacute thyroiditis  Continues on metoprolol but can only tolerate 50 mg daily dose.  TSH remains suppressed but improving when checked 4 weeks ago and T4 level is falling.  We will recheck TSH.  We discussed risk for hypothyroidism eventually developing  - TSH    Palpitations  Still having some intermittent palpitations attributed to subacute thyroiditis.  Unable to tolerate metoprolol beyond 50 mg.  Symptoms are slowly improving    Benign essential hypertension  Blood pressure well controlled    Iron deficiency anemia, unspecified iron deficiency anemia type  Chronic anemia.  Continues on iron replacement.  Last hemoglobin 9.4    High priority for 2019-nCoV vaccine  She is due for her Covid booster which is being provided today  - COVID-19,PF,MODERNA (18+ Yrs BOOSTER .25mL)              Return in about 4 weeks (around 2/3/2022) for Routine preventive.    Jame Dunn MD  [facility]          Subjective       HPI 61-year-old woman here to follow-up after hospitalization for chronic nonhealing wound involving left posterior leg found to have recurrent abscess needing I&D.  Also discussed subacute thyroiditis and chronic anemia.  See assessment and plan for  "details of visit    Current Outpatient Medications   Medication     hydrOXYzine (ATARAX) 25 MG tablet     ibuprofen (ADVIL/MOTRIN) 200 MG tablet     metoprolol succinate ER (TOPROL-XL) 25 MG 24 hr tablet     ondansetron (ZOFRAN) 4 MG tablet     pantoprazole (PROTONIX) 40 MG EC tablet     vitamin D2 (ERGOCALCIFEROL) 28561 units (1250 mcg) capsule     Current Facility-Administered Medications   Medication     denosumab (PROLIA) injection 60 mg        Review of Systems  No fevers or chills  Intermittent palpitations and dyspnea improving      Objective    Vitals:    01/06/22 1613   BP: 122/74   BP Location: Right arm   Patient Position: Sitting   Cuff Size: Adult Small   Pulse: 80   Weight: 54 kg (119 lb)   Height: 1.626 m (5' 4\")        Physical Exam    Healthy-appearing middle-aged woman  Walking boot applied not removed during today's visit      "

## 2022-01-11 ENCOUNTER — LAB (OUTPATIENT)
Dept: LAB | Facility: CLINIC | Age: 62
End: 2022-01-11
Attending: ORTHOPAEDIC SURGERY
Payer: COMMERCIAL

## 2022-01-11 ENCOUNTER — TRANSFERRED RECORDS (OUTPATIENT)
Dept: HEALTH INFORMATION MANAGEMENT | Facility: CLINIC | Age: 62
End: 2022-01-11

## 2022-01-11 ENCOUNTER — MYC MEDICAL ADVICE (OUTPATIENT)
Dept: INTERNAL MEDICINE | Facility: CLINIC | Age: 62
End: 2022-01-11

## 2022-01-11 ENCOUNTER — TELEPHONE (OUTPATIENT)
Dept: INTERNAL MEDICINE | Facility: CLINIC | Age: 62
End: 2022-01-11

## 2022-01-11 DIAGNOSIS — Z11.52 ENCOUNTER FOR PREOPERATIVE SCREENING LABORATORY TESTING FOR COVID-19 VIRUS: Primary | ICD-10-CM

## 2022-01-11 DIAGNOSIS — Z01.812 ENCOUNTER FOR PREOPERATIVE SCREENING LABORATORY TESTING FOR COVID-19 VIRUS: ICD-10-CM

## 2022-01-11 DIAGNOSIS — Z11.52 ENCOUNTER FOR PREOPERATIVE SCREENING LABORATORY TESTING FOR COVID-19 VIRUS: ICD-10-CM

## 2022-01-11 DIAGNOSIS — Z01.812 ENCOUNTER FOR PREOPERATIVE SCREENING LABORATORY TESTING FOR COVID-19 VIRUS: Primary | ICD-10-CM

## 2022-01-11 LAB — SARS-COV-2 RNA RESP QL NAA+PROBE: POSITIVE

## 2022-01-11 PROCEDURE — U0005 INFEC AGEN DETEC AMPLI PROBE: HCPCS

## 2022-01-11 PROCEDURE — U0003 INFECTIOUS AGENT DETECTION BY NUCLEIC ACID (DNA OR RNA); SEVERE ACUTE RESPIRATORY SYNDROME CORONAVIRUS 2 (SARS-COV-2) (CORONAVIRUS DISEASE [COVID-19]), AMPLIFIED PROBE TECHNIQUE, MAKING USE OF HIGH THROUGHPUT TECHNOLOGIES AS DESCRIBED BY CMS-2020-01-R: HCPCS

## 2022-01-11 NOTE — TELEPHONE ENCOUNTER
Reason for Call:      Patient just had an appointment with Dr. Dunn on 1/06/22.  She has a procedure coming up tomorrow and is wondering if Shaun will update her HMP- instead of her having to come in for a pre-op.  She said that her surgeon, Saurabh, is okay with that- if Shaun is okay with it.  There are no openings on his schedule today.    Phone Number Patient can be reached at: Home number on file [phone] (home)    Best Time: Any    Can we leave a detailed message on this number? YES    Call taken on 1/11/2022 at 10:18 AM by Aislinn Martinez CMA

## 2022-01-11 NOTE — TELEPHONE ENCOUNTER
I have made an addendum to my note from January 6 and that can serve as her preop.  Please notify and send if needed.

## 2022-01-12 ENCOUNTER — ANESTHESIA EVENT (OUTPATIENT)
Dept: SURGERY | Facility: CLINIC | Age: 62
End: 2022-01-12
Payer: COMMERCIAL

## 2022-01-12 ENCOUNTER — ANESTHESIA (OUTPATIENT)
Dept: SURGERY | Facility: CLINIC | Age: 62
End: 2022-01-12
Payer: COMMERCIAL

## 2022-01-12 ENCOUNTER — HOSPITAL ENCOUNTER (INPATIENT)
Facility: CLINIC | Age: 62
LOS: 4 days | Discharge: HOME HEALTH | End: 2022-01-16
Attending: ORTHOPAEDIC SURGERY | Admitting: ORTHOPAEDIC SURGERY
Payer: COMMERCIAL

## 2022-01-12 VITALS — HEIGHT: 64 IN | WEIGHT: 115 LBS | BODY MASS INDEX: 19.63 KG/M2

## 2022-01-12 DIAGNOSIS — L08.9 ABRASION OF LEFT LOWER LEG WITH INFECTION, INITIAL ENCOUNTER: ICD-10-CM

## 2022-01-12 DIAGNOSIS — S80.812A ABRASION OF LEFT LOWER LEG WITH INFECTION, INITIAL ENCOUNTER: ICD-10-CM

## 2022-01-12 DIAGNOSIS — T81.89XD NON-HEALING SURGICAL WOUND, SUBSEQUENT ENCOUNTER: Primary | ICD-10-CM

## 2022-01-12 DIAGNOSIS — L02.416 ABSCESS OF LEFT LOWER LEG: ICD-10-CM

## 2022-01-12 LAB
GRAM STAIN RESULT: ABNORMAL

## 2022-01-12 PROCEDURE — 250N000011 HC RX IP 250 OP 636: Performed by: NURSE ANESTHETIST, CERTIFIED REGISTERED

## 2022-01-12 PROCEDURE — 250N000011 HC RX IP 250 OP 636: Performed by: PHYSICIAN ASSISTANT

## 2022-01-12 PROCEDURE — 120N000001 HC R&B MED SURG/OB

## 2022-01-12 PROCEDURE — 370N000017 HC ANESTHESIA TECHNICAL FEE, PER MIN: Performed by: ORTHOPAEDIC SURGERY

## 2022-01-12 PROCEDURE — 250N000011 HC RX IP 250 OP 636: Performed by: ANESTHESIOLOGY

## 2022-01-12 PROCEDURE — 250N000013 HC RX MED GY IP 250 OP 250 PS 637: Performed by: ORTHOPAEDIC SURGERY

## 2022-01-12 PROCEDURE — 250N000009 HC RX 250: Performed by: ANESTHESIOLOGY

## 2022-01-12 PROCEDURE — 87185 SC STD ENZYME DETCJ PER NZM: CPT | Performed by: ORTHOPAEDIC SURGERY

## 2022-01-12 PROCEDURE — 258N000003 HC RX IP 258 OP 636: Performed by: ORTHOPAEDIC SURGERY

## 2022-01-12 PROCEDURE — 87205 SMEAR GRAM STAIN: CPT | Performed by: ORTHOPAEDIC SURGERY

## 2022-01-12 PROCEDURE — 0JDM0ZZ EXTRACTION OF LEFT UPPER LEG SUBCUTANEOUS TISSUE AND FASCIA, OPEN APPROACH: ICD-10-PCS | Performed by: ORTHOPAEDIC SURGERY

## 2022-01-12 PROCEDURE — 258N000003 HC RX IP 258 OP 636: Performed by: ANESTHESIOLOGY

## 2022-01-12 PROCEDURE — 272N000001 HC OR GENERAL SUPPLY STERILE: Performed by: ORTHOPAEDIC SURGERY

## 2022-01-12 PROCEDURE — 87077 CULTURE AEROBIC IDENTIFY: CPT | Performed by: ORTHOPAEDIC SURGERY

## 2022-01-12 PROCEDURE — 999N000141 HC STATISTIC PRE-PROCEDURE NURSING ASSESSMENT: Performed by: ORTHOPAEDIC SURGERY

## 2022-01-12 PROCEDURE — 250N000009 HC RX 250: Performed by: NURSE ANESTHETIST, CERTIFIED REGISTERED

## 2022-01-12 PROCEDURE — 360N000075 HC SURGERY LEVEL 2, PER MIN: Performed by: ORTHOPAEDIC SURGERY

## 2022-01-12 PROCEDURE — 258N000001 HC RX 258: Performed by: ORTHOPAEDIC SURGERY

## 2022-01-12 PROCEDURE — 250N000011 HC RX IP 250 OP 636: Performed by: ORTHOPAEDIC SURGERY

## 2022-01-12 RX ORDER — ONDANSETRON 4 MG/1
4 TABLET, ORALLY DISINTEGRATING ORAL EVERY 30 MIN PRN
Status: DISCONTINUED | OUTPATIENT
Start: 2022-01-12 | End: 2022-01-12 | Stop reason: HOSPADM

## 2022-01-12 RX ORDER — CEFAZOLIN SODIUM 1 G/3ML
1 INJECTION, POWDER, FOR SOLUTION INTRAMUSCULAR; INTRAVENOUS EVERY 8 HOURS
Status: COMPLETED | OUTPATIENT
Start: 2022-01-12 | End: 2022-01-13

## 2022-01-12 RX ORDER — PANTOPRAZOLE SODIUM 20 MG/1
40 TABLET, DELAYED RELEASE ORAL 2 TIMES DAILY
Status: DISCONTINUED | OUTPATIENT
Start: 2022-01-12 | End: 2022-01-16 | Stop reason: HOSPADM

## 2022-01-12 RX ORDER — ONDANSETRON 2 MG/ML
4 INJECTION INTRAMUSCULAR; INTRAVENOUS EVERY 30 MIN PRN
Status: DISCONTINUED | OUTPATIENT
Start: 2022-01-12 | End: 2022-01-12 | Stop reason: HOSPADM

## 2022-01-12 RX ORDER — NALOXONE HYDROCHLORIDE 0.4 MG/ML
0.4 INJECTION, SOLUTION INTRAMUSCULAR; INTRAVENOUS; SUBCUTANEOUS
Status: DISCONTINUED | OUTPATIENT
Start: 2022-01-12 | End: 2022-01-16 | Stop reason: HOSPADM

## 2022-01-12 RX ORDER — OXYCODONE HYDROCHLORIDE 5 MG/1
10 TABLET ORAL EVERY 4 HOURS PRN
Status: DISCONTINUED | OUTPATIENT
Start: 2022-01-12 | End: 2022-01-16 | Stop reason: HOSPADM

## 2022-01-12 RX ORDER — ONDANSETRON 4 MG/1
4 TABLET, FILM COATED ORAL EVERY 6 HOURS PRN
Status: DISCONTINUED | OUTPATIENT
Start: 2022-01-12 | End: 2022-01-12

## 2022-01-12 RX ORDER — ACETAMINOPHEN 325 MG/1
650 TABLET ORAL EVERY 4 HOURS PRN
Status: DISCONTINUED | OUTPATIENT
Start: 2022-01-15 | End: 2022-01-16 | Stop reason: HOSPADM

## 2022-01-12 RX ORDER — FENTANYL CITRATE 50 UG/ML
25 INJECTION, SOLUTION INTRAMUSCULAR; INTRAVENOUS EVERY 5 MIN PRN
Status: DISCONTINUED | OUTPATIENT
Start: 2022-01-12 | End: 2022-01-12 | Stop reason: HOSPADM

## 2022-01-12 RX ORDER — ONDANSETRON 4 MG/1
4 TABLET, ORALLY DISINTEGRATING ORAL EVERY 6 HOURS PRN
Status: DISCONTINUED | OUTPATIENT
Start: 2022-01-12 | End: 2022-01-16 | Stop reason: HOSPADM

## 2022-01-12 RX ORDER — OXYCODONE HYDROCHLORIDE 5 MG/1
5 TABLET ORAL EVERY 4 HOURS PRN
Status: DISCONTINUED | OUTPATIENT
Start: 2022-01-12 | End: 2022-01-16 | Stop reason: HOSPADM

## 2022-01-12 RX ORDER — PROPOFOL 10 MG/ML
INJECTION, EMULSION INTRAVENOUS CONTINUOUS PRN
Status: DISCONTINUED | OUTPATIENT
Start: 2022-01-12 | End: 2022-01-12

## 2022-01-12 RX ORDER — ASPIRIN 81 MG/1
81 TABLET ORAL 2 TIMES DAILY
Status: DISCONTINUED | OUTPATIENT
Start: 2022-01-12 | End: 2022-01-16 | Stop reason: HOSPADM

## 2022-01-12 RX ORDER — SODIUM CHLORIDE, SODIUM LACTATE, POTASSIUM CHLORIDE, CALCIUM CHLORIDE 600; 310; 30; 20 MG/100ML; MG/100ML; MG/100ML; MG/100ML
INJECTION, SOLUTION INTRAVENOUS CONTINUOUS
Status: DISCONTINUED | OUTPATIENT
Start: 2022-01-12 | End: 2022-01-12 | Stop reason: HOSPADM

## 2022-01-12 RX ORDER — NALOXONE HYDROCHLORIDE 0.4 MG/ML
0.2 INJECTION, SOLUTION INTRAMUSCULAR; INTRAVENOUS; SUBCUTANEOUS
Status: DISCONTINUED | OUTPATIENT
Start: 2022-01-12 | End: 2022-01-16 | Stop reason: HOSPADM

## 2022-01-12 RX ORDER — ONDANSETRON 2 MG/ML
INJECTION INTRAMUSCULAR; INTRAVENOUS PRN
Status: DISCONTINUED | OUTPATIENT
Start: 2022-01-12 | End: 2022-01-12

## 2022-01-12 RX ORDER — BUPIVACAINE HYDROCHLORIDE 5 MG/ML
INJECTION, SOLUTION EPIDURAL; INTRACAUDAL
Status: COMPLETED | OUTPATIENT
Start: 2022-01-12 | End: 2022-01-12

## 2022-01-12 RX ORDER — LIDOCAINE HYDROCHLORIDE 10 MG/ML
INJECTION, SOLUTION INFILTRATION; PERINEURAL PRN
Status: DISCONTINUED | OUTPATIENT
Start: 2022-01-12 | End: 2022-01-12

## 2022-01-12 RX ORDER — POLYETHYLENE GLYCOL 3350 17 G/17G
17 POWDER, FOR SOLUTION ORAL DAILY
Status: DISCONTINUED | OUTPATIENT
Start: 2022-01-13 | End: 2022-01-16 | Stop reason: HOSPADM

## 2022-01-12 RX ORDER — DEXAMETHASONE SODIUM PHOSPHATE 4 MG/ML
INJECTION, SOLUTION INTRA-ARTICULAR; INTRALESIONAL; INTRAMUSCULAR; INTRAVENOUS; SOFT TISSUE PRN
Status: DISCONTINUED | OUTPATIENT
Start: 2022-01-12 | End: 2022-01-12

## 2022-01-12 RX ORDER — LIDOCAINE 40 MG/G
CREAM TOPICAL
Status: DISCONTINUED | OUTPATIENT
Start: 2022-01-12 | End: 2022-01-16 | Stop reason: HOSPADM

## 2022-01-12 RX ORDER — FLUMAZENIL 0.1 MG/ML
0.2 INJECTION, SOLUTION INTRAVENOUS
Status: DISCONTINUED | OUTPATIENT
Start: 2022-01-12 | End: 2022-01-12 | Stop reason: HOSPADM

## 2022-01-12 RX ORDER — CEFAZOLIN SODIUM 2 G/100ML
2 INJECTION, SOLUTION INTRAVENOUS
Status: COMPLETED | OUTPATIENT
Start: 2022-01-12 | End: 2022-01-12

## 2022-01-12 RX ORDER — HYDROXYZINE HYDROCHLORIDE 25 MG/1
25 TABLET, FILM COATED ORAL EVERY 6 HOURS PRN
Status: DISCONTINUED | OUTPATIENT
Start: 2022-01-12 | End: 2022-01-16 | Stop reason: HOSPADM

## 2022-01-12 RX ORDER — BISACODYL 10 MG
10 SUPPOSITORY, RECTAL RECTAL DAILY PRN
Status: DISCONTINUED | OUTPATIENT
Start: 2022-01-12 | End: 2022-01-16 | Stop reason: HOSPADM

## 2022-01-12 RX ORDER — CEFAZOLIN SODIUM 2 G/100ML
2 INJECTION, SOLUTION INTRAVENOUS SEE ADMIN INSTRUCTIONS
Status: DISCONTINUED | OUTPATIENT
Start: 2022-01-12 | End: 2022-01-12 | Stop reason: HOSPADM

## 2022-01-12 RX ORDER — METOPROLOL SUCCINATE 25 MG/1
25 TABLET, EXTENDED RELEASE ORAL EVERY EVENING
Status: DISCONTINUED | OUTPATIENT
Start: 2022-01-12 | End: 2022-01-16 | Stop reason: HOSPADM

## 2022-01-12 RX ORDER — LIDOCAINE 40 MG/G
CREAM TOPICAL
Status: DISCONTINUED | OUTPATIENT
Start: 2022-01-12 | End: 2022-01-12 | Stop reason: HOSPADM

## 2022-01-12 RX ORDER — PROCHLORPERAZINE MALEATE 10 MG
10 TABLET ORAL EVERY 6 HOURS PRN
Status: DISCONTINUED | OUTPATIENT
Start: 2022-01-12 | End: 2022-01-16 | Stop reason: HOSPADM

## 2022-01-12 RX ORDER — HYDROMORPHONE HCL IN WATER/PF 6 MG/30 ML
0.2 PATIENT CONTROLLED ANALGESIA SYRINGE INTRAVENOUS EVERY 5 MIN PRN
Status: DISCONTINUED | OUTPATIENT
Start: 2022-01-12 | End: 2022-01-12 | Stop reason: HOSPADM

## 2022-01-12 RX ORDER — ACETAMINOPHEN 325 MG/1
975 TABLET ORAL EVERY 8 HOURS
Status: DISPENSED | OUTPATIENT
Start: 2022-01-12 | End: 2022-01-15

## 2022-01-12 RX ORDER — AMOXICILLIN 250 MG
1 CAPSULE ORAL 2 TIMES DAILY
Status: DISCONTINUED | OUTPATIENT
Start: 2022-01-12 | End: 2022-01-16 | Stop reason: HOSPADM

## 2022-01-12 RX ORDER — ERGOCALCIFEROL 1.25 MG/1
50000 CAPSULE, LIQUID FILLED ORAL WEEKLY
Status: DISCONTINUED | OUTPATIENT
Start: 2022-01-15 | End: 2022-01-16 | Stop reason: HOSPADM

## 2022-01-12 RX ORDER — ONDANSETRON 2 MG/ML
4 INJECTION INTRAMUSCULAR; INTRAVENOUS EVERY 6 HOURS PRN
Status: DISCONTINUED | OUTPATIENT
Start: 2022-01-12 | End: 2022-01-16 | Stop reason: HOSPADM

## 2022-01-12 RX ORDER — VANCOMYCIN HYDROCHLORIDE 1 G/20ML
1 INJECTION, POWDER, LYOPHILIZED, FOR SOLUTION INTRAVENOUS ONCE
Status: COMPLETED | OUTPATIENT
Start: 2022-01-12 | End: 2022-01-12

## 2022-01-12 RX ORDER — IBUPROFEN 200 MG
200-400 TABLET ORAL 2 TIMES DAILY
Status: DISCONTINUED | OUTPATIENT
Start: 2022-01-12 | End: 2022-01-13

## 2022-01-12 RX ORDER — SODIUM CHLORIDE, SODIUM LACTATE, POTASSIUM CHLORIDE, CALCIUM CHLORIDE 600; 310; 30; 20 MG/100ML; MG/100ML; MG/100ML; MG/100ML
INJECTION, SOLUTION INTRAVENOUS CONTINUOUS
Status: DISCONTINUED | OUTPATIENT
Start: 2022-01-12 | End: 2022-01-16 | Stop reason: HOSPADM

## 2022-01-12 RX ORDER — FENTANYL CITRATE 50 UG/ML
25-50 INJECTION, SOLUTION INTRAMUSCULAR; INTRAVENOUS
Status: DISCONTINUED | OUTPATIENT
Start: 2022-01-12 | End: 2022-01-12 | Stop reason: HOSPADM

## 2022-01-12 RX ADMIN — DEXAMETHASONE SODIUM PHOSPHATE 4 MG: 4 INJECTION, SOLUTION INTRA-ARTICULAR; INTRALESIONAL; INTRAMUSCULAR; INTRAVENOUS; SOFT TISSUE at 15:18

## 2022-01-12 RX ADMIN — LIDOCAINE HYDROCHLORIDE 3 ML: 10 INJECTION, SOLUTION INFILTRATION; PERINEURAL at 14:57

## 2022-01-12 RX ADMIN — ACETAMINOPHEN 975 MG: 325 TABLET ORAL at 17:23

## 2022-01-12 RX ADMIN — PANTOPRAZOLE SODIUM 40 MG: 20 TABLET, DELAYED RELEASE ORAL at 20:06

## 2022-01-12 RX ADMIN — CEFAZOLIN 1 G: 1 INJECTION, POWDER, FOR SOLUTION INTRAMUSCULAR; INTRAVENOUS at 22:55

## 2022-01-12 RX ADMIN — CEFAZOLIN SODIUM 2 G: 2 INJECTION, SOLUTION INTRAVENOUS at 15:20

## 2022-01-12 RX ADMIN — SODIUM CHLORIDE, POTASSIUM CHLORIDE, SODIUM LACTATE AND CALCIUM CHLORIDE: 600; 310; 30; 20 INJECTION, SOLUTION INTRAVENOUS at 17:22

## 2022-01-12 RX ADMIN — ASPIRIN 81 MG: 81 TABLET, COATED ORAL at 20:06

## 2022-01-12 RX ADMIN — Medication 1000 MG: at 15:20

## 2022-01-12 RX ADMIN — SODIUM CHLORIDE, POTASSIUM CHLORIDE, SODIUM LACTATE AND CALCIUM CHLORIDE: 600; 310; 30; 20 INJECTION, SOLUTION INTRAVENOUS at 14:18

## 2022-01-12 RX ADMIN — ONDANSETRON 4 MG: 2 INJECTION INTRAMUSCULAR; INTRAVENOUS at 15:18

## 2022-01-12 RX ADMIN — MIDAZOLAM 2 MG: 1 INJECTION INTRAMUSCULAR; INTRAVENOUS at 15:02

## 2022-01-12 RX ADMIN — BUPIVACAINE HYDROCHLORIDE 8 ML: 5 INJECTION, SOLUTION EPIDURAL; INTRACAUDAL; PERINEURAL at 14:04

## 2022-01-12 RX ADMIN — FENTANYL CITRATE 50 MCG: 50 INJECTION, SOLUTION INTRAMUSCULAR; INTRAVENOUS at 14:01

## 2022-01-12 RX ADMIN — MIDAZOLAM 2 MG: 1 INJECTION INTRAMUSCULAR; INTRAVENOUS at 14:02

## 2022-01-12 RX ADMIN — FENTANYL CITRATE 50 MCG: 50 INJECTION, SOLUTION INTRAMUSCULAR; INTRAVENOUS at 14:02

## 2022-01-12 RX ADMIN — PROPOFOL 100 MCG/KG/MIN: 10 INJECTION, EMULSION INTRAVENOUS at 14:53

## 2022-01-12 RX ADMIN — IBUPROFEN 400 MG: 200 TABLET, FILM COATED ORAL at 21:44

## 2022-01-12 RX ADMIN — BUPIVACAINE HYDROCHLORIDE 19 ML: 5 INJECTION, SOLUTION EPIDURAL; INTRACAUDAL at 14:06

## 2022-01-12 ASSESSMENT — ACTIVITIES OF DAILY LIVING (ADL)
ADLS_ACUITY_SCORE: 6
ADLS_ACUITY_SCORE: 7
ADLS_ACUITY_SCORE: 6
ADLS_ACUITY_SCORE: 7
ADLS_ACUITY_SCORE: 10
ADLS_ACUITY_SCORE: 7
ADLS_ACUITY_SCORE: 6
ADLS_ACUITY_SCORE: 7
ADLS_ACUITY_SCORE: 6
ADLS_ACUITY_SCORE: 7
ADLS_ACUITY_SCORE: 7

## 2022-01-12 ASSESSMENT — LIFESTYLE VARIABLES: TOBACCO_USE: 1

## 2022-01-12 ASSESSMENT — MIFFLIN-ST. JEOR: SCORE: 1100.03

## 2022-01-12 NOTE — INTERVAL H&P NOTE
I have reviewed the surgical (or preoperative) H&P that is linked to this encounter, and examined the patient. Noted changes include: Cardiovascular exam was completed S1-S2 no murmurs heard.  Chest was clear to auscultation.  Abdomen soft and nontender.  Chronic draining abscess of left lower leg unchanged since exam yesterday.  Erythema and tenderness and some fluctuance surrounding the posterior incision extending from proximal gastroc to distal Achilles insertion.  2 areas of dehiscence 1 more proximally at approximately the musculotendinous junction of the gastroc and a larger more circular dehiscence at the distal Achilles insertion.

## 2022-01-12 NOTE — ANESTHESIA POSTPROCEDURE EVALUATION
Patient: Anna Sanderson    Procedure: Procedure(s):  LEFT CALF ACHILLES IRRIGATION AND DEBRIDEMENT       Diagnosis:Dehiscence of operative wound [T81.31XA]  Painful ankle [M25.579]  Diagnosis Additional Information: No value filed.    Anesthesia Type:  General    Note:  Disposition: Inpatient   Postop Pain Control: Uneventful            Sign Out: Well controlled pain   PONV: No   Neuro/Psych: Uneventful            Sign Out: Acceptable/Baseline neuro status   Airway/Respiratory: Uneventful            Sign Out: Acceptable/Baseline resp. status   CV/Hemodynamics: Uneventful            Sign Out: Acceptable CV status; No obvious hypovolemia; No obvious fluid overload   Other NRE: NONE   DID A NON-ROUTINE EVENT OCCUR? No           Last vitals:  Vitals Value Taken Time   /67 01/12/22 1645   Temp 36.3  C (97.4  F) 01/12/22 1645   Pulse 92 01/12/22 1645   Resp 18 01/12/22 1645   SpO2 97 % 01/12/22 1645       Electronically Signed By: Reinaldo Kenney MD  January 12, 2022  5:12 PM

## 2022-01-12 NOTE — ANESTHESIA PREPROCEDURE EVALUATION
Anesthesia Pre-Procedure Evaluation    Patient: Anna Sanderson   MRN: [id number] : 1960        Preoperative Diagnosis: Achilles tendon infection [M65.10]  Non-healing surgical wound, subsequent encounter [T81.89XD]    Procedure : Procedure(s):  IRRIGATION AND DEBRIDEMENT, LOWER EXTREMITY WITH WOUND VAC PLACEMENT          Past Medical History:   Diagnosis Date     Abdominal pain, epigastric 2019     Abnormal CT of the chest 2021     Abnormal TSH     TSH suppressed 0.18 and normal free T4, recheck in 3 months     Abscess of left lower leg 2021     Achilles tendon infection      Allergic rhinitis 2020     Chronic cough 2020    CT chest with mild bronchiectasis and small area of consolidation     Deep vein thrombosis (DVT) of brachial vein of left upper extremity (H) 2018    Associated with PICC line, follow-up ultrasound showing resolution of thrombus     Dermatitis      Family history of myocardial infarction     dad 35  mother 70     Fatigue 2017     GERD (gastroesophageal reflux disease)     EGD with esophagitis , experiencing chest pain and dysphagia     History of anesthesia complications      Hypertension      Impaired fasting glucose     Glucose 119 2021.  A1c 5.3%     Iron deficiency anemia 2015    Has required iron infusions     Kidney stones      Microscopic hematuria 2021    Longstanding finding     Motion sickness      MRSA infection 2020    Left leg wound     Open wound of foot, complicated     Achilles tendon rupture complicated by infected open wound with multiple surgeries including skin grafting     Osteoporosis of multiple sites without pathological fracture     DEXA with T score -2.4 L1 L4, -3.2 left femoral neck and -2.6 right femoral neck     Ovarian cyst      Palpitations 2021    Event monitor showing sinus tachycardia no arrhythmia 2021     PONV (postoperative nausea and vomiting)      SIRS  (systemic inflammatory response syndrome) (H) 2019     SOB (shortness of breath) 2021     Subacute thyroiditis 10/15/2021    TSH less than 0.1 and abnormal thyroid uptake scan 2021, asymptomatic     Vitamin D deficiency 2020      Past Surgical History:   Procedure Laterality Date     ANKLE SURGERY      Multiple surgeries [id number] after complications from Achilles rupture and repair with nonhealing open wound including skin grafting     APPLY WOUND VAC Left 2020    Procedure: VERAFLO WOUND VAC APPLICATION;  Surgeon: Mitchell Her MD;  Location:  OR     APPLY WOUND VAC Left 11/10/2020    Procedure: WOUND VAC APPLICATION;  Surgeon: Mitchell Her MD;  Location:  OR      SECTION      x3     GRAFT SKIN SPLIT THICKNESS FROM EXTREMITY Left 11/10/2020    Procedure: SURGICAL PROCUREMENT, SPLIT-THICKNESS SKIN GRAFT, HARVEST FROM LEFT THIGH TO LEFT LEG WOUND;  Surgeon: Mitchell Her MD;  Location:  OR     GYN SURGERY      hysterectomy,  x 3     GYN SURGERY      right oophorectomy     HYSTERECTOMY       IR FOREIGN BODY REMOVAL  2008     IR LOWER EXTREMITY ANGIOGRAM LEFT  10/2/2020     IRRIGATION AND DEBRIDEMENT LOWER EXTREMITY, COMBINED Left 2020    Procedure: MISONIX IRRIGATION AND DEBRIDEMENT LOWER EXTREMITY WITH VASHE APPLICATION;  Surgeon: Mitchell Her MD;  Location:  OR     IRRIGATION AND DEBRIDEMENT LOWER EXTREMITY, COMBINED Left 12/10/2021    Procedure: IRRIGATION AND DEBRIDEMENT, LEFT LOWER EXTREMITY WITH WOUND VAC PLACEMENT;  Surgeon: Carei Wiley MD;  Location: [facility] OR     OOPHORECTOMY Right      PICC  2021          PICC AND MIDLINE TEAM LINE INSERTION  3/23/2018          SOFT TISSUE SURGERY      multiple left Achilles area surgeries      Allergies   Allergen Reactions     No Clinical Screening - See Comments Dermatitis and Rash     CHLORAPREP  Skin blisters- in the past     Morphine Hives     Adhesive Tape  Rash     Other Drug Allergy (See Comments) Other (See Comments)     CHLORAPREP Blisters.      Social History     Tobacco Use     Smoking status: Former Smoker     Types: Cigarettes     Quit date: 3/14/2003     Years since quittin.8     Smokeless tobacco: Never Used     Tobacco comment: Quit    Substance Use Topics     Alcohol use: No     Comment: Alcoholic Drinks/day: rare      Wt Readings from Last 1 Encounters:   22 54 kg (119 lb)        Anesthesia Evaluation            ROS/MED HX  ENT/Pulmonary:     (+) tobacco use, Past use,     Neurologic:  - neg neurologic ROS     Cardiovascular: Comment: ROBEL  MTHFR    (+) hypertension-----    METS/Exercise Tolerance:     Hematologic:     (+) History of blood clots, pt is not anticoagulated, anemia,     Musculoskeletal: Comment: osteoporosis      GI/Hepatic: Comment: esophagitis    (+) GERD, Asymptomatic on medication,     Renal/Genitourinary:     (+) renal disease, Nephrolithiasis ,     Endo:     (+) thyroid problem,     Psychiatric/Substance Use:  - neg psychiatric ROS     Infectious Disease: Comment: Infected wound LLE      Malignancy:       Other:  - neg other ROS          Physical Exam    Airway        Mallampati: II   TM distance: > 3 FB   Neck ROM: full   Mouth opening: > 3 cm    Respiratory Devices and Support         Dental           Cardiovascular   cardiovascular exam normal       Rhythm and rate: regular and normal     Pulmonary   pulmonary exam normal        breath sounds clear to auscultation           OUTSIDE LABS:  CBC:   Lab Results   Component Value Date    WBC 6.9 2022    WBC 5.8 2021    HGB 9.4 (L) 2022    HGB 8.6 (L) 2021    HCT 32.5 (L) 2022    HCT 28.7 (L) 2021     2022     2021     BMP:   Lab Results   Component Value Date     12/10/2021     2021    POTASSIUM 3.8 12/10/2021    POTASSIUM 3.7 2021    CHLORIDE 112 (H) 12/10/2021    CHLORIDE 107 2021     CO2 22 12/10/2021    CO2 19 (L) 12/09/2021    BUN 11 12/10/2021    BUN 11 12/09/2021    CR 0.60 12/12/2021    CR 0.60 12/10/2021    GLC 77 12/11/2021    GLC 83 12/10/2021     COAGS:   Lab Results   Component Value Date    PTT 31 10/02/2020    INR 1.09 12/10/2021     POC:   Lab Results   Component Value Date    BGM 94 11/11/2020     HEPATIC:   Lab Results   Component Value Date    ALBUMIN 3.2 (L) 12/09/2021    PROTTOTAL 6.6 12/09/2021    ALT <9 12/09/2021    AST 12 12/09/2021    ALKPHOS 77 12/09/2021    BILITOTAL 0.4 12/09/2021     OTHER:   Lab Results   Component Value Date    LACT 0.7 08/16/2019    A1C 5.3 03/05/2021    NILESH 7.9 (L) 12/10/2021    MAG 2.1 06/16/2021    LIPASE 53 (H) 11/05/2019    TSH 0.95 01/06/2022    T4 0.83 12/10/2021    CRP 0.8 (H) 01/06/2022    SED 37 (H) 01/06/2022       Anesthesia Plan    ASA Status:  2      Anesthesia Type: General.     - Airway: Mask Only   Induction: Intravenous.   Maintenance: TIVA.        Consents    Anesthesia Plan(s) and associated risks, benefits, and realistic alternatives discussed. Questions answered and patient/representative(s) expressed understanding.     - Discussed: Risks, Benefits and Alternatives for BOTH SEDATION and the PROCEDURE were discussed     - Discussed with:  Patient      - Extended Intubation/Ventilatory Support Discussed: No.      - Patient is DNR/DNI Status: No    Use of blood products discussed: No .     Postoperative Care    Pain management: Peripheral nerve block (Single Shot).   PONV prophylaxis: Ondansetron (or other 5HT-3), Dexamethasone or Solumedrol     Comments:                    Junaid De Jesus MD

## 2022-01-12 NOTE — TELEPHONE ENCOUNTER
"Coronavirus (COVID-19) Notification    Caller Name (Patient, parent, daughter/son, grandparent, etc)  patient     Reason for call  Notify of Positive Coronavirus (COVID-19) lab results, assess symptoms,  review  [employer] [facility] recommendations    Lab Result    Lab test:  2019-nCoV rRt-PCR or SARS-CoV-2 PCR    Oropharyngeal AND/OR nasopharyngeal swabs is POSITIVE for 2019-nCoV RNA/SARS-COV-2 PCR (COVID-19 virus)    RN Recommendations/Instructions per [facility] Coronavirus COVID-19 recommendations    Brief introduction script  Introduce self then review script:  \"I am calling on behalf of [employer].  We were notified that your Coronavirus test (COVID-19) for was POSITIVE for the virus.  I have some information to relay to you but first I wanted to mention that the [place] Dept of Health will be contacting you shortly [it's possible MD already called Patient] to talk to you more about how you are feeling and other people you have had contact with who might now also have the virus.  Also,  [employer] [facility] is Partnering with the [facility] for Covid-19 research, you may be contacted directly by research staff.\"    Assessment (Inquire about Patient's current symptoms)   Assessment   Current Symptoms at time of phone call: (if no symptoms, document No symptoms] No Sx   Symptoms onset (if applicable) Tested 01/11/2022       If at time of call, Patients symptoms hare worsened, the Patient should contact 911 or have someone drive them to Emergency Dept promptly:      If Patient calling 911, inform 911 personal that you have tested positive for the Coronavirus (COVID-19).  Place mask on and await 911 to arrive.    If Emergency Dept, If possible, please have another adult drive you to the Emergency Dept but you need to wear mask when in contact with other people.      Monoclonal Antibody Administration    You may be eligible to receive a new treatment with a monoclonal antibody for preventing hospitalization " in patients at high risk for complications from COVID-19.   This medication is still experimental and available on a limited basis; it is given through an IV and must be given at an infusion center. Please note that not all people who are eligible will receive the medication since it is in limited supply.     Are you interested in being considered for this medication?  No.   Does the patient fit the criteria: No    Review information with Patient    Your result was positive. This means you have COVID-19 (coronavirus).  We have sent you a letter that reviews the information that I'll be reviewing with you now.    How can I protect others?    If you have symptoms: stay home and away from others (self-isolate) until:    You've had no fever--and no medicine that reduces fever--for 1 full day (24 hours). And       Your other symptoms have gotten better. For example, your cough or breathing has improved. And     At least 10 days have passed since your symptoms started. (If you've been told by a doctor that you have a weak immune system, wait 20 days.)     If you don't have symptoms: Stay home and away from others (self-isolate) until at least 10 days have passed since your first positive COVID-19 test. (Date test collected)    During this time:    Stay in your own room, including for meals. Use your own bathroom if you can.    Stay away from others in your home. No hugging, kissing or shaking hands. No visitors.     Don't go to work, school or anywhere else.     Clean  high touch  surfaces often (doorknobs, counters, handles, etc.). Use a household cleaning spray or wipes. You'll find a full list on the EPA website at www.epa.gov/pesticide-registration/list-n-disinfectants-use-against-sars-cov-2.     Cover your mouth and nose with a mask, tissue or other face covering to avoid spreading germs.    Wash your hands and face often with soap and water.    Make a list of people you have been in close contact with recently, even  if either of you wore a face covering.   - Start your list from 2 days before you became ill or had a positive test.  - Include anyone that was within 6 feet of you for a cumulative total of 15 minutes or more in 24 hours. (Example: if you sat next to Kin for 5 minutes in the morning and 10 minutes in the afternoon, then you were in close contact for 15 minutes total that day. Kin would be added to your list.)    A public health worker will call or text you. It is important that you answer. They will ask you questions about possible exposures to COVID-19, such as people you have been in direct contact with and places you have visited.    Tell the people on your list that you have COVID-19; they should stay away from others for 14 days starting from the last time they were in contact with you (unless you are told something different from a public health worker).     Caregivers in these groups are at risk for severe illness due to COVID-19:  o People 65 years and older  o People who live in a nursing home or long-term care facility  o People with chronic disease (lung, heart, cancer, diabetes, kidney, liver, immunologic)  o People who have a weakened immune system, including those who:  - Are in cancer treatment  - Take medicine that weakens the immune system, such as corticosteroids  - Had a bone marrow or organ transplant  - Have an immune deficiency  - Have poorly controlled HIV or AIDS  - Are obese (body mass index of 40 or higher)  - Smoke regularly    Caregivers should wear gloves while washing dishes, handling laundry and cleaning bedrooms and bathrooms.    Wash and dry laundry with special caution. Don't shake dirty laundry, and use the warmest water setting you can.    If you have a weakened immune system, ask your doctor about other actions you should take.    For more tips, go to www.cdc.gov/coronavirus/2019-ncov/downloads/10Things.pdf.    You should not go back to work until you meet the guidelines above  for ending your home isolation. You don't need to be retested for COVID-19 before going back to work--studies show that you won't spread the virus if it's been at least 10 days since your symptoms started (or 20 days, if you have a weak immune system).    Employers: This document serves as formal notice of your employee's medical guidelines for going back to work. They must meet the above guidelines before going back to work in person.    How can I take care of myself?    1. Get lots of rest. Drink extra fluids (unless a doctor has told you not to).    2. Take Tylenol (acetaminophen) for fever or pain. If you have liver or kidney problems, ask your family doctor if it's okay to take Tylenol.     Take either:     650 mg (two 325 mg pills) every 4 to 6 hours, or     1,000 mg (two 500 mg pills) every 8 hours as needed.     Note: Don't take more than 3,000 mg in one day. Acetaminophen is found in many medicines (both prescribed and over-the-counter medicines). Read all labels to be sure you don't take too much.    For children, check the Tylenol bottle for the right dose (based on their age or weight).    3. If you have other health problems (like cancer, heart failure, an organ transplant or severe kidney disease): Call your specialty clinic if you don't feel better in the next 2 days.    4. Know when to call 911: Emergency warning signs include:    Trouble breathing or shortness of breath    Pain or pressure in the chest that doesn't go away    Feeling confused like you haven't felt before, or not being able to wake up    Bluish-colored lips or face    5. Sign up for [employer]. We know it's scary to hear that you have COVID-19. We want to track your symptoms to make sure you're okay over the next 2 weeks. Please look for an email from [employer]--this is a free, online program that we'll use to keep in touch. To sign up, follow the link in the email. Learn more at [email or web address].    Where can I get  more information?    [facility]: [email or web address]    Coronavirus Basics: www.health.[email or web address].[place]/diseases/coronavirus/basics.html    What to Do If You're Sick: www.cdc.gov/coronavirus/2019-ncov/about/steps-when-sick.html    Ending Home Isolation: www.cdc.gov/coronavirus/2019-ncov/hcp/disposition-in-home-patients.html     Caring for Someone with COVID-19: www.cdc.gov/coronavirus/2019-ncov/if-you-are-sick/care-for-someone.html     [facility] clinical trials (COVID-19 research studies): clinicalaffairs.[facility]-clinical-trials     A Positive COVID-19 letter will be sent via [employer] or the mail. (Exception, no letters sent to Presurgerical/Preprocedure Patients)    Apryl Robison LPN

## 2022-01-12 NOTE — ANESTHESIA CARE TRANSFER NOTE
Patient: Anna Sanderson    Procedure: Procedure(s):  LEFT CALF ACHILLES IRRIGATION AND DEBRIDEMENT       Diagnosis: Dehiscence of operative wound [T81.31XA]  Painful ankle [M25.579]  Diagnosis Additional Information: No value filed.    Anesthesia Type:   General     Note:    Oropharynx: oropharynx clear of all foreign objects  Level of Consciousness: awake  Oxygen Supplementation: room air    Independent Airway: airway patency satisfactory and stable  Dentition: dentition unchanged  Vital Signs Stable: post-procedure vital signs reviewed and stable  Report to RN Given: handoff report given  Patient transferred to: Medical/Surgical Unit    Handoff Report: Identifed the Patient, Identified the Reponsible Provider, Reviewed the pertinent medical history, Discussed the surgical course, Reviewed Intra-OP anesthesia mangement and issues during anesthesia, Set expectations for post-procedure period and Allowed opportunity for questions and acknowledgement of understanding      Vitals:  Vitals Value Taken Time   /67 01/12/22 1645   Temp 36.3  C (97.4  F) 01/12/22 1645   Pulse 92 01/12/22 1645   Resp 18 01/12/22 1645   SpO2 97 % 01/12/22 1645       Electronically Signed By: SAWYER Limon CRNA  January 12, 2022  4:50 PM

## 2022-01-12 NOTE — ANESTHESIA PROCEDURE NOTES
Sciatic Procedure Note    Pre-Procedure   Staff -        Performed By: anesthesiologist       Location: pre-op       Procedure Start/Stop Times: 1/12/2022 2:04 PM and 1/12/2022 2:06 PM       Pre-Anesthestic Checklist: patient identified, IV checked, site marked, risks and benefits discussed, informed consent, monitors and equipment checked, pre-op evaluation, at physician/surgeon's request and post-op pain management  Timeout:       Correct Patient: Yes        Correct Procedure: Yes        Correct Site: Yes        Correct Position: Yes        Correct Laterality: Yes        Site Marked: Yes  Procedure Documentation  Procedure: Sciatic       Laterality: left       Patient Position: supine       Patient Prep/Sterile Barriers: sterile gloves, mask       Skin prep: Chloraprep (popliteal approach).       Needle Type: short bevel       Needle Gauge: 21.        Needle Length (Inches): 4        Ultrasound guided       1. Ultrasound was used to identify targeted nerve, plexus, vascular marker, or fascial plane and place a needle adjacent to it in real-time.       2. Ultrasound was used to visualize the spread of anesthetic in close proximity to the above referenced structure.       3. A permanent image is entered into the patient's record.       4. The visualized anatomic structures appeared normal.       5. There were no apparent abnormal pathologic findings.    Assessment/Narrative         The placement was negative for: blood aspirated, painful injection and site bleeding       Paresthesias: No.     Bolus given via needle..        Secured via.        Insertion/Infusion Method: Single Shot       Complications: none       Injection made incrementally with aspirations every 5 mL.    Medication(s) Administered   Bupivacaine 0.5% PF (Infiltration), 19 mL  Medication Administration Time: 1/12/2022 2:06 PM

## 2022-01-12 NOTE — PHARMACY-ADMISSION MEDICATION HISTORY
Pharmacy Note - Admission Medication History    Pertinent Provider Information: Last doses added by RN.    ______________________________________________________________________    Prior To Admission (PTA) med list completed and updated in EMR.       Current Facility-Administered Medications for the 1/12/22 encounter (Hospital Encounter)   Medication     denosumab (PROLIA) injection 60 mg     PTA Med List   Medication Sig Last Dose     hydrOXYzine (ATARAX) 25 MG tablet Take 1 tablet (25 mg) by mouth every 6 hours as needed for other (adjuvant pain) Unknown at Unknown time     ibuprofen (ADVIL/MOTRIN) 200 MG tablet Take 200-800 mg by mouth 2 times daily  1/11/2022 at 0700     metoprolol succinate ER (TOPROL-XL) 25 MG 24 hr tablet Take 25 mg by mouth every evening  1/11/2022 at 2100     ondansetron (ZOFRAN) 4 MG tablet Take 4 mg by mouth every 6 hours as needed  1/11/2022 at 0700     pantoprazole (PROTONIX) 40 MG EC tablet Take 40 mg by mouth 2 times daily  1/12/2022 at 0600     vitamin D2 (ERGOCALCIFEROL) 24296 units (1250 mcg) capsule Take 1 capsule (50,000 Units) by mouth once a week Saturdays 1/8/2022 at Unknown time       Information source(s): Patient and Clinic records    Method of interview communication: N/A    Patient was asked about OTC/herbal products specifically.  PTA med list reflects this.    Based on the pharmacist's assessment, the PTA med list information appears reliable    Allergies were reviewed, assessed, and updated with the patient.      Patient does not use any multi-dose medications prior to admission.     Thank you for the opportunity to participate in the care of this patient.      Morteza Gonzales RPH     1/12/2022     1:51 PM

## 2022-01-13 LAB
CREAT SERPL-MCNC: 0.66 MG/DL (ref 0.6–1.1)
GFR SERPL CREATININE-BSD FRML MDRD: >90 ML/MIN/1.73M2
GLUCOSE BLD-MCNC: 83 MG/DL (ref 70–125)
HGB BLD-MCNC: 8.3 G/DL (ref 11.7–15.7)

## 2022-01-13 PROCEDURE — 99254 IP/OBS CNSLTJ NEW/EST MOD 60: CPT

## 2022-01-13 PROCEDURE — 250N000013 HC RX MED GY IP 250 OP 250 PS 637: Performed by: PHYSICIAN ASSISTANT

## 2022-01-13 PROCEDURE — 250N000011 HC RX IP 250 OP 636: Performed by: ORTHOPAEDIC SURGERY

## 2022-01-13 PROCEDURE — 82947 ASSAY GLUCOSE BLOOD QUANT: CPT | Performed by: ORTHOPAEDIC SURGERY

## 2022-01-13 PROCEDURE — 250N000011 HC RX IP 250 OP 636

## 2022-01-13 PROCEDURE — 250N000013 HC RX MED GY IP 250 OP 250 PS 637: Performed by: ORTHOPAEDIC SURGERY

## 2022-01-13 PROCEDURE — 82565 ASSAY OF CREATININE: CPT | Performed by: ORTHOPAEDIC SURGERY

## 2022-01-13 PROCEDURE — 258N000003 HC RX IP 258 OP 636

## 2022-01-13 PROCEDURE — 36415 COLL VENOUS BLD VENIPUNCTURE: CPT | Performed by: ORTHOPAEDIC SURGERY

## 2022-01-13 PROCEDURE — 85018 HEMOGLOBIN: CPT | Performed by: ORTHOPAEDIC SURGERY

## 2022-01-13 PROCEDURE — 120N000001 HC R&B MED SURG/OB

## 2022-01-13 RX ORDER — PIPERACILLIN SODIUM, TAZOBACTAM SODIUM 3; .375 G/15ML; G/15ML
3.38 INJECTION, POWDER, LYOPHILIZED, FOR SOLUTION INTRAVENOUS ONCE
Status: COMPLETED | OUTPATIENT
Start: 2022-01-13 | End: 2022-01-13

## 2022-01-13 RX ORDER — PIPERACILLIN SODIUM, TAZOBACTAM SODIUM 3; .375 G/15ML; G/15ML
3.38 INJECTION, POWDER, LYOPHILIZED, FOR SOLUTION INTRAVENOUS EVERY 8 HOURS
Status: DISCONTINUED | OUTPATIENT
Start: 2022-01-13 | End: 2022-01-13 | Stop reason: DRUGHIGH

## 2022-01-13 RX ORDER — IBUPROFEN 600 MG/1
600 TABLET, FILM COATED ORAL 3 TIMES DAILY
Status: DISCONTINUED | OUTPATIENT
Start: 2022-01-13 | End: 2022-01-16 | Stop reason: HOSPADM

## 2022-01-13 RX ORDER — PIPERACILLIN SODIUM, TAZOBACTAM SODIUM 3; .375 G/15ML; G/15ML
3.38 INJECTION, POWDER, LYOPHILIZED, FOR SOLUTION INTRAVENOUS EVERY 8 HOURS
Status: DISCONTINUED | OUTPATIENT
Start: 2022-01-13 | End: 2022-01-16 | Stop reason: HOSPADM

## 2022-01-13 RX ADMIN — ONDANSETRON 4 MG: 4 TABLET, ORALLY DISINTEGRATING ORAL at 14:36

## 2022-01-13 RX ADMIN — IBUPROFEN 600 MG: 600 TABLET ORAL at 14:36

## 2022-01-13 RX ADMIN — PANTOPRAZOLE SODIUM 40 MG: 20 TABLET, DELAYED RELEASE ORAL at 09:34

## 2022-01-13 RX ADMIN — PIPERACILLIN AND TAZOBACTAM 3.38 G: 3; .375 INJECTION, POWDER, LYOPHILIZED, FOR SOLUTION INTRAVENOUS at 13:24

## 2022-01-13 RX ADMIN — IBUPROFEN 600 MG: 600 TABLET ORAL at 20:59

## 2022-01-13 RX ADMIN — ASPIRIN 81 MG: 81 TABLET, COATED ORAL at 20:59

## 2022-01-13 RX ADMIN — IBUPROFEN 600 MG: 600 TABLET ORAL at 09:53

## 2022-01-13 RX ADMIN — ACETAMINOPHEN 975 MG: 325 TABLET ORAL at 00:36

## 2022-01-13 RX ADMIN — PIPERACILLIN AND TAZOBACTAM 3.38 G: 3; .375 INJECTION, POWDER, LYOPHILIZED, FOR SOLUTION INTRAVENOUS at 18:10

## 2022-01-13 RX ADMIN — DOCUSATE SODIUM 50MG AND SENNOSIDES 8.6MG 1 TABLET: 8.6; 5 TABLET, FILM COATED ORAL at 20:59

## 2022-01-13 RX ADMIN — ASPIRIN 81 MG: 81 TABLET, COATED ORAL at 09:34

## 2022-01-13 RX ADMIN — VANCOMYCIN HYDROCHLORIDE 750 MG: 5 INJECTION, POWDER, LYOPHILIZED, FOR SOLUTION INTRAVENOUS at 14:36

## 2022-01-13 RX ADMIN — PANTOPRAZOLE SODIUM 40 MG: 20 TABLET, DELAYED RELEASE ORAL at 20:59

## 2022-01-13 RX ADMIN — CEFAZOLIN 1 G: 1 INJECTION, POWDER, FOR SOLUTION INTRAMUSCULAR; INTRAVENOUS at 06:52

## 2022-01-13 ASSESSMENT — ACTIVITIES OF DAILY LIVING (ADL)
ADLS_ACUITY_SCORE: 7

## 2022-01-13 NOTE — PLAN OF CARE
Problem: Anemia  Goal: Anemia Symptom Improvement  Outcome: No Change     Problem: Hypertension Acute  Goal: Blood Pressure Within Desired Range  Outcome: Improving  Vss. Pt pain rated between 4-6 on a 0-10 pain scale. Addressed with scheduled ibuprofen. Refuses PT/OT consults. IV zosyn and vanco started. Waiting on wound cultures until ID can determine whether pt will go home with IV vs. PO antibiotics. Will continue to monitor.

## 2022-01-13 NOTE — PLAN OF CARE
Note from admission-1930. Pt denied pain during shift thus far. Skilled monitoring in all medical conditions. No new skin issues noted. ACE is CDI. Sensation absent in LLE, pulses intact. Due to transfer. Fluids running, IV patent. Due to void. Education on medication administration and use of call-light to reduce risk for falls and injury. No further issues noted. VSS, no shortness of breath. Will continue to monitor.    Taylor R Schoenecker, RN

## 2022-01-13 NOTE — PROGRESS NOTES
"Orthopedic Progress Note      Assessment: 1 Day Post-Op  S/P Procedure(s):  LEFT CALF ACHILLES IRRIGATION AND DEBRIDEMENT     Plan:   - Continue PT/OT  - Weightbearing status: NWB LLE  - Cam boot, okay to remove per patient  - Do not change dressing, reinforce as needed  - Anticoagulation: ASA 81 PO BID in addition to SCDs, wanda stockings and early ambulation.  - Appreciate ID input for abx plan  - Discharge planning: Pending final abx plan      Subjective:  Pain: mild  Nausea, Vomiting:  No  Lightheadedness, Dizziness:  No  Neuro:  Patient denies new onset numbness or paresthesias    Patient is doing okay. Managing her pain with Tylenol and ibuprofen. She would like to avoid narcotics. No other complaints.     Objective:  /60 (BP Location: Right arm)   Pulse 65   Temp 98.3  F (36.8  C) (Oral)   Resp 16   Ht 1.626 m (5' 4.02\")   Wt 54 kg (119 lb)   SpO2 98%   BMI 20.42 kg/m    The patient is A&Ox3. Appears comfortable.   Sensation is intact.  Able to wiggle the toes.   Brisk capillary refill in the toes.   Dressing is C/D/I.     Pertinent Labs   Lab Results: personally reviewed.   Lab Results   Component Value Date    INR 1.09 12/10/2021    INR 0.95 06/18/2021    INR 0.97 06/17/2021     Lab Results   Component Value Date    WBC 6.9 01/06/2022    HGB 8.3 (L) 01/13/2022    HCT 32.5 (L) 01/06/2022    MCV 81 01/06/2022     01/06/2022     Lab Results   Component Value Date     12/10/2021    CO2 22 12/10/2021         Report completed by:  Stephani Matias PA-C, ADAM  Date: 1/13/2022  Time: 2:08 PM    "

## 2022-01-13 NOTE — PLAN OF CARE
Problem: Hypertension Acute  Goal: Blood Pressure Within Desired Range  Outcome: Improving     Problem: Adult Inpatient Plan of Care  Goal: Optimal Comfort and Wellbeing  1/13/2022 0543 by Anna De Jesus, RN  Outcome: Improving  1/13/2022 0356 by Anna De Jesus, RN  Outcome: Improving     No acute events or changes this shift. Patient is alert and oriented, call light appropriate and bed alarm on. Resting comfortably in bed overnight. Denies pain. Sensation remains absent in LLE due to nerve block. Minimal dried drainage to ace wrap. VSS on room air, BP WNL. Denies any SOB. Receiving IV Ancef for abx. ID consulted. Hopeful to transition home once medically cleared to discharge.

## 2022-01-13 NOTE — PHARMACY-VANCOMYCIN DOSING SERVICE
"Pharmacy Vancomycin Initial Note  Date of Service 2022  Patient's  1960  61 year old, female    Indication: Skin and Soft Tissue Infection    Current estimated CrCl = Estimated Creatinine Clearance: 78.2 mL/min (based on SCr of 0.66 mg/dL).    Creatinine for last 3 days  2022:  5:58 AM Creatinine 0.66 mg/dL    Recent Vancomycin Level(s) for last 3 days  No results found for requested labs within last 72 hours.      Vancomycin IV Administrations (past 72 hours)                   vancomycin 1000 mg in 0.9% NaCl 250 mL intermittent infusion 1,000 mg (mg) 1,000 mg Given 22 1520                Nephrotoxins and other renal medications (From now, onward)    Start     Dose/Rate Route Frequency Ordered Stop    22 1826  piperacillin-tazobactam (ZOSYN) 3.375 g vial to attach to  mL bag        Note to Pharmacy: Extended infusion dosing to start 6 hours after initial infusion.   \"Followed by\" Linked Group Details    3.375 g  over 240 Minutes Intravenous EVERY 8 HOURS 22 1226      22 1330  vancomycin 750 mg in 0.9% NaCl 250 mL intermittent infusion 750 mg         750 mg  over 60 Minutes Intravenous EVERY 12 HOURS 22 1256      22 1230  piperacillin-tazobactam (ZOSYN) 3.375 g vial to attach to  mL bag        \"Followed by\" Linked Group Details    3.375 g  over 30 Minutes Intravenous ONCE 22 1226      22 1000  ibuprofen (ADVIL/MOTRIN) tablet 600 mg         600 mg Oral 3 TIMES DAILY 22 0950            Contrast Orders - past 72 hours (72h ago, onward)            None          [employer] Prediction of Planned Initial Vancomycin Regimen  Loading dose: 1000 mg at 15:20 2022.  Regimen: 750 mg IV every 12 hours.  Start time: 12:57 on 2022  Exposure target: AUC24 (range)400-600 mg/L.hr   AUC24,ss: 430 mg/L.hr  Probability of AUC24 > 400: 58 %  Ctrough,ss: 12.9 mg/L  Probability of Ctrough,ss > 20: 17 %  Probability of nephrotoxicity (Lodise " CAMI 2009): 8 %        Plan:  1. Start vancomycin  750 mg IV q12h.   2. Vancomycin monitoring method: AUC  3. Vancomycin therapeutic monitoring goal: 400-600 mg*h/L  4. Pharmacy will check vancomycin levels as appropriate in 1-3 Days.    5. Serum creatinine levels will be ordered daily for the first week of therapy and at least twice weekly for subsequent weeks.      Nguyen Martínez [facility]

## 2022-01-13 NOTE — PLAN OF CARE
Physical Therapy: Orders received. Chart reviewed and discussed with care team.? Physical Therapy not indicated due to patient declining services at this time.? Will complete orders. Thank you

## 2022-01-13 NOTE — PLAN OF CARE
Problem: Adult Inpatient Plan of Care  Goal: Optimal Comfort and Wellbeing  Outcome: Improving     Patient alert and oriented, call light appropriate and within reach. Up in chair, ambulating to/from bathroom with SBA and crutches. Voiding adequate amounts. Denies pain. Sensation absent in LLE. Pulses intact. Dressing CDI. VSS on room air. Denies shortness of breath. Will continue to monitor.

## 2022-01-14 ENCOUNTER — APPOINTMENT (OUTPATIENT)
Dept: MRI IMAGING | Facility: CLINIC | Age: 62
End: 2022-01-14
Attending: PHYSICIAN ASSISTANT
Payer: COMMERCIAL

## 2022-01-14 LAB
BACTERIA SPEC CULT: ABNORMAL
BACTERIA SPEC CULT: ABNORMAL
BASOPHILS # BLD AUTO: 0.1 10E3/UL (ref 0–0.2)
BASOPHILS NFR BLD AUTO: 1 %
CRP SERPL HS-MCNC: 1.5 MG/L (ref 0–3)
EOSINOPHIL # BLD AUTO: 0.2 10E3/UL (ref 0–0.7)
EOSINOPHIL NFR BLD AUTO: 2 %
ERYTHROCYTE [DISTWIDTH] IN BLOOD BY AUTOMATED COUNT: 16.1 % (ref 10–15)
ERYTHROCYTE [SEDIMENTATION RATE] IN BLOOD BY WESTERGREN METHOD: 34 MM/HR (ref 0–20)
HCT VFR BLD AUTO: 28.9 % (ref 35–47)
HGB BLD-MCNC: 8.2 G/DL (ref 11.7–15.7)
HGB BLD-MCNC: 8.4 G/DL (ref 11.7–15.7)
IMM GRANULOCYTES # BLD: 0 10E3/UL
IMM GRANULOCYTES NFR BLD: 0 %
LACTATE SERPL-SCNC: 0.9 MMOL/L (ref 0.7–2)
LYMPHOCYTES # BLD AUTO: 3.1 10E3/UL (ref 0.8–5.3)
LYMPHOCYTES NFR BLD AUTO: 37 %
MCH RBC QN AUTO: 23 PG (ref 26.5–33)
MCHC RBC AUTO-ENTMCNC: 29.1 G/DL (ref 31.5–36.5)
MCV RBC AUTO: 79 FL (ref 78–100)
MONOCYTES # BLD AUTO: 0.8 10E3/UL (ref 0–1.3)
MONOCYTES NFR BLD AUTO: 9 %
NEUTROPHILS # BLD AUTO: 4.3 10E3/UL (ref 1.6–8.3)
NEUTROPHILS NFR BLD AUTO: 51 %
NRBC # BLD AUTO: 0 10E3/UL
NRBC BLD AUTO-RTO: 0 /100
PLATELET # BLD AUTO: 319 10E3/UL (ref 150–450)
PROCALCITONIN SERPL-MCNC: 0.02 NG/ML (ref 0–0.49)
RBC # BLD AUTO: 3.65 10E6/UL (ref 3.8–5.2)
VANCOMYCIN SERPL-MCNC: 12.2 MG/L
WBC # BLD AUTO: 8.3 10E3/UL (ref 4–11)

## 2022-01-14 PROCEDURE — 99233 SBSQ HOSP IP/OBS HIGH 50: CPT

## 2022-01-14 PROCEDURE — 250N000013 HC RX MED GY IP 250 OP 250 PS 637: Performed by: ORTHOPAEDIC SURGERY

## 2022-01-14 PROCEDURE — 36415 COLL VENOUS BLD VENIPUNCTURE: CPT | Performed by: ORTHOPAEDIC SURGERY

## 2022-01-14 PROCEDURE — 36415 COLL VENOUS BLD VENIPUNCTURE: CPT | Performed by: PHYSICIAN ASSISTANT

## 2022-01-14 PROCEDURE — 36569 INSJ PICC 5 YR+ W/O IMAGING: CPT

## 2022-01-14 PROCEDURE — 250N000013 HC RX MED GY IP 250 OP 250 PS 637: Performed by: PHYSICIAN ASSISTANT

## 2022-01-14 PROCEDURE — 120N000001 HC R&B MED SURG/OB

## 2022-01-14 PROCEDURE — 83605 ASSAY OF LACTIC ACID: CPT | Performed by: PHYSICIAN ASSISTANT

## 2022-01-14 PROCEDURE — 250N000009 HC RX 250

## 2022-01-14 PROCEDURE — 85018 HEMOGLOBIN: CPT | Performed by: PHYSICIAN ASSISTANT

## 2022-01-14 PROCEDURE — 85652 RBC SED RATE AUTOMATED: CPT | Performed by: PHYSICIAN ASSISTANT

## 2022-01-14 PROCEDURE — 255N000002 HC RX 255 OP 636: Performed by: ORTHOPAEDIC SURGERY

## 2022-01-14 PROCEDURE — 80202 ASSAY OF VANCOMYCIN: CPT | Performed by: ORTHOPAEDIC SURGERY

## 2022-01-14 PROCEDURE — 84145 PROCALCITONIN (PCT): CPT | Performed by: PHYSICIAN ASSISTANT

## 2022-01-14 PROCEDURE — 250N000011 HC RX IP 250 OP 636

## 2022-01-14 PROCEDURE — 85018 HEMOGLOBIN: CPT | Performed by: ORTHOPAEDIC SURGERY

## 2022-01-14 PROCEDURE — 258N000003 HC RX IP 258 OP 636

## 2022-01-14 PROCEDURE — A9585 GADOBUTROL INJECTION: HCPCS | Performed by: ORTHOPAEDIC SURGERY

## 2022-01-14 PROCEDURE — 272N000450 HC KIT 4FR POWER PICC SINGLE LUMEN

## 2022-01-14 PROCEDURE — 86141 C-REACTIVE PROTEIN HS: CPT | Performed by: PHYSICIAN ASSISTANT

## 2022-01-14 PROCEDURE — 73720 MRI LWR EXTREMITY W/O&W/DYE: CPT | Mod: LT

## 2022-01-14 RX ORDER — GADOBUTROL 604.72 MG/ML
5.5 INJECTION INTRAVENOUS ONCE
Status: COMPLETED | OUTPATIENT
Start: 2022-01-14 | End: 2022-01-14

## 2022-01-14 RX ORDER — ACETAMINOPHEN 325 MG/1
975 TABLET ORAL EVERY 8 HOURS
Qty: 90 TABLET | Refills: 0 | Status: SHIPPED | OUTPATIENT
Start: 2022-01-14 | End: 2022-03-04

## 2022-01-14 RX ORDER — LIDOCAINE 40 MG/G
CREAM TOPICAL
Status: DISCONTINUED | OUTPATIENT
Start: 2022-01-14 | End: 2022-01-15

## 2022-01-14 RX ADMIN — IBUPROFEN 600 MG: 600 TABLET ORAL at 20:03

## 2022-01-14 RX ADMIN — PANTOPRAZOLE SODIUM 40 MG: 20 TABLET, DELAYED RELEASE ORAL at 20:03

## 2022-01-14 RX ADMIN — PIPERACILLIN AND TAZOBACTAM 3.38 G: 3; .375 INJECTION, POWDER, LYOPHILIZED, FOR SOLUTION INTRAVENOUS at 02:59

## 2022-01-14 RX ADMIN — VANCOMYCIN HYDROCHLORIDE 750 MG: 5 INJECTION, POWDER, LYOPHILIZED, FOR SOLUTION INTRAVENOUS at 14:14

## 2022-01-14 RX ADMIN — LIDOCAINE HYDROCHLORIDE 1.5 ML: 10 INJECTION, SOLUTION INFILTRATION; PERINEURAL at 15:10

## 2022-01-14 RX ADMIN — PIPERACILLIN AND TAZOBACTAM 3.38 G: 3; .375 INJECTION, POWDER, LYOPHILIZED, FOR SOLUTION INTRAVENOUS at 10:42

## 2022-01-14 RX ADMIN — IBUPROFEN 600 MG: 600 TABLET ORAL at 14:14

## 2022-01-14 RX ADMIN — ASPIRIN 81 MG: 81 TABLET, COATED ORAL at 20:03

## 2022-01-14 RX ADMIN — VANCOMYCIN HYDROCHLORIDE 750 MG: 5 INJECTION, POWDER, LYOPHILIZED, FOR SOLUTION INTRAVENOUS at 01:19

## 2022-01-14 RX ADMIN — PANTOPRAZOLE SODIUM 40 MG: 20 TABLET, DELAYED RELEASE ORAL at 08:08

## 2022-01-14 RX ADMIN — GADOBUTROL 5.5 ML: 604.72 INJECTION INTRAVENOUS at 22:23

## 2022-01-14 RX ADMIN — IBUPROFEN 600 MG: 600 TABLET ORAL at 08:08

## 2022-01-14 RX ADMIN — PIPERACILLIN AND TAZOBACTAM 3.38 G: 3; .375 INJECTION, POWDER, LYOPHILIZED, FOR SOLUTION INTRAVENOUS at 18:37

## 2022-01-14 RX ADMIN — ASPIRIN 81 MG: 81 TABLET, COATED ORAL at 08:08

## 2022-01-14 ASSESSMENT — ACTIVITIES OF DAILY LIVING (ADL)
ADLS_ACUITY_SCORE: 7

## 2022-01-14 NOTE — PHARMACY-VANCOMYCIN DOSING SERVICE
"Pharmacy Vancomycin Note  Date of Service 2022  Patient's  1960   61 year old, female    Indication: Skin and Soft Tissue Infection  Day of Therapy: 2  Current vancomycin regimen:  750 mg IV q12h  Current vancomycin monitoring method: AUC  Current vancomycin therapeutic monitoring goal: 400-600 mg*h/L    [employer] Prediction of Current Vancomycin Regimen      Current estimated CrCl = Estimated Creatinine Clearance: 78.2 mL/min (based on SCr of 0.66 mg/dL).    Creatinine for last 3 days  2022:  5:58 AM Creatinine 0.66 mg/dL    Recent Vancomycin Levels (past 3 days)  2022: 12:03 PM Vancomycin 12.2 mg/L    Vancomycin IV Administrations (past 72 hours)                   vancomycin 750 mg in 0.9% NaCl 250 mL intermittent infusion 750 mg (mg) 750 mg Given 22 0119     750 mg Given 22 1436    vancomycin 1000 mg in 0.9% NaCl 250 mL intermittent infusion 1,000 mg (mg) 1,000 mg Given 22 1520                Nephrotoxins and other renal medications (From now, onward)    Start     Dose/Rate Route Frequency Ordered Stop    22 1826  piperacillin-tazobactam (ZOSYN) 3.375 g vial to attach to  mL bag        Note to Pharmacy: Extended infusion dosing to start 6 hours after initial infusion.   \"Followed by\" Linked Group Details    3.375 g  over 240 Minutes Intravenous EVERY 8 HOURS 22 1226      22 1330  vancomycin 750 mg in 0.9% NaCl 250 mL intermittent infusion 750 mg         750 mg  over 60 Minutes Intravenous EVERY 12 HOURS 22 1256      22 1000  ibuprofen (ADVIL/MOTRIN) tablet 600 mg         600 mg Oral 3 TIMES DAILY 22 0950               Contrast Orders - past 72 hours (72h ago, onward)            None          Interpretation of levels and current regimen:  Vancomycin level is reflective of -600    Has serum creatinine changed greater than 50% in last 72 hours: No    Urine output:  good urine output    Renal Function: Stable    [employer] " Prediction of Planned New Vancomycin Regimen      Plan:  1. Continue Current Dose  2. Vancomycin monitoring method: AUC  3. Vancomycin therapeutic monitoring goal: 400-600 mg*h/L  4. Pharmacy will check vancomycin levels as appropriate in 3-5 Days.  5. Serum creatinine levels will be ordered every 48 hours.    Nick Egan RP

## 2022-01-14 NOTE — PLAN OF CARE
Problem: Adult Inpatient Plan of Care  Goal: Optimal Comfort and Wellbeing  Outcome: Improving     Patient alert and oriented, call light within reach. VSS on room air. Denies SOB. Managing minimal pain in LLE with scheduled tylenol, rest, repositioning, elevation and quiet environment. Surgical dressing CDI. IV Zosyn currently infusing. Reinforced importance of patient calling for nurse assistance when needing to transfer while IVF infusing.

## 2022-01-14 NOTE — PROGRESS NOTES
"Orthopedic Progress Note      Assessment: 2 Days Post-Op  S/P Procedure(s):  LEFT CALF ACHILLES IRRIGATION AND DEBRIDEMENT     Plan:   - Continue PT/OT  - Weightbearing status: NWB LLE  - Cam boot, okay to remove per patient  - Do not change dressing, reinforce as needed  - Anticoagulation: ASA 81 PO BID in addition to SCDs, wanda stockings and early ambulation.  - Appreciate ID input for abx plan  - Discharge planning: Pending final abx plan, okay to discharge per ortho      Subjective:  Pain: mild  Nausea, Vomiting:  No  Lightheadedness, Dizziness:  No  Neuro:  Patient denies new onset numbness or paresthesias    Patient is doing well today. Managing her pain with ibuprofen. No other complaints.     Objective:  /60 (BP Location: Right arm, Patient Position: Semi-Coburn's)   Pulse 68   Temp 98.4  F (36.9  C) (Oral)   Resp 14   Ht 1.626 m (5' 4.02\")   Wt 54 kg (119 lb)   SpO2 97%   BMI 20.42 kg/m    The patient is A&Ox3. Appears comfortable.   Sensation is intact.  Able to wiggle the toes.   Brisk capillary refill in the toes.   Dressing is C/D/I.     Pertinent Labs   Lab Results: personally reviewed.   Lab Results   Component Value Date    INR 1.09 12/10/2021    INR 0.95 06/18/2021    INR 0.97 06/17/2021     Lab Results   Component Value Date    WBC 6.9 01/06/2022    HGB 8.2 (L) 01/14/2022    HCT 32.5 (L) 01/06/2022    MCV 81 01/06/2022     01/06/2022     Lab Results   Component Value Date     12/10/2021    CO2 22 12/10/2021         Report completed by:  Stephani Matias PA-C, ADAM  Date: 1/14/2022  Time: 11:35 AM    " IV removed. Catheter intact and site benign. Pressure and 4x4 applied to site. 
No bleeding noted.Patient discharged to home in stable condition. Written and 
verbal after care instructions given. Patient verbalizes understanding of 
instruction.

## 2022-01-14 NOTE — PROGRESS NOTES
Txt paged Dr. Mcneil 3:18 PM 01/14/22     Jewel RN #[id number]  350: J.F-J  PICC placed. Can we draw labs off PICC? Thanks!     Pending response     **ADDENDUM 3:19 PM 01/14/22 - Returned page - no blood draws from PICC line

## 2022-01-14 NOTE — PLAN OF CARE
Problem: Hypertension Acute  Goal: Blood Pressure Within Desired Range  Outcome: Improving     Problem: Adult Inpatient Plan of Care  Goal: Optimal Comfort and Wellbeing  1/14/2022 0541 by Anna De Jesus, RN  Outcome: Improving  1/13/2022 2155 by Anna De Jesus, RN  Outcome: Improving     No acute changes or events this shift. Patient alert and oriented, independent in room and NWB to LLE. Call light within reach. Managing mild pain in LLLE with scheduled ibuprofen, elevation, rest and quiet environment. Dressing CDI. VSS on room air, BP remains WDL while refusing her metoprolol. Denies SOB. Receiving IV vanco and zosyn for abx. Wound cultures pending.

## 2022-01-14 NOTE — PROGRESS NOTES
Infectious Disease Progress Note    Assessment/Plan    Impression: Recurrent infection left lower extremity.     Polymicrobial including anaerobes, Pseudomonas, E coli, S lugdunensis. 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     Incidentally found to have COVID-19 infection.  She has received 3 doses of vaccine and is not immunocompromised.     Recommendations: Continue vancomycin     Cont  Zosyn at this time to cover for gram-negative bacilli.    Anticipate discharge on IV antibiotics, either zosyn or meropenem for 2 weeks.  May need vancomycin as well if S lugdunensis is resistant to oxacillin.     Will place picc today.     No indication for additional treatment for COVID-19 at this time.    Phone call out to discuss with Dr. Wiley.       Active Problems:    Abscess of left lower leg      OLIVIA ALEXANDER MD  [phone]      Subjective  Feels ok.  Bored.     Objective    Vital signs in last 24 hours  Temp:  [97.8  F (36.6  C)-98.4  F (36.9  C)] 98.4  F (36.9  C)  Pulse:  [68-75] 68  Resp:  [14-17] 14  BP: (115-132)/(60-77) 124/60  SpO2:  [97 %-98 %] 97 %  Wt Readings from Last 3 Encounters:   01/12/22 54 kg (119 lb)   01/06/22 54 kg (119 lb)   12/09/21 54.1 kg (119 lb 3.2 oz)           Intake/Output last 3 shifts  I/O last 3 completed shifts:  In: 720 [P.O.:720]  Out: -   Intake/Output this shift:  I/O this shift:  In: 480 [P.O.:480]  Out: -     Review of Systems   Pertinent items are noted in HPI., otherwise negative.    Physical Exam  General appearance: alert, appears stated age and cooperative  Head: Normocephalic, without obvious abnormality, atraumatic  Eyes: EOMI, no icterus  Neck: no adenopathy and supple, symmetrical, trachea midline  Lungs: normal respiratory pattern  Extremities: Left lower extremity wrapped in postoperative dressing.  Skin: Skin color, texture, turgor normal. No rashes or lesions  Neurologic: Grossly normal    Pertinent Labs   Lab Results: personally reviewed.     Recent Labs    Lab 01/14/22  0519 01/13/22  0558   HGB 8.2* 8.3*      No results for input(s): NA, CO2, BUN, CREATININE, GLUCOSE in the last 168 hours.    Invalid input(s): K, CL, LABGLOM, CALCIUM  Culture Micro   Date Value Ref Range Status   06/10/2021 Moderate growth  Normal skin aamir    Final   06/10/2021 Moderate growth  Klebsiella oxytoca   (A)  Final   06/10/2021 Moderate growth  Strain 2  Klebsiella oxytoca   (A)  Final   06/10/2021 Moderate growth  Staphylococcus lugdunensis   (A)  Final   06/10/2021 (A)  Final    Heavy growth  Streptococcus anginosus  This organism is susceptible to ampicillin, penicillin, vancomycin and the cephalosporins.   If treatment is required AND your patient is allergic to penicillin, contact the   Microbiology Lab within 5 days to request susceptibility testing.     06/10/2021 (A)  Final    Heavy growth  Trueperella (Arcanobacterium) bernardiae  Identification obtained by MALDI-TOF mass spectrometry research use only database. Test   characteristics determined and verified by the Infectious Diseases Diagnostic Laboratory   ([facility]) [place], [place].  Susceptibility testing not routinely done     06/10/2021 (A)  Final    Heavy growth  Actinomyces species  Identification obtained by MALDI-TOF mass spectrometry research use only database. Test   characteristics determined and verified by the Infect[facility] Diseases Diagnostic Laboratory   ([facility]) [place], [place].  Susceptibility testing not routinely done     06/10/2021 (A)  Final    Moderate growth  Bacteroides thetaiotaomicron  Susceptibility testing not routinely done     06/10/2021 (A)  Final    Moderate growth  Bacteroides ovatus/xylanisolvens  Susceptibility testing not routinely done     06/10/2021 (A)  Final    Plus  Heavy growth  Mixed aerobic and anaerobic aamir     05/25/2021 Moderate growth  Klebsiella oxytoca   (A)  Final   05/25/2021 (A)  Final    Moderate growth  Trueperella (Arcanobacterium) bernardiae  Identification obtained by  MALDI-TOF mass spectrometry research use only database. Test   characteristics determined and verified by the Infectious Diseases Diagnostic Laboratory   ([facility]) [place], [place].  Susceptibility testing not routinely done     05/25/2021 Light growth  Staphylococcus lugdunensis   (A)  Final   05/25/2021 Light growth  Normal skin aamir    Final   04/22/2021 (A)  Final    Heavy growth  Corynebacterium species  Identification obtained by MALDI-TOF mass spectrometry research use only database. Test   characteristics determined and verified by the Infectious Diseases Diagnostic Laboratory   ([facility]) [place], [place].  Susceptibility testing not routinely done     04/22/2021 (A)  Final    Heavy growth  Trueperella (Arcanobacterium) bernardiae  Identification obtained by MALDI-TOF mass spectrometry research use only database. Test   characteristics determined and verified by the Infect[facility] Diseases Diagnostic Laboratory   ([facility]) [place], [place].  Susceptibility testing not routinely done     04/22/2021 Plus  Light growth  Normal skin aamir    Final   04/22/2021 (A)  Final    Moderate growth  Bacteroides thetaiotaomicron  Susceptibility testing not routinely done     04/22/2021 (A)  Final    Plus  Heavy growth  Mixed aerobic and anaerobic aamir     10/01/2020 No growth  Final   10/01/2020 No anaerobes isolated  Final     Collected Updated Procedure Result Status    01/12/2022 1526 01/12/2022 2039 Gram Stain [[id number]]   (Abnormal)   Tissue from Leg, Below Knee, Left    Final result Component Value   Gram Stain Result 2+ Gram negative bacilli Abnormal    Gram Stain Result 1+ Gram positive cocci Abnormal    Gram Stain Result 3+ WBC seen Abnormal    Predominantly PMNs           01/12/2022 1526 01/14/2022 1315 Tissue Aerobic Bacterial Culture Routine [[id number]]   (Abnormal)   Tissue from Leg, Below Knee, Left    Preliminary result Component Value   Culture Culture in progress P    2+ Pseudomonas aeruginosa Abnormal  P    2+  Pseudomonas aeruginosa Abnormal  P    1+ Escherichia coli Abnormal  P    Identification is preliminary, confirmation in progress    2+ Staphylococcus lugdunensis Abnormal  P    2+ Streptococcus intermedius Abnormal  P           01/12/2022 1518 01/14/2022 1325 Anaerobic Bacterial Culture Routine [[id number]]   (Abnormal)   Swab from Leg, Below Knee, Left    Final result Component Value   Culture 4+ Bacteroides thetaiotaomicron Abnormal     Susceptibilities not routinely done    4+ Mixed Aerobic and Anaerobic aamir           01/12/2022 1518 01/12/2022 1958 Gram Stain [[id number]]   (Abnormal)   Swab from Leg, Below Knee, Left    Final result Component Value   Gram Stain Result 3+ Gram positive cocci Abnormal    Gram Stain Result 2+ Gram negative bacilli Abnormal    Gram Stain Result 1+ WBC seen Abnormal            01/12/2022 1518 01/14/2022 1325 Swab Aerobic Bacterial Culture Routine [[id number]]   (Abnormal)   Swab from Leg, Below Knee, Left    Preliminary result Component Value   Culture Culture in progress P    4+ Pseudomonas aeruginosa Abnormal  P    Susceptibilities done on previous cultures    4+ Pseudomonas aeruginosa Abnormal  P    Susceptibilities done on previous cultures    2+ Escherichia coli Abnormal  P    Susceptibilities done on previous cultures    1+ Enterococcus faecalis Abnormal  P    1+ Staphylococcus lugdunensis Abnormal  P    Susceptibilities done on previous cultures    4+ Streptococcus intermedius Abnormal  P           01/12/2022 1517 01/13/2022 1931 Anaerobic Bacterial Culture Routine [[id number]]   Swab from Leg, Below Knee, Left    Preliminary result Component Value   Culture No anaerobic organisms isolated after 1 day P           01/12/2022 1517 01/12/2022 1948 Gram Stain [[id number]]   (Abnormal)   Swab from Leg, Below Knee, Left    Final result Component Value   Gram Stain Result 1+ Gram positive cocci Abnormal    Gram Stain Result 2+ WBC seen Abnormal            01/12/2022 1517  01/13/2022 1253 Swab Aerobic Bacterial Culture Routine [[id number]]   (Abnormal)   Swab from Leg, Below Knee, Left    Preliminary result Component Value   Culture Culture in progress P    2+ Pseudomonas aeruginosa Abnormal  P    Identification is preliminary, confirmation in progress           01/11/2022 1049 01/11/2022 1841 Asymptomatic COVID-19 Virus (Coronavirus) by PCR Nose [[id number]]    (Abnormal)   Swab from Nose    Final result Component Value   SARS CoV2 PCR Positive Abnormal    POSITIVE: SARS-CoV-2 (COVID-          Pertinent Radiology   Radiology Results:   MR Tibia Fibula Lower Leg Left wo & w Contr    Result Date: 12/9/2021  EXAM: MR TIBIA FIBULA LOWER LEG LEFT WO and W CONTR LOCATION: [facility] DATE/TIME: 12/9/2021 7:39 PM INDICATION: Soft tissue infection. COMPARISON: None. TECHNIQUE: Routine. Additional postgadolinium T1 sequences were obtained. IV CONTRAST: 5ml gadavist FINDINGS: There is an ulcer in the posterior aspect of the lower calf and ankle. A soft tissue defect extends from the skin to the center of the Achilles tendon, where there is an abscess but dissects into the muscles of the posterior compartment (axial image 45).  This abscess measures 2.4 x 1.2 cm in short axis, 10 cm from superior to inferior (axial postcontrast series 9 images 8-3 6). There is infectious myositis throughout the posterior deep posterior compartments, with soft tissue edema extending along the fascial planes and the interosseous membrane. There is no abnormal fascial enhancement to specifically indicate acute fasciitis. Soft tissue edema and enhancement in the lower calf is consistent with cellulitis. There are defects in the superficial adipose tissue over the posterior medial aspect of the left calf, which are sequela of prior surgery and/or ulceration. There is chronic severe atrophy and fatty infiltration of the gastrocnemius muscle, consistent with relative disuse. No atrophy  "elsewhere. Mild reactive edema within the muscle fibers of the anterior compartment, related to infection/inflammation along the interosseous membrane. Of infectious myositis involving the muscles of the lateral compartment, inferiorly. Aside from the central defect in the distal Achilles tendon, no other tendon tear. The flexor, extensor, and peroneal tendons are intact at the ankle. Proximal tendons appear intact. No evidence of osteomyelitis or reactive osteitis. No knee or ankle joint effusion. Normal joint alignment.     IMPRESSION: 1.  Skin ulcer in the posterior aspect of the lower calf/healed, with a sinus tract projecting to the third of the Achilles tendon, and an abscess within the posterior muscle compartments of the lower calf, measuring 2.4 x 1.2 x 10 cm. 2.  Edema/infectious myositis involving the muscle fibers in the posterior, deep posterior, lateral, and anterior compartments as described above. 3.  No fascial enhancement to specifically indicate fasciitis. 4.  Soft tissue deficiency over the posterior and medial aspect lower calf suggesting prior surgical debridement. 5.  Skin edema and enhancement in the lower calf suggesting cellulitis. 6.  No evidence of osteomyelitis/reactive osteitis.    POC US Guidance Needle Placement    Result Date: 12/10/2021  Ultrasound was performed as guidance to an anesthesia procedure.  Click \"PACS images\" hyperlink below to view any stored images.  For specific procedure details, view procedure note authored by anesthesia.                      "

## 2022-01-14 NOTE — PROCEDURES
"PICC Line Insertion Procedure Note    Pt. Name: Anna Sanderson  MRN:        [id number]    Procedure: Insertion of a  SINGLE Lumen  4 fr  Bard SOLO (valved) Power PICC, Lot number [id number]    Indications: Antibiotic(s)    Contraindications : None; (CHG Allergy - rash)    Procedure Details   Patient identified with 2 identifiers and \"Time Out\" conducted.  .     Central line insertion bundle followed: hand hygiene performed prior to procedure, site cleansed with POVIDONE IODINE, hat, mask, sterile gloves,sterile gown worn, patient draped with maximum barrier head to toe drape, sterile field maintained.    The vein was assessed and found to be compressible and of adequate size. 1.5 ml 1% Lidocaine administered sq to the insertion site. A 4 Fr PICC was inserted into the BRACHIAL vein of the right arm with ultrasound guidance. ONE attempt(s) required to access vein.   Catheter threaded without difficulty. Good blood return noted.    Modified Seldinger Technique used for insertion.    The 8 sharps that are included in the PICC insertion kit were accounted for and disposed of in the sharps container prior to breakdown of the sterile field.    Catheter secured with Statlock, biopatch and Tegaderm dressing applied.    Findings:  Total catheter length  38 cm, with 0 cm exposed. Mid upper arm circumference is 25 cm. Catheter was flushed with 20 cc NS. Patient  tolerated procedure well.    Tip placement verified in the distal SVC by 3CG technology:        CLABSI prevention brochure left at bedside.    Patient's primary RN notified PICC is ready for use.    Comments:      Aquiles Cam, RN, MSN, [facility]   Vascular Access - [facility]        "

## 2022-01-15 ENCOUNTER — HOME INFUSION (PRE-WILLOW HOME INFUSION) (OUTPATIENT)
Dept: PHARMACY | Facility: CLINIC | Age: 62
End: 2022-01-15
Payer: COMMERCIAL

## 2022-01-15 LAB
BACTERIA SPEC CULT: ABNORMAL

## 2022-01-15 PROCEDURE — 258N000003 HC RX IP 258 OP 636

## 2022-01-15 PROCEDURE — 250N000011 HC RX IP 250 OP 636

## 2022-01-15 PROCEDURE — 250N000013 HC RX MED GY IP 250 OP 250 PS 637: Performed by: PHYSICIAN ASSISTANT

## 2022-01-15 PROCEDURE — 120N000001 HC R&B MED SURG/OB

## 2022-01-15 PROCEDURE — 250N000013 HC RX MED GY IP 250 OP 250 PS 637: Performed by: ORTHOPAEDIC SURGERY

## 2022-01-15 PROCEDURE — 99233 SBSQ HOSP IP/OBS HIGH 50: CPT | Performed by: STUDENT IN AN ORGANIZED HEALTH CARE EDUCATION/TRAINING PROGRAM

## 2022-01-15 RX ADMIN — ASPIRIN 81 MG: 81 TABLET, COATED ORAL at 20:03

## 2022-01-15 RX ADMIN — IBUPROFEN 600 MG: 600 TABLET ORAL at 09:39

## 2022-01-15 RX ADMIN — PIPERACILLIN AND TAZOBACTAM 3.38 G: 3; .375 INJECTION, POWDER, LYOPHILIZED, FOR SOLUTION INTRAVENOUS at 03:59

## 2022-01-15 RX ADMIN — VANCOMYCIN HYDROCHLORIDE 750 MG: 5 INJECTION, POWDER, LYOPHILIZED, FOR SOLUTION INTRAVENOUS at 02:31

## 2022-01-15 RX ADMIN — ASPIRIN 81 MG: 81 TABLET, COATED ORAL at 09:39

## 2022-01-15 RX ADMIN — PIPERACILLIN AND TAZOBACTAM 3.38 G: 3; .375 INJECTION, POWDER, LYOPHILIZED, FOR SOLUTION INTRAVENOUS at 17:49

## 2022-01-15 RX ADMIN — VANCOMYCIN HYDROCHLORIDE 750 MG: 5 INJECTION, POWDER, LYOPHILIZED, FOR SOLUTION INTRAVENOUS at 14:16

## 2022-01-15 RX ADMIN — IBUPROFEN 600 MG: 600 TABLET ORAL at 14:16

## 2022-01-15 RX ADMIN — PANTOPRAZOLE SODIUM 40 MG: 20 TABLET, DELAYED RELEASE ORAL at 20:03

## 2022-01-15 RX ADMIN — IBUPROFEN 600 MG: 600 TABLET ORAL at 20:03

## 2022-01-15 RX ADMIN — PIPERACILLIN AND TAZOBACTAM 3.38 G: 3; .375 INJECTION, POWDER, LYOPHILIZED, FOR SOLUTION INTRAVENOUS at 09:39

## 2022-01-15 RX ADMIN — ERGOCALCIFEROL 50000 UNITS: 1.25 CAPSULE ORAL at 15:59

## 2022-01-15 RX ADMIN — PANTOPRAZOLE SODIUM 40 MG: 20 TABLET, DELAYED RELEASE ORAL at 09:39

## 2022-01-15 ASSESSMENT — ACTIVITIES OF DAILY LIVING (ADL)
ADLS_ACUITY_SCORE: 7
DEPENDENT_IADLS:: INDEPENDENT
ADLS_ACUITY_SCORE: 7

## 2022-01-15 NOTE — PLAN OF CARE
Problem: Infection  Goal: Absence of Infection Signs and Symptoms  Outcome: No Change     VSS and afebrile. Alert and oriented x 4. Able to make needs known. Up independent with crutches and NWB on LLE. Zosyn infusing in PICC line. Refused scheduled metoprolol despite /74, educated pt, BP rechecked this /63, asymptomatic. Taking scheduled Ibuprofen for pain. Denies pain at this time. Dressing CDI.  Will continue to monitor and support POC.

## 2022-01-15 NOTE — PROVIDER NOTIFICATION
MRI completed at approx 10PM tonight. Paged on call JCARLOS Castaneda with [facility] to update on MRI results listed below. No new orders at this time.    MRI result read to JCARLOS Castaneda:  IMPRESSION:  1.  1.9 x 1.2 x 7.2 cm peripherally enhancing fluid collection in the posterior calf consistent with an abscess. Surrounding myositis. Sinus tract seen extending to the dermal surface.  2.  No osteomyelitis.

## 2022-01-15 NOTE — PROGRESS NOTES
Orthopedic Progress Note      Assessment: 3Days Post-Op  S/P Procedure(s):  LEFT CALF ACHILLES IRRIGATION AND DEBRIDEMENT     Plan:   - Weightbearing status: NWB LLE  - Cam boot, okay to remove per patient  - Do not change dressing, reinforce as needed  - Discharge planning:  okay to discharge per ortho when home antibiotics arranged    Florentin Perales MD on 1/15/2022 at 9:44 AM

## 2022-01-15 NOTE — PROGRESS NOTES
LYNDA informed CM of need for IV ABX at hospital discharge. Chart reviewed. CM send referral to [facility] (\[facility]\"")  to check coverage. Initial CM consult to follow.     9:43 AM  CM spoke with patient about options for IV antibiotics outside of the hospital. She has used \[facility]\"" in the past and would like to do the same at this time. Declines need for PTand OT.   Milagro with intake at \[facility]\"" is reviewing benefit information at this time and return call to CM- aware of potential discharge today.     Milagro from \[facility]\""  returned call and patient has 100% coverage for home infusion once $5,000 annual deductible met. CM updated patient.     4:14 PM  Still waiting on final ID recommendations. Per [place] resource nurse with \[facility]\"" ([phone]) would have needed orders to pharmacy by 4pm in order to have medication delivered and teaching done today. CM updated patient and bedside RN of anticipated discharge for tomorrow at soonest.     Will also need home care orders (including face to face) for Infusion services and Skilled Nursing.

## 2022-01-15 NOTE — PROGRESS NOTES
Infectious Disease Progress Note    Assessment/Plan    Impression:   Anna Sanderson is a 61 year old female with   1.  Chronic right leg wound for at least 10 years.  2.  Recurrent infection left lower extremity secondary to #1. Polymicrobial including anaerobes, Pseudomonas, E coli, S lugdunensis. 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  Repeat MRI on 1/14/2022 with described abscess measuring 1.2 x 1.9 x 7.2 cm.  This will be addressed by surgery  3.  Incidentally found to have COVID-19 infection.  She has received 3 doses of vaccine and is not immunocompromised.     Recommendations:   Continue vancomycin pending susceptibility of the Staphylococcus lugdunensis.  Continue IV Zosyn  Will need IV antibiotic outpatient, to be dictated by final cultures.  Surgery to address the MRI finding.  Continue enhanced respiratory isolation for the COVID.  No additional treatment for COVID at this point.    Discussed with the patient, nursing staff.    ID will follow.    35 minutes of total care with greater than 50% coordinating care.     Active Problems:    Abscess of left lower leg      Adonis Hernandez MD  [phone]      Subjective  New to me today.  MRI reviewed with the patient.  On further history, the patient has had chronic wound as she shown to me on her phone for at least 10 years.    Objective    Vital signs in last 24 hours  Temp:  [97.3  F (36.3  C)-98.7  F (37.1  C)] 97.8  F (36.6  C)  Pulse:  [63-71] 68  Resp:  [14-16] 14  BP: (131-160)/(63-87) 156/69  SpO2:  [96 %-98 %] 97 %  Wt Readings from Last 3 Encounters:   01/12/22 54 kg (119 lb)   01/06/22 54 kg (119 lb)   12/09/21 54.1 kg (119 lb 3.2 oz)           Intake/Output last 3 shifts  I/O last 3 completed shifts:  In: 710 [P.O.:360; I.V.:350]  Out: -   Intake/Output this shift:  No intake/output data recorded.    Review of Systems   Pertinent items are noted in HPI., otherwise negative.    Physical Exam  General appearance: alert, appears  stated age and cooperative  Head: Normocephalic, without obvious abnormality, atraumatic  Eyes: EOMI, no icterus  Neck: no adenopathy and supple, symmetrical, trachea midline  Lungs: normal respiratory pattern  Extremities: Left lower extremity wrapped in postoperative dressing.  Skin: Skin color, texture, turgor normal. No rashes or lesions  Neurologic: Grossly normal    Pertinent Labs   Lab Results: personally reviewed.     Recent Labs   Lab 01/14/22  1617 01/14/22  0519 01/13/22  0558   WBC 8.3  --   --    HGB 8.4* 8.2* 8.3*   HCT 28.9*  --   --      --   --       No results for input(s): NA, CO2, BUN, CREATININE, GLUCOSE in the last 168 hours.    Invalid input(s): K, CL, LABGLOM, CALCIUM  Culture Micro   Date Value Ref Range Status   06/10/2021 Moderate growth  Normal skin aamir    Final   06/10/2021 Moderate growth  Klebsiella oxytoca   (A)  Final   06/10/2021 Moderate growth  Strain 2  Klebsiella oxytoca   (A)  Final   06/10/2021 Moderate growth  Staphylococcus lugdunensis   (A)  Final   06/10/2021 (A)  Final    Heavy growth  Streptococcus anginosus  This organism is susceptible to ampicillin, penicillin, vancomycin and the cephalosporins.   If treatment is required AND your patient is allergic to penicillin, contact the   Microbiology Lab within 5 days to request susceptibility testing.     06/10/2021 (A)  Final    Heavy growth  Trueperella (Arcanobacterium) bernardiae  Identification obtained by MALDI-TOF mass spectrometry research use only database. Test   characteristics determined and verified by the [facility] Diagnostic Laboratory   ([facility]) [place], [place].  Susceptibility testing not routinely done     06/10/2021 (A)  Final    Heavy growth  Actinomyces species  Identification obtained by MALDI-TOF mass spectrometry research use only database. Test   characteristics determined and verified by the [facility] Diagnostic Laboratory   ([facility]) [place], [place].  Susceptibility testing  not routinely done     06/10/2021 (A)  Final    Moderate growth  Bacteroides thetaiotaomicron  Susceptibility testing not routinely done     06/10/2021 (A)  Final    Moderate growth  Bacteroides ovatus/xylanisolvens  Susceptibility testing not routinely done     06/10/2021 (A)  Final    Plus  Heavy growth  Mixed aerobic and anaerobic aamir     05/25/2021 Moderate growth  Klebsiella oxytoca   (A)  Final   05/25/2021 (A)  Final    Moderate growth  Trueperella (Arcanobacterium) bernardiae  Identification obtained by MALDI-TOF mass spectrometry research use only database. Test   characteristics determined and verified by the Infectious Diseases Diagnostic Laboratory   ([facility]) [place], [place].  Susceptibility testing not routinely done     05/25/2021 Light growth  Staphylococcus lugdunensis   (A)  Final   05/25/2021 Light growth  Normal skin aamir    Final   04/22/2021 (A)  Final    Heavy growth  Corynebacterium species  Identification obtained by MALDI-TOF mass spectrometry research use only database. Test   characteristics determined and verified by the Infect[facility] Diseases Diagnostic Laboratory   ([facility]) [place], [place].  Susceptibility testing not routinely done     04/22/2021 (A)  Final    Heavy growth  Trueperella (Arcanobacterium) bernardiae  Identification obtained by MALDI-TOF mass spectrometry research use only database. Test   characteristics determined and verified by the Infect[facility] Diseases Diagnostic Laboratory   ([facility]) [place], [place].  Susceptibility testing not routinely done     04/22/2021 Plus  Light growth  Normal skin aamir    Final   04/22/2021 (A)  Final    Moderate growth  Bacteroides thetaiotaomicron  Susceptibility testing not routinely done     04/22/2021 (A)  Final    Plus  Heavy growth  Mixed aerobic and anaerobic aamir     10/01/2020 No growth  Final   10/01/2020 No anaerobes isolated  Final     Collected Updated Procedure Result Status    01/12/2022 1526 01/12/2022 2039 Gram Stain  [[id number]]   (Abnormal)   Tissue from Leg, Below Knee, Left    Final result Component Value   Gram Stain Result 2+ Gram negative bacilli Abnormal    Gram Stain Result 1+ Gram positive cocci Abnormal    Gram Stain Result 3+ WBC seen Abnormal    Predominantly PMNs           01/12/2022 1526 01/14/2022 1315 Tissue Aerobic Bacterial Culture Routine [[id number]]   (Abnormal)   Tissue from Leg, Below Knee, Left    Preliminary result Component Value   Culture Culture in progress P    2+ Pseudomonas aeruginosa Abnormal  P    2+ Pseudomonas aeruginosa Abnormal  P    1+ Escherichia coli Abnormal  P    Identification is preliminary, confirmation in progress    2+ Staphylococcus lugdunensis Abnormal  P    2+ Streptococcus intermedius Abnormal  P           01/12/2022 1518 01/14/2022 1325 Anaerobic Bacterial Culture Routine [[id number]]   (Abnormal)   Swab from Leg, Below Knee, Left    Final result Component Value   Culture 4+ Bacteroides thetaiotaomicron Abnormal     Susceptibilities not routinely done    4+ Mixed Aerobic and Anaerobic aamir           01/12/2022 1518 01/12/2022 1958 Gram Stain [[id number]]   (Abnormal)   Swab from Leg, Below Knee, Left    Final result Component Value   Gram Stain Result 3+ Gram positive cocci Abnormal    Gram Stain Result 2+ Gram negative bacilli Abnormal    Gram Stain Result 1+ WBC seen Abnormal            01/12/2022 1518 01/14/2022 1325 Swab Aerobic Bacterial Culture Routine [[id number]]   (Abnormal)   Swab from Leg, Below Knee, Left    Preliminary result Component Value   Culture Culture in progress P    4+ Pseudomonas aeruginosa Abnormal  P    Susceptibilities done on previous cultures    4+ Pseudomonas aeruginosa Abnormal  P    Susceptibilities done on previous cultures    2+ Escherichia coli Abnormal  P    Susceptibilities done on previous cultures    1+ Enterococcus faecalis Abnormal  P    1+ Staphylococcus lugdunensis Abnormal  P    Susceptibilities done on previous cultures     4+ Streptococcus intermedius Abnormal  P           01/12/2022 1517 01/13/2022 1931 Anaerobic Bacterial Culture Routine [[id number]]   Swab from Leg, Below Knee, Left    Preliminary result Component Value   Culture No anaerobic organisms isolated after 1 day P           01/12/2022 1517 01/12/2022 1948 Gram Stain [[id number]]   (Abnormal)   Swab from Leg, Below Knee, Left    Final result Component Value   Gram Stain Result 1+ Gram positive cocci Abnormal    Gram Stain Result 2+ WBC seen Abnormal            01/12/2022 1517 01/13/2022 1253 Swab Aerobic Bacterial Culture Routine [[id number]]   (Abnormal)   Swab from Leg, Below Knee, Left    Preliminary result Component Value   Culture Culture in progress P    2+ Pseudomonas aeruginosa Abnormal  P    Identification is preliminary, confirmation in progress           01/11/2022 1049 01/11/2022 1841 Asymptomatic COVID-19 Virus (Coronavirus) by PCR Nose [[id number]]    (Abnormal)   Swab from Nose    Final result Component Value   SARS CoV2 PCR Positive Abnormal    POSITIVE: SARS-CoV-2 (COVID-        Anaerobic Bacterial Culture Routine  Order: [id number]   Collected 1/12/2022  3:17 PM     Status: Preliminary result     Visible to patient: No (not released)    Specimen Information: Leg, Below Knee, Left; Swab         0 Result Notes    Culture 1+ Prevotella bivia Abnormal     Beta Lactamase Positive   Susceptibilities not routinely done        Resulting Agency: [facility]           Specimen Collected: 01/12/22  3:17 PM Last Resulted: 01/15/22  9:33 AM           Swab Aerobic Bacterial Culture Routine  Order: [id number]   Collected 1/12/2022  3:18 PM     Status: Final result     Visible to patient: Yes (seen)    Specimen Information: Leg, Below Knee, Left; Swab         0 Result Notes    Culture 4+ Pseudomonas aeruginosa Abnormal     Susceptibilities done on previous cultures   4+ Pseudomonas aeruginosa Abnormal     Susceptibilities done on previous cultures   2+ Escherichia  coli Abnormal     Susceptibilities done on previous cultures   1+ Enterococcus faecalis Abnormal        1+ Staphylococcus lugdunensis Abnormal     Susceptibilities done on previous cultures   4+ Streptococcus intermedius Abnormal     This organism is susceptible to ampicillin, penicillin, vancomycin and the cephalosporins. If treatment is required and your patient is allergic to penicillin, contact the microbiology lab within 5 days to request susceptibility testing.        Resulting Agency: [facility]       Susceptibility     Enterococcus faecalis     ZORAIDA     Ampicillin <=2.0 ug/mL Susceptible     Gentamicin Synergy Susceptible... Susceptible 1     Penicillin 4.0 ug/mL Susceptible     Vancomycin 2.0 ug/mL Susceptible              1 No high level gentamicin resistance found - therefore combination therapy with an aminoglycoside may be indicated for serious enterococcal infections such as bacteremia and endocarditis.            Specimen Collected: 01/12/22  3:18 PM Last Resulted: 01/15/22 10:15 AM                         Contains abnormal data Anaerobic Bacterial Culture Routine  Order: [id number]   Collected 1/12/2022  3:18 PM       Status: Final result       Visible to patient: Yes (seen)      Specimen Information: Leg, Below Knee, Left; Swab         0 Result Notes      Culture 4+ Bacteroides thetaiotaomicron Abnormal     Susceptibilities not routinely done   4+ Mixed Aerobic and Anaerobic aamir            Resulting Agency: [facility]             Specimen Collected: 01/12/22  3:18 PM Last Resulted: 01/14/22  1:25 PM            Contains abnormal data Tissue Aerobic Bacterial Culture Routine  Order: [id number]   Collected 1/12/2022  3:26 PM     Status: Preliminary result     Visible to patient: No (not released)    Specimen Information: Leg, Below Knee, Left; Tissue         0 Result Notes    Culture 2+ Pseudomonas aeruginosa Abnormal        2+ Pseudomonas aeruginosa Abnormal        1+ Escherichia coli Abnormal        2+  Staphylococcus lugdunensis Abnormal        2+ Streptococcus intermedius Abnormal     This organism is susceptible to ampicillin, penicillin, vancomycin and the cephalosporins. If treatment is required and your patient is allergic to penicillin, contact the microbiology lab within 5 days to request susceptibility testing.        Resulting Agency: [facility]       Susceptibility     Pseudomonas aeruginosa (1) Pseudomonas aeruginosa (2) Escherichia coli     ZORAIDA ZORAIDA ZORAIDA     Amikacin <=2.0 ug/mL Susceptible <=2.0 ug/mL Susceptible       Ampicillin     <=2.0 ug/mL Susceptible     Ampicillin/ Sulbactam     <=2.0 ug/mL Susceptible     Cefepime 2.0 ug/mL Susceptible 2.0 ug/mL Susceptible <=1.0 ug/mL Susceptible     Ceftazidime 2.0 ug/mL Susceptible 4.0 ug/mL Susceptible <=1.0 ug/mL Susceptible     Ceftriaxone     <=1.0 ug/mL Susceptible     Ciprofloxacin <=0.25 ug/mL Susceptible <=0.25 ug/mL Susceptible <=0.25 ug/mL Susceptible     Gentamicin 2.0 ug/mL Susceptible <=1.0 ug/mL Susceptible <=1.0 ug/mL Susceptible     Levofloxacin 1.0 ug/mL Susceptible 1.0 ug/mL Susceptible <=0.12 ug/mL Susceptible     Meropenem <=0.25 ug/mL Susceptible <=0.25 ug/mL Susceptible <=0.25 ug/mL Susceptible     Piperacillin/Tazobactam 8.0 ug/mL Susceptible 8.0 ug/mL Susceptible <=4.0 ug/mL Susceptible     Tobramycin <=1.0 ug/mL Susceptible <=1.0 ug/mL Susceptible <=1.0 ug/mL Susceptible     Trimethoprim/Sulfamethoxazole     <=1/19 ug/mL Susceptible                      Specimen Collected: 01/12/22  3:26 PM Last Resulted: 01/15/22 10:34 AM               Pertinent Radiology   Radiology Results:   MR Tibia Fibula Lower Leg Left wo & w Contr    Result Date: 1/15/2022  FINAL REPORT: I agree with the preliminary report. In addition, there are postoperative changes related to flexor digitorum longus tendon transfer to the posterosuperior calcaneus with surgical anchor in a corresponding location. Thickening and irregularity in the region of the distal Achilles  tendon represents tendinopathy and chronic tearing. Final Interpretation Dictated By: Luis Gonzalez MD Date: 01/15/2022 PRELIMINARY REPORT. Final as per [facility] MRI. EXAM: MR TIBIA FIBULA LOWER LEG LEFT WO and W CONTR LOCATION: [facility] DATE/TIME: 1/14/2022 9:49 PM INDICATION: Known soft tissue infection in the calf. COMPARISON: None. TECHNIQUE: Routine. Additional postgadolinium T1 sequences were obtained. IV CONTRAST: 5.5 mL Gadavist     IMPRESSION: 1.  1.9 x 1.2 x 7.2 cm peripherally enhancing fluid collection in the posterior calf consistent with an abscess. Surrounding myositis. Sinus tract seen extending to the dermal surface. 2.  No osteomyelitis. Preliminary Interpretation Dictated By: John Brunner, MD Date: 1/14/2022 11:05 PM     MR Tibia Fibula Lower Leg Left wo & w Contr    Result Date: 12/9/2021  EXAM: MR TIBIA FIBULA LOWER LEG LEFT WO and W CONTR LOCATION: [facility] DATE/TIME: 12/9/2021 7:39 PM INDICATION: Soft tissue infection. COMPARISON: None. TECHNIQUE: Routine. Additional postgadolinium T1 sequences were obtained. IV CONTRAST: 5ml gadavist FINDINGS: There is an ulcer in the posterior aspect of the lower calf and ankle. A soft tissue defect extends from the skin to the center of the Achilles tendon, where there is an abscess but dissects into the muscles of the posterior compartment (axial image 45).  This abscess measures 2.4 x 1.2 cm in short axis, 10 cm from superior to inferior (axial postcontrast series 9 images 8-3 6). There is infectious myositis throughout the posterior deep posterior compartments, with soft tissue edema extending along the fascial planes and the interosseous membrane. There is no abnormal fascial enhancement to specifically indicate acute fasciitis. Soft tissue edema and enhancement in the lower calf is consistent with cellulitis. There are defects in the superficial adipose tissue over the posterior medial aspect of the  "left calf, which are sequela of prior surgery and/or ulceration. There is chronic severe atrophy and fatty infiltration of the gastrocnemius muscle, consistent with relative disuse. No atrophy elsewhere. Mild reactive edema within the muscle fibers of the anterior compartment, related to infection/inflammation along the interosseous membrane. Of infectious myositis involving the muscles of the lateral compartment, inferiorly. Aside from the central defect in the distal Achilles tendon, no other tendon tear. The flexor, extensor, and peroneal tendons are intact at the ankle. Proximal tendons appear intact. No evidence of osteomyelitis or reactive osteitis. No knee or ankle joint effusion. Normal joint alignment.     IMPRESSION: 1.  Skin ulcer in the posterior aspect of the lower calf/healed, with a sinus tract projecting to the third of the Achilles tendon, and an abscess within the posterior muscle compartments of the lower calf, measuring 2.4 x 1.2 x 10 cm. 2.  Edema/infectious myositis involving the muscle fibers in the posterior, deep posterior, lateral, and anterior compartments as described above. 3.  No fascial enhancement to specifically indicate fasciitis. 4.  Soft tissue deficiency over the posterior and medial aspect lower calf suggesting prior surgical debridement. 5.  Skin edema and enhancement in the lower calf suggesting cellulitis. 6.  No evidence of osteomyelitis/reactive osteitis.    POC US Guidance Needle Placement    Result Date: 12/10/2021  Ultrasound was performed as guidance to an anesthesia procedure.  Click \"PACS images\" hyperlink below to view any stored images.  For specific procedure details, view procedure note authored by anesthesia.                      "

## 2022-01-15 NOTE — PROGRESS NOTES
Pt has coverage for iv abx through their [employer] plan. Pt has a deductible of $5000 (met $759.81). Once deductible has been met pt should be covered at 100%.         (Sonia) In reference to admission date 1/12/2022 to check for iv abx coverage.         Please contact Intake with any questions, [phone] or In Basket pool,  Home Infusion ([phone]).

## 2022-01-15 NOTE — CONSULTS
Care Management Initial Consult    General Information  Assessment completed with: Patient,    Type of CM/SW Visit: Initial Assessment    Primary Care Provider verified and updated as needed: Yes   Readmission within the last 30 days:   Readmission to hospital- was discharged from [facility] on 12/14 with [facility] (per patient services never started)        Reason for Consult: discharge planning  Advance Care Planning:  Patient does not have Health Care Directive and is not interested in completing one at this time.        Communication Assessment  Patient's communication style: spoken language (English or Bilingual)    Hearing Difficulty or Deaf: no   Wear Glasses or Blind: yes    Cognitive  Cognitive/Neuro/Behavioral: WDL                      Living Environment:   People in home: child(toney), adult,grandparent(s),spouse   Sher, daughter and granddaughter  Current living Arrangements:        Able to return to prior arrangements: yes       Family/Social Support:  Care provided by: self  Provides care for: no one  Marital Status:   ,Children  Sher       Description of Support System: Supportive,Involved         Current Resources:   Patient receiving home care services: No (D/C from hospital 12/14 with HC services but never opened)     Community Resources: None  Equipment currently used at home: crutches (knee scooter for use at work)  Supplies currently used at home: Wound Care Supplies    Employment/Financial:  Employment Status: employed full-time     Employment/ Comments: no  or [employer] affiliation   Financial Concerns: No concerns identified   Referral to Financial Counselor: No       Functional Status:  Prior to admission patient needed assistance:   Dependent ADLs:: Independent  Dependent IADLs:: Independent       Mental Health Status:  Mental Health Status: No Current Concerns       Chemical Dependency Status:  Chemical Dependency Status: No  Current Concerns             Values/Beliefs:  Spiritual, Cultural Beliefs, Baptism Practices, Values that affect care: no               Additional Information:  Chart reviewed. CM alerted to anticipated need for IV antibiotics at hospital discharge.   CM completed assessment with patient via phone due to COVID +.  Patient discharged from [facility] on 12/14 with [facility], per patient the Home Care services never did start as Wound VAC was cared for in the clinic and then removed.   Patient has had home infusion in the past (June 2021) with [facility] and would like to use same at this hospital discharge.   Patient lives with , daughter and granddaughter in private home. She is independent with all cares, including driving. Using crutches and has knee scooter for use at work. Wears Rx glasses and has wound care supplies. PCP confirmed. No current Home Care services.     Patient plans to drive self home at hospital discharge (car in parking lot).         Rosalina Lees RN

## 2022-01-16 ENCOUNTER — HOME INFUSION (PRE-WILLOW HOME INFUSION) (OUTPATIENT)
Dept: PHARMACY | Facility: CLINIC | Age: 62
End: 2022-01-16
Payer: COMMERCIAL

## 2022-01-16 VITALS
HEIGHT: 64 IN | OXYGEN SATURATION: 98 % | TEMPERATURE: 98.4 F | WEIGHT: 119 LBS | BODY MASS INDEX: 20.32 KG/M2 | RESPIRATION RATE: 14 BRPM | DIASTOLIC BLOOD PRESSURE: 79 MMHG | SYSTOLIC BLOOD PRESSURE: 149 MMHG | HEART RATE: 70 BPM

## 2022-01-16 LAB
BACTERIA SPEC CULT: ABNORMAL
BACTERIA TISS BX CULT: ABNORMAL

## 2022-01-16 PROCEDURE — 258N000003 HC RX IP 258 OP 636

## 2022-01-16 PROCEDURE — 99233 SBSQ HOSP IP/OBS HIGH 50: CPT | Performed by: STUDENT IN AN ORGANIZED HEALTH CARE EDUCATION/TRAINING PROGRAM

## 2022-01-16 PROCEDURE — 250N000013 HC RX MED GY IP 250 OP 250 PS 637: Performed by: ORTHOPAEDIC SURGERY

## 2022-01-16 PROCEDURE — 250N000013 HC RX MED GY IP 250 OP 250 PS 637: Performed by: PHYSICIAN ASSISTANT

## 2022-01-16 PROCEDURE — 250N000011 HC RX IP 250 OP 636

## 2022-01-16 RX ORDER — TAZOBACTAM SODIUM AND PIPERACILLIN SODIUM 375; 3 MG/50ML; G/50ML
3.38 INJECTION, SOLUTION INTRAVENOUS EVERY 8 HOURS
Qty: 2100 ML | Refills: 0
Start: 2022-01-16 | End: 2022-01-30

## 2022-01-16 RX ADMIN — IBUPROFEN 600 MG: 600 TABLET ORAL at 08:32

## 2022-01-16 RX ADMIN — PIPERACILLIN AND TAZOBACTAM 3.38 G: 3; .375 INJECTION, POWDER, LYOPHILIZED, FOR SOLUTION INTRAVENOUS at 11:19

## 2022-01-16 RX ADMIN — IBUPROFEN 600 MG: 600 TABLET ORAL at 14:53

## 2022-01-16 RX ADMIN — PIPERACILLIN AND TAZOBACTAM 3.38 G: 3; .375 INJECTION, POWDER, LYOPHILIZED, FOR SOLUTION INTRAVENOUS at 03:01

## 2022-01-16 RX ADMIN — PANTOPRAZOLE SODIUM 40 MG: 20 TABLET, DELAYED RELEASE ORAL at 08:31

## 2022-01-16 RX ADMIN — VANCOMYCIN HYDROCHLORIDE 750 MG: 5 INJECTION, POWDER, LYOPHILIZED, FOR SOLUTION INTRAVENOUS at 01:17

## 2022-01-16 RX ADMIN — ASPIRIN 81 MG: 81 TABLET, COATED ORAL at 08:32

## 2022-01-16 ASSESSMENT — ACTIVITIES OF DAILY LIVING (ADL)
ADLS_ACUITY_SCORE: 7

## 2022-01-16 NOTE — PROGRESS NOTES
Infectious Disease Progress Note    Assessment/Plan    Impression:   Anna Sanderson is a 61 year old female with   1.  Chronic right leg wound for at least 10 years.  2.  Recurrent infection left lower extremity secondary to #1. Polymicrobial including anaerobes, Pseudomonas, E coli, methicillin susceptible S lugdunensis. 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  Repeat MRI on 1/14/2022 with described abscess measuring 1.2 x 1.9 x 7.2 cm.  Surgery team to drain.  3.  Incidentally found to have COVID-19 infection.  She has received 3 doses of vaccine and is not immunocompromised.     Recommendations:   Discontinue IV vancomycin.  Can discharge on IV Zosyn 3.375 every 8 hours for 14 days.  Weekly CBC with differential, BMP, CRP.  Keep clinic follow-up with Dr. Powell on 1/20/2022.  ID discharge orders placed.  Remove PICC line after last dose.  No ID clinic follow-up needed.    Discussed with the patient, nursing staff.    ID will sign off.    35 minutes of total care with greater than 50% coordinating care.     Active Problems:    Abscess of left lower leg      Adonis Hernandez MD  [phone]      Subjective  Doing well.  No side effect from the antibiotic.  Cultures reviewed.  Surgery managing abscess.    Objective    Vital signs in last 24 hours  Temp:  [97.7  F (36.5  C)-98.7  F (37.1  C)] 98.2  F (36.8  C)  Pulse:  [68-89] 75  Resp:  [14-18] 16  BP: (131-156)/(64-70) 139/70  SpO2:  [96 %-98 %] 96 %  Wt Readings from Last 3 Encounters:   01/12/22 54 kg (119 lb)   01/06/22 54 kg (119 lb)   12/09/21 54.1 kg (119 lb 3.2 oz)           Intake/Output last 3 shifts  I/O last 3 completed shifts:  In: 830 [P.O.:480; I.V.:350]  Out: -   Intake/Output this shift:  No intake/output data recorded.    Review of Systems   Pertinent items are noted in HPI., otherwise negative.    Physical Exam  General appearance: alert, appears stated age and cooperative  Head: Normocephalic, without obvious abnormality,  atraumatic  Eyes: EOMI, no icterus  Neck: no adenopathy and supple, symmetrical, trachea midline  Lungs: normal respiratory pattern  Extremities: Left lower extremity wrapped in postoperative dressing.  Skin: Skin color, texture, turgor normal. No rashes or lesions  Neurologic: Grossly normal    Pertinent Labs   Lab Results: personally reviewed.     Recent Labs   Lab 01/14/22  1617 01/14/22  0519 01/13/22  0558   WBC 8.3  --   --    HGB 8.4* 8.2* 8.3*   HCT 28.9*  --   --      --   --       No results for input(s): NA, CO2, BUN, CREATININE, GLUCOSE in the last 168 hours.    Invalid input(s): K, CL, LABGLOM, CALCIUM  Culture Micro   Date Value Ref Range Status   06/10/2021 Moderate growth  Normal skin aamir    Final   06/10/2021 Moderate growth  Klebsiella oxytoca   (A)  Final   06/10/2021 Moderate growth  Strain 2  Klebsiella oxytoca   (A)  Final   06/10/2021 Moderate growth  Staphylococcus lugdunensis   (A)  Final   06/10/2021 (A)  Final    Heavy growth  Streptococcus anginosus  This organism is susceptible to ampicillin, penicillin, vancomycin and the cephalosporins.   If treatment is required AND your patient is allergic to penicillin, contact the   Microbiology Lab within 5 days to request susceptibility testing.     06/10/2021 (A)  Final    Heavy growth  Trueperella (Arcanobacterium) bernardiae  Identification obtained by MALDI-TOF mass spectrometry research use only database. Test   characteristics determined and verified by the [facility]   ([facility]) [place], [place].  Susceptibility testing not routinely done     06/10/2021 (A)  Final    Heavy growth  Actinomyces species  Identification obtained by MALDI-TOF mass spectrometry research use only database. Test   characteristics determined and verified by the [facility]   ([facility]) [place], [place].  Susceptibility testing not routinely done     06/10/2021 (A)  Final    Moderate growth  Bacteroides  thetaiotaomicron  Susceptibility testing not routinely done     06/10/2021 (A)  Final    Moderate growth  Bacteroides ovatus/xylanisolvens  Susceptibility testing not routinely done     06/10/2021 (A)  Final    Plus  Heavy growth  Mixed aerobic and anaerobic aamir     05/25/2021 Moderate growth  Klebsiella oxytoca   (A)  Final   05/25/2021 (A)  Final    Moderate growth  Trueperella (Arcanobacterium) bernardiae  Identification obtained by MALDI-TOF mass spectrometry research use only database. Test   characteristics determined and verified by the [facility] Diagnostic Laboratory   ([facility]) [place], [place].  Susceptibility testing not routinely done     05/25/2021 Light growth  Staphylococcus lugdunensis   (A)  Final   05/25/2021 Light growth  Normal skin aamir    Final   04/22/2021 (A)  Final    Heavy growth  Corynebacterium species  Identification obtained by MALDI-TOF mass spectrometry research use only database. Test   characteristics determined and verified by the [facility]   ([facility]) [place], [place].  Susceptibility testing not routinely done     04/22/2021 (A)  Final    Heavy growth  Trueperella (Arcanobacterium) bernardiae  Identification obtained by MALDI-TOF mass spectrometry research use only database. Test   characteristics determined and verified by the [facility]   ([facility]) [place], [place].  Susceptibility testing not routinely done     04/22/2021 Plus  Light growth  Normal skin aamir    Final   04/22/2021 (A)  Final    Moderate growth  Bacteroides thetaiotaomicron  Susceptibility testing not routinely done     04/22/2021 (A)  Final    Plus  Heavy growth  Mixed aerobic and anaerobic aamir     10/01/2020 No growth  Final   10/01/2020 No anaerobes isolated  Final     Collected Updated Procedure Result Status    01/12/2022 1526 01/12/2022 2039 Gram Stain [[id number]]   (Abnormal)   Tissue from Leg, Below Knee, Left    Final result Component  Value   Gram Stain Result 2+ Gram negative bacilli Abnormal    Gram Stain Result 1+ Gram positive cocci Abnormal    Gram Stain Result 3+ WBC seen Abnormal    Predominantly PMNs           01/12/2022 1526 01/14/2022 1315 Tissue Aerobic Bacterial Culture Routine [[id number]]   (Abnormal)   Tissue from Leg, Below Knee, Left    Preliminary result Component Value   Culture Culture in progress P    2+ Pseudomonas aeruginosa Abnormal  P    2+ Pseudomonas aeruginosa Abnormal  P    1+ Escherichia coli Abnormal  P    Identification is preliminary, confirmation in progress    2+ Staphylococcus lugdunensis Abnormal  P    2+ Streptococcus intermedius Abnormal  P           01/12/2022 1518 01/14/2022 1325 Anaerobic Bacterial Culture Routine [[id number]]   (Abnormal)   Swab from Leg, Below Knee, Left    Final result Component Value   Culture 4+ Bacteroides thetaiotaomicron Abnormal     Susceptibilities not routinely done    4+ Mixed Aerobic and Anaerobic aamir           01/12/2022 1518 01/12/2022 1958 Gram Stain [[id number]]   (Abnormal)   Swab from Leg, Below Knee, Left    Final result Component Value   Gram Stain Result 3+ Gram positive cocci Abnormal    Gram Stain Result 2+ Gram negative bacilli Abnormal    Gram Stain Result 1+ WBC seen Abnormal            01/12/2022 1518 01/14/2022 1325 Swab Aerobic Bacterial Culture Routine [[id number]]   (Abnormal)   Swab from Leg, Below Knee, Left    Preliminary result Component Value   Culture Culture in progress P    4+ Pseudomonas aeruginosa Abnormal  P    Susceptibilities done on previous cultures    4+ Pseudomonas aeruginosa Abnormal  P    Susceptibilities done on previous cultures    2+ Escherichia coli Abnormal  P    Susceptibilities done on previous cultures    1+ Enterococcus faecalis Abnormal  P    1+ Staphylococcus lugdunensis Abnormal  P    Susceptibilities done on previous cultures    4+ Streptococcus intermedius Abnormal  P           01/12/2022 1517 01/13/2022 1931  Anaerobic Bacterial Culture Routine [[id number]]   Swab from Leg, Below Knee, Left    Preliminary result Component Value   Culture No anaerobic organisms isolated after 1 day P           01/12/2022 1517 01/12/2022 1948 Gram Stain [[id number]]   (Abnormal)   Swab from Leg, Below Knee, Left    Final result Component Value   Gram Stain Result 1+ Gram positive cocci Abnormal    Gram Stain Result 2+ WBC seen Abnormal            01/12/2022 1517 01/13/2022 1253 Swab Aerobic Bacterial Culture Routine [[id number]]   (Abnormal)   Swab from Leg, Below Knee, Left    Preliminary result Component Value   Culture Culture in progress P    2+ Pseudomonas aeruginosa Abnormal  P    Identification is preliminary, confirmation in progress           01/11/2022 1049 01/11/2022 1841 Asymptomatic COVID-19 Virus (Coronavirus) by PCR Nose [[id number]]    (Abnormal)   Swab from Nose    Final result Component Value   SARS CoV2 PCR Positive Abnormal    POSITIVE: SARS-CoV-2 (COVID-        Anaerobic Bacterial Culture Routine  Order: [id number]   Collected 1/12/2022  3:17 PM     Status: Preliminary result     Visible to patient: No (not released)    Specimen Information: Leg, Below Knee, Left; Swab         0 Result Notes    Culture 1+ Prevotella bivia Abnormal     Beta Lactamase Positive   Susceptibilities not routinely done        Resulting Agency: [facility]           Specimen Collected: 01/12/22  3:17 PM Last Resulted: 01/15/22  9:33 AM           Swab Aerobic Bacterial Culture Routine  Order: [id number]   Collected 1/12/2022  3:18 PM     Status: Final result     Visible to patient: Yes (seen)    Specimen Information: Leg, Below Knee, Left; Swab         0 Result Notes    Culture 4+ Pseudomonas aeruginosa Abnormal     Susceptibilities done on previous cultures   4+ Pseudomonas aeruginosa Abnormal     Susceptibilities done on previous cultures   2+ Escherichia coli Abnormal     Susceptibilities done on previous cultures   1+ Enterococcus  faecalis Abnormal        1+ Staphylococcus lugdunensis Abnormal     Susceptibilities done on previous cultures   4+ Streptococcus intermedius Abnormal     This organism is susceptible to ampicillin, penicillin, vancomycin and the cephalosporins. If treatment is required and your patient is allergic to penicillin, contact the microbiology lab within 5 days to request susceptibility testing.        Resulting Agency: [facility]       Susceptibility     Enterococcus faecalis     ZORAIDA     Ampicillin <=2.0 ug/mL Susceptible     Gentamicin Synergy Susceptible... Susceptible 1     Penicillin 4.0 ug/mL Susceptible     Vancomycin 2.0 ug/mL Susceptible              1 No high level gentamicin resistance found - therefore combination therapy with an aminoglycoside may be indicated for serious enterococcal infections such as bacteremia and endocarditis.            Specimen Collected: 01/12/22  3:18 PM Last Resulted: 01/15/22 10:15 AM                         Contains abnormal data Anaerobic Bacterial Culture Routine  Order: [id number]   Collected 1/12/2022  3:18 PM       Status: Final result       Visible to patient: Yes (seen)      Specimen Information: Leg, Below Knee, Left; Swab         0 Result Notes      Culture 4+ Bacteroides thetaiotaomicron Abnormal     Susceptibilities not routinely done   4+ Mixed Aerobic and Anaerobic aamir            Resulting Agency: [facility]             Specimen Collected: 01/12/22  3:18 PM Last Resulted: 01/14/22  1:25 PM            Contains abnormal data Tissue Aerobic Bacterial Culture Routine  Order: [id number]   Collected 1/12/2022  3:26 PM     Status: Preliminary result     Visible to patient: No (not released)    Specimen Information: Leg, Below Knee, Left; Tissue         0 Result Notes    Culture 2+ Pseudomonas aeruginosa Abnormal        2+ Pseudomonas aeruginosa Abnormal        1+ Escherichia coli Abnormal        2+ Staphylococcus lugdunensis Abnormal        2+ Streptococcus intermedius Abnormal     This  organism is susceptible to ampicillin, penicillin, vancomycin and the cephalosporins. If treatment is required and your patient is allergic to penicillin, contact the microbiology lab within 5 days to request susceptibility testing.        Resulting Agency: [facility]       Susceptibility     Pseudomonas aeruginosa (1) Pseudomonas aeruginosa (2) Escherichia coli     ZORAIDA ZORAIDA ZORAIDA     Amikacin <=2.0 ug/mL Susceptible <=2.0 ug/mL Susceptible       Ampicillin     <=2.0 ug/mL Susceptible     Ampicillin/ Sulbactam     <=2.0 ug/mL Susceptible     Cefepime 2.0 ug/mL Susceptible 2.0 ug/mL Susceptible <=1.0 ug/mL Susceptible     Ceftazidime 2.0 ug/mL Susceptible 4.0 ug/mL Susceptible <=1.0 ug/mL Susceptible     Ceftriaxone     <=1.0 ug/mL Susceptible     Ciprofloxacin <=0.25 ug/mL Susceptible <=0.25 ug/mL Susceptible <=0.25 ug/mL Susceptible     Gentamicin 2.0 ug/mL Susceptible <=1.0 ug/mL Susceptible <=1.0 ug/mL Susceptible     Levofloxacin 1.0 ug/mL Susceptible 1.0 ug/mL Susceptible <=0.12 ug/mL Susceptible     Meropenem <=0.25 ug/mL Susceptible <=0.25 ug/mL Susceptible <=0.25 ug/mL Susceptible     Piperacillin/Tazobactam 8.0 ug/mL Susceptible 8.0 ug/mL Susceptible <=4.0 ug/mL Susceptible     Tobramycin <=1.0 ug/mL Susceptible <=1.0 ug/mL Susceptible <=1.0 ug/mL Susceptible     Trimethoprim/Sulfamethoxazole     <=1/19 ug/mL Susceptible                      Specimen Collected: 01/12/22  3:26 PM Last Resulted: 01/15/22 10:34 AM             Tissue Aerobic Bacterial Culture Routine  Order: [id number]   Collected 1/12/2022  3:26 PM     Status: Final result     Visible to patient: Yes (seen)    Specimen Information: Leg, Below Knee, Left; Tissue         0 Result Notes    Culture 2+ Pseudomonas aeruginosa Abnormal        2+ Pseudomonas aeruginosa Abnormal        1+ Escherichia coli Abnormal        2+ Staphylococcus lugdunensis Abnormal        2+ Streptococcus intermedius Abnormal     This organism is susceptible to ampicillin, penicillin,  vancomycin and the cephalosporins. If treatment is required and your patient is allergic to penicillin, contact the microbiology lab within 5 days to request susceptibility testing.        Resulting Agency: [facility]       Susceptibility     Pseudomonas aeruginosa (1) Pseudomonas aeruginosa (2) Escherichia coli Staphylococcus lugdunensis     ZORAIDA ZORAIDA ZORAIDA ZORAIDA     Amikacin <=2.0 ug/mL Susceptible <=2.0 ug/mL Susceptible         Ampicillin     <=2.0 ug/mL Susceptible       Ampicillin/ Sulbactam     <=2.0 ug/mL Susceptible       Cefepime 2.0 ug/mL Susceptible 2.0 ug/mL Susceptible <=1.0 ug/mL Susceptible       Ceftazidime 2.0 ug/mL Susceptible 4.0 ug/mL Susceptible <=1.0 ug/mL Susceptible       Ceftriaxone     <=1.0 ug/mL Susceptible       Ciprofloxacin <=0.25 ug/mL Susceptible <=0.25 ug/mL Susceptible <=0.25 ug/mL Susceptible       Clindamycin       <=0.25 ug/mL Susceptible     Erythromycin       <=0.25 ug/mL Susceptible     Gentamicin 2.0 ug/mL Susceptible <=1.0 ug/mL Susceptible <=1.0 ug/mL Susceptible <=0.5 ug/mL Susceptible     Levofloxacin 1.0 ug/mL Susceptible 1.0 ug/mL Susceptible <=0.12 ug/mL Susceptible       Meropenem <=0.25 ug/mL Susceptible <=0.25 ug/mL Susceptible <=0.25 ug/mL Susceptible       Oxacillin       2.0 ug/mL Susceptible 1     Piperacillin/Tazobactam 8.0 ug/mL Susceptible 8.0 ug/mL Susceptible <=4.0 ug/mL Susceptible       Tetracycline       <=1.0 ug/mL Susceptible     Tobramycin <=1.0 ug/mL Susceptible <=1.0 ug/mL Susceptible <=1.0 ug/mL Susceptible       Trimethoprim/Sulfamethoxazole     <=1/19 ug/mL Susceptible <=0.5/9.5 u... Susceptible     Vancomycin       <=0.5 ug/mL Susceptible                    1 Oxacillin susceptible isolates are susceptible to cephalosporins (ex. cefazolin and cephalexin) and beta lactam combination agents. Oxacillin resistant isolates are resistant to these agents.            Specimen Collected: 01/12/22  3:26 PM Last Resulted: 01/16/22  3:53 AM                Pertinent  Radiology   Radiology Results:   MR Tibia Fibula Lower Leg Left wo & w Contr    Result Date: 1/15/2022  FINAL REPORT: I agree with the preliminary report. In addition, there are postoperative changes related to flexor digitorum longus tendon transfer to the posterosuperior calcaneus with surgical anchor in a corresponding location. Thickening and irregularity in the region of the distal Achilles tendon represents tendinopathy and chronic tearing. Final Interpretation Dictated By: Luis Gonzalez MD Date: 01/15/2022 PRELIMINARY REPORT. Final as per [facility] MRI. EXAM: MR TIBIA FIBULA LOWER LEG LEFT WO and W CONTR LOCATION: [facility] DATE/TIME: 1/14/2022 9:49 PM INDICATION: Known soft tissue infection in the calf. COMPARISON: None. TECHNIQUE: Routine. Additional postgadolinium T1 sequences were obtained. IV CONTRAST: 5.5 mL Gadavist     IMPRESSION: 1.  1.9 x 1.2 x 7.2 cm peripherally enhancing fluid collection in the posterior calf consistent with an abscess. Surrounding myositis. Sinus tract seen extending to the dermal surface. 2.  No osteomyelitis. Preliminary Interpretation Dictated By: John Brunner, MD Date: 1/14/2022 11:05 PM     MR Tibia Fibula Lower Leg Left wo & w Contr    Result Date: 12/9/2021  EXAM: MR TIBIA FIBULA LOWER LEG LEFT WO and W CONTR LOCATION: [facility] DATE/TIME: 12/9/2021 7:39 PM INDICATION: Soft tissue infection. COMPARISON: None. TECHNIQUE: Routine. Additional postgadolinium T1 sequences were obtained. IV CONTRAST: 5ml gadavist FINDINGS: There is an ulcer in the posterior aspect of the lower calf and ankle. A soft tissue defect extends from the skin to the center of the Achilles tendon, where there is an abscess but dissects into the muscles of the posterior compartment (axial image 45).  This abscess measures 2.4 x 1.2 cm in short axis, 10 cm from superior to inferior (axial postcontrast series 9 images 8-3 6). There is infectious myositis  throughout the posterior deep posterior compartments, with soft tissue edema extending along the fascial planes and the interosseous membrane. There is no abnormal fascial enhancement to specifically indicate acute fasciitis. Soft tissue edema and enhancement in the lower calf is consistent with cellulitis. There are defects in the superficial adipose tissue over the posterior medial aspect of the left calf, which are sequela of prior surgery and/or ulceration. There is chronic severe atrophy and fatty infiltration of the gastrocnemius muscle, consistent with relative disuse. No atrophy elsewhere. Mild reactive edema within the muscle fibers of the anterior compartment, related to infection/inflammation along the interosseous membrane. Of infectious myositis involving the muscles of the lateral compartment, inferiorly. Aside from the central defect in the distal Achilles tendon, no other tendon tear. The flexor, extensor, and peroneal tendons are intact at the ankle. Proximal tendons appear intact. No evidence of osteomyelitis or reactive osteitis. No knee or ankle joint effusion. Normal joint alignment.     IMPRESSION: 1.  Skin ulcer in the posterior aspect of the lower calf/healed, with a sinus tract projecting to the third of the Achilles tendon, and an abscess within the posterior muscle compartments of the lower calf, measuring 2.4 x 1.2 x 10 cm. 2.  Edema/infectious myositis involving the muscle fibers in the posterior, deep posterior, lateral, and anterior compartments as described above. 3.  No fascial enhancement to specifically indicate fasciitis. 4.  Soft tissue deficiency over the posterior and medial aspect lower calf suggesting prior surgical debridement. 5.  Skin edema and enhancement in the lower calf suggesting cellulitis. 6.  No evidence of osteomyelitis/reactive osteitis.    POC US Guidance Needle Placement    Result Date: 12/10/2021  Ultrasound was performed as guidance to an anesthesia  "procedure.  Click \"PACS images\" hyperlink below to view any stored images.  For specific procedure details, view procedure note authored by anesthesia.                      "

## 2022-01-16 NOTE — PROGRESS NOTES
Called Dr. Wiley's cell phone as other MD to electronically sign order is in surgery -  Dr. Wiley stated she will attempt to complete electronic signature    **ADDENDUM 3:58 PM 01/16/22 - Discussed with Dr. Wiley - provider unable to complete electronic signature prior to 1600 deadline per [facility] home infusion & care management

## 2022-01-16 NOTE — DISCHARGE INSTRUCTIONS
Home Care Services have been arranged:  Home Care Agency: [facility]   Home Care Agency Telephone: [phone]  Services: Infusion (IV antibiotics) and Skilled Nursing   Instructions: Teaching will be done in the home. IV antibiotics and supplies will be shipped to the home.

## 2022-01-16 NOTE — PROGRESS NOTES
Care Management Discharge Note    Discharge Date: 01/16/2022 pending final orders are electronically signed by 4pm today.        Discharge Disposition: Home,Home Care,Home Infusion (home with [facility] for IV antibiotics and Skilled Nursing)    Discharge Services:  (home with [facility] for IV antibiotics and Skilled Nursing)    Discharge DME:  (has PICC line)    Discharge Transportation: car, drives self    Private pay costs discussed: CM discussed coverage for Home Infusion services.     Education Provided on the Discharge Plan:  Patient aware of plan for [facility] for IV antibiotics and Skilled Nursing. AVS will be per bedside RN.   Persons Notified of Discharge Plans: patient   Patient/Family in Agreement with the Plan: yes    Handoff Referral Completed: CM reviewed  Additional Information:   Chart reviewed. Anticipate discharge home with [facility] ([facility]) for IV antibiotics and Skilled Nursing. PICC line has been placed. Final ID plan is pending then CM will arrange for teaching (if discharging today- teaching likely to be done in the home by I RN, if discharging tomorrow- teaching likely to be done at the hospital by [facility] Liaison). Patient plans to drive self home from the hospital (car in parking lot).    Will need Home Care orders (with Face to Face) for infusion services and Skilled Nursing.     3:02 PM  Final ID plan is ordered by ID. Awaiting electronic signature of orders by Ortho. CM updated [facility] Nurse with [facility]. They are unable to send a Nurse to the home until tomorrow, however patient has been on home infusion in the past and confirms comfortable with administration. Provided orders are electronically signed by 4pm today, patient can discharge home and will medication/supplies will be shipped to the home for next dose (per I, evening dose will be changed to 9-10pm to get patient on a schedule where she is not dosing in  the middle of the night). CM updated bedside RN. Patient plans to drive self home from hospital.      4:36 PM  Per Manjula Pedersen Nurse with [facility], OK to discharge home at this time. Pharmacist is willing to ship medication and supplies to the home for this evening dose. Still need orders electronically signed. Electronic order signed and CM updated Manjula Pedersen Nurse. CM updated bedside RN and patient.     Rosalina Lees, RN

## 2022-01-16 NOTE — PLAN OF CARE
Problem: Adult Inpatient Plan of Care  Goal: Plan of Care Review  Outcome: Improving     Problem: Infection  Goal: Absence of Infection Signs and Symptoms  Outcome: Improving  Intervention: Prevent or Manage Infection  Recent Flowsheet Documentation  Taken 1/16/2022 0115 by Gaby Ulloa RN  Isolation Precautions: airborne precautions maintained     Alert and oriented x 4, afebrile and VSS, on RA, denies pain, dressing CDI, independence with crutches,  continuation on IV scheduled antibiotics, discharge plan is home with home care and antibiotic IV.

## 2022-01-16 NOTE — PROGRESS NOTES
Discharge AVS reviewed with patient.  Questions answered and teachings acknowledged.  Belongings and paperwork sent with via self transport

## 2022-01-16 NOTE — PLAN OF CARE
Patient vital signs are at baseline: yes  Patient able to ambulate as they were prior to admission or with assist devices provided by therapies during their stay: yes  Patient MUST void prior to discharge: yes  Patient able to tolerate oral intake: yes  Pain has adequate pain control using oral analgesics: yes  Will try oral tonight: yes  Notes:  baseline LLE neuropathy - CMS otherwise intact.  LLE dressing CDI.

## 2022-01-16 NOTE — PROGRESS NOTES
Orthopedic Progress Note      Assessment: 3Days Post-Op  S/P Procedure(s):  LEFT CALF ACHILLES IRRIGATION AND DEBRIDEMENT   Patient is doing well this AM. She is anxious to go home. Waiting on ID for IV antiobiotic rx. She currently is not having much pain and denies N/V, fever, SOB and chest pain      Patient is alert and oriented to person, place and time with normal affect. She has near full ROM of knee without pain. Able to dorsiflex and plantarflex at the ankle without pain. No calf or thigh TTP. Compartments are soft and nontender. Dressing is CDI. Sensation is intact, arterial pulses are palpable.   Plan:   - Weightbearing status: NWB LLE  - Cam boot, okay to remove per patient  - MRI from Friday shows smaller but persistent abscess. Since this is chronic, she is afebrile, WBC is 8.3 and she is doing really well symptomatically, I do not recommend any further surgical intervention at this point. Recommend IV abx and close follow up with Dr. Wiley outpatient.   - Do not change dressing, reinforce as needed  - Discharge planning:  okay to discharge per ortho when home antibiotics arranged    Neha William PA-C on 1/15/2022 at 12:25 PM

## 2022-01-16 NOTE — PROGRESS NOTES
Spoke with Rosalina from CM - patient ok to discharge home today - IV Zosyn dose to be rescheduled for later tonight

## 2022-01-16 NOTE — PLAN OF CARE
Patient vital signs are at baseline: yes  Patient able to ambulate as they were prior to admission or with assist devices provided by therapies during their stay: yes - with crutches independently in room   Patient MUST void prior to discharge: yes  Patient able to tolerate oral intake: yes  Pain has adequate pain control using oral analgesics: yes  Will try oral tonight: --  Notes:  BLE CMS intact. LLE dressing CDI. Remains on IV Vanco and Zosyn pending finalization of cx/antibiotic plans per ID.

## 2022-01-17 ENCOUNTER — TELEPHONE (OUTPATIENT)
Dept: INFECTIOUS DISEASES | Facility: CLINIC | Age: 62
End: 2022-01-17
Payer: COMMERCIAL

## 2022-01-17 NOTE — TELEPHONE ENCOUNTER
ID TEAM    Please advise when to schedule or if ID f/up is not needed at this time.    Procedure: Infectious Disease Referral Status: Needs Scheduling   Requested appt date: 1/28/2022 (Approximate) Authorizing: Florentin Mcneil MD   Referral: [id number] (Pending Review)       Expires: 1/14/2023 Priority: Routine: Next available opening   Diagnosis: Abscess of left lower leg [L02.416]

## 2022-01-18 ENCOUNTER — TRANSFERRED RECORDS (OUTPATIENT)
Dept: HEALTH INFORMATION MANAGEMENT | Facility: CLINIC | Age: 62
End: 2022-01-18
Payer: COMMERCIAL

## 2022-01-18 VITALS — DIASTOLIC BLOOD PRESSURE: 70 MMHG | SYSTOLIC BLOOD PRESSURE: 128 MMHG | OXYGEN SATURATION: 99 % | HEART RATE: 91 BPM

## 2022-01-18 VITALS
OXYGEN SATURATION: 99 % | WEIGHT: 118 LBS | BODY MASS INDEX: 20.14 KG/M2 | DIASTOLIC BLOOD PRESSURE: 74 MMHG | HEIGHT: 64 IN | SYSTOLIC BLOOD PRESSURE: 144 MMHG | HEART RATE: 90 BPM

## 2022-01-18 VITALS — BODY MASS INDEX: 19.29 KG/M2 | HEART RATE: 90 BPM | RESPIRATION RATE: 14 BRPM | WEIGHT: 113 LBS | HEIGHT: 64 IN

## 2022-01-18 VITALS
OXYGEN SATURATION: 100 % | WEIGHT: 119 LBS | BODY MASS INDEX: 21.09 KG/M2 | DIASTOLIC BLOOD PRESSURE: 90 MMHG | HEART RATE: 112 BPM | TEMPERATURE: 98 F | SYSTOLIC BLOOD PRESSURE: 148 MMHG | HEIGHT: 63 IN

## 2022-01-18 VITALS
HEART RATE: 82 BPM | HEIGHT: 64 IN | DIASTOLIC BLOOD PRESSURE: 84 MMHG | SYSTOLIC BLOOD PRESSURE: 132 MMHG | BODY MASS INDEX: 20.32 KG/M2 | OXYGEN SATURATION: 99 % | WEIGHT: 119 LBS

## 2022-01-18 VITALS
TEMPERATURE: 97.4 F | WEIGHT: 116 LBS | HEART RATE: 100 BPM | OXYGEN SATURATION: 100 % | BODY MASS INDEX: 20.55 KG/M2 | DIASTOLIC BLOOD PRESSURE: 82 MMHG | HEIGHT: 63 IN | SYSTOLIC BLOOD PRESSURE: 134 MMHG

## 2022-01-18 VITALS
OXYGEN SATURATION: 96 % | HEART RATE: 101 BPM | WEIGHT: 119 LBS | SYSTOLIC BLOOD PRESSURE: 112 MMHG | HEIGHT: 64 IN | DIASTOLIC BLOOD PRESSURE: 64 MMHG | BODY MASS INDEX: 20.32 KG/M2

## 2022-01-18 VITALS
SYSTOLIC BLOOD PRESSURE: 135 MMHG | HEART RATE: 63 BPM | BODY MASS INDEX: 21.09 KG/M2 | TEMPERATURE: 98.3 F | DIASTOLIC BLOOD PRESSURE: 85 MMHG | RESPIRATION RATE: 18 BRPM | OXYGEN SATURATION: 98 % | WEIGHT: 120 LBS

## 2022-01-18 VITALS
BODY MASS INDEX: 20.38 KG/M2 | DIASTOLIC BLOOD PRESSURE: 80 MMHG | OXYGEN SATURATION: 100 % | TEMPERATURE: 98.6 F | HEIGHT: 63 IN | SYSTOLIC BLOOD PRESSURE: 124 MMHG | HEART RATE: 100 BPM | WEIGHT: 115 LBS

## 2022-01-18 VITALS
BODY MASS INDEX: 19.4 KG/M2 | DIASTOLIC BLOOD PRESSURE: 80 MMHG | WEIGHT: 113 LBS | SYSTOLIC BLOOD PRESSURE: 142 MMHG | HEART RATE: 95 BPM

## 2022-01-18 VITALS — OXYGEN SATURATION: 97 % | TEMPERATURE: 97 F

## 2022-01-19 ENCOUNTER — LAB REQUISITION (OUTPATIENT)
Dept: LAB | Facility: CLINIC | Age: 62
End: 2022-01-19
Payer: COMMERCIAL

## 2022-01-19 ENCOUNTER — HOME INFUSION (PRE-WILLOW HOME INFUSION) (OUTPATIENT)
Dept: PHARMACY | Facility: CLINIC | Age: 62
End: 2022-01-19

## 2022-01-19 DIAGNOSIS — A49.02 METHICILLIN RESISTANT STAPHYLOCOCCUS AUREUS INFECTION, UNSPECIFIED SITE: ICD-10-CM

## 2022-01-19 LAB
ANION GAP SERPL CALCULATED.3IONS-SCNC: 9 MMOL/L (ref 5–18)
AST SERPL W P-5'-P-CCNC: 12 U/L (ref 0–40)
BACTERIA SPEC CULT: ABNORMAL
BACTERIA SPEC CULT: ABNORMAL
BASOPHILS # BLD AUTO: 0.1 10E3/UL (ref 0–0.2)
BASOPHILS NFR BLD AUTO: 1 %
BUN SERPL-MCNC: 10 MG/DL (ref 8–22)
C REACTIVE PROTEIN LHE: <0.1 MG/DL (ref 0–0.8)
CALCIUM SERPL-MCNC: 8.6 MG/DL (ref 8.5–10.5)
CHLORIDE BLD-SCNC: 112 MMOL/L (ref 98–107)
CO2 SERPL-SCNC: 20 MMOL/L (ref 22–31)
CREAT SERPL-MCNC: 0.61 MG/DL (ref 0.6–1.1)
EOSINOPHIL # BLD AUTO: 0.3 10E3/UL (ref 0–0.7)
EOSINOPHIL NFR BLD AUTO: 4 %
ERYTHROCYTE [DISTWIDTH] IN BLOOD BY AUTOMATED COUNT: 16.1 % (ref 10–15)
ERYTHROCYTE [SEDIMENTATION RATE] IN BLOOD BY WESTERGREN METHOD: 29 MM/HR (ref 0–20)
GFR SERPL CREATININE-BSD FRML MDRD: >90 ML/MIN/1.73M2
GLUCOSE BLD-MCNC: 84 MG/DL (ref 70–125)
HCT VFR BLD AUTO: 30.4 % (ref 35–47)
HGB BLD-MCNC: 8.8 G/DL (ref 11.7–15.7)
IMM GRANULOCYTES # BLD: 0 10E3/UL
IMM GRANULOCYTES NFR BLD: 1 %
LYMPHOCYTES # BLD AUTO: 2.4 10E3/UL (ref 0.8–5.3)
LYMPHOCYTES NFR BLD AUTO: 35 %
MCH RBC QN AUTO: 22.7 PG (ref 26.5–33)
MCHC RBC AUTO-ENTMCNC: 28.9 G/DL (ref 31.5–36.5)
MCV RBC AUTO: 79 FL (ref 78–100)
MONOCYTES # BLD AUTO: 0.6 10E3/UL (ref 0–1.3)
MONOCYTES NFR BLD AUTO: 10 %
NEUTROPHILS # BLD AUTO: 3.3 10E3/UL (ref 1.6–8.3)
NEUTROPHILS NFR BLD AUTO: 49 %
NRBC # BLD AUTO: 0 10E3/UL
NRBC BLD AUTO-RTO: 0 /100
PLATELET # BLD AUTO: 510 10E3/UL (ref 150–450)
POTASSIUM BLD-SCNC: 4 MMOL/L (ref 3.5–5)
RBC # BLD AUTO: 3.87 10E6/UL (ref 3.8–5.2)
SODIUM SERPL-SCNC: 141 MMOL/L (ref 136–145)
WBC # BLD AUTO: 6.7 10E3/UL (ref 4–11)

## 2022-01-19 PROCEDURE — 80048 BASIC METABOLIC PNL TOTAL CA: CPT | Performed by: STUDENT IN AN ORGANIZED HEALTH CARE EDUCATION/TRAINING PROGRAM

## 2022-01-19 PROCEDURE — 85652 RBC SED RATE AUTOMATED: CPT | Performed by: STUDENT IN AN ORGANIZED HEALTH CARE EDUCATION/TRAINING PROGRAM

## 2022-01-19 PROCEDURE — 84450 TRANSFERASE (AST) (SGOT): CPT | Performed by: STUDENT IN AN ORGANIZED HEALTH CARE EDUCATION/TRAINING PROGRAM

## 2022-01-19 PROCEDURE — 85025 COMPLETE CBC W/AUTO DIFF WBC: CPT | Performed by: STUDENT IN AN ORGANIZED HEALTH CARE EDUCATION/TRAINING PROGRAM

## 2022-01-19 PROCEDURE — 36592 COLLECT BLOOD FROM PICC: CPT | Performed by: STUDENT IN AN ORGANIZED HEALTH CARE EDUCATION/TRAINING PROGRAM

## 2022-01-19 PROCEDURE — 86140 C-REACTIVE PROTEIN: CPT | Performed by: STUDENT IN AN ORGANIZED HEALTH CARE EDUCATION/TRAINING PROGRAM

## 2022-01-20 ENCOUNTER — HOME INFUSION (PRE-WILLOW HOME INFUSION) (OUTPATIENT)
Dept: PHARMACY | Facility: CLINIC | Age: 62
End: 2022-01-20

## 2022-01-24 NOTE — TELEPHONE ENCOUNTER
Pt called. States that ortho wants pt to be seen ASAP. picc line is to be removed on the 30th, worried that it should be extended.    Per britni christian to schedule f/up, pt needs to send ortho notes over.    Pt notified to contact ortho and send over notes/labs to .    appt made as:  Date: 1/31/2022 Status: Scheduled   Time: 9:20 AM Length: 20   Visit Type: RETURN [657] Copay: $0.00   Provider: Florentin Mcneil MD Department: [facility]FECTIOUS [facility]EASE   [facility] Area[facility]

## 2022-01-25 ENCOUNTER — TRANSFERRED RECORDS (OUTPATIENT)
Dept: HEALTH INFORMATION MANAGEMENT | Facility: CLINIC | Age: 62
End: 2022-01-25
Payer: COMMERCIAL

## 2022-01-26 ENCOUNTER — LAB REQUISITION (OUTPATIENT)
Dept: LAB | Facility: CLINIC | Age: 62
End: 2022-01-26
Payer: COMMERCIAL

## 2022-01-26 ENCOUNTER — HOME INFUSION (PRE-WILLOW HOME INFUSION) (OUTPATIENT)
Dept: PHARMACY | Facility: CLINIC | Age: 62
End: 2022-01-26
Payer: COMMERCIAL

## 2022-01-26 DIAGNOSIS — A49.02 METHICILLIN RESISTANT STAPHYLOCOCCUS AUREUS INFECTION, UNSPECIFIED SITE: ICD-10-CM

## 2022-01-26 LAB
ANION GAP SERPL CALCULATED.3IONS-SCNC: 6 MMOL/L (ref 3–14)
AST SERPL W P-5'-P-CCNC: 11 U/L (ref 0–45)
BASOPHILS # BLD AUTO: 0.1 10E3/UL (ref 0–0.2)
BASOPHILS NFR BLD AUTO: 1 %
BUN SERPL-MCNC: 13 MG/DL (ref 7–30)
CALCIUM SERPL-MCNC: 9.2 MG/DL (ref 8.5–10.1)
CHLORIDE BLD-SCNC: 109 MMOL/L (ref 94–109)
CO2 SERPL-SCNC: 25 MMOL/L (ref 20–32)
CREAT SERPL-MCNC: 0.62 MG/DL (ref 0.52–1.04)
CRP SERPL-MCNC: <2.9 MG/L (ref 0–8)
EOSINOPHIL # BLD AUTO: 0.2 10E3/UL (ref 0–0.7)
EOSINOPHIL NFR BLD AUTO: 2 %
ERYTHROCYTE [DISTWIDTH] IN BLOOD BY AUTOMATED COUNT: 16.3 % (ref 10–15)
ERYTHROCYTE [SEDIMENTATION RATE] IN BLOOD BY WESTERGREN METHOD: 30 MM/HR (ref 0–30)
GFR SERPL CREATININE-BSD FRML MDRD: >90 ML/MIN/1.73M2
GLUCOSE BLD-MCNC: 81 MG/DL (ref 70–99)
HCT VFR BLD AUTO: 31.4 % (ref 35–47)
HGB BLD-MCNC: 9.2 G/DL (ref 11.7–15.7)
HOLD SPECIMEN: NORMAL
IMM GRANULOCYTES # BLD: 0 10E3/UL
IMM GRANULOCYTES NFR BLD: 0 %
LYMPHOCYTES # BLD AUTO: 2.3 10E3/UL (ref 0.8–5.3)
LYMPHOCYTES NFR BLD AUTO: 33 %
MCH RBC QN AUTO: 22.4 PG (ref 26.5–33)
MCHC RBC AUTO-ENTMCNC: 29.3 G/DL (ref 31.5–36.5)
MCV RBC AUTO: 76 FL (ref 78–100)
MONOCYTES # BLD AUTO: 0.7 10E3/UL (ref 0–1.3)
MONOCYTES NFR BLD AUTO: 10 %
NEUTROPHILS # BLD AUTO: 3.7 10E3/UL (ref 1.6–8.3)
NEUTROPHILS NFR BLD AUTO: 54 %
NRBC # BLD AUTO: 0 10E3/UL
NRBC BLD AUTO-RTO: 0 /100
PLATELET # BLD AUTO: 443 10E3/UL (ref 150–450)
POTASSIUM BLD-SCNC: 4 MMOL/L (ref 3.4–5.3)
RBC # BLD AUTO: 4.11 10E6/UL (ref 3.8–5.2)
SODIUM SERPL-SCNC: 140 MMOL/L (ref 133–144)
WBC # BLD AUTO: 7 10E3/UL (ref 4–11)

## 2022-01-26 PROCEDURE — 84450 TRANSFERASE (AST) (SGOT): CPT | Performed by: STUDENT IN AN ORGANIZED HEALTH CARE EDUCATION/TRAINING PROGRAM

## 2022-01-26 PROCEDURE — 80048 BASIC METABOLIC PNL TOTAL CA: CPT | Performed by: STUDENT IN AN ORGANIZED HEALTH CARE EDUCATION/TRAINING PROGRAM

## 2022-01-26 PROCEDURE — 36592 COLLECT BLOOD FROM PICC: CPT | Performed by: STUDENT IN AN ORGANIZED HEALTH CARE EDUCATION/TRAINING PROGRAM

## 2022-01-26 PROCEDURE — 86140 C-REACTIVE PROTEIN: CPT | Performed by: STUDENT IN AN ORGANIZED HEALTH CARE EDUCATION/TRAINING PROGRAM

## 2022-01-26 PROCEDURE — 85652 RBC SED RATE AUTOMATED: CPT | Performed by: STUDENT IN AN ORGANIZED HEALTH CARE EDUCATION/TRAINING PROGRAM

## 2022-01-26 PROCEDURE — 85025 COMPLETE CBC W/AUTO DIFF WBC: CPT | Performed by: STUDENT IN AN ORGANIZED HEALTH CARE EDUCATION/TRAINING PROGRAM

## 2022-01-26 NOTE — TELEPHONE ENCOUNTER
1/26 - called pt x 1 - pt met with ortho 1/25. Maybe note not completed./sent yet. She was told it would be on the way. I is visiting her today for labs. She was that picc line is getting removed Sunday, 1/30. Should she still keep Monday appt with Dr Mcneil?

## 2022-01-27 ENCOUNTER — HOME INFUSION (PRE-WILLOW HOME INFUSION) (OUTPATIENT)
Dept: PHARMACY | Facility: CLINIC | Age: 62
End: 2022-01-27

## 2022-01-31 ENCOUNTER — OFFICE VISIT (OUTPATIENT)
Dept: INFECTIOUS DISEASES | Facility: CLINIC | Age: 62
End: 2022-01-31
Payer: COMMERCIAL

## 2022-01-31 VITALS
BODY MASS INDEX: 20.42 KG/M2 | DIASTOLIC BLOOD PRESSURE: 88 MMHG | WEIGHT: 119 LBS | HEART RATE: 72 BPM | SYSTOLIC BLOOD PRESSURE: 132 MMHG | TEMPERATURE: 97.9 F

## 2022-01-31 DIAGNOSIS — T14.8XXA WOUND INFECTION: Primary | ICD-10-CM

## 2022-01-31 DIAGNOSIS — L08.9 WOUND INFECTION: Primary | ICD-10-CM

## 2022-01-31 DIAGNOSIS — L98.499 CHRONIC SKIN ULCER, UNSPECIFIED ULCER STAGE (H): ICD-10-CM

## 2022-01-31 PROCEDURE — 99214 OFFICE O/P EST MOD 30 MIN: CPT

## 2022-01-31 RX ORDER — CIPROFLOXACIN 500 MG/1
500 TABLET, FILM COATED ORAL 2 TIMES DAILY
Qty: 60 TABLET | Refills: 3 | Status: SHIPPED | OUTPATIENT
Start: 2022-01-31 | End: 2022-05-03

## 2022-01-31 NOTE — PROGRESS NOTES
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    Noting that the Pseudomonas was susceptible to Cipro despite patient receiving Cipro for several weeks, raises the question of inadequate vascular flow for antimicrobials to be of any benefit.    Additionally, her CRP was normal during her hospitalization which suggests poor inflammatory response    We had discussion about chronic suppressive antibiotics versus amputation.  As she is never been on chronic suppressive antibiotics, working to start with Augmentin and Cipro and have her follow-up in a couple weeks.    Can remove the PICC line today.  - amoxicillin-clavulanate (AUGMENTIN) 875-125 MG tablet  Dispense: 60 tablet; Refill: 3  - ciprofloxacin (CIPRO) 500 MG tablet  Dispense: 60 tablet; Refill: 3      Review of prior external note(s) from - Outside records from [facility]    MEDICATIONS:  Continue current medications without change    Return in about 2 weeks (around 2/14/2022).    OLIVIA ALEXANDER MD  [facility]    Carlitos Castillo is a 61 year old who presents for the following health issues      HPI     Follow-up visit for patient I saw at [facility] when she was admitted for recurrent left lower extremity infection.  She did undergo I&D with Dr. Segovia.    Patient has had chronic infection for many years.  She persistently grew Pseudomonas and this time had multiple other pathogens including E. coli, multiple anaerobes, staph lugdunensis.    Interestingly, none of these organisms were resistant to any antibiotics, including Cipro which she had been on up to the time of surgery.    Review of Systems   Tolerating antibiotics without difficulty.      Objective    /88   Pulse 72   Temp 97.9  F (36.6  C)   Wt 54 kg (119 lb)   BMI 20.42 kg/m    Body mass index is 20.42 kg/m .  Physical Exam   GENERAL: healthy, alert and no distress  NECK: no  adenopathy, no asymmetry, masses, or scars and thyroid normal to palpation  RESP: Normal respiratory pattern    MS: Left lower extremity dressing taken down.  Wound on the calf has healed.  Wound on the Achilles tendon area is still dehisced with poor granulation.  SKIN: no suspicious lesions or rashes    Laboratory studies reviewed including normal CRP during her hospitalization.

## 2022-02-01 ENCOUNTER — TRANSFERRED RECORDS (OUTPATIENT)
Dept: HEALTH INFORMATION MANAGEMENT | Facility: CLINIC | Age: 62
End: 2022-02-01
Payer: COMMERCIAL

## 2022-02-02 ENCOUNTER — HOME INFUSION (PRE-WILLOW HOME INFUSION) (OUTPATIENT)
Dept: PHARMACY | Facility: CLINIC | Age: 62
End: 2022-02-02

## 2022-02-02 DIAGNOSIS — I10 BENIGN ESSENTIAL HYPERTENSION: Primary | ICD-10-CM

## 2022-02-02 NOTE — TELEPHONE ENCOUNTER
Refill Request  Medication name: Pending Prescriptions:                       Disp   Refills    metoprolol succinate ER (TOPROL-XL) 25 MG*                    Sig: Take 1 tablet (25 mg) by mouth every evening    Who prescribed the medication: Shaun  Last refill on medication: 11/04/21  [facility]ed [facility]  Last appointment with PCP: 01/06/22  Next appointment: Appointment scheduled for 02/07/22

## 2022-02-03 RX ORDER — METOPROLOL SUCCINATE 25 MG/1
25 TABLET, EXTENDED RELEASE ORAL EVERY EVENING
Qty: 90 TABLET | Refills: 3 | Status: SHIPPED | OUTPATIENT
Start: 2022-02-03 | End: 2022-02-07

## 2022-02-05 LAB
ACID FAST STAIN (ARUP): NORMAL
ACID FAST STAIN (ARUP): NORMAL

## 2022-02-07 ENCOUNTER — OFFICE VISIT (OUTPATIENT)
Dept: INTERNAL MEDICINE | Facility: CLINIC | Age: 62
End: 2022-02-07
Payer: COMMERCIAL

## 2022-02-07 VITALS
SYSTOLIC BLOOD PRESSURE: 128 MMHG | DIASTOLIC BLOOD PRESSURE: 76 MMHG | HEIGHT: 64 IN | BODY MASS INDEX: 20.32 KG/M2 | HEART RATE: 88 BPM | WEIGHT: 119 LBS

## 2022-02-07 DIAGNOSIS — Z12.11 COLON CANCER SCREENING: ICD-10-CM

## 2022-02-07 DIAGNOSIS — R05.3 PERSISTENT COUGH FOR 3 WEEKS OR LONGER: ICD-10-CM

## 2022-02-07 DIAGNOSIS — I73.9 PAD (PERIPHERAL ARTERY DISEASE) (H): ICD-10-CM

## 2022-02-07 DIAGNOSIS — T81.89XD NON-HEALING SURGICAL WOUND, SUBSEQUENT ENCOUNTER: ICD-10-CM

## 2022-02-07 DIAGNOSIS — M81.0 OSTEOPOROSIS OF MULTIPLE SITES WITHOUT PATHOLOGICAL FRACTURE: ICD-10-CM

## 2022-02-07 DIAGNOSIS — K21.00 GASTROESOPHAGEAL REFLUX DISEASE WITH ESOPHAGITIS WITHOUT HEMORRHAGE: ICD-10-CM

## 2022-02-07 DIAGNOSIS — E06.1 SUBACUTE THYROIDITIS: ICD-10-CM

## 2022-02-07 DIAGNOSIS — D50.9 IRON DEFICIENCY ANEMIA, UNSPECIFIED IRON DEFICIENCY ANEMIA TYPE: ICD-10-CM

## 2022-02-07 DIAGNOSIS — I10 BENIGN ESSENTIAL HYPERTENSION: ICD-10-CM

## 2022-02-07 DIAGNOSIS — T88.7XXS UNSPECIFIED ADVERSE EFFECT OF DRUG OR MEDICAMENT, SEQUELA (CODE): ICD-10-CM

## 2022-02-07 DIAGNOSIS — R93.89 ABNORMAL CT OF THE CHEST: ICD-10-CM

## 2022-02-07 DIAGNOSIS — E55.9 VITAMIN D DEFICIENCY: ICD-10-CM

## 2022-02-07 DIAGNOSIS — R00.2 PALPITATIONS: ICD-10-CM

## 2022-02-07 DIAGNOSIS — Z00.00 ROUTINE GENERAL MEDICAL EXAMINATION AT A HEALTH CARE FACILITY: Primary | ICD-10-CM

## 2022-02-07 PROBLEM — R79.89 ABNORMAL TSH: Status: RESOLVED | Noted: 2021-05-13 | Resolved: 2022-02-07

## 2022-02-07 PROBLEM — M76.60 ACHILLES TENDINITIS: Status: RESOLVED | Noted: 2019-07-26 | Resolved: 2022-02-07

## 2022-02-07 PROBLEM — R31.29 MICROSCOPIC HEMATURIA: Status: RESOLVED | Noted: 2021-03-07 | Resolved: 2022-02-07

## 2022-02-07 PROBLEM — K21.9 GASTROESOPHAGEAL REFLUX DISEASE WITHOUT ESOPHAGITIS: Status: RESOLVED | Noted: 2021-06-16 | Resolved: 2022-02-07

## 2022-02-07 LAB
CHOLEST SERPL-MCNC: 222 MG/DL
FASTING STATUS PATIENT QL REPORTED: ABNORMAL
HDLC SERPL-MCNC: 63 MG/DL
IRON SATN MFR SERPL: 4 % (ref 15–46)
IRON SERPL-MCNC: 20 UG/DL (ref 35–180)
LDLC SERPL CALC-MCNC: 137 MG/DL
TIBC SERPL-MCNC: 488 UG/DL (ref 240–430)
TRIGL SERPL-MCNC: 111 MG/DL
TSH SERPL DL<=0.005 MIU/L-ACNC: 0.53 UIU/ML (ref 0.3–5)

## 2022-02-07 PROCEDURE — 80061 LIPID PANEL: CPT | Performed by: INTERNAL MEDICINE

## 2022-02-07 PROCEDURE — 84443 ASSAY THYROID STIM HORMONE: CPT | Performed by: INTERNAL MEDICINE

## 2022-02-07 PROCEDURE — 82306 VITAMIN D 25 HYDROXY: CPT | Performed by: INTERNAL MEDICINE

## 2022-02-07 PROCEDURE — 36415 COLL VENOUS BLD VENIPUNCTURE: CPT | Performed by: INTERNAL MEDICINE

## 2022-02-07 PROCEDURE — 83550 IRON BINDING TEST: CPT | Performed by: INTERNAL MEDICINE

## 2022-02-07 PROCEDURE — 99214 OFFICE O/P EST MOD 30 MIN: CPT | Mod: 25 | Performed by: INTERNAL MEDICINE

## 2022-02-07 PROCEDURE — 99396 PREV VISIT EST AGE 40-64: CPT | Performed by: INTERNAL MEDICINE

## 2022-02-07 RX ORDER — PANTOPRAZOLE SODIUM 40 MG/1
40 TABLET, DELAYED RELEASE ORAL 2 TIMES DAILY
Qty: 180 TABLET | Refills: 3 | Status: SHIPPED | OUTPATIENT
Start: 2022-02-07 | End: 2023-09-11

## 2022-02-07 RX ORDER — METOPROLOL SUCCINATE 50 MG/1
50 TABLET, EXTENDED RELEASE ORAL DAILY
Qty: 90 TABLET | Refills: 3 | Status: SHIPPED | OUTPATIENT
Start: 2022-02-07 | End: 2023-09-11

## 2022-02-07 ASSESSMENT — MIFFLIN-ST. JEOR: SCORE: 1099.78

## 2022-02-07 NOTE — PROGRESS NOTES
SUBJECTIVE:   CC: Anna Sanderson is an 61 year old woman who presents for preventive health visit.     HPI-in addition to her annual preventive visit, Anna is here to follow-up multiple chronic medical problems including nonhealing wound on left posterior ankle with poor circulation, hypertension, palpitations, osteoporosis, iron deficiency anemia, GERD and subacute thyroiditis.  See assessment and plan for details.      Patient has been advised of split billing requirements and indicates understanding: Yes  Healthy Habits:     Getting at least 3 servings of Calcium per day:  NO    Bi-annual eye exam:  Yes    Dental care twice a year:  NO    Sleep apnea or symptoms of sleep apnea:  None    Diet:  Regular (no restrictions)    Frequency of exercise:  6-7 days/week    Duration of exercise:  15-30 minutes    Taking medications regularly:  Yes    Medication side effects:  None    PHQ-2 Total Score: 0    Additional concerns today:  No              Today's PHQ-2 Score:   PHQ-2 (  [employer]) 2022   Q1: Little interest or pleasure in doing things 0   Q2: Feeling down, depressed or hopeless 0   PHQ-2 Score 0   PHQ-2 Total Score (12-17 Years)- Positive if 3 or more points; Administer PHQ-A if positive -   Q1: Little interest or pleasure in doing things Not at all   Q2: Feeling down, depressed or hopeless Not at all   PHQ-2 Score 0       Abuse: Current or Past (Physical, Sexual or Emotional) - No  Do you feel safe in your environment? Yes        Social History     Tobacco Use     Smoking status: Former Smoker     Types: Cigarettes     Quit date: 3/14/2003     Years since quittin.9     Smokeless tobacco: Never Used     Tobacco comment: Quit    Substance Use Topics     Alcohol use: No     Comment: Alcoholic Drinks/day: rare         Alcohol Use 2022   Prescreen: >3 drinks/day or >7 drinks/week? No       Reviewed orders with patient.  Reviewed health maintenance and updated orders accordingly -  Yes  Lab work is in process    Breast Cancer Screening:  Any new diagnosis of family breast, ovarian, or bowel cancer? No    FHS-7: No flowsheet data found.      Pertinent mammograms are reviewed under the imaging tab.    History of abnormal Pap smear: NO - age 30- 65 PAP every 3 years recommended     Reviewed and updated as needed this visit by clinical staff  Tobacco  Allergies  Meds  Problems  Med Hx  Surg Hx  Fam Hx         Reviewed and updated as needed this visit by Provider  Tobacco  Allergies  Meds  Problems  Med Hx  Surg Hx  Fam Hx        Past Medical History:   Diagnosis Date     Abdominal pain, epigastric 11/05/2019     Abnormal CT of the chest 02/01/2021     Abnormal TSH     TSH suppressed 0.18 and normal free T4, recheck in 3 months     Abscess of left lower leg 06/07/2021     Achilles tendinitis 7/26/2019     Achilles tendon infection      Allergic rhinitis 09/03/2020     Chronic cough 03/23/2020    CT chest with mild bronchiectasis and small area of consolidation     Deep vein thrombosis (DVT) of brachial vein of left upper extremity (H) 04/17/2018    Associated with PICC line, follow-up ultrasound showing resolution of thrombus     Dermatitis      Family history of myocardial infarction     dad 35  mother 70     Fatigue 02/24/2017     GERD (gastroesophageal reflux disease)     EGD with esophagitis 2015, experiencing chest pain and dysphagia     History of anesthesia complications      Hypertension      Impaired fasting glucose     Glucose 119 February 2021.  A1c 5.3%     Iron deficiency anemia 12/18/2015    Has required iron infusions     Kidney stones      Microscopic hematuria 03/07/2021    Longstanding finding     Motion sickness      MRSA infection 08/2020    Left leg wound     Open wound of foot, complicated     Achilles tendon rupture complicated by infected open wound with multiple surgeries including skin grafting     Osteoporosis of multiple sites without pathological fracture      DEXA with T score -2.4 L1 L4, -3.2 left femoral neck and -2.6 right femoral neck     Ovarian cyst      Palpitations 2021    Event monitor showing sinus tachycardia no arrhythmia 2021     PONV (postoperative nausea and vomiting)      SIRS (systemic inflammatory response syndrome) (H) 2019     SOB (shortness of breath) 2021     Subacute thyroiditis 10/15/2021    TSH less than 0.1 and abnormal thyroid uptake scan 2021, asymptomatic     Vitamin D deficiency 2020      Past Surgical History:   Procedure Laterality Date     ANKLE SURGERY      Multiple surgeries [id number] after complications from Achilles rupture and repair with nonhealing open wound including skin grafting     APPLY WOUND VAC Left 2020    Procedure: VERAFLO WOUND VAC APPLICATION;  Surgeon: Mitchell Her MD;  Location:  OR     APPLY WOUND VAC Left 11/10/2020    Procedure: WOUND VAC APPLICATION;  Surgeon: Mitchell Her MD;  Location:  OR      SECTION      x3     GRAFT SKIN SPLIT THICKNESS FROM EXTREMITY Left 11/10/2020    Procedure: SURGICAL PROCUREMENT, SPLIT-THICKNESS SKIN GRAFT, HARVEST FROM LEFT THIGH TO LEFT LEG WOUND;  Surgeon: Mitchell Her MD;  Location:  OR     GYN SURGERY  2000    hysterectomy,  x 3     GYN SURGERY      right oophorectomy     HYSTERECTOMY  2002     IR FOREIGN BODY REMOVAL  2008     IR LOWER EXTREMITY ANGIOGRAM LEFT  10/2/2020     IRRIGATION AND DEBRIDEMENT LOWER EXTREMITY, COMBINED Left 2020    Procedure: MISONIX IRRIGATION AND DEBRIDEMENT LOWER EXTREMITY WITH VASHE APPLICATION;  Surgeon: Mitchell Her MD;  Location:  OR     IRRIGATION AND DEBRIDEMENT LOWER EXTREMITY, COMBINED Left 12/10/2021    Procedure: IRRIGATION AND DEBRIDEMENT, LEFT LOWER EXTREMITY WITH WOUND VAC PLACEMENT;  Surgeon: Carie Wiley MD;  Location[facility]     IRRIGATION AND DEBRIDEMENT LOWER EXTREMITY, COMBINED Left 2022    Procedure: LEFT CALF  "ACHILLES IRRIGATION AND DEBRIDEMENT;  Surgeon: Carie Wiley MD;  Location: [facility] OR     OOPHORECTOMY Right 2002     PICC  6/17/2021          PICC AND MIDLINE TEAM LINE INSERTION  3/23/2018          PICC SINGLE LUMEN PLACEMENT  1/14/2022          SOFT TISSUE SURGERY      multiple left Achilles area surgeries       Review of Systems  12 point review of systems is negative other than what is discussed in the assessment and plan     OBJECTIVE:   /76 (BP Location: Right arm, Patient Position: Sitting, Cuff Size: Adult Regular)   Pulse 88   Ht 1.626 m (5' 4\")   Wt 54 kg (119 lb)   BMI 20.43 kg/m    Physical Exam  EYES: Eyelids, conjunctiva, and sclera were normal. Pupils were normal. Cornea, iris, and lens were normal bilaterally.  HEAD, EARS, NOSE, MOUTH, AND THROAT: Head and face were normal. TMs and external auditory canals are normal  NECK: Neck appearance was normal. There were no neck masses and the thyroid was not enlarged and no nodules are felt.  No lymphadenopathy.  RESPIRATORY: Breathing pattern was normal and the chest moved symmetrically.   Lung sounds were normal and there were no rales or wheezes.  CARDIOVASCULAR: Heart rate and rhythm were normal.  S1 and S2 were normal and there were no extra sounds or murmurs.  Jugular venous pressure was normal.  There was no peripheral edema.  No carotid bruits.  Good pedal pulse right foot.  Left foot not evaluated with boot applied.  Followed by surgery.  GASTROINTESTINAL:  Bowel sounds were present.   Palpation detected no tenderness, mass, or enlarged organs.   MUSCULOSKELETAL: Skeletal configuration was normal and muscle mass was normal for age. Joint appearance was overall normal.  LYMPHATIC: There were no enlarged nodes.  SKIN/HAIR/NAILS: Skin color was normal.  Hair and nails were normal.There were no skin lesions.  NEUROLOGIC: The patient was alert and oriented to person, place, time, and circumstance. Speech was normal. Cranial nerves " were normal. Motor strength was normal for age. The patient was normally coordinated.   PSYCHIATRIC:  Mood and affect were normal and the patient had normal recent and remote memory. The patient's judgment and insight were normal.        ASSESSMENT/PLAN:   1. Routine general medical examination at a health care facility  Immunizations are reviewed and everything is up-to-date. Living will discussed.  She has completed and will bring a copy.  Non-smoker quitting over 15 years ago.  Uses alcohol rarely.  Regular exercise discussed.  She has never had a colonoscopy and declines but is open to Cologuard as an alternative.  Overdue for mammogram and I am urging her to get this completed.  Information provided to schedule.  Continue to follow-up with OB/GYN annually.   She sees an ophthalmologist every year. Skin exam performed and recommending regular use of sunblock.  Hepatitis C antibody for screening was normal.  Screened for diabetes with fasting glucose.      2. PAD (peripheral artery disease) (H)  Previously evaluated by Dr. Aquiles Ng.  Revascularization procedure attempted last year.  Poor circulation left lower extremity contributing to nonhealing wound.  Concerned that she may need left BKA.  She is followed by infectious disease and orthopedic surgery.  Continue 81 mg aspirin daily.  - aspirin (ASA) 81 MG EC tablet; Take 1 tablet (81 mg) by mouth daily  Dispense: 60 tablet; Refill: 0  - Lipid Profile (Chol, Trig, HDL, LDL calc); Future  - Lipid Profile (Chol, Trig, HDL, LDL calc)    3. Non-healing surgical wound, subsequent encounter  Chronically infected nonhealing left Achilles wound.  Recurrent hospitalizations.  Recently found to have abscess and underwent incision and drainage December 2021.  No bone involvement but Pseudomonas was found.  Wound VAC.  Followed by infectious disease and Dr. Wiley from [facility].  Continues on Cipro and Augmentin.  Concerns that this may never heal and that  she will need left BKA.  She is trying to come to terms with this.  - aspirin (ASA) 81 MG EC tablet; Take 1 tablet (81 mg) by mouth daily  Dispense: 60 tablet; Refill: 0    4. Benign essential hypertension  Blood pressure looks adequately controlled with current dose of Toprol-XL 50 mg daily  - metoprolol succinate ER (TOPROL-XL) 50 MG 24 hr tablet; Take 1 tablet (50 mg) by mouth daily  Dispense: 90 tablet; Refill: 3  - Lipid Profile (Chol, Trig, HDL, LDL calc); Future  - Lipid Profile (Chol, Trig, HDL, LDL calc)    5. Palpitations  Continues on beta-blocker and subacute thyroiditis appears to be resolving but still having some intermittent palpitations.  Wore event monitor October 2021 showing occasional sinus tachycardia.  No arrhythmia.    6. Osteoporosis of multiple sites without pathological fracture  She is completed 4 doses of Prolia.  Will be due for her fifth dose in April 2022 but will obtain DEXA to confirm good response to treatment.  Also emphasized the importance of needing 1500 mg of calcium daily.  Currently not taking any supplements nor does she get much dairy in her diet.  I am providing her information on other sources of calcium besides dairy and I would suggest starting 600 mg of Citracal using with some caution with history of nephrolithiasis.  Maintain good hydration.  - DX Hip/Pelvis/Spine; Future  - calcium citrate-vitamin D (CITRACAL) 315-250 MG-UNIT TABS per tablet; Take 2 tablets by mouth daily    7. Vitamin D deficiency  Recheck vitamin D on replacement taking 50,000 units every week  - Vitamin D deficiency screening; Future  - Vitamin D deficiency screening    8. Iron deficiency anemia, unspecified iron deficiency anemia type  Chronic anemia with hemoglobin 9.2 recently associated with MCV 76.  Currently not on iron supplement which she is taken in the past.  Rechecking iron levels and anticipate getting her back on some iron.  - Iron and iron binding capacity; Future  - Iron and iron  "binding capacity    9. Subacute thyroiditis  Suppressed TSH last year secondary to subacute thyroiditis as suggested by nuclear medicine scan.  TSH has now returned to normal and she is at risk for developing hypothyroidism.  This will be monitored closely.  - TSH; Future  - TSH    10. Gastroesophageal reflux disease with esophagitis without hemorrhage  Reflux well controlled taking pantoprazole 40 mg twice daily  - pantoprazole (PROTONIX) 40 MG EC tablet; Take 1 tablet (40 mg) by mouth 2 times daily  Dispense: 180 tablet; Refill: 3        12. Persistent cough for 3 weeks or longer  Persistent cough.  History of tobacco use.  Last CT scan in 2020 with abnormalities.  We will arrange for follow-up CT scan to further evaluate current symptoms.  - CT Chest w/o Contrast; Future    13. Colon cancer screening  Declines having colonoscopy but will make arrangements for Cologuard  - COLOGUARD([employer]); Future    Patient has been advised of split billing requirements and indicates understanding: Yes    COUNSELING:  Reviewed preventive health counseling, as reflected in patient instructions       Regular exercise       Healthy diet/nutrition       Vision screening       Aspirin prophylaxis       Osteoporosis prevention/bone health       Colorectal Cancer Screening    Estimated body mass index is 20.43 kg/m  as calculated from the following:    Height as of this encounter: 1.626 m (5' 4\").    Weight as of this encounter: 54 kg (119 lb).        She reports that she quit smoking about 18 years ago. Her smoking use included cigarettes. She has never used smokeless tobacco.      Counseling Resources:  ATP IV Guidelines  Pooled Cohorts Equation Calculator  Breast Cancer Risk Calculator  BRCA-Related Cancer Risk Assessment: FHS-7 Tool  FRAX Risk Assessment  ICSI Preventive Guidelines  Dietary Guidelines for Americans, 2010  USDA's MyPlate  ASA Prophylaxis  Lung CA Screening    Jame Dunn MD  [facility] " [facility] [place]

## 2022-02-07 NOTE — PATIENT INSTRUCTIONS
A DEXA will be scheduled to assess your response to Prolia.  If it confirms improvement, plan to get your next dose in April 2022    CT chest will be scheduled for persistent cough and to follow-up previous abnormal CT scan    You should try to increase your calcium in your diet reading the information provided.  I would also try introducing 600 mg of Citracal once daily.  Do not take at the same time as your ciprofloxacin      Preventive Health Recommendations  Female Ages 50 - 64    Yearly exam: See your health care provider every year in order to  o Review health changes.   o Discuss preventive care.    o Review your medicines if your doctor has prescribed any.      Get a Pap test every three years (unless you have an abnormal result and your provider advises testing more often).    If you get Pap tests with HPV test, you only need to test every 5 years, unless you have an abnormal result.     You do not need a Pap test if your uterus was removed (hysterectomy) and you have not had cancer.    You should be tested each year for STDs (sexually transmitted diseases) if you're at risk.     Mammogram annually.  You are overdue.  You can call [phone] to schedule    Cologuard as alternative to colonoscopy    Cholesterol screening    Diabetes screening        Shots: Get a flu shot each year. Get a tetanus shot every 10 years.    Nutrition:     Eat at least 5 servings of fruits and vegetables each day.    Eat whole-grain bread, whole-wheat pasta and brown rice instead of white grains and rice.    Get adequate Calcium and Vitamin D.     Lifestyle    Exercise at least 150 minutes a week (30 minutes a day, 5 days a week). This will help you control your weight and prevent disease.    Limit alcohol to one drink per day.    No smoking.     Wear sunscreen to prevent skin cancer.     See your dentist every six months for an exam and cleaning.    See your eye doctor every 1 to 2 years.

## 2022-02-07 NOTE — DISCHARGE SUMMARY
Orthopedic Discharge Summary    Anna Sanderson,  1960, MRN [id number]    Admission Date: 2022  Admission Diagnoses: Dehiscence of operative wound [T81.31XA]  Painful ankle [M25.579]  Abscess of left lower leg [L02.416]     Discharge Date: 2022     Post-operative Day:  26 Days Post-Op    Reason for Admission: The patient was admitted for the following:  Left calf and achilles infection requiring I&D     Brief Hospital Course: This 61 year old female underwent the aforementioned procedure with Dr. Wiley on 22. There were no intraoperative complications and the patient was transferred to the recovery room and later the orthopedic unit in stable condition. Once the patient reached the orthopedic floor our orthopedic pain protocol was implemented along with the following:  Therapy: IP therapy and OT  Anticoagulation Medications: ASA     Complications during admission: None  Consultations during admission: Hospitalist service for medical management none    Pertinent Results at Discharge:    Hemoglobin   Date/Time Value Ref Range Status   2022 10:35 AM 9.2 (L) 11.7 - 15.7 g/dL Final   2022 10:00 AM 8.8 (L) 11.7 - 15.7 g/dL Final   2022 04:17 PM 8.4 (L) 11.7 - 15.7 g/dL Final   10/02/2020 08:45 AM 11.6 (L) 11.7 - 15.7 g/dL Final   2018 02:35 PM 9.9 (L) 11.7 - 15.7 g/dL Final   2018 09:25 AM 9.9 (L) 11.7 - 15.7 g/dL Final     INR   Date/Time Value Ref Range Status   12/10/2021 05:57 AM 1.09 0.90 - 1.15 Final   2021 07:01 AM 0.95 0.90 - 1.10 Final   2021 06:21 AM 0.97 0.90 - 1.10 Final   10/02/2020 08:45 AM 0.89 0.86 - 1.14 Final     Platelet Count   Date/Time Value Ref Range Status   2022 10:35  150 - 450 10e3/uL Final   2022 10:00  (H) 150 - 450 10e3/uL Final   2022 04:17  150 - 450 10e3/uL Final   10/02/2020 08:45  150 - 450 10e9/L Final   2018 02:35  150 - 450 10e9/L Final   2018 09:25 AM  "484 (H) 150 - 450 10e9/L Final     BP (!) 149/79 (BP Location: Left arm, Patient Position: Sitting)   Pulse 70   Temp 98.4  F (36.9  C) (Oral)   Resp 14   Ht 1.626 m (5' 4.02\")   Wt 54 kg (119 lb)   SpO2 98%   BMI 20.42 kg/m      Active Problems:    Abscess of left lower leg      Discharge Information:  Condition at discharge: good  Discharge destination: [unfilled]    Medications at discharge:      Medication List      Started    acetaminophen 325 MG tablet  Commonly known as: TYLENOL  975 mg, Oral, EVERY 8 HOURS        ASK your doctor about these medications    Zosyn 3-0.375 GM/50ML infusion  Generic drug: piperacillin-tazobactam  3.375 g, Intravenous, EVERY 8 HOURS  Ask about: Should I take this medication?            Activity: WBAT    Follow-up Care:  The patient will be followed in our office in 2 weeks or sooner should the need arise.  Patient should follow up with their PCP as directed.  Patient was seen by myelf on the date of discharge.    Neha William PA-C    Date: 2/7/2022  Time: 9:03 AM  "

## 2022-02-08 DIAGNOSIS — D50.9 IRON DEFICIENCY ANEMIA, UNSPECIFIED IRON DEFICIENCY ANEMIA TYPE: Primary | ICD-10-CM

## 2022-02-08 LAB — DEPRECATED CALCIDIOL+CALCIFEROL SERPL-MC: 52 UG/L (ref 30–80)

## 2022-02-08 RX ORDER — FERROUS SULFATE 325(65) MG
325 TABLET ORAL
COMMUNITY
Start: 2022-02-08 | End: 2023-12-19

## 2022-02-10 NOTE — PROGRESS NOTES
This encounter was created solely for the purpose of releasing the future order that was placed for Cologuard.  This is a necessary step in order for the results to be abstracted once they are available.  Yee Nunes

## 2022-02-14 ENCOUNTER — OFFICE VISIT (OUTPATIENT)
Dept: INFECTIOUS DISEASES | Facility: CLINIC | Age: 62
End: 2022-02-14
Payer: COMMERCIAL

## 2022-02-14 VITALS
HEART RATE: 80 BPM | DIASTOLIC BLOOD PRESSURE: 84 MMHG | BODY MASS INDEX: 21.11 KG/M2 | SYSTOLIC BLOOD PRESSURE: 144 MMHG | WEIGHT: 123 LBS | TEMPERATURE: 98.2 F

## 2022-02-14 DIAGNOSIS — T81.89XD NON-HEALING SURGICAL WOUND, SUBSEQUENT ENCOUNTER: Primary | ICD-10-CM

## 2022-02-14 PROCEDURE — 99213 OFFICE O/P EST LOW 20 MIN: CPT

## 2022-02-14 NOTE — PROGRESS NOTES
Assessment & Plan     Wound infection  Left lower leg wound chronically infected with Pseudomonas and other pathogens.    Noting that the Pseudomonas was susceptible to Cipro despite patient receiving Cipro for several weeks, raises the question of inadequate vascular flow for antimicrobials to be of any benefit.    Additionally, her CRP was normal during her hospitalization which suggests poor inflammatory response    Recommendations: She seems to be tolerating the oral antibiotics, will continue these for another 6 to 8 weeks and then reassess.        Review of prior external note(s) from - Outside records from [facility]    MEDICATIONS:  Continue current medications without change    Return in about 6 weeks (around 3/28/2022).    OLIVIA ALEXANDER MD  [facility]    Carlitos Castillo is a 61 year old who presents for the following health issues      HPI     January 31, 2022: Follow-up visit for patient I saw at [facility] when she was admitted for recurrent left lower extremity infection.  She did undergo I&D with Dr. Segovia.    Patient has had chronic infection for many years.  She persistently grew Pseudomonas and this time had multiple other pathogens including E. coli, multiple anaerobes, staph lugdunensis.    Interestingly, none of these organisms were resistant to any antibiotics, including Cipro which she had been on up to the time of surgery.    February 14, 2022:    She is tolerating the oral antibiotics well.  Denies diarrhea, yeast infection    She thinks her wound is a little bigger.    Review of Systems   Tolerating antibiotics without difficulty.      Objective    BP (!) 144/84   Pulse 80   Temp 98.2  F (36.8  C)   Wt 55.8 kg (123 lb)   BMI 21.11 kg/m    Body mass index is 21.11 kg/m .  Physical Exam   GENERAL: healthy, alert and no distress  NECK: no adenopathy, no asymmetry, masses, or scars and thyroid normal to palpation  RESP: Normal respiratory  pattern    MS: Left lower extremity dressing taken down.  Wound on the calf has healed.  Wound on the Achilles tendon area is still dehisced with may be slightly better granulation tissue  SKIN: no suspicious lesions or rashes    Laboratory studies reviewed including normal CRP during her hospitalization.

## 2022-02-16 ENCOUNTER — TELEPHONE (OUTPATIENT)
Dept: INTERNAL MEDICINE | Facility: CLINIC | Age: 62
End: 2022-02-16
Payer: COMMERCIAL

## 2022-02-16 NOTE — TELEPHONE ENCOUNTER
Please initiate PA for pantoprazole (PROTONIX) 40 MG EC tablet.    Pt ID # [id number]  Phone # [phone]

## 2022-02-18 NOTE — PROGRESS NOTES
This is a recent snapshot of the patient's [facility] medical record.  For current drug dose and complete information and questions, call [phone] or In Basket pool, fv home infusion ([id number])  CSN Number:  [id number]

## 2022-02-21 ENCOUNTER — ANCILLARY PROCEDURE (OUTPATIENT)
Dept: BONE DENSITY | Facility: CLINIC | Age: 62
End: 2022-02-21
Attending: INTERNAL MEDICINE
Payer: COMMERCIAL

## 2022-02-21 DIAGNOSIS — M81.0 OSTEOPOROSIS OF MULTIPLE SITES WITHOUT PATHOLOGICAL FRACTURE: ICD-10-CM

## 2022-02-21 PROCEDURE — 77080 DXA BONE DENSITY AXIAL: CPT | Mod: TC | Performed by: RADIOLOGY

## 2022-02-23 NOTE — TELEPHONE ENCOUNTER
Central Prior Authorization Team  Phone: [phone]    PA Initiation    Medication: pantoprazole (PROTONIX) 40 MG EC tablet  Insurance Company: [facility] - Phone [phone] Fax [phone]  Pharmacy Filling the Rx: [id number] [place] [id number] [facility]  AT [facility]  Filling Pharmacy Phone: [phone]  Filling Pharmacy Fax:    Start Date: 2/23/2022

## 2022-02-23 NOTE — TELEPHONE ENCOUNTER
Prior Authorization Approval    Authorization Effective Date: 2/10/2022  Authorization Expiration Date: 2/10/2023  Medication: pantoprazole (PROTONIX) 40 MG EC tablet- APPROVED   Approved Dose/Quantity:   Reference #:     Insurance Company: [employer] [place] - Phone [phone] Fax [phone]  Expected CoPay:       CoPay Card Available:      Foundation Assistance Needed:    Which Pharmacy is filling the prescription (Not needed for infusion/clinic administered): [id number] [place] [address] AT [facility]  Pharmacy Notified: Yes  Patient Notified:  **Instructed pharmacy to notify patient when script is ready to /ship.**

## 2022-02-28 DIAGNOSIS — R05.3 CHRONIC COUGH: Primary | ICD-10-CM

## 2022-03-03 ENCOUNTER — HOSPITAL ENCOUNTER (OUTPATIENT)
Dept: CT IMAGING | Facility: CLINIC | Age: 62
Discharge: HOME | End: 2022-03-03
Attending: INTERNAL MEDICINE | Admitting: INTERNAL MEDICINE
Payer: COMMERCIAL

## 2022-03-03 DIAGNOSIS — R05.3 PERSISTENT COUGH FOR 3 WEEKS OR LONGER: ICD-10-CM

## 2022-03-03 PROCEDURE — 71250 CT THORAX DX C-: CPT

## 2022-03-04 ENCOUNTER — OFFICE VISIT (OUTPATIENT)
Dept: INTERNAL MEDICINE | Facility: CLINIC | Age: 62
End: 2022-03-04
Payer: COMMERCIAL

## 2022-03-04 VITALS
HEART RATE: 96 BPM | WEIGHT: 123 LBS | SYSTOLIC BLOOD PRESSURE: 136 MMHG | DIASTOLIC BLOOD PRESSURE: 84 MMHG | BODY MASS INDEX: 21.11 KG/M2

## 2022-03-04 DIAGNOSIS — R05.3 CHRONIC COUGH: ICD-10-CM

## 2022-03-04 DIAGNOSIS — I10 BENIGN ESSENTIAL HYPERTENSION: Primary | ICD-10-CM

## 2022-03-04 PROCEDURE — 99213 OFFICE O/P EST LOW 20 MIN: CPT | Performed by: INTERNAL MEDICINE

## 2022-03-04 NOTE — PROGRESS NOTES
Assessment & Plan     Benign essential hypertension  Blood pressure looking adequately controlled with metoprolol    Chronic cough  CT chest without abnormality    FMLA paperwork completed.  Her  Sher is severely ill and has been hospitalized with Ney's gangrene.  He will need transfer to [facility] and will need prolonged cares probably 4 months.  She is providing emotional support and acts as his advocate as he has periods of delirium/confusion.         22 minutes spent reviewing chart, discussing care needed, completing forms, and documenting        Return in about 1 year (around 3/4/2023) for Routine preventive.    Jame Dunn MD  [facility]          Subjective       HPI 61-year-old woman here to discuss [facility] paperwork that she needs completed to help care for her  who is sick in the hospital  See assessment and plan for details of visit    Current Outpatient Medications   Medication     amoxicillin-clavulanate (AUGMENTIN) 875-125 MG tablet     aspirin (ASA) 81 MG EC tablet     calcium citrate-vitamin D (CITRACAL) 315-250 MG-UNIT TABS per tablet     ciprofloxacin (CIPRO) 500 MG tablet     ferrous sulfate (FEROSUL) 325 (65 Fe) MG tablet     ibuprofen (ADVIL/MOTRIN) 200 MG tablet     metoprolol succinate ER (TOPROL-XL) 50 MG 24 hr tablet     ondansetron (ZOFRAN) 4 MG tablet     pantoprazole (PROTONIX) 40 MG EC tablet     vitamin D2 (ERGOCALCIFEROL) 49083 units (1250 mcg) capsule     Current Facility-Administered Medications   Medication     [START ON 3/30/2022] denosumab (PROLIA) injection 60 mg              Objective    Vitals:    03/04/22 1619   BP: 136/84   BP Location: Right arm   Patient Position: Sitting   Cuff Size: Adult Regular   Pulse: 96   Weight: 55.8 kg (123 lb)        Physical Exam  Well-appearing middle-aged woman

## 2022-03-30 ENCOUNTER — TELEPHONE (OUTPATIENT)
Dept: INTERNAL MEDICINE | Facility: CLINIC | Age: 62
End: 2022-03-30

## 2022-03-30 NOTE — TELEPHONE ENCOUNTER
Reason for Call:  Other     Detailed comments: Leanne, Rn case manager @ [facility] called and wanted to let PCP know that she is helping patient with her healthcare needs and helping patient manage her care. This is just an FYI message to PCP. No need to call Leanne back.    Best Time: any    Can we leave a detailed message on this number? YES    Call taken on 3/30/2022 at 4:34 PM by Valarie Lara

## 2022-04-18 ENCOUNTER — VIRTUAL VISIT (OUTPATIENT)
Dept: INTERNAL MEDICINE | Facility: CLINIC | Age: 62
End: 2022-04-18
Payer: COMMERCIAL

## 2022-04-18 DIAGNOSIS — F41.8 SITUATIONAL ANXIETY: Primary | ICD-10-CM

## 2022-04-18 PROCEDURE — 99214 OFFICE O/P EST MOD 30 MIN: CPT | Mod: 95 | Performed by: INTERNAL MEDICINE

## 2022-04-18 RX ORDER — ESCITALOPRAM OXALATE 10 MG/1
10 TABLET ORAL DAILY
Qty: 90 TABLET | Refills: 3 | Status: SHIPPED | OUTPATIENT
Start: 2022-04-18 | End: 2022-08-25

## 2022-04-18 RX ORDER — ALPRAZOLAM 0.25 MG
0.25 TABLET ORAL DAILY PRN
Qty: 12 TABLET | Refills: 0 | Status: SHIPPED | OUTPATIENT
Start: 2022-04-18 | End: 2023-01-05

## 2022-04-18 ASSESSMENT — ANXIETY QUESTIONNAIRES
7. FEELING AFRAID AS IF SOMETHING AWFUL MIGHT HAPPEN: MORE THAN HALF THE DAYS
3. WORRYING TOO MUCH ABOUT DIFFERENT THINGS: NEARLY EVERY DAY
GAD7 TOTAL SCORE: 18
4. TROUBLE RELAXING: NEARLY EVERY DAY
2. NOT BEING ABLE TO STOP OR CONTROL WORRYING: NEARLY EVERY DAY
7. FEELING AFRAID AS IF SOMETHING AWFUL MIGHT HAPPEN: MORE THAN HALF THE DAYS
GAD7 TOTAL SCORE: 18
1. FEELING NERVOUS, ANXIOUS, OR ON EDGE: NEARLY EVERY DAY
6. BECOMING EASILY ANNOYED OR IRRITABLE: SEVERAL DAYS
GAD7 TOTAL SCORE: 18
5. BEING SO RESTLESS THAT IT IS HARD TO SIT STILL: NEARLY EVERY DAY

## 2022-04-18 ASSESSMENT — ENCOUNTER SYMPTOMS: NERVOUS/ANXIOUS: 1

## 2022-04-18 NOTE — PROGRESS NOTES
Anna is a 61 year old who is being evaluated via a billable video visit.      How would you like to obtain your AVS? MyChart  If the video visit is dropped, the invitation should be resent by: Text to cell phone: [phone]   Will anyone else be joining your video visit? No      Video Start Time: 11:40 AM    Assessment & Plan     Situational anxiety  Worsening anxiety.  High stress dealing with 's illness and possible discharge to home needing extensive wound care that cannot be taken care of by one person.  He has been bedridden and in the hospital for months.  She can no longer handle things and having difficulty staying focused and concentrating.  She is interested in medication.  Begin Lexapro 10 mg daily.  She understands that it may take 2 or 3 weeks for this to start working.  We discussed potential side effects.  I will also provide her a small supply of alprazolam to use intermittently when anxiety levels are high.  She understands risk of medication including possible dependence and understands that this is meant to be short-term until the SSRI becomes more effective.  She does have good support network.  We also discussed seeing a therapist and she will consider but at this time she is not sure if she has the time that would allow this.  - escitalopram (LEXAPRO) 10 MG tablet; Take 1 tablet (10 mg) by mouth daily  - ALPRAZolam (XANAX) 0.25 MG tablet; Take 1 tablet (0.25 mg) by mouth daily as needed for anxiety                 Return in about 3 months (around 7/18/2022) for Follow up.    Jame Dunn MD  [facility]   Anna is a 61 year old who presents for the following health issues video visit was performed to address worsening anxiety.  See assessment plan for details.    Anxiety    History of Present Illness       Mental Health Follow-up:  Patient presents to follow-up on Anxiety.    Patient's anxiety since last visit has been:   Medium  The patient is having other symptoms associated with anxiety.  Any significant life events: other  Patient is feeling anxious or having panic attacks.  Patient has no concerns about alcohol or drug use.         Today's KENA-7 Score: 18    She eats 2-3 servings of fruits and vegetables daily.She consumes 0 sweetened beverage(s) daily.She exercises with enough effort to increase her heart rate 20 to 29 minutes per day.  She exercises with enough effort to increase her heart rate 3 or less days per week.   She is taking medications regularly.             Review of Systems   Psychiatric/Behavioral: The patient is nervous/anxious.             Objective           Vitals:  No vitals were obtained today due to virtual visit.    Physical Exam   Middle-age woman with KENA-7 score of 18        Video-Visit Details    Type of service:  Video Visit    Video End Time:11:58 AM    Originating Location (pt. Location): Home    Distant Location (provider location):  [facility]     Platform used for Video Visit: [employer]

## 2022-04-19 ASSESSMENT — ANXIETY QUESTIONNAIRES: GAD7 TOTAL SCORE: 18

## 2022-05-03 ENCOUNTER — VIRTUAL VISIT (OUTPATIENT)
Dept: FAMILY MEDICINE | Facility: CLINIC | Age: 62
End: 2022-05-03
Payer: COMMERCIAL

## 2022-05-03 DIAGNOSIS — R06.02 SOB (SHORTNESS OF BREATH): Primary | ICD-10-CM

## 2022-05-03 DIAGNOSIS — J20.9 ACUTE BRONCHITIS, UNSPECIFIED ORGANISM: ICD-10-CM

## 2022-05-03 PROBLEM — I10 BENIGN ESSENTIAL HYPERTENSION: Status: ACTIVE | Noted: 2021-06-16

## 2022-05-03 PROCEDURE — 99213 OFFICE O/P EST LOW 20 MIN: CPT | Mod: 95 | Performed by: FAMILY MEDICINE

## 2022-05-03 RX ORDER — ALBUTEROL SULFATE 90 UG/1
2 AEROSOL, METERED RESPIRATORY (INHALATION) ONCE
Status: DISCONTINUED | OUTPATIENT
Start: 2022-05-03 | End: 2022-05-03

## 2022-05-03 RX ORDER — PREDNISONE 20 MG/1
TABLET ORAL
Qty: 20 TABLET | Refills: 0 | Status: SHIPPED | OUTPATIENT
Start: 2022-05-03 | End: 2022-05-16

## 2022-05-03 RX ORDER — ALBUTEROL SULFATE 90 UG/1
2 AEROSOL, METERED RESPIRATORY (INHALATION) EVERY 6 HOURS
Qty: 18 G | Refills: 11 | Status: SHIPPED | OUTPATIENT
Start: 2022-05-03 | End: 2023-12-19

## 2022-05-03 RX ORDER — AZITHROMYCIN 250 MG/1
TABLET, FILM COATED ORAL
Qty: 6 TABLET | Refills: 0 | Status: SHIPPED | OUTPATIENT
Start: 2022-05-03 | End: 2022-05-08

## 2022-05-03 NOTE — PROGRESS NOTES
Anna is a 62 year old who is being evaluated via a billable video visit.      How would you like to obtain your AVS? MyChart  If the video visit is dropped, the invitation should be resent by: Text to cell phone: [phone]  Will anyone else be joining your video visit? No    Video Start Time: 1:25 PM    Assessment & Plan   SOB (shortness of breath)  Acute bronchitis, unspecified organism  Recurrent history of bronchiectasis and persistent cough/bronchitis and requesting a round of prednisone, refill of albuterol and Z-Zaire which has been effective in the past.-Medication sent:  - predniSONE (DELTASONE) 20 MG tablet  Dispense: 20 tablet; Refill: 0  - azithromycin (ZITHROMAX) 250 MG tablet  Dispense: 6 tablet; Refill: 0    Recommended she get a mammogram as well.    Return in about 2 weeks (around 5/17/2022) for symptoms failing to improve or sooner if worsening.      Danny James MD      [address] [address]  [facility]   Office: [phone]       Subjective   Anna is a 62 year old who presents for the following health issues     Acute Illness  Acute illness concerns: Cough x 5, did at home tests for covid they were negative   Onset/Duration: x 5 days   Symptoms:  Fever: no  Chills/Sweats: no  Headache (location?): YES  Sinus Pressure: YES  Conjunctivitis:  no  Ear Pain: no  Rhinorrhea: no  Congestion: no  Sore Throat: no  Cough: YES-productive of yellow sputum  Wheeze: YES- on and off   Decreased Appetite: no  Nausea: no  Vomiting: no  Diarrhea: no  Dysuria/Freq.: no  Dysuria or Hematuria: no  Fatigue/Achiness: no  Sick/Strep Exposure: YES- grand daughter who lives with her has croup   Therapies tried and outcome: Mucinex and dayquil       Reason for visit:  Cough that is getting worse  Symptom onset:  3-7 days ago  Symptoms include:  Coughing up thick nasty yellow phlem  Symptom intensity:  Moderate  Symptom progression:  Worsening  Had these  symptoms before:  Yes  Has tried/received treatment for these symptoms:  Yes  Previous treatment was successful:  Yes  Prior treatment description:  Prednosone and z pack  What makes it worse:  Not sure  What makes it better:  Not sure    She eats 2-3 servings of fruits and vegetables daily.She consumes 0 sweetened beverage(s) daily.She exercises with enough effort to increase her heart rate 20 to 29 minutes per day.  She exercises with enough effort to increase her heart rate 3 or less days per week.   She is taking medications regularly.     Review of Systems         Objective           Vitals:  No vitals were obtained today due to virtual visit.    Physical Exam   GENERAL: Healthy, alert and no distress  EYES: Eyes grossly normal to inspection.  No discharge or erythema, or obvious scleral/conjunctival abnormalities.  RESP: No audible wheeze, cough, or visible cyanosis.  No visible retractions or increased work of breathing.    SKIN: Visible skin clear. No significant rash, abnormal pigmentation or lesions.  NEURO: Cranial nerves grossly intact.  Mentation and speech appropriate for age.  PSYCH: Mentation appears normal, affect normal/bright, judgement and insight intact, normal speech and appearance well-groomed.                Video-Visit Details    Type of service:  Video Visit    Video End Time:1:32 PM    Originating Location (pt. Location): Home    Distant Location (provider location):  [facility]     Platform used for Video Visit: [employer]

## 2022-05-09 NOTE — PROGRESS NOTES
This is a recent snapshot of the patient's [facility] medical record.  For current drug dose and complete information and questions, call [phone] or In Basket pool, fv home infusion ([id number])  CSN Number:  [id number]

## 2022-06-23 NOTE — PROGRESS NOTES
This is a recent snapshot of the patient's [facility] medical record.  For current drug dose and complete information and questions, call [phone] or In Basket pool, fv home infusion ([id number])  CSN Number:  [id number]

## 2022-06-24 NOTE — PROGRESS NOTES
This is a recent snapshot of the patient's [facility] medical record.  For current drug dose and complete information and questions, call [phone] or In Basket pool, fv home infusion ([id number])  CSN Number:  [id number]

## 2022-06-29 NOTE — PROGRESS NOTES
This is a recent snapshot of the patient's [facility] medical record.  For current drug dose and complete information and questions, call [phone] or In Basket pool, fv home infusion ([id number])  CSN Number:  [id number]

## 2022-07-10 ENCOUNTER — TELEPHONE (OUTPATIENT)
Dept: INTERNAL MEDICINE | Facility: CLINIC | Age: 62
End: 2022-07-10

## 2022-07-10 NOTE — TELEPHONE ENCOUNTER
Prior Authorization Request    1. Prior Authorization for the medication CARAFATE 1 GM/10ML suspension        Requesting Provider: Jame Dunn          Pt name: Anna Sanderson        Pt : 1960        Pt MRN: [id number]        Last Office Visit: 2022           Insurance: Payor: BCBS / Plan: [place] / Product Type: Indemnity /         Insurance ID Number: [unfilled]        Prior Auth Contact Phone number:     BIN#:   PCN#:         CMM KEY:

## 2022-07-11 NOTE — TELEPHONE ENCOUNTER
Central Prior Authorization Team   Phone: [phone]    PA Initiation    Medication: CARAFATE 1 GM/10ML suspension  Insurance Company: [facility] - Phone [phone] Fax [phone]  Pharmacy Filling the Rx: [id number] #[id number] [place] [id number] [facility]  AT [facility]  Filling Pharmacy Phone: [phone]  Filling Pharmacy Fax:    Start Date: 7/11/2022

## 2022-07-12 NOTE — TELEPHONE ENCOUNTER
PRIOR AUTHORIZATION DENIED    Medication: CARAFATE 1 GM/10ML suspension    Denial Date: 7/12/2022    Denial Rational:         Appeal Information:  If provider would like to appeal please provide a letter of medical necessity and route back to PA team.

## 2022-07-14 NOTE — TELEPHONE ENCOUNTER
Yes that diagnosis was used. If provider would like to appeal please provide a letter of medical necessity and route back to PA team.

## 2022-07-14 NOTE — TELEPHONE ENCOUNTER
Was proper diagnosis used?  She has GERD with esophagitis.  K21.00 Not adequately controlled with PPI, pantoprazole needing Carafate to manage symptoms.

## 2022-07-18 NOTE — TELEPHONE ENCOUNTER
Per insurance, Patient will need to submit paperwork that was mailed to her in order to start the appeals process.  The request must be started by patient and not provider's office.  Insurance only covers this medication for FDA approved diagnosis, those are Ulcer of duodenum, Active or Ulcer of duodenum, Maintenance.  Patient can call the number on the back of her insurance card if she needs assistance in filling the forms out.

## 2022-07-19 NOTE — TELEPHONE ENCOUNTER
Letter signed by Dr Dunn has been mailed to patient. Looxcie message sent to patient letting her know this.

## 2022-07-22 NOTE — PROGRESS NOTES
This is a recent snapshot of the patient's [facility] medical record.  For current drug dose and complete information and questions, call [phone] or In Basket pool, fv home infusion ([id number])  CSN Number:  [id number]

## 2022-07-26 ENCOUNTER — ALLIED HEALTH/NURSE VISIT (OUTPATIENT)
Dept: FAMILY MEDICINE | Facility: CLINIC | Age: 62
End: 2022-07-26

## 2022-07-26 ENCOUNTER — TELEPHONE (OUTPATIENT)
Dept: INTERNAL MEDICINE | Facility: CLINIC | Age: 62
End: 2022-07-26

## 2022-07-26 ENCOUNTER — LAB (OUTPATIENT)
Dept: LAB | Facility: CLINIC | Age: 62
End: 2022-07-26
Payer: COMMERCIAL

## 2022-07-26 DIAGNOSIS — M81.0 OSTEOPOROSIS OF MULTIPLE SITES WITHOUT PATHOLOGICAL FRACTURE: Primary | ICD-10-CM

## 2022-07-26 DIAGNOSIS — E06.1 SUBACUTE THYROIDITIS: Primary | ICD-10-CM

## 2022-07-26 DIAGNOSIS — E06.1 SUBACUTE THYROIDITIS: ICD-10-CM

## 2022-07-26 LAB
T4 FREE SERPL-MCNC: 2.65 NG/DL (ref 0.9–1.7)
TSH SERPL DL<=0.005 MIU/L-ACNC: 0.01 UIU/ML (ref 0.3–4.2)

## 2022-07-26 PROCEDURE — 84439 ASSAY OF FREE THYROXINE: CPT | Performed by: INTERNAL MEDICINE

## 2022-07-26 PROCEDURE — 84443 ASSAY THYROID STIM HORMONE: CPT

## 2022-07-26 PROCEDURE — 36415 COLL VENOUS BLD VENIPUNCTURE: CPT

## 2022-07-26 PROCEDURE — 99207 PR NO CHARGE NURSE ONLY: CPT

## 2022-07-26 PROCEDURE — 96372 THER/PROPH/DIAG INJ SC/IM: CPT | Performed by: INTERNAL MEDICINE

## 2022-07-26 NOTE — TELEPHONE ENCOUNTER
Please disregard. It sounds like order was located.  Cherelle Gil CMA ............... 4:03 PM, 07/26/22

## 2022-07-26 NOTE — TELEPHONE ENCOUNTER
Pt is here in clinic on the lab schedule and is requesting a Thyroid lab and hemoglobin. Please plce orders. thanks

## 2022-07-26 NOTE — PROGRESS NOTES
"Prolia Injection Phone Screen      Screening questions have been asked 2-3 days prior to administration visit for Prolia. If any questions are answered with \"Yes,\" this phone encounter were will routed to ordering provider for further evaluation.     1.  When was the last injection?  9/29/2021    2.  Has insurance for this injection been verified?  Yes    3.  Did you experience any new onset achiness or rashes that lasted for over a month with your previous Prolia injection?   No    4.  Do you have a fever over 101?F or a new deep cough that is unusual for you today? No    5.  Have you started any new medications in the last 6 months that you were told could affect your immune system? These may have been prescribed by oncologist, transplant, rheumatology, or dermatology.   No    6.  In the last 6 months have you have gastric bypass or parathyroid surgery?   No    7.  Do you plan dental work requiring drilling into the bone such as implants/extractions or oral surgery in the next 2-3 months?   No    8. Do you have new insurance since the last injection? No    9. Have you received the Covid-19 vaccine? Yes  If No - Proceed with Prolia injection  If Yes - Date of vaccination 1/6/22. Will need to wait until 2 weeks after 2nd dose of Covid-19 vaccine before administering Prolia       Patient informed if symptoms discussed above present prior to their administration appointment, they are to notify clinic immediately.     Cherelle Gil CMA          The following steps were completed to comply with the REMS program for Prolia:  1. Ordering provider has previously reviewed information in the Medication Guide and Patient Counseling Chart, including the serious risks of Prolia  and the symptoms of each risk and have been advised to seek prompt medical attention if they have signs or symptoms of any of the serious risks.  2. Provided each patient a copy of the Medication Guide and Patient Brochure.  See MAR for " administration details.   Indication: Prolia  (denosumab) is a prescription medicine used to treat osteoporosis in patients who:   Are at high risk for fracture, meaning patients who have had a fracture related to osteoporosis, or who have multiple risk factors for fracture; Cannot use another osteoporosis medicine or other osteoporosis medicines did not work well.   The timeline for early/late injections would be 4 weeks early and any time after the 6 month yannick. If a patient receives their injection late, then the subsequent injection would be 6 months from the date that they actually received the injection    Have the screening questions been asked prior to this administration? Yes

## 2022-07-27 DIAGNOSIS — E05.90 HYPERTHYROIDISM: Primary | ICD-10-CM

## 2022-08-22 ASSESSMENT — ENCOUNTER SYMPTOMS
TASTE DISTURBANCE: 0
SHORTNESS OF BREATH: 1
JAUNDICE: 0
TROUBLE SWALLOWING: 1
WEIGHT LOSS: 1
HALLUCINATIONS: 0
INCREASED ENERGY: 0
DYSPNEA ON EXERTION: 1
EYE WATERING: 0
RECTAL PAIN: 0
BLOATING: 0
EYE IRRITATION: 1
EXERCISE INTOLERANCE: 0
SYNCOPE: 0
HYPERTENSION: 1
COUGH: 1
SINUS PAIN: 0
SLEEP DISTURBANCES DUE TO BREATHING: 0
ALTERED TEMPERATURE REGULATION: 1
POOR WOUND HEALING: 1
POLYPHAGIA: 1
LEG PAIN: 0
HEMOPTYSIS: 0
SINUS CONGESTION: 0
EYE PAIN: 0
COUGH DISTURBING SLEEP: 0
ORTHOPNEA: 0
POLYDIPSIA: 0
HOARSE VOICE: 1
FATIGUE: 1
PALPITATIONS: 1
FEVER: 0
HYPOTENSION: 0
BOWEL INCONTINENCE: 0
NECK MASS: 0
CHILLS: 0
NAUSEA: 1
POSTURAL DYSPNEA: 0
ABDOMINAL PAIN: 0
VOMITING: 1
DIARRHEA: 0
EYE REDNESS: 1
LIGHT-HEADEDNESS: 1
SMELL DISTURBANCE: 0
NAIL CHANGES: 0
WHEEZING: 0
NIGHT SWEATS: 1
SNORES LOUDLY: 0
HEARTBURN: 1
SPUTUM PRODUCTION: 0
WEIGHT GAIN: 1
SORE THROAT: 1
SKIN CHANGES: 0
CONSTIPATION: 0
DECREASED APPETITE: 0
BLOOD IN STOOL: 0
DOUBLE VISION: 0

## 2022-08-25 ENCOUNTER — OFFICE VISIT (OUTPATIENT)
Dept: ENDOCRINOLOGY | Facility: CLINIC | Age: 62
End: 2022-08-25
Attending: INTERNAL MEDICINE
Payer: COMMERCIAL

## 2022-08-25 VITALS
BODY MASS INDEX: 21.11 KG/M2 | DIASTOLIC BLOOD PRESSURE: 72 MMHG | WEIGHT: 123 LBS | HEART RATE: 97 BPM | SYSTOLIC BLOOD PRESSURE: 118 MMHG

## 2022-08-25 DIAGNOSIS — E05.90 HYPERTHYROIDISM: ICD-10-CM

## 2022-08-25 PROBLEM — L97.323: Status: RESOLVED | Noted: 2020-09-03 | Resolved: 2022-08-25

## 2022-08-25 PROBLEM — R06.02 SOB (SHORTNESS OF BREATH): Status: RESOLVED | Noted: 2021-11-04 | Resolved: 2022-08-25

## 2022-08-25 PROBLEM — L02.416 ABSCESS OF LEFT LOWER LEG: Status: RESOLVED | Noted: 2021-06-07 | Resolved: 2022-08-25

## 2022-08-25 PROBLEM — L20.9 ATOPIC DERMATITIS, UNSPECIFIED TYPE: Status: RESOLVED | Noted: 2020-09-03 | Resolved: 2022-08-25

## 2022-08-25 LAB
T4 FREE SERPL-MCNC: 2.17 NG/DL (ref 0.9–1.7)
THYROGLOB AB SERPL IA-ACNC: <20 IU/ML
THYROPEROXIDASE AB SERPL-ACNC: <10 IU/ML
TSH SERPL DL<=0.005 MIU/L-ACNC: 0.01 UIU/ML (ref 0.3–4.2)

## 2022-08-25 PROCEDURE — 84443 ASSAY THYROID STIM HORMONE: CPT | Performed by: NURSE PRACTITIONER

## 2022-08-25 PROCEDURE — 84439 ASSAY OF FREE THYROXINE: CPT | Performed by: NURSE PRACTITIONER

## 2022-08-25 PROCEDURE — 86800 THYROGLOBULIN ANTIBODY: CPT | Performed by: NURSE PRACTITIONER

## 2022-08-25 PROCEDURE — 99203 OFFICE O/P NEW LOW 30 MIN: CPT | Performed by: NURSE PRACTITIONER

## 2022-08-25 PROCEDURE — 86376 MICROSOMAL ANTIBODY EACH: CPT | Performed by: NURSE PRACTITIONER

## 2022-08-25 PROCEDURE — 36415 COLL VENOUS BLD VENIPUNCTURE: CPT | Performed by: NURSE PRACTITIONER

## 2022-08-25 RX ORDER — METHIMAZOLE 10 MG/1
TABLET ORAL
Qty: 60 TABLET | Refills: 5 | Status: SHIPPED | OUTPATIENT
Start: 2022-08-25 | End: 2022-08-26

## 2022-08-25 NOTE — LETTER
8/25/2022         RE: Anna Sanderson  [address]  [place] [address]        Dear Colleague,    Thank you for referring your patient, Anna Sanderson, to the [facility]. Please see a copy of my visit note below.    [facility] ENDOCRINOLOGY    Thyroid Note  8/25/2022    Anna Sanderson, 1960, [id number]          Reason for visit      1. Hyperthyroidism        HPI     Anna Sanderson is a very pleasant 62 year old old female who presents for follow up.  SUMMARY:    Anan is here today in referral for Hyperthyroidism. She has had a rather tumultuous year medically, and is currently in a walking boot for a non-healing surgical wound that was positive for MRSA.     PCP had been watching her TSH serially since February of 2021. When TSH was checked in September and November, it was very suppressed at <0.01.  It then started rising, 0.12 in Dec, 0.95 in Jan, and 0.53 in Feb. fT4 was slightly elevated in December (1.87).  Most recent lab work done in July again showed a suppressed TSH at <0.01 and a very elevated fT4 at 2.65. Pt reports multiple symptoms, including fluctuating weight, insomnia, irritability, SOB, feeling faint, hair loss, and feeling excessively warm. She reports that the only Thyroid problems in her family that she is aware of would be her Mother, who had Papillary Thyroid Cancer. NM Thyroid Uptake and scan showed 24-hour uptake of 7.9, with normal range 10-30%.  Findings concluded subacute thyroiditis. Pt notes no significant problems with swallowing or referable to her neck.     Past Medical History     Patient Active Problem List   Diagnosis     Nonhealing surgical wound     Achilles tendon infection     Gastroesophageal reflux disease with esophagitis     Iron deficiency anemia     Vitamin D deficiency     MTHFR gene mutation (H) Heterozygoyus April 2021, [facility]     Abnormal CT of the chest      Subacute thyroiditis     Benign essential hypertension     PAD (peripheral artery disease) (H)     Palpitations     Osteoporosis of multiple sites without pathological fracture       Family History       family history includes Alzheimer Disease in her sister; Cerebrovascular Disease in her sister; Coronary Artery Disease in her father and mother; Hypertension in her sister; No Known Problems in her brother, brother, daughter, sister, son, and son; Osteoporosis in her mother; Thyroid Disease in her mother.    Social History      reports that she quit smoking about 19 years ago. Her smoking use included cigarettes. She has never used smokeless tobacco. She reports that she does not drink alcohol and does not use drugs.      Review of Systems     Patient denies fatigue, weight changes, heat/cold intolerance, bowel/skin changes or CVS symptoms.   Remainder per HPI and per attached intake form.      Vital Signs     /72 (BP Location: Right arm, Patient Position: Sitting, Cuff Size: Adult Regular)   Pulse 97   Wt 55.8 kg (123 lb)   BMI 21.11 kg/m    Wt Readings from Last 3 Encounters:   08/25/22 55.8 kg (123 lb)   03/04/22 55.8 kg (123 lb)   02/14/22 55.8 kg (123 lb)       Physical Exam     Constitutional:  Well developed, Well nourished  HENT:  Normocephalic,   Neck: normal in appearance  Eyes:  PERRL, Conjunctiva pink  Respiratory:  No respiratory distress  Skin: No acanthosis nigricans, lipoatrophy or lipodystrophy  Neurologic:  Alert & oriented x 3, nonfocal  Psychiatric:  Affect, Mood, Insight appropriate            Assessment     1. Hyperthyroidism            Plan     Will get anti thyroglobulin antibody and thyroid antioxidase antibody levels done to determine if we are dealing with an autoimmune situation. Will start pt on Methimazole, 10 mg daily and get another set of labs done in 2 months. Discussed possible side effects, including agranulocytosis or arthralgias. Verbalized understanding. She will  reach out with problems or concerns. F/u with me in 6 months.         Marimar Henriquez NP  [facility]  8/25/2022  9:24 AM      Lab Results     TSH   Date Value Ref Range Status   07/26/2022 0.01 (L) 0.30 - 4.20 uIU/mL Final   02/07/2022 0.53 0.30 - 5.00 uIU/mL Final     No components found for: THYROIDAB    No results found for: [id number]    Imaging Results   Last thyroid ultrasound:  No results found for this or any previous visit.      Last thyroid nuclear scan:  Results for orders placed during the hospital encounter of 10/12/21    NM Thyroid Uptake and Scan    Narrative  EXAM: NM THYROID UPTAKE AND SCAN  LOCATION: [facility]  DATE/TIME: 10/12/2021 2:22 PM    INDICATION: Clinical and/or laboratory evidence of hyperthyroidism.  COMPARISON: None available at time of dictation.  TECHNIQUE: 170 uCi I-123, oral ingestion. 24-hour neck uptake and imaging.    FINDINGS: 24-hour uptake: 7.9% (Normal range: 10-30%).    Minimal uptake within the thyroid parenchyma, which in the setting of hyperthyroidism suggests subacute thyroiditis.      Current Medications     Outpatient Medications Prior to Visit   Medication Sig Dispense Refill     albuterol (PROAIR HFA/PROVENTIL HFA/VENTOLIN HFA) 108 (90 Base) MCG/ACT inhaler Inhale 2 puffs into the lungs every 6 hours 18 g 11     ALPRAZolam (XANAX) 0.25 MG tablet Take 1 tablet (0.25 mg) by mouth daily as needed for anxiety 12 tablet 0     aspirin (ASA) 81 MG EC tablet Take 1 tablet (81 mg) by mouth daily 60 tablet 0     calcium citrate-vitamin D (CITRACAL) 315-250 MG-UNIT TABS per tablet Take 2 tablets by mouth daily       CARAFATE 1 GM/10ML suspension Take 10 mLs (1 g) by mouth 4 times daily as needed (reflux) 414 mL 3     ferrous sulfate (FEROSUL) 325 (65 Fe) MG tablet Take 1 tablet (325 mg) by mouth daily (with breakfast)       ibuprofen (ADVIL/MOTRIN) 200 MG tablet Take 200-800 mg by mouth 2 times daily        metoprolol succinate ER (TOPROL-XL) 50 MG  24 hr tablet Take 1 tablet (50 mg) by mouth daily 90 tablet 3     ondansetron (ZOFRAN) 4 MG tablet Take 4 mg by mouth every 6 hours as needed        pantoprazole (PROTONIX) 40 MG EC tablet Take 1 tablet (40 mg) by mouth 2 times daily 180 tablet 3     vitamin D2 (ERGOCALCIFEROL) 00287 units (1250 mcg) capsule Take 1 capsule (50,000 Units) by mouth once a week Saturdays 13 capsule 3     escitalopram (LEXAPRO) 10 MG tablet Take 1 tablet (10 mg) by mouth daily 90 tablet 3     Facility-Administered Medications Prior to Visit   Medication Dose Route Frequency Provider Last Rate Last Admin     denosumab (PROLIA) injection 60 mg  60 mg Subcutaneous Q6 Months Jame Dunn MD   60 mg at 07/26/22 1544         Answers for HPI/ROS submitted by the patient on 8/22/2022  General Symptoms: Yes  Skin Symptoms: Yes  HENT Symptoms: Yes  EYE SYMPTOMS: Yes  HEART SYMPTOMS: Yes  LUNG SYMPTOMS: Yes  INTESTINAL SYMPTOMS: Yes  URINARY SYMPTOMS: No  GYNECOLOGIC SYMPTOMS: No  BREAST SYMPTOMS: No  SKELETAL SYMPTOMS: No  BLOOD SYMPTOMS: No  NERVOUS SYSTEM SYMPTOMS: No  MENTAL HEALTH SYMPTOMS: No  Ear pain: Yes  Ear discharge: Yes  Hearing loss: No  Tinnitus: No  Nosebleeds: No  Congestion: No  Sinus pain: No  Trouble swallowing: Yes   Voice hoarseness: Yes  Mouth sores: No  Sore throat: Yes  Tooth pain: No  Gum tenderness: No  Bleeding gums: No  Change in taste: No  Change in sense of smell: No  Dry mouth: No  Hearing aid used: No  Neck lump: No  Fever: No  Loss of appetite: No  Weight loss: Yes  Weight gain: Yes  Fatigue: Yes  Night sweats: Yes  Chills: No  Increased stress: No  Excessive hunger: Yes  Excessive thirst: No  Feeling hot or cold when others believe the temperature is normal: Yes  Loss of height: No  Post-operative complications: No  Surgical site pain: No  Hallucinations: No  Change in or Loss of Energy: No  Hyperactivity: Yes  Confusion: No  Changes in hair: Yes  Changes in moles/birth marks: No  Itching: Yes  Rashes:  Yes  Changes in nails: No  Acne: No  Hair in places you don't want it: No  Change in facial hair: No  Warts: No  Non-healing sores: Yes  Scarring: No  Flaking of skin: Yes  Color changes of hands/feet in cold : No  Sun sensitivity: No  Skin thickening: No  Eye pain: No  Vision loss: No  Dry eyes: Yes  Watery eyes: No  Eye bulging: No  Double vision: No  Flashing of lights: No  Spots: No  Floaters: Yes  Redness: Yes  Crossed eyes: No  Tunnel Vision: No  Yellowing of eyes: No  Eye irritation: Yes  Chest pain or pressure: No  Fast or irregular heartbeat: Yes  Pain in legs with walking: No  Trouble breathing while lying down: No  Fingers or toes appear blue: No  High blood pressure: Yes  Low blood pressure: No  Fainting: No  Murmurs: No  Pacemaker: No  Varicose veins: Yes  Edema or swelling: No  Wake up at night with shortness of breath: No  Light-headedness: Yes  Exercise intolerance: No  Cough: Yes  Sputum or phlegm: No  Coughing up blood: No  Difficulty breating or shortness of breath: Yes  Snoring: No  Wheezing: No  Difficulty breathing on exertion: Yes  Nighttime Cough: No  Difficulty breathing when lying flat: No  Heart burn or indigestion: Yes  Nausea: Yes  Vomiting: Yes  Abdominal pain: No  Bloating: No  Constipation: No  Diarrhea: No  Blood in stool: No  Black stools: No  Rectal or Anal pain: No  Fecal incontinence: No  Yellowing of skin or eyes: No  Vomit with blood: No  Change in stools: No          Again, thank you for allowing me to participate in the care of your patient.        Sincerely,        Marimar Henriquez NP

## 2022-08-25 NOTE — PROGRESS NOTES
[facility]OCR[facility]OLOG[facility]    Thyroid Note  8/25/2022    Anna Sanderson, 1960, [id number]          Reason for visit      1. Hyperthyroidism        HPI     Anna Sanderson is a very pleasant 62 year old old female who presents for follow up.  SUMMARY:    Anna is here today in referral for Hyperthyroidism. She has had a rather tumultuous year medically, and is currently in a walking boot for a non-healing surgical wound that was positive for MRSA.     PCP had been watching her TSH serially since February of 2021. When TSH was checked in September and November, it was very suppressed at <0.01.  It then started rising, 0.12 in Dec, 0.95 in Jan, and 0.53 in Feb. fT4 was slightly elevated in December (1.87).  Most recent lab work done in July again showed a suppressed TSH at <0.01 and a very elevated fT4 at 2.65. Pt reports multiple symptoms, including fluctuating weight, insomnia, irritability, SOB, feeling faint, hair loss, and feeling excessively warm. She reports that the only Thyroid problems in her family that she is aware of would be her Mother, who had Papillary Thyroid Cancer. NM Thyroid Uptake and scan showed 24-hour uptake of 7.9, with normal range 10-30%.  Findings concluded subacute thyroiditis. Pt notes no significant problems with swallowing or referable to her neck.     Past Medical History     Patient Active Problem List   Diagnosis     Nonhealing surgical wound     Achilles tendon infection     Gastroesophageal reflux disease with esophagitis     Iron deficiency anemia     Vitamin D deficiency     MTHFR gene mutation (H) Heterozygoyus April 2021, [facility]     Abnormal CT of the chest     Subacute thyroiditis     Benign essential hypertension     PAD (peripheral artery disease) (H)     Palpitations     Osteoporosis of multiple sites without pathological fracture       Family History       family history includes Alzheimer Disease in her sister; Cerebrovascular  Disease in her sister; Coronary Artery Disease in her father and mother; Hypertension in her sister; No Known Problems in her brother, brother, daughter, sister, son, and son; Osteoporosis in her mother; Thyroid Disease in her mother.    Social History      reports that she quit smoking about 19 years ago. Her smoking use included cigarettes. She has never used smokeless tobacco. She reports that she does not drink alcohol and does not use drugs.      Review of Systems     Patient denies fatigue, weight changes, heat/cold intolerance, bowel/skin changes or CVS symptoms.   Remainder per HPI and per attached intake form.      Vital Signs     /72 (BP Location: Right arm, Patient Position: Sitting, Cuff Size: Adult Regular)   Pulse 97   Wt 55.8 kg (123 lb)   BMI 21.11 kg/m    Wt Readings from Last 3 Encounters:   08/25/22 55.8 kg (123 lb)   03/04/22 55.8 kg (123 lb)   02/14/22 55.8 kg (123 lb)       Physical Exam     Constitutional:  Well developed, Well nourished  HENT:  Normocephalic,   Neck: normal in appearance  Eyes:  PERRL, Conjunctiva pink  Respiratory:  No respiratory distress  Skin: No acanthosis nigricans, lipoatrophy or lipodystrophy  Neurologic:  Alert & oriented x 3, nonfocal  Psychiatric:  Affect, Mood, Insight appropriate            Assessment     1. Hyperthyroidism            Plan     Will get anti thyroglobulin antibody and thyroid antioxidase antibody levels done to determine if we are dealing with an autoimmune situation. Will start pt on Methimazole, 10 mg daily and get another set of labs done in 2 months. Discussed possible side effects, including agranulocytosis or arthralgias. Verbalized understanding. She will reach out with problems or concerns. F/u with me in 6 months.         Marimar Henriquez NP  [facility]  8/25/2022  9:24 AM      Lab Results     TSH   Date Value Ref Range Status   07/26/2022 0.01 (L) 0.30 - 4.20 uIU/mL Final   02/07/2022 0.53 0.30 - 5.00 uIU/mL Final     No  components found for: THYROIDAB    No results found for: [id number]    Imaging Results   Last thyroid ultrasound:  No results found for this or any previous visit.      Last thyroid nuclear scan:  Results for orders placed during the hospital encounter of 10/12/21    NM Thyroid Uptake and Scan    Narrative  EXAM: NM THYROID UPTAKE AND SCAN  LOCATION: [facility]  DATE/TIME: 10/12/2021 2:22 PM    INDICATION: Clinical and/or laboratory evidence of hyperthyroidism.  COMPARISON: None available at time of dictation.  TECHNIQUE: 170 uCi I-123, oral ingestion. 24-hour neck uptake and imaging.    FINDINGS: 24-hour uptake: 7.9% (Normal range: 10-30%).    Minimal uptake within the thyroid parenchyma, which in the setting of hyperthyroidism suggests subacute thyroiditis.      Current Medications     Outpatient Medications Prior to Visit   Medication Sig Dispense Refill     albuterol (PROAIR HFA/PROVENTIL HFA/VENTOLIN HFA) 108 (90 Base) MCG/ACT inhaler Inhale 2 puffs into the lungs every 6 hours 18 g 11     ALPRAZolam (XANAX) 0.25 MG tablet Take 1 tablet (0.25 mg) by mouth daily as needed for anxiety 12 tablet 0     aspirin (ASA) 81 MG EC tablet Take 1 tablet (81 mg) by mouth daily 60 tablet 0     calcium citrate-vitamin D (CITRACAL) 315-250 MG-UNIT TABS per tablet Take 2 tablets by mouth daily       CARAFATE 1 GM/10ML suspension Take 10 mLs (1 g) by mouth 4 times daily as needed (reflux) 414 mL 3     ferrous sulfate (FEROSUL) 325 (65 Fe) MG tablet Take 1 tablet (325 mg) by mouth daily (with breakfast)       ibuprofen (ADVIL/MOTRIN) 200 MG tablet Take 200-800 mg by mouth 2 times daily        metoprolol succinate ER (TOPROL-XL) 50 MG 24 hr tablet Take 1 tablet (50 mg) by mouth daily 90 tablet 3     ondansetron (ZOFRAN) 4 MG tablet Take 4 mg by mouth every 6 hours as needed        pantoprazole (PROTONIX) 40 MG EC tablet Take 1 tablet (40 mg) by mouth 2 times daily 180 tablet 3     vitamin D2 (ERGOCALCIFEROL)  54926 units (1250 mcg) capsule Take 1 capsule (50,000 Units) by mouth once a week Saturdays 13 capsule 3     escitalopram (LEXAPRO) 10 MG tablet Take 1 tablet (10 mg) by mouth daily 90 tablet 3     Facility-Administered Medications Prior to Visit   Medication Dose Route Frequency Provider Last Rate Last Admin     denosumab (PROLIA) injection 60 mg  60 mg Subcutaneous Q6 Months Jame Dunn MD   60 mg at 07/26/22 1544         Answers for HPI/ROS submitted by the patient on 8/22/2022  General Symptoms: Yes  Skin Symptoms: Yes  HENT Symptoms: Yes  EYE SYMPTOMS: Yes  HEART SYMPTOMS: Yes  LUNG SYMPTOMS: Yes  INTESTINAL SYMPTOMS: Yes  URINARY SYMPTOMS: No  GYNECOLOGIC SYMPTOMS: No  BREAST SYMPTOMS: No  SKELETAL SYMPTOMS: No  BLOOD SYMPTOMS: No  NERVOUS SYSTEM SYMPTOMS: No  MENTAL HEALTH SYMPTOMS: No  Ear pain: Yes  Ear discharge: Yes  Hearing loss: No  Tinnitus: No  Nosebleeds: No  Congestion: No  Sinus pain: No  Trouble swallowing: Yes   Voice hoarseness: Yes  Mouth sores: No  Sore throat: Yes  Tooth pain: No  Gum tenderness: No  Bleeding gums: No  Change in taste: No  Change in sense of smell: No  Dry mouth: No  Hearing aid used: No  Neck lump: No  Fever: No  Loss of appetite: No  Weight loss: Yes  Weight gain: Yes  Fatigue: Yes  Night sweats: Yes  Chills: No  Increased stress: No  Excessive hunger: Yes  Excessive thirst: No  Feeling hot or cold when others believe the temperature is normal: Yes  Loss of height: No  Post-operative complications: No  Surgical site pain: No  Hallucinations: No  Change in or Loss of Energy: No  Hyperactivity: Yes  Confusion: No  Changes in hair: Yes  Changes in moles/birth marks: No  Itching: Yes  Rashes: Yes  Changes in nails: No  Acne: No  Hair in places you don't want it: No  Change in facial hair: No  Warts: No  Non-healing sores: Yes  Scarring: No  Flaking of skin: Yes  Color changes of hands/feet in cold : No  Sun sensitivity: No  Skin thickening: No  Eye pain: No  Vision  loss: No  Dry eyes: Yes  Watery eyes: No  Eye bulging: No  Double vision: No  Flashing of lights: No  Spots: No  Floaters: Yes  Redness: Yes  Crossed eyes: No  Tunnel Vision: No  Yellowing of eyes: No  Eye irritation: Yes  Chest pain or pressure: No  Fast or irregular heartbeat: Yes  Pain in legs with walking: No  Trouble breathing while lying down: No  Fingers or toes appear blue: No  High blood pressure: Yes  Low blood pressure: No  Fainting: No  Murmurs: No  Pacemaker: No  Varicose veins: Yes  Edema or swelling: No  Wake up at night with shortness of breath: No  Light-headedness: Yes  Exercise intolerance: No  Cough: Yes  Sputum or phlegm: No  Coughing up blood: No  Difficulty breating or shortness of breath: Yes  Snoring: No  Wheezing: No  Difficulty breathing on exertion: Yes  Nighttime Cough: No  Difficulty breathing when lying flat: No  Heart burn or indigestion: Yes  Nausea: Yes  Vomiting: Yes  Abdominal pain: No  Bloating: No  Constipation: No  Diarrhea: No  Blood in stool: No  Black stools: No  Rectal or Anal pain: No  Fecal incontinence: No  Yellowing of skin or eyes: No  Vomit with blood: No  Change in stools: No

## 2022-08-26 DIAGNOSIS — E05.90 HYPERTHYROIDISM: ICD-10-CM

## 2022-08-26 RX ORDER — METHIMAZOLE 10 MG/1
TABLET ORAL
Qty: 90 TABLET | Refills: 0 | Status: SHIPPED | OUTPATIENT
Start: 2022-08-26 | End: 2022-09-06

## 2022-09-06 DIAGNOSIS — E06.1 SUBACUTE THYROIDITIS: Primary | ICD-10-CM

## 2022-09-06 RX ORDER — PROPYLTHIOURACIL 50 MG/1
TABLET ORAL
Qty: 60 TABLET | Refills: 5 | Status: SHIPPED | OUTPATIENT
Start: 2022-09-06 | End: 2022-09-08

## 2022-09-08 RX ORDER — PROPYLTHIOURACIL 50 MG/1
TABLET ORAL
Qty: 180 TABLET | Refills: 1 | Status: SHIPPED | OUTPATIENT
Start: 2022-09-08 | End: 2023-01-05

## 2022-10-16 ENCOUNTER — HEALTH MAINTENANCE LETTER (OUTPATIENT)
Age: 62
End: 2022-10-16

## 2022-10-31 ENCOUNTER — LAB (OUTPATIENT)
Dept: LAB | Facility: CLINIC | Age: 62
End: 2022-10-31
Payer: COMMERCIAL

## 2022-10-31 DIAGNOSIS — E05.90 HYPERTHYROIDISM: ICD-10-CM

## 2022-10-31 LAB
T4 FREE SERPL-MCNC: 2.36 NG/DL (ref 0.9–1.7)
TSH SERPL DL<=0.005 MIU/L-ACNC: 0.01 UIU/ML (ref 0.3–4.2)

## 2022-10-31 PROCEDURE — 84443 ASSAY THYROID STIM HORMONE: CPT

## 2022-10-31 PROCEDURE — 36415 COLL VENOUS BLD VENIPUNCTURE: CPT

## 2022-10-31 PROCEDURE — 84439 ASSAY OF FREE THYROXINE: CPT

## 2022-11-02 DIAGNOSIS — E06.1 SUBACUTE THYROIDITIS: Primary | ICD-10-CM

## 2022-11-02 RX ORDER — PROPYLTHIOURACIL 50 MG/1
TABLET ORAL
Qty: 270 TABLET | Refills: 3 | Status: SHIPPED | OUTPATIENT
Start: 2022-11-02 | End: 2023-04-26

## 2022-11-18 ENCOUNTER — LAB (OUTPATIENT)
Dept: LAB | Facility: CLINIC | Age: 62
End: 2022-11-18
Payer: COMMERCIAL

## 2022-11-18 DIAGNOSIS — E05.90 HYPERTHYROIDISM: ICD-10-CM

## 2022-11-18 LAB
ALBUMIN SERPL BCG-MCNC: 4.3 G/DL (ref 3.5–5.2)
ALP SERPL-CCNC: 52 U/L (ref 35–104)
ALT SERPL W P-5'-P-CCNC: 11 U/L (ref 10–35)
AST SERPL W P-5'-P-CCNC: 21 U/L (ref 10–35)
BILIRUB DIRECT SERPL-MCNC: <0.2 MG/DL (ref 0–0.3)
BILIRUB SERPL-MCNC: 0.2 MG/DL
PROT SERPL-MCNC: 6.9 G/DL (ref 6.4–8.3)

## 2022-11-18 PROCEDURE — 80076 HEPATIC FUNCTION PANEL: CPT

## 2022-11-18 PROCEDURE — 36415 COLL VENOUS BLD VENIPUNCTURE: CPT

## 2022-11-30 NOTE — TELEPHONE ENCOUNTER
----- Message from Paulina Ruggiero MD sent at 7/24/2020  2:56 PM CDT -----  Colt Cota,   Please send the results to the patient.  Patient is on Doxycycline and Ciprofloxacin at this time    Awaiting final identification and sensitivities of Gram negative Rods  Doxycycline will treat MRSA  Please call and update the patient    Paulina Ruggiero MD  [facility]  [phone]    ----- Message -----  From: Lab, Background User  Sent: 7/23/2020   8:21 AM CDT  To: Paulina Ruggiero MD      
I called LM for the pt to c/b to inform of the below.     
Pt called back and I informed of the below. Pt understands.   
4 = No assist / stand by assistance

## 2022-12-08 ASSESSMENT — ENCOUNTER SYMPTOMS
NIGHT SWEATS: 1
HYPOTENSION: 0
MEMORY LOSS: 0
POSTURAL DYSPNEA: 0
DOUBLE VISION: 0
EYE IRRITATION: 1
LOSS OF CONSCIOUSNESS: 1
WHEEZING: 0
SPEECH CHANGE: 0
SYNCOPE: 1
PALPITATIONS: 1
EYE PAIN: 0
POLYPHAGIA: 0
POLYDIPSIA: 0
SEIZURES: 0
EXERCISE INTOLERANCE: 0
SKIN CHANGES: 0
EYE REDNESS: 1
INCREASED ENERGY: 1
POOR WOUND HEALING: 0
WEIGHT GAIN: 0
SNORES LOUDLY: 0
SHORTNESS OF BREATH: 1
ORTHOPNEA: 0
TREMORS: 0
NUMBNESS: 0
COUGH: 0
FEVER: 0
DECREASED APPETITE: 0
FATIGUE: 1
HYPERTENSION: 1
LEG PAIN: 0
EYE WATERING: 0
DISTURBANCES IN COORDINATION: 0
WEIGHT LOSS: 0
DYSPNEA ON EXERTION: 0
NAIL CHANGES: 0
CHILLS: 0
PARALYSIS: 0
LIGHT-HEADEDNESS: 1
WEAKNESS: 0
HEADACHES: 0
DIZZINESS: 1
HEMOPTYSIS: 0
COUGH DISTURBING SLEEP: 0
ALTERED TEMPERATURE REGULATION: 1
TINGLING: 0
HALLUCINATIONS: 0
SLEEP DISTURBANCES DUE TO BREATHING: 1
SPUTUM PRODUCTION: 0

## 2022-12-09 ENCOUNTER — LAB (OUTPATIENT)
Dept: LAB | Facility: CLINIC | Age: 62
End: 2022-12-09
Payer: COMMERCIAL

## 2022-12-09 ENCOUNTER — OFFICE VISIT (OUTPATIENT)
Dept: ENDOCRINOLOGY | Facility: CLINIC | Age: 62
End: 2022-12-09
Payer: COMMERCIAL

## 2022-12-09 VITALS
DIASTOLIC BLOOD PRESSURE: 70 MMHG | HEART RATE: 84 BPM | WEIGHT: 120.6 LBS | BODY MASS INDEX: 20.7 KG/M2 | SYSTOLIC BLOOD PRESSURE: 110 MMHG

## 2022-12-09 DIAGNOSIS — E05.90 HYPERTHYROIDISM: ICD-10-CM

## 2022-12-09 DIAGNOSIS — E05.90 HYPERTHYROIDISM: Primary | ICD-10-CM

## 2022-12-09 LAB
BASOPHILS # BLD AUTO: 0.1 10E3/UL (ref 0–0.2)
BASOPHILS NFR BLD AUTO: 1 %
EOSINOPHIL # BLD AUTO: 0.1 10E3/UL (ref 0–0.7)
EOSINOPHIL NFR BLD AUTO: 2 %
ERYTHROCYTE [DISTWIDTH] IN BLOOD BY AUTOMATED COUNT: 18.1 % (ref 10–15)
HCT VFR BLD AUTO: 33.7 % (ref 35–47)
HGB BLD-MCNC: 10 G/DL (ref 11.7–15.7)
IMM GRANULOCYTES # BLD: 0 10E3/UL
IMM GRANULOCYTES NFR BLD: 0 %
LYMPHOCYTES # BLD AUTO: 2.5 10E3/UL (ref 0.8–5.3)
LYMPHOCYTES NFR BLD AUTO: 36 %
MCH RBC QN AUTO: 22.3 PG (ref 26.5–33)
MCHC RBC AUTO-ENTMCNC: 29.7 G/DL (ref 31.5–36.5)
MCV RBC AUTO: 75 FL (ref 78–100)
MONOCYTES # BLD AUTO: 0.7 10E3/UL (ref 0–1.3)
MONOCYTES NFR BLD AUTO: 10 %
NEUTROPHILS # BLD AUTO: 3.6 10E3/UL (ref 1.6–8.3)
NEUTROPHILS NFR BLD AUTO: 51 %
PLATELET # BLD AUTO: 351 10E3/UL (ref 150–450)
RBC # BLD AUTO: 4.49 10E6/UL (ref 3.8–5.2)
T3 SERPL-MCNC: 136 NG/DL (ref 85–202)
WBC # BLD AUTO: 7.1 10E3/UL (ref 4–11)

## 2022-12-09 PROCEDURE — 99000 SPECIMEN HANDLING OFFICE-LAB: CPT

## 2022-12-09 PROCEDURE — 36415 COLL VENOUS BLD VENIPUNCTURE: CPT

## 2022-12-09 PROCEDURE — 83520 IMMUNOASSAY QUANT NOS NONAB: CPT | Mod: 90

## 2022-12-09 PROCEDURE — 80050 GENERAL HEALTH PANEL: CPT

## 2022-12-09 PROCEDURE — 84480 ASSAY TRIIODOTHYRONINE (T3): CPT

## 2022-12-09 PROCEDURE — 84439 ASSAY OF FREE THYROXINE: CPT

## 2022-12-09 PROCEDURE — 84445 ASSAY OF TSI GLOBULIN: CPT | Mod: 90

## 2022-12-09 PROCEDURE — 99215 OFFICE O/P EST HI 40 MIN: CPT | Performed by: INTERNAL MEDICINE

## 2022-12-09 NOTE — LETTER
12/9/2022         RE: Anna Sanderson  [address]  [place] [address]        Dear Colleague,    Thank you for referring your patient, Anna Sanderson, to the [facility]. Please see a copy of my visit note below.      ENDOCRINOLOGY NEW PATIENT VISIT        HISTORY OF PRESENT ILLNESS    Anna Sanderson is seen to establish endocrine care.  Has followed with Ms. Florence NP.  The patient is new to me.    I reviewed endocrine related labs, imaging studies and history.    First noted to have abnormal thyroid function tests in 2/2021: TSH was partially suppressed free T4 normal.  She had persistent abnormalities that became more pronounced, with TSH becoming fully suppressed by 9/2021 and free T4 rising by 11/2021.    Radioiodine uptake and scan performed on 10/12/2021 (images personally reviewed) revealed lower uptake at 7.9% within the thyroid, with question of subacute thyroiditis.    She was initiated on methimazole in 8/2022.  The patient sent [employer] message in 9/2022 noting that she had been feeling unwell: Hot, nauseated and exhausted.  PTU was prescribed and dose adjusted further: Presently taking 100 mg in the morning and 50 mg in the evening (most recent dose adjustment on 11/2/2022).    She still finds itchiness, heat intolerance and feeling unwell persist on PTU.  In fact, she has changed dose and has been taking 100 mg in the morning and skipping the evening dose.  Feels tremulous and notices more frequent/looser bowel movements periodically.    Does not take biotin or iodine supplement.    Has not noted a goiter.  No dysphagia, although she has had some hoarseness of her voice since diagnosis of hyperthyroidism.  No stridor.    Her eyes often feel dry and water involuntarily and frequently.    Family history is remarkable for 2 sisters with hypothyroidism.  Mother with papillary thyroid carcinoma who underwent thyroidectomy and had some  postoperative complication; now , although thyroid cancer was not the cause of death.    Pertinent Social History: , has 3 children and 2 grandchildren.  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    PAST MEDICAL HISTORY  Past Medical History:   Diagnosis Date     Abdominal pain, epigastric 2019     Abnormal CT of the chest 2021     Abnormal TSH     TSH suppressed 0.18 and normal free T4, recheck in 3 months     Abscess of left lower leg 2021     Achilles tendinitis 2019     Achilles tendon infection      Allergic rhinitis 2020     Chronic cough 2020    CT chest with mild bronchiectasis and small area of consolidation     Deep vein thrombosis (DVT) of brachial vein of left upper extremity (H) 2018    Associated with PICC line, follow-up ultrasound showing resolution of thrombus     Dermatitis      Family history of myocardial infarction     dad 35  mother 70     Fatigue 2017     GERD (gastroesophageal reflux disease)     EGD with esophagitis , experiencing chest pain and dysphagia     History of anesthesia complications      Hypertension      Impaired fasting glucose     Glucose 119 2021.  A1c 5.3%     Iron deficiency anemia 2015    Has required iron infusions     Kidney stones      Microscopic hematuria 2021    Longstanding finding     Motion sickness      MRSA infection 2020    Left leg wound     Open wound of foot, complicated     Achilles tendon rupture complicated by infected open wound with multiple surgeries including skin grafting     Osteoporosis of multiple sites without pathological fracture     DEXA with T score -2.4 L1 L4, -3.2 left femoral neck and -2.6 right femoral neck.  DEXA 2022 with significant improvement -2.6 left femoral neck and -2.3 right femoral neck     Ovarian cyst      Palpitations 2021    Event monitor showing sinus tachycardia no arrhythmia 2021      PONV (postoperative nausea and vomiting)      SIRS (systemic inflammatory response syndrome) (H) 08/16/2019     SOB (shortness of breath) 11/04/2021     Subacute thyroiditis 10/15/2021    TSH less than 0.1 and abnormal thyroid uptake scan October 2021, asymptomatic     Vitamin D deficiency 01/22/2020       MEDICATIONS  Current Outpatient Medications   Medication Sig Dispense Refill     albuterol (PROAIR HFA/PROVENTIL HFA/VENTOLIN HFA) 108 (90 Base) MCG/ACT inhaler Inhale 2 puffs into the lungs every 6 hours 18 g 11     aspirin (ASA) 81 MG EC tablet Take 1 tablet (81 mg) by mouth daily 60 tablet 0     calcium citrate-vitamin D (CITRACAL) 315-250 MG-UNIT TABS per tablet Take 2 tablets by mouth daily       ferrous sulfate (FEROSUL) 325 (65 Fe) MG tablet Take 1 tablet (325 mg) by mouth daily (with breakfast)       ibuprofen (ADVIL/MOTRIN) 200 MG tablet Take 200-800 mg by mouth 2 times daily        metoprolol succinate ER (TOPROL-XL) 50 MG 24 hr tablet Take 1 tablet (50 mg) by mouth daily 90 tablet 3     ondansetron (ZOFRAN) 4 MG tablet Take 4 mg by mouth every 6 hours as needed        pantoprazole (PROTONIX) 40 MG EC tablet Take 1 tablet (40 mg) by mouth 2 times daily 180 tablet 3     propylthiouracil (PTU) 50 MG tablet Take two tablets in the morning and 1 tablet in the evening. 270 tablet 3     vitamin D2 (ERGOCALCIFEROL) 63445 units (1250 mcg) capsule Take 1 capsule (50,000 Units) by mouth once a week Saturdays 13 capsule 3     ALPRAZolam (XANAX) 0.25 MG tablet Take 1 tablet (0.25 mg) by mouth daily as needed for anxiety 12 tablet 0     CARAFATE 1 GM/10ML suspension Take 10 mLs (1 g) by mouth 4 times daily as needed (reflux) 414 mL 3     propylthiouracil (PTU) 50 MG tablet One tablet twice daily 180 tablet 1       Allergies, family, and social history were reviewed and documented as needed in EHR.     REVIEW OF SYSTEMS  A complete 10-point ROS was performed and pertinent positives and negatives are noted in the  HPI.    PHYSICAL EXAM  /70   Pulse 84   Wt 54.7 kg (120 lb 9.6 oz)   BMI 20.70 kg/m    Body mass index is 20.7 kg/m .  Constitutional: Vital signs reviewed, as recorded above. Patient is alert, oriented and appears in no acute distress.  Eyes: PER, EOMI, no stare, lid lag, or retraction; no conjunctival injection.  Neck: Neck supple, no palpable thyromegaly, mild tenderness on exam.  Lymphatic: No cervical or supraclavicular LAD.  Cardiovascular: RRR, normal S1/S2, no audible murmurs, rubs or gallops; no LE edema.  Respiratory: CTAB, without wheezes, crackles or rhonchi; normal chest wall motion and respiratory effort.  GI: Positive bowel sounds, soft, NT/ND.  MSK: No clubbing or cyanosis; normal muscle bulk and tone.  Skin: Normal skin color, temperature, turgor and texture.  Neurological: Alert and oriented times 3. No tremor.    DATA REVIEW  Each of the following laboratory and/or imaging studies were reviewed.      Component      Latest Ref Rng & Units 7/26/2022 8/25/2022 10/31/2022 11/18/2022   Protein Total      6.4 - 8.3 g/dL    6.9   Albumin      3.5 - 5.2 g/dL    4.3   Bilirubin Total      <=1.2 mg/dL    0.2   Alkaline Phosphatase      35 - 104 U/L    52   AST      10 - 35 U/L    21   ALT      10 - 35 U/L    11   Bilirubin Direct      0.00 - 0.30 mg/dL    <0.20   TSH      0.30 - 4.20 uIU/mL 0.01 (L) 0.01 (L) 0.01 (L)    T4 Free      0.90 - 1.70 ng/dL 2.65 (H) 2.17 (H) 2.36 (H)    Thyroglobulin Antibody      <40 IU/mL  <20     Thyroid Peroxidase Antibody      <35 IU/mL  <10       EXAM: NM THYROID UPTAKE AND SCAN  LOCATION: [facility]  DATE/TIME: 10/12/2021 2:22 PM     INDICATION: Clinical and/or laboratory evidence of hyperthyroidism.  COMPARISON: None available at time of dictation.  TECHNIQUE: 170 uCi I-123, oral ingestion. 24-hour neck uptake and imaging.     FINDINGS: 24-hour uptake: 7.9% (Normal range: 10-30%).     Minimal uptake within the thyroid parenchyma, which in  the setting of hyperthyroidism suggests subacute thyroiditis.       ASSESSMENT  1.  Hyperthyroidism.  Radioiodine uptake and scan showed relatively low uptake over the thyroid, with initial suspicion for thyroiditis.  However, hyperthyroidism has persisted and evolution of her thyroid function tests is not consistent with a subacute or painless thyroiditis.  She does have family history of hypothyroidism so autoimmune thyroid disease could be a cause (we will check TSI and TSHRAb, also check thyroid ultrasound to see if the gland appears heterogenous); perhaps iodine uptake was blunted by medications/supplements (the patient did not receive IV contrast prior to study).  Another possibility is struma ovarii: Would therefore repeat radioiodine uptake and scan and request that both thyroid and pelvis be scanned for uptake.  With regards to treatment, identifying cause of hyperthyroidism will be helpful in determining our long-term plans for treatment.  There has been question of intolerance to methimazole and PTU, although itching can be associated with hyperthyroidism and may not be fully attributable to these medications.  I have asked her to see if her itching improves when she holds PTU in anticipation of radioiodine uptake and scan.  We can then decide whether to consider a rechallenge with methimazole to see if controlling hyperthyroidism helps with itching also.  I have reservations about continuing PTU indefinitely given potential hepatotoxicity, albeit rare.  If she is unable to tolerate antithyroid drugs, would give consideration to radioiodine therapy or surgery depending on the cause of hyperthyroidism.  We discussed potential causes of hyperthyroidism, proposed testing, as well as treatment options; we discussed precautions to take while on PTU and potential side effects of the medication.      PLAN  -Labs today  -For now, continue  mg (two 50 mg tabs) every morning  -Schedule radioiodine uptake and  scan--hold PTU for 5 days before scan and monitor if itching changes (can resume PTU after scan)  -Schedule thyroid ultrasound  -Based on results, we will decide on next best steps  -Return for a follow-up visit earliest available  -We will communicate results via [employer], or if needed by phone      Orders Placed This Encounter   Procedures     NM Thyroid Uptake and Scan     US Thyroid     TSH     T4 free     T3 total     Thyroid stimulating immunoglobulin     Thyrotropin Receptor Antibody     Comprehensive metabolic panel     CBC with Platelets & Differential         I spent a total of 63 minutes on the date of encounter reviewing medical records, evaluating the patient, coordinating care and documenting in the EHR, as detailed above.      Hasmukh Andrade MD   Division of Diabetes, Endocrinology and Metabolism  Department of Medicine      cc: Marimar Henriquez NP; Jame Dunn MD             Again, thank you for allowing me to participate in the care of your patient.        Sincerely,        HASMUKH Andrade MD

## 2022-12-09 NOTE — PATIENT INSTRUCTIONS
-Labs today  -For now, continue  mg (two 50 mg tabs) every morning  -Schedule radioiodine uptake and scan--hold PTU for 5 days before scan and monitor if itching changes (can resume PTU after scan)  -Schedule thyroid ultrasound  -Based on results, we will decide on next best steps  -Return for a follow-up visit earliest available  -We will communicate results via [employer], or if needed by phone

## 2022-12-09 NOTE — PROGRESS NOTES
ENDOCRINOLOGY NEW PATIENT VISIT        HISTORY OF PRESENT ILLNESS    Anna Sanderson is seen to establish endocrine care.  Has followed with Ms. Florence NP.  The patient is new to me.    I reviewed endocrine related labs, imaging studies and history.    First noted to have abnormal thyroid function tests in 2021: TSH was partially suppressed free T4 normal.  She had persistent abnormalities that became more pronounced, with TSH becoming fully suppressed by 2021 and free T4 rising by 2021.    Radioiodine uptake and scan performed on 10/12/2021 (images personally reviewed) revealed lower uptake at 7.9% within the thyroid, with question of subacute thyroiditis.    She was initiated on methimazole in 2022.  The patient sent [employer] message in 2022 noting that she had been feeling unwell: Hot, nauseated and exhausted.  PTU was prescribed and dose adjusted further: Presently taking 100 mg in the morning and 50 mg in the evening (most recent dose adjustment on 2022).    She still finds itchiness, heat intolerance and feeling unwell persist on PTU.  In fact, she has changed dose and has been taking 100 mg in the morning and skipping the evening dose.  Feels tremulous and notices more frequent/looser bowel movements periodically.    Does not take biotin or iodine supplement.    Has not noted a goiter.  No dysphagia, although she has had some hoarseness of her voice since diagnosis of hyperthyroidism.  No stridor.    Her eyes often feel dry and water involuntarily and frequently.    Family history is remarkable for 2 sisters with hypothyroidism.  Mother with papillary thyroid carcinoma who underwent thyroidectomy and had some postoperative complication; now , although thyroid cancer was not the cause of death.    Pertinent Social History: , has 3 children and 2 grandchildren.  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    PAST MEDICAL HISTORY  Past Medical History:    Diagnosis Date     Abdominal pain, epigastric 11/05/2019     Abnormal CT of the chest 02/01/2021     Abnormal TSH     TSH suppressed 0.18 and normal free T4, recheck in 3 months     Abscess of left lower leg 06/07/2021     Achilles tendinitis 07/26/2019     Achilles tendon infection      Allergic rhinitis 09/03/2020     Chronic cough 03/23/2020    CT chest with mild bronchiectasis and small area of consolidation     Deep vein thrombosis (DVT) of brachial vein of left upper extremity (H) 04/17/2018    Associated with PICC line, follow-up ultrasound showing resolution of thrombus     Dermatitis      Family history of myocardial infarction     dad 35  mother 70     Fatigue 02/24/2017     GERD (gastroesophageal reflux disease)     EGD with esophagitis 2015, experiencing chest pain and dysphagia     History of anesthesia complications      Hypertension      Impaired fasting glucose     Glucose 119 February 2021.  A1c 5.3%     Iron deficiency anemia 12/18/2015    Has required iron infusions     Kidney stones      Microscopic hematuria 03/07/2021    Longstanding finding     Motion sickness      MRSA infection 08/2020    Left leg wound     Open wound of foot, complicated     Achilles tendon rupture complicated by infected open wound with multiple surgeries including skin grafting     Osteoporosis of multiple sites without pathological fracture     DEXA with T score -2.4 L1 L4, -3.2 left femoral neck and -2.6 right femoral neck.  DEXA February 2022 with significant improvement -2.6 left femoral neck and -2.3 right femoral neck     Ovarian cyst      Palpitations 11/04/2021    Event monitor showing sinus tachycardia no arrhythmia November 2021     PONV (postoperative nausea and vomiting)      SIRS (systemic inflammatory response syndrome) (H) 08/16/2019     SOB (shortness of breath) 11/04/2021     Subacute thyroiditis 10/15/2021    TSH less than 0.1 and abnormal thyroid uptake scan October 2021, asymptomatic     Vitamin D  deficiency 01/22/2020       MEDICATIONS  Current Outpatient Medications   Medication Sig Dispense Refill     albuterol (PROAIR HFA/PROVENTIL HFA/VENTOLIN HFA) 108 (90 Base) MCG/ACT inhaler Inhale 2 puffs into the lungs every 6 hours 18 g 11     aspirin (ASA) 81 MG EC tablet Take 1 tablet (81 mg) by mouth daily 60 tablet 0     calcium citrate-vitamin D (CITRACAL) 315-250 MG-UNIT TABS per tablet Take 2 tablets by mouth daily       ferrous sulfate (FEROSUL) 325 (65 Fe) MG tablet Take 1 tablet (325 mg) by mouth daily (with breakfast)       ibuprofen (ADVIL/MOTRIN) 200 MG tablet Take 200-800 mg by mouth 2 times daily        metoprolol succinate ER (TOPROL-XL) 50 MG 24 hr tablet Take 1 tablet (50 mg) by mouth daily 90 tablet 3     ondansetron (ZOFRAN) 4 MG tablet Take 4 mg by mouth every 6 hours as needed        pantoprazole (PROTONIX) 40 MG EC tablet Take 1 tablet (40 mg) by mouth 2 times daily 180 tablet 3     propylthiouracil (PTU) 50 MG tablet Take two tablets in the morning and 1 tablet in the evening. 270 tablet 3     vitamin D2 (ERGOCALCIFEROL) 73473 units (1250 mcg) capsule Take 1 capsule (50,000 Units) by mouth once a week Saturdays 13 capsule 3     ALPRAZolam (XANAX) 0.25 MG tablet Take 1 tablet (0.25 mg) by mouth daily as needed for anxiety 12 tablet 0     CARAFATE 1 GM/10ML suspension Take 10 mLs (1 g) by mouth 4 times daily as needed (reflux) 414 mL 3     propylthiouracil (PTU) 50 MG tablet One tablet twice daily 180 tablet 1       Allergies, family, and social history were reviewed and documented as needed in EHR.     REVIEW OF SYSTEMS  A complete 10-point ROS was performed and pertinent positives and negatives are noted in the HPI.    PHYSICAL EXAM  /70   Pulse 84   Wt 54.7 kg (120 lb 9.6 oz)   BMI 20.70 kg/m    Body mass index is 20.7 kg/m .  Constitutional: Vital signs reviewed, as recorded above. Patient is alert, oriented and appears in no acute distress.  Eyes: PER, EOMI, no stare, lid lag, or  retraction; no conjunctival injection.  Neck: Neck supple, no palpable thyromegaly, mild tenderness on exam.  Lymphatic: No cervical or supraclavicular LAD.  Cardiovascular: RRR, normal S1/S2, no audible murmurs, rubs or gallops; no LE edema.  Respiratory: CTAB, without wheezes, crackles or rhonchi; normal chest wall motion and respiratory effort.  GI: Positive bowel sounds, soft, NT/ND.  MSK: No clubbing or cyanosis; normal muscle bulk and tone.  Skin: Normal skin color, temperature, turgor and texture.  Neurological: Alert and oriented times 3. No tremor.    DATA REVIEW  Each of the following laboratory and/or imaging studies were reviewed.      Component      Latest Ref Rng & Units 7/26/2022 8/25/2022 10/31/2022 11/18/2022   Protein Total      6.4 - 8.3 g/dL    6.9   Albumin      3.5 - 5.2 g/dL    4.3   Bilirubin Total      <=1.2 mg/dL    0.2   Alkaline Phosphatase      35 - 104 U/L    52   AST      10 - 35 U/L    21   ALT      10 - 35 U/L    11   Bilirubin Direct      0.00 - 0.30 mg/dL    <0.20   TSH      0.30 - 4.20 uIU/mL 0.01 (L) 0.01 (L) 0.01 (L)    T4 Free      0.90 - 1.70 ng/dL 2.65 (H) 2.17 (H) 2.36 (H)    Thyroglobulin Antibody      <40 IU/mL  <20     Thyroid Peroxidase Antibody      <35 IU/mL  <10       EXAM: NM THYROID UPTAKE AND SCAN  LOCATION: [facility]  DATE/TIME: 10/12/2021 2:22 PM     INDICATION: Clinical and/or laboratory evidence of hyperthyroidism.  COMPARISON: None available at time of dictation.  TECHNIQUE: 170 uCi I-123, oral ingestion. 24-hour neck uptake and imaging.     FINDINGS: 24-hour uptake: 7.9% (Normal range: 10-30%).     Minimal uptake within the thyroid parenchyma, which in the setting of hyperthyroidism suggests subacute thyroiditis.       ASSESSMENT  1.  Hyperthyroidism.  Radioiodine uptake and scan showed relatively low uptake over the thyroid, with initial suspicion for thyroiditis.  However, hyperthyroidism has persisted and evolution of her thyroid  function tests is not consistent with a subacute or painless thyroiditis.  She does have family history of hypothyroidism so autoimmune thyroid disease could be a cause (we will check TSI and TSHRAb, also check thyroid ultrasound to see if the gland appears heterogenous); perhaps iodine uptake was blunted by medications/supplements (the patient did not receive IV contrast prior to study).  Another possibility is struma ovarii: Would therefore repeat radioiodine uptake and scan and request that both thyroid and pelvis be scanned for uptake.  With regards to treatment, identifying cause of hyperthyroidism will be helpful in determining our long-term plans for treatment.  There has been question of intolerance to methimazole and PTU, although itching can be associated with hyperthyroidism and may not be fully attributable to these medications.  I have asked her to see if her itching improves when she holds PTU in anticipation of radioiodine uptake and scan.  We can then decide whether to consider a rechallenge with methimazole to see if controlling hyperthyroidism helps with itching also.  I have reservations about continuing PTU indefinitely given potential hepatotoxicity, albeit rare.  If she is unable to tolerate antithyroid drugs, would give consideration to radioiodine therapy or surgery depending on the cause of hyperthyroidism.  We discussed potential causes of hyperthyroidism, proposed testing, as well as treatment options; we discussed precautions to take while on PTU and potential side effects of the medication.  We also discussed potential adverse effects of untreated hyperthyroidism, including risk for atrial fibrillation (with consequent risk for stroke), heart failure and worsening osteoporosis.    2.  Osteoporosis.  Managed by PCP, therefore deferred.  We can consult on osteoporosis if questions come up.    PLAN  -Labs today  -For now, continue  mg (two 50 mg tabs) every morning  -Schedule  radioiodine uptake and scan--hold PTU for 5 days before scan and monitor if itching changes (can resume PTU after scan)  -Schedule thyroid ultrasound  -Based on results, we will decide on next best steps  -Return for a follow-up visit earliest available  -We will communicate results via [employer], or if needed by phone      Orders Placed This Encounter   Procedures     NM Thyroid Uptake and Scan     US Thyroid     TSH     T4 free     T3 total     Thyroid stimulating immunoglobulin     Thyrotropin Receptor Antibody     Comprehensive metabolic panel     CBC with Platelets & Differential         I spent a total of 63 minutes on the date of encounter reviewing medical records, evaluating the patient, coordinating care and documenting in the EHR, as detailed above.      Isaias Andrade MD   Division of Diabetes, Endocrinology and Metabolism  Department of Medicine      cc: Marimar Henriquez NP; Jame Dunn MD

## 2022-12-10 LAB
ALBUMIN SERPL BCG-MCNC: 4.4 G/DL (ref 3.5–5.2)
ALP SERPL-CCNC: 56 U/L (ref 35–104)
ALT SERPL W P-5'-P-CCNC: 11 U/L (ref 10–35)
ANION GAP SERPL CALCULATED.3IONS-SCNC: 17 MMOL/L (ref 7–15)
AST SERPL W P-5'-P-CCNC: 21 U/L (ref 10–35)
BILIRUB SERPL-MCNC: 0.2 MG/DL
BUN SERPL-MCNC: 12.6 MG/DL (ref 8–23)
CALCIUM SERPL-MCNC: 9.3 MG/DL (ref 8.8–10.2)
CHLORIDE SERPL-SCNC: 110 MMOL/L (ref 98–107)
CREAT SERPL-MCNC: 0.58 MG/DL (ref 0.51–0.95)
DEPRECATED HCO3 PLAS-SCNC: 16 MMOL/L (ref 22–29)
GFR SERPL CREATININE-BSD FRML MDRD: >90 ML/MIN/1.73M2
GLUCOSE SERPL-MCNC: 89 MG/DL (ref 70–99)
POTASSIUM SERPL-SCNC: 4.8 MMOL/L (ref 3.4–5.3)
PROT SERPL-MCNC: 7 G/DL (ref 6.4–8.3)
SODIUM SERPL-SCNC: 143 MMOL/L (ref 136–145)
T4 FREE SERPL-MCNC: 2.84 NG/DL (ref 0.9–1.7)
TSH RECEP AB SER-ACNC: <1.1 IU/L (ref 0–1.75)
TSH SERPL DL<=0.005 MIU/L-ACNC: <0.01 UIU/ML (ref 0.3–4.2)

## 2022-12-13 LAB — TSI SER-ACNC: <1 TSI INDEX

## 2022-12-22 DIAGNOSIS — E05.90 HYPERTHYROIDISM: Primary | ICD-10-CM

## 2023-01-01 NOTE — OP NOTE
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  She recently underwent repeat irrigation debridement of the wound extending up into her calf.  She was admitted but was discharged home on oral antibiotics and has had recurrence of drainage and purulence from her wound.  Unfortunately she also tested positive for COVID on her preop testing.  Appropriate precautions were taken throughout.  We have on multiple occasions discussed different treatment options strategies including amputation versus ankle fusion etc.  At this point continue with irrigation debridement as she is not systemically unwell.  She understands that there is significant risk for neurovascular structures with repeat I&D because of the extensive fibrotic scar tissue through her entire posterior compartment.  All of her questions were answered and patient was in agreement with the plan.    Procedure: After being informed of the risks, benefits, and alternatives to the procedure, the patient desired to proceed and was brought to the operating suite where the patient was placed under moderate sedation appeared she did receive a preoperative nerve block for postoperative pain control.  She was positioned prone with adequate padding of all bony prominences.  A tourniquet was applied to the left upper thigh.  The left leg was then prepped and draped in the usual sterile  fashion.  A timeout was called.  I began by elevating the left leg for approximately 5 minutes.  Tourniquet was inflated to 250 mmHg.  I began by debriding the skin edges and reopening the previous wound.  There are 2 areas of dehiscence 1 at the musculotendinous junction of her gastroc and 1 more distally just proximal to the Achilles insertion.  The skin edges were debrided with a 15 blade back to what appeared to be more healthy-appearing tissue.  There is significant scarring in different sections throughout.  The entire underlying tissue was extensively debrided with a curette as well as a rongeur back to what appeared to be reasonably healthy muscle and subcutaneous tissue.  There was 1 small area that seem to develop into more of a pocket but no devang abscess.  This was opened and debrided.  Care was taken to try and observe for the neurovascular bundle but again given the extensive scarring was very difficult to identify any sort of anatomic landmarks.  The wound was irrigated copiously with 9 L of normal saline.  We did have a wound VAC ready but I was able to really approximate the skin edges with a combination of 2-0 PDS and 3-0 Prolene.  Prior to closure I did place 1 g of vancomycin powder into the wound.  A sterile dressing was applied.     She was transferred directly to her room because of her COVID status.  She has a cam boot which will be reapplied in her room.  We will consult with infectious disease with regards to antibiotic management.  Carie Wiley MD    Date: 1/12/2022  Time: 4:27 PM      * No implants in log *     (1) body pink, extremities blue

## 2023-01-05 ENCOUNTER — OFFICE VISIT (OUTPATIENT)
Dept: INTERNAL MEDICINE | Facility: CLINIC | Age: 63
End: 2023-01-05
Payer: COMMERCIAL

## 2023-01-05 VITALS
HEART RATE: 73 BPM | TEMPERATURE: 98 F | SYSTOLIC BLOOD PRESSURE: 116 MMHG | OXYGEN SATURATION: 100 % | RESPIRATION RATE: 16 BRPM | DIASTOLIC BLOOD PRESSURE: 76 MMHG

## 2023-01-05 DIAGNOSIS — Z12.31 VISIT FOR SCREENING MAMMOGRAM: ICD-10-CM

## 2023-01-05 DIAGNOSIS — I10 BENIGN ESSENTIAL HYPERTENSION: ICD-10-CM

## 2023-01-05 DIAGNOSIS — Z12.4 CERVICAL CANCER SCREENING: ICD-10-CM

## 2023-01-05 DIAGNOSIS — Z12.11 SCREENING FOR COLON CANCER: Primary | ICD-10-CM

## 2023-01-05 DIAGNOSIS — Z12.11 COLON CANCER SCREENING: ICD-10-CM

## 2023-01-05 DIAGNOSIS — Z12.11 SCREEN FOR COLON CANCER: ICD-10-CM

## 2023-01-05 DIAGNOSIS — J02.9 SORE THROAT: ICD-10-CM

## 2023-01-05 DIAGNOSIS — R05.9 COUGH, UNSPECIFIED TYPE: ICD-10-CM

## 2023-01-05 LAB
DEPRECATED S PYO AG THROAT QL EIA: NEGATIVE
FLUAV AG SPEC QL IA: NEGATIVE
FLUBV AG SPEC QL IA: NEGATIVE
GROUP A STREP BY PCR: NOT DETECTED
SARS-COV-2 RNA RESP QL NAA+PROBE: NEGATIVE

## 2023-01-05 PROCEDURE — 99213 OFFICE O/P EST LOW 20 MIN: CPT | Mod: CS | Performed by: NURSE PRACTITIONER

## 2023-01-05 PROCEDURE — 87651 STREP A DNA AMP PROBE: CPT | Performed by: NURSE PRACTITIONER

## 2023-01-05 PROCEDURE — U0005 INFEC AGEN DETEC AMPLI PROBE: HCPCS | Performed by: NURSE PRACTITIONER

## 2023-01-05 PROCEDURE — U0003 INFECTIOUS AGENT DETECTION BY NUCLEIC ACID (DNA OR RNA); SEVERE ACUTE RESPIRATORY SYNDROME CORONAVIRUS 2 (SARS-COV-2) (CORONAVIRUS DISEASE [COVID-19]), AMPLIFIED PROBE TECHNIQUE, MAKING USE OF HIGH THROUGHPUT TECHNOLOGIES AS DESCRIBED BY CMS-2020-01-R: HCPCS | Performed by: NURSE PRACTITIONER

## 2023-01-05 PROCEDURE — 87804 INFLUENZA ASSAY W/OPTIC: CPT | Performed by: NURSE PRACTITIONER

## 2023-01-05 ASSESSMENT — ENCOUNTER SYMPTOMS
SORE THROAT: 1
COUGH: 1

## 2023-01-05 NOTE — PROGRESS NOTES
Assessment & Plan     Sore throat  Suspect viral upper respiratory infection.  Supportive measures discussed  - Streptococcus A Rapid Screen w/Reflex to PCR - Clinic Collect    Cough, unspecified type  - Influenza A & B Antigen - Clinic Collect  - Symptomatic COVID-19 Virus (Coronavirus) by PCR Nose    Benign essential hypertension  Controlled on metoprolol    Screen for colon cancer  We talked about ordering a colonoscopy but she prefers Cologuard    Visit for screening mammogram  Due for screening mammogram  - MA SCREENING DIGITAL BILAT - Future  (s+30); Future    Patient Instructions   Swab for influenza, strep throat, and COVID-19 today.    Blood pressure and other vital signs look good.    Physical exam is reassuring.    Tylenol for aches and pains, stay well-hydrated.  Menthol fever cough lozenges.  Warm decaffeinated tea with honey.    Cologuard test ordered.    Mammogram ordered.      Ordering of each unique test  15 minutes spent on the date of the encounter doing chart review, history and exam, documentation and further activities per the note      Return in about 6 months (around 7/5/2023).    Juventino Han [facility]    Carlitos Castillo is a 62 year old, presenting for the following health issues:    Cough (Started 12/12/22 and improved. New years emilia it became worse. ) and Pharyngitis      Cough  Associated symptoms include sore throat.   Pharyngitis   Associated symptoms include cough.   History of Present Illness       Reason for visit:  Cough, achy, low gradw fever,  Symptom onset:  3-7 days ago  Symptoms include:  Cough, achy, low grade fever on and off  Symptom intensity:  Moderate  Had these symptoms before:  No  What makes it better:  Ibuprofen    She eats 2-3 servings of fruits and vegetables daily.She consumes 0 sweetened beverage(s) daily.She exercises with enough effort to increase her heart rate 10 to 19 minutes per day.  She exercises with  enough effort to increase her heart rate 3 or less days per week.   She is taking medications regularly.         Review of Systems   HENT: Positive for sore throat.    Respiratory: Positive for cough.             Objective    /76 (BP Location: Right arm, Patient Position: Sitting, Cuff Size: Adult Regular)   Pulse 73   Temp 98  F (36.7  C) (Oral)   Resp 16   SpO2 100%   There is no height or weight on file to calculate BMI.  Physical Exam   Lungs clear to auscultation bilaterally.  Pharynx is mildly erythematous but no exudate.  No cervical adenopathy appreciated.

## 2023-01-05 NOTE — PATIENT INSTRUCTIONS
Swab for influenza, strep throat, and COVID-19 today.    Blood pressure and other vital signs look good.    Physical exam is reassuring.    Tylenol for aches and pains, stay well-hydrated.  Menthol fever cough lozenges.  Warm decaffeinated tea with honey.    Cologuard test ordered.    Mammogram ordered.

## 2023-01-19 ENCOUNTER — LAB (OUTPATIENT)
Dept: INTERNAL MEDICINE | Facility: CLINIC | Age: 63
End: 2023-01-19
Payer: COMMERCIAL

## 2023-01-19 DIAGNOSIS — Z12.11 SCREENING FOR COLON CANCER: ICD-10-CM

## 2023-01-26 ENCOUNTER — ANCILLARY PROCEDURE (OUTPATIENT)
Dept: GENERAL RADIOLOGY | Facility: CLINIC | Age: 63
End: 2023-01-26
Attending: INTERNAL MEDICINE
Payer: COMMERCIAL

## 2023-01-26 ENCOUNTER — OFFICE VISIT (OUTPATIENT)
Dept: INTERNAL MEDICINE | Facility: CLINIC | Age: 63
End: 2023-01-26
Payer: COMMERCIAL

## 2023-01-26 VITALS — RESPIRATION RATE: 16 BRPM | SYSTOLIC BLOOD PRESSURE: 138 MMHG | DIASTOLIC BLOOD PRESSURE: 70 MMHG | TEMPERATURE: 98.1 F

## 2023-01-26 DIAGNOSIS — K21.00 GASTROESOPHAGEAL REFLUX DISEASE WITH ESOPHAGITIS WITHOUT HEMORRHAGE: ICD-10-CM

## 2023-01-26 DIAGNOSIS — M54.9 UPPER BACK PAIN: ICD-10-CM

## 2023-01-26 DIAGNOSIS — M81.0 OSTEOPOROSIS OF MULTIPLE SITES WITHOUT PATHOLOGICAL FRACTURE: Primary | ICD-10-CM

## 2023-01-26 DIAGNOSIS — T88.7XXS UNSPECIFIED ADVERSE EFFECT OF DRUG OR MEDICAMENT, SEQUELA (CODE): ICD-10-CM

## 2023-01-26 DIAGNOSIS — E05.90 HYPERTHYROIDISM: ICD-10-CM

## 2023-01-26 PROBLEM — M65.10 ACHILLES TENDON INFECTION: Status: RESOLVED | Noted: 2020-09-03 | Resolved: 2023-01-26

## 2023-01-26 PROCEDURE — 96372 THER/PROPH/DIAG INJ SC/IM: CPT | Performed by: INTERNAL MEDICINE

## 2023-01-26 PROCEDURE — 99214 OFFICE O/P EST MOD 30 MIN: CPT | Mod: 25 | Performed by: INTERNAL MEDICINE

## 2023-01-26 PROCEDURE — 72070 X-RAY EXAM THORAC SPINE 2VWS: CPT | Mod: TC | Performed by: RADIOLOGY

## 2023-01-26 RX ORDER — SUCRALFATE 1 G/1
1 TABLET ORAL 4 TIMES DAILY
Qty: 120 TABLET | Refills: 11 | Status: SHIPPED | OUTPATIENT
Start: 2023-01-26 | End: 2023-01-29

## 2023-01-26 NOTE — PROGRESS NOTES
Assessment & Plan     Osteoporosis of multiple sites without pathological fracture  She is receiving Prolia to treat osteoporosis.  She had her fifth dose in July 2022 and is due for her sixth injection.  Her DEXA last year did confirm good response to treatment.  Getting adequate calcium has been emphasized.  We will place order for her next Prolia injection in case prior authorization needs to be obtained.    Gastroesophageal reflux disease with esophagitis without hemorrhage  Despite taking Protonix 40 mg twice daily, she is describing what sounds like worsening reflux symptoms.  She begins violently coughing during meals.  We will arrange for esophagram with UGI.  Consider GI referral.  - sucralfate (CARAFATE) 1 GM tablet; Take 1 tablet (1 g) by mouth 4 times daily  - XR Esophagram w Upper GI; Future    Upper back pain  Worsening back pain associated with coughing.  There is some tenderness with palpation over her thoracic spine and with her history of osteoporosis, she is at risk for vertebral compression fracture.  We will obtain thoracic spine x-ray.  - XR Esophagram w Upper GI; Future  - XR Thoracic Spine 2 Views; Future    Hyperthyroidism  She has been evaluated by endocrinology.  Suppressed TSH and elevated T4 consistent with hyperthyroidism although thyroid scan with low uptake.  Plans are to repeat nuclear scan and include views of pelvis to look for struma ovarii                   Return in about 4 months (around 5/26/2023) for Routine preventive.    Jame Dunn MD  [facility]   Anna is a 62 year old, presenting for the following health issues: Discussed osteoporosis, GERD and upper back pain during today's visit.  See assessment plan for details.  Follow Up (Prolia shot)      History of Present Illness       Reason for visit:  Cough....prolia    She eats 2-3 servings of fruits and vegetables daily.She consumes 0 sweetened beverage(s)  daily.She exercises with enough effort to increase her heart rate 20 to 29 minutes per day.  She exercises with enough effort to increase her heart rate 7 days per week.   She is taking medications regularly.         Review of Systems   She has chronic nausea.  No melena.      Objective    /70 (BP Location: Right arm, Patient Position: Sitting, Cuff Size: Adult Regular)   Temp 98.1  F (36.7  C) (Oral)   Resp 16   There is no height or weight on file to calculate BMI.  Physical Exam   Some tenderness with palpation over mid thoracic spine  Lungs clear bilaterally

## 2023-01-27 NOTE — PROCEDURES
"Prolia Injection Phone Screen      Screening questions have been asked 2-3 days prior to administration visit for Prolia. If any questions are answered with \"Yes,\" this phone encounter were will routed to ordering provider for further evaluation.     1.  When was the last injection? 7-26-22    2.  Has insurance for this injection been verified?  Yes    3.  Did you experience any new onset achiness or rashes that lasted for over a month with your previous Prolia injection?   No    4.  Do you have a fever over 101?F or a new deep cough that is unusual for you today? No    5.  Have you started any new medications in the last 6 months that you were told could affect your immune system? These may have been prescribed by oncologist, transplant, rheumatology, or dermatology.   No    6.  In the last 6 months have you have gastric bypass or parathyroid surgery?   No    7.  Do you plan dental work requiring drilling into the bone such as implants/extractions or oral surgery in the next 2-3 months?   No    8. Do you have new insurance since the last injection?    9. Have you received the Covid-19 vaccine? No  If No - Proceed with Prolia injection  If Yes - Date of vaccination . Will need to wait until 2 weeks after 2nd dose of Covid-19 vaccine before administering Prolia       Patient informed if symptoms discussed above present prior to their administration appointment, they are to notify clinic immediately.     Bertha Falcon            "

## 2023-01-28 DIAGNOSIS — K21.00 GASTROESOPHAGEAL REFLUX DISEASE WITH ESOPHAGITIS WITHOUT HEMORRHAGE: ICD-10-CM

## 2023-01-29 RX ORDER — SUCRALFATE 1 G/1
TABLET ORAL
Qty: 360 TABLET | Refills: 3 | Status: SHIPPED | OUTPATIENT
Start: 2023-01-29

## 2023-01-31 ENCOUNTER — HOSPITAL ENCOUNTER (OUTPATIENT)
Dept: RADIOLOGY | Facility: CLINIC | Age: 63
Discharge: HOME | End: 2023-01-31
Attending: INTERNAL MEDICINE
Payer: COMMERCIAL

## 2023-01-31 ENCOUNTER — HOSPITAL ENCOUNTER (OUTPATIENT)
Dept: ULTRASOUND IMAGING | Facility: CLINIC | Age: 63
Discharge: HOME | End: 2023-01-31
Attending: INTERNAL MEDICINE
Payer: COMMERCIAL

## 2023-01-31 DIAGNOSIS — K21.00 GASTROESOPHAGEAL REFLUX DISEASE WITH ESOPHAGITIS WITHOUT HEMORRHAGE: ICD-10-CM

## 2023-01-31 DIAGNOSIS — M54.9 UPPER BACK PAIN: ICD-10-CM

## 2023-01-31 DIAGNOSIS — E05.90 HYPERTHYROIDISM: ICD-10-CM

## 2023-01-31 PROCEDURE — 74246 X-RAY XM UPR GI TRC 2CNTRST: CPT

## 2023-01-31 PROCEDURE — 76536 US EXAM OF HEAD AND NECK: CPT

## 2023-01-31 PROCEDURE — 255N000001 HC RX 255: Performed by: INTERNAL MEDICINE

## 2023-01-31 RX ADMIN — ANTACID/ANTIFLATULENT 4 G: 380; 550; 10; 10 GRANULE, EFFERVESCENT ORAL at 10:15

## 2023-02-07 ENCOUNTER — MYC MEDICAL ADVICE (OUTPATIENT)
Dept: ENDOCRINOLOGY | Facility: CLINIC | Age: 63
End: 2023-02-07
Payer: COMMERCIAL

## 2023-03-02 ENCOUNTER — OFFICE VISIT (OUTPATIENT)
Dept: FAMILY MEDICINE | Facility: CLINIC | Age: 63
End: 2023-03-02
Payer: COMMERCIAL

## 2023-03-02 VITALS
DIASTOLIC BLOOD PRESSURE: 84 MMHG | TEMPERATURE: 98.3 F | RESPIRATION RATE: 16 BRPM | HEART RATE: 88 BPM | OXYGEN SATURATION: 100 % | SYSTOLIC BLOOD PRESSURE: 130 MMHG

## 2023-03-02 DIAGNOSIS — H69.91 DYSFUNCTION OF RIGHT EUSTACHIAN TUBE: Primary | ICD-10-CM

## 2023-03-02 PROCEDURE — 99213 OFFICE O/P EST LOW 20 MIN: CPT | Performed by: PHYSICIAN ASSISTANT

## 2023-03-02 RX ORDER — FLUTICASONE PROPIONATE 50 MCG
1 SPRAY, SUSPENSION (ML) NASAL DAILY
Qty: 9.9 ML | Refills: 0 | Status: SHIPPED | OUTPATIENT
Start: 2023-03-02 | End: 2023-04-01

## 2023-03-02 RX ORDER — CETIRIZINE HYDROCHLORIDE 10 MG/1
10 TABLET ORAL DAILY
Qty: 30 TABLET | Refills: 0 | Status: SHIPPED | OUTPATIENT
Start: 2023-03-02 | End: 2023-04-01

## 2023-03-02 NOTE — PATIENT INSTRUCTIONS
Use of over-the-counter Zyrtec.  Follow packaging directions  Use of over-the-counter Flonase  Frequent swallowing drinking and chewing gum to help create low-grade suction on the eustachian tube.  Elevate head at nighttime so it does not increase pressure  Use of over-the-counter Tylenol as needed for comfort.  Return to primary care provider for reevaluation treatment if any complication or new symptoms should present.        Patient Education     Earache, No Infection (Adult)  Earaches can happen without an infection. This occurs when air and fluid build up behind the eardrum causing a feeling of fullness and discomfort and reduced hearing. This is called otitis media with effusion (OME) or serous otitis media. It means there is fluid in the middle ear. It is not the same as acute otitis media, which is typically from infection.  OME can happen when you have a cold if congestion blocks the passage that drains the middle ear. This passage is called the eustachian tube. OME may also occur with nasal allergies or after a bacterial middle ear infection.    The pain or discomfort may come and go. You may hear clicking or popping sounds when you chew or swallow. You may feel that your balance is off. Or you may hear ringing in the ear.  It often takes from several weeks up to 3 months for the fluid to clear on its own. Oral pain relievers and ear drops help if there is pain. Decongestants and antihistamines sometimes help. Antibiotics don't help since there is no infection. Your doctor may prescribe a nasal spray to help reduce swelling in the nose and eustachian tube. This can allow the ear to drain.  If your OME doesn't improve after 3 months, surgery may be used to drain the fluid and insert a small tube in the eardrum to allow continued drainage.  Because the middle ear fluid can become infected, it is important to watch for signs of an ear infection which may develop later. These signs include increased ear pain,  fever, or drainage from the ear.  Home care  The following guidelines will help you care for yourself at home:  You may use over-the-counter medicine as directed to control pain, unless another medicine was prescribed. If you have chronic liver or kidney disease or ever had a stomach ulcer or GI bleeding, talk with your doctor before using these medicines. Aspirin should never be used in anyone under 18 years of age who is ill with a fever. It may cause severe liver damage.  You may use over-the-counter decongestants such as phenylephrine or pseudoephedrine. But they are not always helpful. Don't use nasal spray decongestants more than 3 days. Longer use can make congestion worse. Prescription nasal sprays from your doctor don't typically have those restrictions.  Antihistamines may help if you are also having allergy symptoms.  You may use medicines such as guaifenesin to thin mucus and promote drainage.  Follow-up care  Follow up with your healthcare provider or as advised if you are not feeling better after 3 days.  When to seek medical advice  Call your healthcare provider right away if any of the following occur:  Your ear pain gets worse or does not start to improve   Fever of 100.4 F (38 C) or higher, or as directed by your healthcare provider  Fluid or blood draining from the ear  Headache or sinus pain  Stiff neck  Unusual drowsiness or confusion  Date Last Reviewed: 10/1/2016    4681-2756 [employer]. [address] [place] [address]. All rights reserved. This information is not intended as a substitute for professional medical care. Always follow your healthcare professional's instructions.

## 2023-03-02 NOTE — PROGRESS NOTES
Patient presents with:  Pharyngitis: Sore throat and neck, earache right ear       Clinical Decision Making:  Patient is treated for eustachian tube dysfunction on the right ear. Expected course of resolution and indication for return was gone over and questions were answered to patient/parent's satisfaction before discharge.        ICD-10-CM    1. Dysfunction of right eustachian tube  H69.81 cetirizine (ZYRTEC) 10 MG tablet     fluticasone (FLONASE) 50 MCG/ACT nasal spray          Patient Instructions   Use of over-the-counter Zyrtec.  Follow packaging directions  Use of over-the-counter Flonase  Frequent swallowing drinking and chewing gum to help create low-grade suction on the eustachian tube.  Elevate head at nighttime so it does not increase pressure  Use of over-the-counter Tylenol as needed for comfort.  Return to primary care provider for reevaluation treatment if any complication or new symptoms should present.        Patient Education     Earache, No Infection (Adult)  Earaches can happen without an infection. This occurs when air and fluid build up behind the eardrum causing a feeling of fullness and discomfort and reduced hearing. This is called otitis media with effusion (OME) or serous otitis media. It means there is fluid in the middle ear. It is not the same as acute otitis media, which is typically from infection.  OME can happen when you have a cold if congestion blocks the passage that drains the middle ear. This passage is called the eustachian tube. OME may also occur with nasal allergies or after a bacterial middle ear infection.    The pain or discomfort may come and go. You may hear clicking or popping sounds when you chew or swallow. You may feel that your balance is off. Or you may hear ringing in the ear.  It often takes from several weeks up to 3 months for the fluid to clear on its own. Oral pain relievers and ear drops help if there is pain. Decongestants and antihistamines sometimes  help. Antibiotics don't help since there is no infection. Your doctor may prescribe a nasal spray to help reduce swelling in the nose and eustachian tube. This can allow the ear to drain.  If your OME doesn't improve after 3 months, surgery may be used to drain the fluid and insert a small tube in the eardrum to allow continued drainage.  Because the middle ear fluid can become infected, it is important to watch for signs of an ear infection which may develop later. These signs include increased ear pain, fever, or drainage from the ear.  Home care  The following guidelines will help you care for yourself at home:    You may use over-the-counter medicine as directed to control pain, unless another medicine was prescribed. If you have chronic liver or kidney disease or ever had a stomach ulcer or GI bleeding, talk with your doctor before using these medicines. Aspirin should never be used in anyone under 18 years of age who is ill with a fever. It may cause severe liver damage.    You may use over-the-counter decongestants such as phenylephrine or pseudoephedrine. But they are not always helpful. Don't use nasal spray decongestants more than 3 days. Longer use can make congestion worse. Prescription nasal sprays from your doctor don't typically have those restrictions.    Antihistamines may help if you are also having allergy symptoms.    You may use medicines such as guaifenesin to thin mucus and promote drainage.  Follow-up care  Follow up with your healthcare provider or as advised if you are not feeling better after 3 days.  When to seek medical advice  Call your healthcare provider right away if any of the following occur:    Your ear pain gets worse or does not start to improve     Fever of 100.4 F (38 C) or higher, or as directed by your healthcare provider    Fluid or blood draining from the ear    Headache or sinus pain    Stiff neck    Unusual drowsiness or confusion  Date Last Reviewed: 10/1/2016     [id number] [email or web address]. [address] [place] [address], [place] [address]. All rights reserved. This information is not intended as a substitute for professional medical care. Always follow your healthcare professional's instructions.               HPI:  Anna Sanderson is a 62 year old female who presents today for sore throat with drainage down the posterior pharyngeal wall and radiation of pain from the right ear to the right lateral aspect of the neck.  She does hear popping and clicking when she chews and swallows and has increased pain when she lays down at nighttime.  Slight muffled hearing on the right ear.  Pain is made worse when she lays down at nighttime.  At this time she is not having involvement of the left ear no reported fever chills night sweats fatigue    History obtained from chart review and the patient.    Problem List:  2023-01: Hyperthyroidism  2022-02: Osteoporosis of multiple sites without pathological fracture  2021-11: SOB (shortness of breath)  2021-11: Palpitations  2021-11: Palpitations  2021-10: Subacute thyroiditis  2021-06: PAD (peripheral artery disease) (H)  2021-06: Benign essential hypertension  2021-06: Gastroesophageal reflux disease without esophagitis  2021-06: Subclinical hyperthyroidism  2021-06: Abscess of left lower leg  2021-05: Abnormal TSH  2021-04: MTHFR gene mutation (H) Heterozygoyus April 2021, [facility]  2021-03: Microscopic hematuria  2021-02: Abnormal CT of the chest  2020-10: Non-healing surgical wound, subsequent encounter  2020-09: Achilles tendon infection  2020-09: Allergic rhinitis  2020-09: Atopic dermatitis, unspecified type  2020-09: Gastroesophageal reflux disease with esophagitis  2020-09: Osteoporosis of multiple sites without pathological fracture  2020-09: Skin ulcer of left ankle with necrosis of muscle (H)  2020-03: Chronic cough  2020-01: Vitamin D deficiency  2019-11: Abdominal pain, epigastric  2019-08: SIRS (systemic  inflammatory response syndrome) (H)  2019-07: Achilles tendinitis  2018-11: Nonhealing surgical wound  2018-04: Deep vein thrombosis (DVT) of brachial vein of left upper   extremity (H)  2015-12: Iron deficiency anemia      Past Medical History:   Diagnosis Date     Abdominal pain, epigastric 11/05/2019     Abnormal CT of the chest 02/01/2021     Abnormal TSH     TSH suppressed 0.18 and normal free T4, recheck in 3 months     Abscess of left lower leg 06/07/2021     Achilles tendinitis 07/26/2019     Achilles tendon infection      Allergic rhinitis 09/03/2020     Chronic cough 03/23/2020    CT chest with mild bronchiectasis and small area of consolidation     Deep vein thrombosis (DVT) of brachial vein of left upper extremity (H) 04/17/2018    Associated with PICC line, follow-up ultrasound showing resolution of thrombus     Dermatitis      Family history of myocardial infarction     dad 35  mother 70     Fatigue 02/24/2017     GERD (gastroesophageal reflux disease)     EGD with esophagitis 2015, experiencing chest pain and dysphagia     History of anesthesia complications      Hypertension      Hyperthyroidism 1/26/2023     Impaired fasting glucose     Glucose 119 February 2021.  A1c 5.3%     Iron deficiency anemia 12/18/2015    Has required iron infusions     Kidney stones      Microscopic hematuria 03/07/2021    Longstanding finding     Motion sickness      MRSA infection 08/2020    Left leg wound     Open wound of foot, complicated     Achilles tendon rupture complicated by infected open wound with multiple surgeries including skin grafting     Osteoporosis of multiple sites without pathological fracture     DEXA with T score -2.4 L1 L4, -3.2 left femoral neck and -2.6 right femoral neck.  DEXA February 2022 with significant improvement -2.6 left femoral neck and -2.3 right femoral neck     Ovarian cyst      Palpitations 11/04/2021    Event monitor showing sinus tachycardia no arrhythmia November 2021     PONV  (postoperative nausea and vomiting)      SIRS (systemic inflammatory response syndrome) (H) 2019     SOB (shortness of breath) 2021     Subacute thyroiditis 10/15/2021    TSH less than 0.1 and abnormal thyroid uptake scan 2021, asymptomatic     Vitamin D deficiency 2020       Social History     Tobacco Use     Smoking status: Former     Types: Cigarettes     Quit date: 3/14/2003     Years since quittin.9     Smokeless tobacco: Never     Tobacco comments:     Quit    Substance Use Topics     Alcohol use: No     Comment: Alcoholic Drinks/day: rare       Review of Systems  As above in HPI otherwise negative.    Vitals:    23 1203   BP: 130/84   Pulse: 88   Resp: 16   Temp: 98.3  F (36.8  C)   TempSrc: Oral   SpO2: 100%       General: Patient is resting comfortably no acute distress is afebrile  HEENT: Head is normocephalic atraumatic   eyes are PERRL EOMI sclera anicteric   TMs on the right is with fluid in the middle ear dull cone of light flex but no effusion.  No erythema.  TM on the left is clear  Throat is with mild pharyngeal wall drainage no erythema  There is pain to palpation along the course of the eustachian tube on the right lateral neck.  No cervical lymphadenopathy present  LUNGS: Clear to auscultation bilaterally  HEART: Regular rate and rhythm  Skin: Without rash non-diaphoretic    Physical Exam    At the end of the encounter, I discussed results, diagnosis, medications. Discussed red flags for immediate return to clinic/ER, as well as indications for follow up if no improvement. Patient understood and agreed to plan. Patient was stable for discharge.

## 2023-03-05 ENCOUNTER — MYC REFILL (OUTPATIENT)
Dept: INTERNAL MEDICINE | Facility: CLINIC | Age: 63
End: 2023-03-05
Payer: COMMERCIAL

## 2023-03-05 DIAGNOSIS — R11.0 NAUSEA: Primary | ICD-10-CM

## 2023-03-07 RX ORDER — ONDANSETRON 4 MG/1
4 TABLET, FILM COATED ORAL EVERY 6 HOURS PRN
Qty: 30 TABLET | Refills: 0 | Status: SHIPPED | OUTPATIENT
Start: 2023-03-07 | End: 2023-09-06

## 2023-03-07 NOTE — TELEPHONE ENCOUNTER
"Routing refill request to provider for review/approval because:  Medication is reported/historical    Last Written Prescription Date:  6/7/2021 (recorded)  Last office visit provider:  1/26/2023     Requested Prescriptions   Pending Prescriptions Disp Refills     ondansetron (ZOFRAN) 4 MG tablet       Sig: Take 1 tablet (4 mg) by mouth every 6 hours as needed        Antivertigo/Antiemetic Agents Passed - 3/7/2023 11:04 AM        Passed - Recent (12 mo) or future (30 days) visit within the authorizing provider's specialty     Patient has had an office visit with the authorizing provider or a provider within the authorizing providers department within the previous 12 mos or has a future within next 30 days. See \"Patient Info\" tab in inbasket, or \"Choose Columns\" in Meds & Orders section of the refill encounter.              Passed - Medication is active on med list        Passed - Patient is 18 years of age or older             Halima Yousif RN 03/07/23 11:06 AM  "

## 2023-03-26 ENCOUNTER — HEALTH MAINTENANCE LETTER (OUTPATIENT)
Age: 63
End: 2023-03-26

## 2023-04-04 ENCOUNTER — HOSPITAL ENCOUNTER (OUTPATIENT)
Dept: NUCLEAR MEDICINE | Facility: HOSPITAL | Age: 63
Discharge: HOME | End: 2023-04-04
Attending: INTERNAL MEDICINE | Admitting: INTERNAL MEDICINE
Payer: COMMERCIAL

## 2023-04-04 DIAGNOSIS — E05.90 HYPERTHYROIDISM: ICD-10-CM

## 2023-04-04 PROCEDURE — 78014 THYROID IMAGING W/BLOOD FLOW: CPT

## 2023-04-04 PROCEDURE — A9516 IODINE I-123 SOD IODIDE MIC: HCPCS | Performed by: INTERNAL MEDICINE

## 2023-04-04 PROCEDURE — 343N000001 HC RX 343: Performed by: INTERNAL MEDICINE

## 2023-04-04 RX ADMIN — Medication 231 MICROCURIE: at 09:15

## 2023-04-05 ENCOUNTER — HOSPITAL ENCOUNTER (OUTPATIENT)
Dept: NUCLEAR MEDICINE | Facility: HOSPITAL | Age: 63
Discharge: HOME | End: 2023-04-05
Attending: INTERNAL MEDICINE
Payer: COMMERCIAL

## 2023-04-05 ENCOUNTER — LAB (OUTPATIENT)
Dept: LAB | Facility: HOSPITAL | Age: 63
End: 2023-04-05
Attending: INTERNAL MEDICINE
Payer: COMMERCIAL

## 2023-04-05 DIAGNOSIS — E05.90 HYPERTHYROIDISM: ICD-10-CM

## 2023-04-05 LAB
ANION GAP SERPL CALCULATED.3IONS-SCNC: 9 MMOL/L (ref 7–15)
BUN SERPL-MCNC: 10.7 MG/DL (ref 8–23)
CALCIUM SERPL-MCNC: 9.4 MG/DL (ref 8.8–10.2)
CHLORIDE SERPL-SCNC: 104 MMOL/L (ref 98–107)
CREAT SERPL-MCNC: 0.72 MG/DL (ref 0.51–0.95)
DEPRECATED HCO3 PLAS-SCNC: 26 MMOL/L (ref 22–29)
GFR SERPL CREATININE-BSD FRML MDRD: >90 ML/MIN/1.73M2
GLUCOSE SERPL-MCNC: 89 MG/DL (ref 70–99)
POTASSIUM SERPL-SCNC: 5 MMOL/L (ref 3.4–5.3)
SODIUM SERPL-SCNC: 139 MMOL/L (ref 136–145)
T3 SERPL-MCNC: 104 NG/DL (ref 85–202)
T4 FREE SERPL-MCNC: 1.66 NG/DL (ref 0.9–1.7)

## 2023-04-05 PROCEDURE — 80048 BASIC METABOLIC PNL TOTAL CA: CPT

## 2023-04-05 PROCEDURE — 84439 ASSAY OF FREE THYROXINE: CPT

## 2023-04-05 PROCEDURE — 84443 ASSAY THYROID STIM HORMONE: CPT

## 2023-04-05 PROCEDURE — 84480 ASSAY TRIIODOTHYRONINE (T3): CPT

## 2023-04-05 PROCEDURE — 36415 COLL VENOUS BLD VENIPUNCTURE: CPT

## 2023-04-08 LAB — TSH SERPL DL<=0.005 MIU/L-ACNC: 0.16 UIU/ML (ref 0.3–4.2)

## 2023-04-25 ASSESSMENT — ENCOUNTER SYMPTOMS
EXERCISE INTOLERANCE: 0
WEIGHT LOSS: 0
POLYPHAGIA: 0
POLYDIPSIA: 0
TREMORS: 0
SPEECH CHANGE: 0
HYPERTENSION: 1
FATIGUE: 1
TINGLING: 0
SEIZURES: 0
DISTURBANCES IN COORDINATION: 0
SYNCOPE: 1
BOWEL INCONTINENCE: 0
MEMORY LOSS: 0
BLOATING: 0
HEARTBURN: 1
DECREASED APPETITE: 0
PARALYSIS: 0
DIZZINESS: 1
ABDOMINAL PAIN: 1
PALPITATIONS: 1
WEAKNESS: 0
DOUBLE VISION: 0
CHILLS: 0
NUMBNESS: 0
RECTAL PAIN: 0
JAUNDICE: 0
HALLUCINATIONS: 0
NIGHT SWEATS: 1
FEVER: 0
BLOOD IN STOOL: 0
EYE WATERING: 1
CONSTIPATION: 0
EYE IRRITATION: 1
ORTHOPNEA: 0
INCREASED ENERGY: 1
VOMITING: 1
HEADACHES: 1
EYE PAIN: 0
EYE REDNESS: 1
ALTERED TEMPERATURE REGULATION: 0
DIARRHEA: 0
HYPOTENSION: 0
LIGHT-HEADEDNESS: 1
WEIGHT GAIN: 0
LOSS OF CONSCIOUSNESS: 1
NAUSEA: 1
SLEEP DISTURBANCES DUE TO BREATHING: 1
LEG PAIN: 0

## 2023-04-26 ENCOUNTER — OFFICE VISIT (OUTPATIENT)
Dept: ENDOCRINOLOGY | Facility: CLINIC | Age: 63
End: 2023-04-26
Payer: COMMERCIAL

## 2023-04-26 VITALS
HEART RATE: 92 BPM | BODY MASS INDEX: 21.28 KG/M2 | WEIGHT: 124 LBS | SYSTOLIC BLOOD PRESSURE: 122 MMHG | DIASTOLIC BLOOD PRESSURE: 64 MMHG

## 2023-04-26 DIAGNOSIS — E06.1 SUBACUTE THYROIDITIS: ICD-10-CM

## 2023-04-26 DIAGNOSIS — E05.90 HYPERTHYROIDISM: Primary | ICD-10-CM

## 2023-04-26 PROCEDURE — 99214 OFFICE O/P EST MOD 30 MIN: CPT | Performed by: INTERNAL MEDICINE

## 2023-04-26 RX ORDER — PROPYLTHIOURACIL 50 MG/1
TABLET ORAL
Qty: 180 TABLET | Refills: 0 | Status: SHIPPED | OUTPATIENT
Start: 2023-04-26 | End: 2023-12-28

## 2023-04-26 NOTE — PATIENT INSTRUCTIONS
-Labs in 2 weeks  -Based on results, we will decide whether to adjust PTU dose--for now, continue  mg (two 50 mg tabs) every morning  -We will await addendum to radioiodine uptake and scan  -Return for a follow-up visit in 6 months  -We will communicate results via Six3, or if needed by phone

## 2023-04-26 NOTE — LETTER
2023         RE: Anna Sanderson  [address]  [address] [facility] [address]        Dear Colleague,    Thank you for referring your patient, Anna Sanderson, to the [facility]. Please see a copy of my visit note below.      ENDOCRINOLOGY FOLLOW-UP        HISTORY OF PRESENT ILLNESS    Anna Sanderson is seen in follow-up.    Given patient's concern about tolerance of PTU, we continued at a reduced dose of  mg every morning at last visit but plans to coordinate additional work-up so that we could confirm diagnosis.    Ms. Ailyn Sanderson had radioiodine uptake and scan and thyroid ultrasound prior to this visit: She confirms that she stopped PTU for 5 days before nuclear medicine study.    Has been taking PTU consistently.  Continues to endorse fatigue and heat intolerance but her itchiness is not as bothersome as it had been in the past.    Pertinent endocrine and related history:  1.  Hyperthyroidism.   -First noted to have abnormal thyroid function tests in 2021: TSH was partially suppressed and free T4 normal.  She had persistent abnormalities that became more pronounced, with TSH becoming fully suppressed by 2021 and free T4 rising by 2021.  -Radioiodine uptake and scan performed on 10/12/2021 revealed lower uptake at 7.9% within the thyroid, with question of subacute thyroiditis.  -Initiated on methimazole in 2022.  The patient sent Packet[employer] message in 2022 noting that she had been feeling unwell: Hot, nauseated and exhausted.  PTU was prescribed then.  -Family history is remarkable for 2 sisters with hypothyroidism.  Mother with papillary thyroid carcinoma who underwent thyroidectomy and had some postoperative complication; now , although thyroid cancer was not the cause of death.    Pertinent Social History: , has 3 children and 2 grandchildren.  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    PAST MEDICAL  HISTORY  Past Medical History:   Diagnosis Date     Abdominal pain, epigastric 11/05/2019     Abnormal CT of the chest 02/01/2021     Abnormal TSH     TSH suppressed 0.18 and normal free T4, recheck in 3 months     Abscess of left lower leg 06/07/2021     Achilles tendinitis 07/26/2019     Achilles tendon infection      Allergic rhinitis 09/03/2020     Chronic cough 03/23/2020    CT chest with mild bronchiectasis and small area of consolidation     Deep vein thrombosis (DVT) of brachial vein of left upper extremity (H) 04/17/2018    Associated with PICC line, follow-up ultrasound showing resolution of thrombus     Dermatitis      Family history of myocardial infarction     dad 35  mother 70     Fatigue 02/24/2017     GERD (gastroesophageal reflux disease)     EGD with esophagitis 2015, experiencing chest pain and dysphagia     History of anesthesia complications      Hypertension      Hyperthyroidism 1/26/2023     Impaired fasting glucose     Glucose 119 February 2021.  A1c 5.3%     Iron deficiency anemia 12/18/2015    Has required iron infusions     Kidney stones      Microscopic hematuria 03/07/2021    Longstanding finding     Motion sickness      MRSA infection 08/2020    Left leg wound     Open wound of foot, complicated     Achilles tendon rupture complicated by infected open wound with multiple surgeries including skin grafting     Osteoporosis of multiple sites without pathological fracture     DEXA with T score -2.4 L1 L4, -3.2 left femoral neck and -2.6 right femoral neck.  DEXA February 2022 with significant improvement -2.6 left femoral neck and -2.3 right femoral neck     Ovarian cyst      Palpitations 11/04/2021    Event monitor showing sinus tachycardia no arrhythmia November 2021     PONV (postoperative nausea and vomiting)      SIRS (systemic inflammatory response syndrome) (H) 08/16/2019     SOB (shortness of breath) 11/04/2021     Subacute thyroiditis 10/15/2021    TSH less than 0.1 and abnormal  thyroid uptake scan October 2021, asymptomatic     Vitamin D deficiency 01/22/2020       MEDICATIONS  Current Outpatient Medications   Medication Sig Dispense Refill     ondansetron (ZOFRAN) 4 MG tablet Take 1 tablet (4 mg) by mouth every 6 hours as needed for nausea 30 tablet 0     albuterol (PROAIR HFA/PROVENTIL HFA/VENTOLIN HFA) 108 (90 Base) MCG/ACT inhaler Inhale 2 puffs into the lungs every 6 hours 18 g 11     aspirin (ASA) 81 MG EC tablet Take 1 tablet (81 mg) by mouth daily 60 tablet 0     calcium citrate-vitamin D (CITRACAL) 315-250 MG-UNIT TABS per tablet Take 2 tablets by mouth daily       ferrous sulfate (FEROSUL) 325 (65 Fe) MG tablet Take 1 tablet (325 mg) by mouth daily (with breakfast)       ibuprofen (ADVIL/MOTRIN) 200 MG tablet Take 200-800 mg by mouth 2 times daily        metoprolol succinate ER (TOPROL-XL) 50 MG 24 hr tablet Take 1 tablet (50 mg) by mouth daily 90 tablet 3     pantoprazole (PROTONIX) 40 MG EC tablet Take 1 tablet (40 mg) by mouth 2 times daily 180 tablet 3     propylthiouracil (PTU) 50 MG tablet Take two tablets in the morning and 1 tablet in the evening. 270 tablet 3     sucralfate (CARAFATE) 1 GM tablet TAKE 1 TABLET(1 GRAM) BY MOUTH FOUR TIMES DAILY 360 tablet 3     vitamin D2 (ERGOCALCIFEROL) 51528 units (1250 mcg) capsule Take 1 capsule (50,000 Units) by mouth once a week Saturdays 13 capsule 3       Allergies, family, and social history were reviewed and documented as needed in EHR.     REVIEW OF SYSTEMS  A focused ROS was performed, with pertinent positives and negatives as noted in the HPI.    PHYSICAL EXAM  /64 (BP Location: Right arm, Patient Position: Sitting, Cuff Size: Adult Regular)   Pulse 92   Wt 56.2 kg (124 lb)   BMI 21.28 kg/m    Body mass index is 21.28 kg/m .  Constitutional: Vital signs reviewed, as recorded above. Patient is alert, oriented and appears in no acute distress.  Eyes: PER, EOMI, no stare, lid lag, or retraction; no conjunctival  injection.  Neck: Neck supple, no palpable thyromegaly, no tenderness on exam.  Cardiovascular: RRR, normal S1/S2, no audible murmurs, rubs or gallops; no LE edema.  Respiratory: CTAB, without wheezes, crackles or rhonchi; normal chest wall motion and respiratory effort.  MSK: No clubbing or cyanosis; normal muscle bulk and tone.  Skin: Normal skin color, temperature, turgor and texture.  Neurological: Alert and oriented times 3. No tremor.    DATA REVIEW  Each of the following laboratory and/or imaging studies were reviewed.    Component      Latest Ref Rng 4/5/2023  10:02 AM   T4 Free      0.90 - 1.70 ng/dL 1.66    Triiodothyronine (T3)      85 - 202 ng/dL 104    TSH      0.30 - 4.20 uIU/mL 0.16 (L)       Legend:  (L) Low      Component      Latest Ref Rng 12/9/2022  12:54 PM   Thyroid Stim Immunog      <=1.3 TSI index <1.0    Thyrotropin Receptor Antibody      0.00 - 1.75 IU/L <1.10          EXAM: NM THYROID UPTAKE AND SCAN  LOCATION: [facility]  DATE/TIME: 4/5/2023 10:02 AM     INDICATION: Clinical and/or laboratory evidence of hyperthyroidism.  COMPARISON: Thyroid ultrasound 01/31/2023. Thyroid uptake scan 10/13/2021.  TECHNIQUE: 231 uCi I-123, oral ingestion. 24-hour neck uptake and imaging.     FINDINGS: 24-hour uptake: 13% (Normal range: 10-30%).                                                                      IMPRESSION:     Normal radiotracer uptake throughout the thyroid gland without hot/cold nodule.      EXAM: US THYROID  LOCATION: [facility]  DATE/TIME: 1/31/2023 10:41 AM     INDICATION: Hyperthyroidism, unknown cause, family history of thyroid cancer, please evaluate gland  COMPARISON: Thyroid scan 10/13/2021  TECHNIQUE: Thyroid ultrasound.      FINDINGS:  RIGHT lobe: 4.4 x 1.7 x 1.3 cm. Homogeneous echotexture.  Isthmus: 2 mm.  LEFT lobe: 4.1 x 1.6 x 1.3 cm. Homogeneous echotexture.     NECK: No cervical lymphadenopathy.     NODULES: None.                                                                       IMPRESSION:  1.  Normal thyroid ultrasound.       ASSESSMENT  1.  Hyperthyroidism.  Based on diffuse uptake of radioiodine on nuclear medicine study, suspect Graves' disease; however, serology for Graves' is negative and the patient does not have typical heterogenous appearance of thyroid on ultrasound.  We did scan the pelvis to rule out struma ovarii: My review of images does not indicate a discrete area of ovarian uptake (I do see some uptake in the pelvis although I suspect this may be in the bladder)--radiology did not interpret pelvis study and I have contacted nuclear medicine to ask that the study be addended with interpretation of pelvis scan.  Labs drawn earlier this month indicate improving thyroid function and suggest Graves' disease is remitting.  Would recheck thyroid function tests about 4 to 6 weeks after last study and adjust PTU dose downward as needed.  We had previously discussed more definitive therapy for hyperthyroidism but, recognizing that thyroid function tests are now improving, would continue antithyroid drug to see if Graves' goes into remission.  The patient also approaches prefers this approach.    2.  Osteoporosis.  Managed by PCP, therefore deferred.  We can consult on osteoporosis if questions come up.    PLAN  -Labs in 2 weeks  -Based on results, we will decide whether to adjust PTU dose--for now, continue  mg (two 50 mg tabs) every morning  -We will await addendum to radioiodine uptake and scan  -Return for a follow-up visit in 6 months  -We will communicate results via Arvirago, or if needed by phone      Orders Placed This Encounter   Procedures     TSH     T4 free     T3 total     Hepatic function panel         I spent a total of 31 minutes on the date of encounter reviewing medical records, evaluating the patient, coordinating care and documenting in the EHR, as detailed above.      Isaias Andrade MD    Division of Diabetes, Endocrinology and Metabolism  Department of Medicine           Again, thank you for allowing me to participate in the care of your patient.        Sincerely,        HASMUKH Andrade MD

## 2023-04-26 NOTE — PROGRESS NOTES
ENDOCRINOLOGY FOLLOW-UP        HISTORY OF PRESENT ILLNESS    Anna Sanderson is seen in follow-up.    Given patient's concern about tolerance of PTU, we continued at a reduced dose of  mg every morning at last visit but plans to coordinate additional work-up so that we could confirm diagnosis.    Ms. Ailyn Sanderson had radioiodine uptake and scan and thyroid ultrasound prior to this visit: She confirms that she stopped PTU for 5 days before nuclear medicine study.    Has been taking PTU consistently.  Continues to endorse fatigue and heat intolerance but her itchiness is not as bothersome as it had been in the past.    Pertinent endocrine and related history:  1.  Hyperthyroidism.   -First noted to have abnormal thyroid function tests in 2021: TSH was partially suppressed and free T4 normal.  She had persistent abnormalities that became more pronounced, with TSH becoming fully suppressed by 2021 and free T4 rising by 2021.  -Radioiodine uptake and scan performed on 10/12/2021 revealed lower uptake at 7.9% within the thyroid, with question of subacute thyroiditis.  -Initiated on methimazole in 2022.  The patient sent [employer] message in 2022 noting that she had been feeling unwell: Hot, nauseated and exhausted.  PTU was prescribed then.  -Family history is remarkable for 2 sisters with hypothyroidism.  Mother with papillary thyroid carcinoma who underwent thyroidectomy and had some postoperative complication; now , although thyroid cancer was not the cause of death.    Pertinent Social History: , has 3 children and 2 grandchildren.  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    PAST MEDICAL HISTORY  Past Medical History:   Diagnosis Date     Abdominal pain, epigastric 2019     Abnormal CT of the chest 2021     Abnormal TSH     TSH suppressed 0.18 and normal free T4, recheck in 3 months     Abscess of left lower leg 2021     Achilles  tendinitis 07/26/2019     Achilles tendon infection      Allergic rhinitis 09/03/2020     Chronic cough 03/23/2020    CT chest with mild bronchiectasis and small area of consolidation     Deep vein thrombosis (DVT) of brachial vein of left upper extremity (H) 04/17/2018    Associated with PICC line, follow-up ultrasound showing resolution of thrombus     Dermatitis      Family history of myocardial infarction     dad 35  mother 70     Fatigue 02/24/2017     GERD (gastroesophageal reflux disease)     EGD with esophagitis 2015, experiencing chest pain and dysphagia     History of anesthesia complications      Hypertension      Hyperthyroidism 1/26/2023     Impaired fasting glucose     Glucose 119 February 2021.  A1c 5.3%     Iron deficiency anemia 12/18/2015    Has required iron infusions     Kidney stones      Microscopic hematuria 03/07/2021    Longstanding finding     Motion sickness      MRSA infection 08/2020    Left leg wound     Open wound of foot, complicated     Achilles tendon rupture complicated by infected open wound with multiple surgeries including skin grafting     Osteoporosis of multiple sites without pathological fracture     DEXA with T score -2.4 L1 L4, -3.2 left femoral neck and -2.6 right femoral neck.  DEXA February 2022 with significant improvement -2.6 left femoral neck and -2.3 right femoral neck     Ovarian cyst      Palpitations 11/04/2021    Event monitor showing sinus tachycardia no arrhythmia November 2021     PONV (postoperative nausea and vomiting)      SIRS (systemic inflammatory response syndrome) (H) 08/16/2019     SOB (shortness of breath) 11/04/2021     Subacute thyroiditis 10/15/2021    TSH less than 0.1 and abnormal thyroid uptake scan October 2021, asymptomatic     Vitamin D deficiency 01/22/2020       MEDICATIONS  Current Outpatient Medications   Medication Sig Dispense Refill     ondansetron (ZOFRAN) 4 MG tablet Take 1 tablet (4 mg) by mouth every 6 hours as needed for nausea  30 tablet 0     albuterol (PROAIR HFA/PROVENTIL HFA/VENTOLIN HFA) 108 (90 Base) MCG/ACT inhaler Inhale 2 puffs into the lungs every 6 hours 18 g 11     aspirin (ASA) 81 MG EC tablet Take 1 tablet (81 mg) by mouth daily 60 tablet 0     calcium citrate-vitamin D (CITRACAL) 315-250 MG-UNIT TABS per tablet Take 2 tablets by mouth daily       ferrous sulfate (FEROSUL) 325 (65 Fe) MG tablet Take 1 tablet (325 mg) by mouth daily (with breakfast)       ibuprofen (ADVIL/MOTRIN) 200 MG tablet Take 200-800 mg by mouth 2 times daily        metoprolol succinate ER (TOPROL-XL) 50 MG 24 hr tablet Take 1 tablet (50 mg) by mouth daily 90 tablet 3     pantoprazole (PROTONIX) 40 MG EC tablet Take 1 tablet (40 mg) by mouth 2 times daily 180 tablet 3     propylthiouracil (PTU) 50 MG tablet Take two tablets in the morning and 1 tablet in the evening. 270 tablet 3     sucralfate (CARAFATE) 1 GM tablet TAKE 1 TABLET(1 GRAM) BY MOUTH FOUR TIMES DAILY 360 tablet 3     vitamin D2 (ERGOCALCIFEROL) 03691 units (1250 mcg) capsule Take 1 capsule (50,000 Units) by mouth once a week Saturdays 13 capsule 3       Allergies, family, and social history were reviewed and documented as needed in EHR.     REVIEW OF SYSTEMS  A focused ROS was performed, with pertinent positives and negatives as noted in the HPI.    PHYSICAL EXAM  /64 (BP Location: Right arm, Patient Position: Sitting, Cuff Size: Adult Regular)   Pulse 92   Wt 56.2 kg (124 lb)   BMI 21.28 kg/m    Body mass index is 21.28 kg/m .  Constitutional: Vital signs reviewed, as recorded above. Patient is alert, oriented and appears in no acute distress.  Eyes: PER, EOMI, no stare, lid lag, or retraction; no conjunctival injection.  Neck: Neck supple, no palpable thyromegaly, no tenderness on exam.  Cardiovascular: RRR, normal S1/S2, no audible murmurs, rubs or gallops; no LE edema.  Respiratory: CTAB, without wheezes, crackles or rhonchi; normal chest wall motion and respiratory  effort.  MSK: No clubbing or cyanosis; normal muscle bulk and tone.  Skin: Normal skin color, temperature, turgor and texture.  Neurological: Alert and oriented times 3. No tremor.    DATA REVIEW  Each of the following laboratory and/or imaging studies were reviewed.    Component      Latest Ref Rng 4/5/2023  10:02 AM   T4 Free      0.90 - 1.70 ng/dL 1.66    Triiodothyronine (T3)      85 - 202 ng/dL 104    TSH      0.30 - 4.20 uIU/mL 0.16 (L)       Legend:  (L) Low      Component      Latest Ref Rng 12/9/2022  12:54 PM   Thyroid Stim Immunog      <=1.3 TSI index <1.0    Thyrotropin Receptor Antibody      0.00 - 1.75 IU/L <1.10          EXAM: NM THYROID UPTAKE AND SCAN  LOCATION: [facility]  DATE/TIME: 4/5/2023 10:02 AM     INDICATION: Clinical and/or laboratory evidence of hyperthyroidism.  COMPARISON: Thyroid ultrasound 01/31/2023. Thyroid uptake scan 10/13/2021.  TECHNIQUE: 231 uCi I-123, oral ingestion. 24-hour neck uptake and imaging.     FINDINGS: 24-hour uptake: 13% (Normal range: 10-30%).                                                                      IMPRESSION:     Normal radiotracer uptake throughout the thyroid gland without hot/cold nodule.      EXAM: US THYROID  LOCATION: [facility]  DATE/TIME: 1/31/2023 10:41 AM     INDICATION: Hyperthyroidism, unknown cause, family history of thyroid cancer, please evaluate gland  COMPARISON: Thyroid scan 10/13/2021  TECHNIQUE: Thyroid ultrasound.      FINDINGS:  RIGHT lobe: 4.4 x 1.7 x 1.3 cm. Homogeneous echotexture.  Isthmus: 2 mm.  LEFT lobe: 4.1 x 1.6 x 1.3 cm. Homogeneous echotexture.     NECK: No cervical lymphadenopathy.     NODULES: None.                                                                      IMPRESSION:  1.  Normal thyroid ultrasound.       ASSESSMENT  1.  Hyperthyroidism.  Based on diffuse uptake of radioiodine on nuclear medicine study, suspect Graves' disease; however, serology for  Graves' is negative and the patient does not have typical heterogenous appearance of thyroid on ultrasound.  We did scan the pelvis to rule out struma ovarii: My review of images does not indicate a discrete area of ovarian uptake (I do see some uptake in the pelvis although I suspect this may be in the bladder)--radiology did not interpret pelvis study and I have contacted nuclear medicine to ask that the study be addended with interpretation of pelvis scan.  Labs drawn earlier this month indicate improving thyroid function and suggest Graves' disease is remitting.  Would recheck thyroid function tests about 4 to 6 weeks after last study and adjust PTU dose downward as needed.  We had previously discussed more definitive therapy for hyperthyroidism but, recognizing that thyroid function tests are now improving, would continue antithyroid drug to see if Graves' goes into remission.  The patient also approaches prefers this approach.    2.  Osteoporosis.  Managed by PCP, therefore deferred.  We can consult on osteoporosis if questions come up.    PLAN  -Labs in 2 weeks  -Based on results, we will decide whether to adjust PTU dose--for now, continue  mg (two 50 mg tabs) every morning  -We will await addendum to radioiodine uptake and scan  -Return for a follow-up visit in 6 months  -We will communicate results via Zangot, or if needed by phone      Orders Placed This Encounter   Procedures     TSH     T4 free     T3 total     Hepatic function panel         I spent a total of 31 minutes on the date of encounter reviewing medical records, evaluating the patient, coordinating care and documenting in the EHR, as detailed above.      Isaias Andrade MD   Division of Diabetes, Endocrinology and Metabolism  Department of Medicine

## 2023-06-28 ENCOUNTER — OFFICE VISIT (OUTPATIENT)
Dept: FAMILY MEDICINE | Facility: CLINIC | Age: 63
End: 2023-06-28
Payer: COMMERCIAL

## 2023-06-28 VITALS
DIASTOLIC BLOOD PRESSURE: 82 MMHG | RESPIRATION RATE: 14 BRPM | BODY MASS INDEX: 20.25 KG/M2 | HEART RATE: 95 BPM | SYSTOLIC BLOOD PRESSURE: 145 MMHG | WEIGHT: 118 LBS | TEMPERATURE: 97.9 F | OXYGEN SATURATION: 100 %

## 2023-06-28 DIAGNOSIS — H00.014 HORDEOLUM EXTERNUM OF LEFT UPPER EYELID: Primary | ICD-10-CM

## 2023-06-28 PROCEDURE — 99213 OFFICE O/P EST LOW 20 MIN: CPT | Performed by: PHYSICIAN ASSISTANT

## 2023-06-28 RX ORDER — ERYTHROMYCIN 5 MG/G
0.5 OINTMENT OPHTHALMIC 4 TIMES DAILY
Qty: 14 G | Refills: 0 | Status: SHIPPED | OUTPATIENT
Start: 2023-06-28 | End: 2023-07-05

## 2023-06-28 NOTE — PROGRESS NOTES
Assessment & Plan:      Problem List Items Addressed This Visit    None  Visit Diagnoses     Hordeolum externum of left upper eyelid    -  Primary    Relevant Medications    erythromycin (ROMYCIN) 5 MG/GM ophthalmic ointment        Medical Decision Making  Patient presents with left upper eyelid swelling and left eye pain.  Symptoms appear most consistent with a stye.  Recommend antibiotic eye ointment and warm compresses.  Discussed treatment and symptomatic care.  Allergies and medication interactions reviewed.  Discussed signs of worsening symptoms and when to follow-up with PCP if no symptom improvement.     Subjective:      Anna Sanderson is a 63 year old female here for evaluation of left upper eyelid swelling and left eye pain.  Onset of symptoms was this morning.  Patient noted some slight swelling of the left upper eyelid.  As the days gone on she notes more pain and burning sensation of the left eye.  Patient did note sensation of a film on the left eye that improves after blinking.  No history of contact lens use.  No known trauma or foreign body in the left eye.     The following portions of the patient's history were reviewed and updated as appropriate: allergies, current medications, and problem list.     Review of Systems  Pertinent items are noted in HPI.    Allergies  Allergies   Allergen Reactions     Other Drug Allergy (See Comments) Other (See Comments)     CHLORAPREP Blisters.     Morphine Hives     Adhesive Tape Rash       Family History   Problem Relation Age of Onset     Coronary Artery Disease Mother      Thyroid Disease Mother      Osteoporosis Mother      Coronary Artery Disease Father      Alzheimer Disease Sister      Cerebrovascular Disease Sister      Hypertension Sister      No Known Problems Brother      No Known Problems Sister      No Known Problems Brother      No Known Problems Daughter      No Known Problems Son      No Known Problems Son      Breast Cancer No family  hx of        Social History     Tobacco Use     Smoking status: Former     Types: Cigarettes     Quit date: 3/14/2003     Years since quittin.3     Smokeless tobacco: Never     Tobacco comments:     Quit    Substance Use Topics     Alcohol use: No     Comment: Alcoholic Drinks/day: rare        Objective:      BP (!) 145/82   Pulse 95   Temp 97.9  F (36.6  C) (Oral)   Resp 14   Wt 53.5 kg (118 lb)   SpO2 100%   BMI 20.25 kg/m    General appearance - alert, well appearing, and in no distress and non-toxic  Eyes - Left: Appearance of stye of the left upper eyelid and localized erythema, otherwise conjunctivae/sclera clear, no signs of foreign body    The use of Dragon/[employer] dictation services was used to construct the content of this note; any grammatical errors are non-intentional. Please contact the author directly if you are in need of any clarification.

## 2023-07-09 ENCOUNTER — ANCILLARY PROCEDURE (OUTPATIENT)
Dept: GENERAL RADIOLOGY | Facility: CLINIC | Age: 63
End: 2023-07-09
Attending: FAMILY MEDICINE
Payer: COMMERCIAL

## 2023-07-09 ENCOUNTER — OFFICE VISIT (OUTPATIENT)
Dept: FAMILY MEDICINE | Facility: CLINIC | Age: 63
End: 2023-07-09
Payer: COMMERCIAL

## 2023-07-09 VITALS
OXYGEN SATURATION: 99 % | WEIGHT: 118 LBS | HEART RATE: 108 BPM | TEMPERATURE: 97.9 F | DIASTOLIC BLOOD PRESSURE: 85 MMHG | RESPIRATION RATE: 16 BRPM | BODY MASS INDEX: 20.25 KG/M2 | SYSTOLIC BLOOD PRESSURE: 134 MMHG

## 2023-07-09 DIAGNOSIS — R05.1 ACUTE COUGH: ICD-10-CM

## 2023-07-09 DIAGNOSIS — J20.9 ACUTE BRONCHITIS WITH SYMPTOMS > 10 DAYS: Primary | ICD-10-CM

## 2023-07-09 PROCEDURE — 99214 OFFICE O/P EST MOD 30 MIN: CPT | Performed by: FAMILY MEDICINE

## 2023-07-09 PROCEDURE — 71046 X-RAY EXAM CHEST 2 VIEWS: CPT | Mod: TC | Performed by: RADIOLOGY

## 2023-07-09 RX ORDER — LEVALBUTEROL TARTRATE 45 UG/1
2 AEROSOL, METERED ORAL EVERY 4 HOURS PRN
Qty: 15 G | Refills: 0 | Status: SHIPPED | OUTPATIENT
Start: 2023-07-09 | End: 2023-12-19

## 2023-07-09 RX ORDER — BENZONATATE 100 MG/1
100 CAPSULE ORAL 3 TIMES DAILY PRN
Qty: 30 CAPSULE | Refills: 0 | Status: SHIPPED | OUTPATIENT
Start: 2023-07-09 | End: 2023-12-19

## 2023-07-09 RX ORDER — PREDNISONE 20 MG/1
TABLET ORAL
Qty: 15 TABLET | Refills: 0 | Status: SHIPPED | OUTPATIENT
Start: 2023-07-09 | End: 2023-07-27

## 2023-07-09 RX ORDER — AZITHROMYCIN 250 MG/1
TABLET, FILM COATED ORAL
Qty: 6 TABLET | Refills: 0 | Status: SHIPPED | OUTPATIENT
Start: 2023-07-09 | End: 2023-07-14

## 2023-07-09 NOTE — PROGRESS NOTES
Assessment/Plan:   Acute cough  Acute bronchitis with symptoms > 10 days  - XR Chest 2 Views; Future  - benzonatate (TESSALON) 100 MG capsule; Take 1 capsule (100 mg) by mouth 3 times daily as needed for cough  Dispense: 30 capsule; Refill: 0  - azithromycin (ZITHROMAX) 250 MG tablet; Take 2 tablets (500 mg) by mouth daily for 1 day, THEN 1 tablet (250 mg) daily for 4 days.  Dispense: 6 tablet; Refill: 0  - levalbuterol (XOPENEX HFA) 45 MCG/ACT inhaler; Inhale 2 puffs into the lungs every 4 hours as needed for shortness of breath, wheezing or other (cough)  Dispense: 15 g; Refill: 0  - predniSONE (DELTASONE) 20 MG tablet; 40mg daily for 5 days then 20mg daily for 5 days  Dispense: 15 tablet; Refill: 0    Acute respiratory infection onset 2 weeks ago, improved then worse cough started 5 days ago. Fevers, productive vigorous cough, short of breath.   Pattern of illness worrisome for secondary bacterial pneumonia or bronchitis and/or COPD exacerbation.   CXR obtained and viewed by me showing no significant hyperinflation or infiltrate or fluid.   Will treat with azithromycin for secondary bacterial bronchitis and prednisone for mild COPD flare. 40mg daily for 5 days then 20mg daily for 5 days.   Xopenex inhaler with spacer 2 puffs ever 4 hours as needed for wheeze or shortness of breath or spasms of cough. Her [employer] formulary had this preferred over albuterol so she will try it.   Tessalon perles if needed for cough.   Cough drops as needed.   Repeat covid test at home  Recheck if worse or no better.     I discussed red flag symptoms, return precautions to clinic/ER and follow up care with patient/parent.  Expected clinical course, symptomatic cares advised. Questions answered. Patient/parent amenable with plan.      Subjective:     Anna Sanderson is a 63 year old female who presents for evaluation of worsening cough and shortness of breath.   Illness started 2 weeks ago with URI, cough, stye. That illness  improved. No further ST or nasal congestion, eye is better. Cough lingered.   On Wednesday of this week, 5 days ago, the cough worsened dramatically.   Cough is very vigorous and persistent, to the point of gagging and emesis. Wracks her body. Productive of copious mucus.   Short of breath with walking and after coughing spells.   Feverish off and on since Wednesday.   Feels similar to a prior illness a couple years ago which involved lung hyperinflation on CXR and presumed early COPD/chronic bronchitis. Also had the productive cough and shortness of breath at that time. Treated with steroids and azithromycin. Has been given an inhaler with spacer in the past.   Has not had lung concerns after that until now.   No leg swelling or chest pain. No rash.   Worse at night.   Home covid test negative x 1    Allergies   Allergen Reactions     Other Drug Allergy (See Comments) Other (See Comments)     CHLORAPREP Blisters.     Morphine Hives     Adhesive Tape Rash     Current Outpatient Medications   Medication     albuterol (PROAIR HFA/PROVENTIL HFA/VENTOLIN HFA) 108 (90 Base) MCG/ACT inhaler     aspirin (ASA) 81 MG EC tablet     azithromycin (ZITHROMAX) 250 MG tablet     benzonatate (TESSALON) 100 MG capsule     calcium citrate-vitamin D (CITRACAL) 315-250 MG-UNIT TABS per tablet     ferrous sulfate (FEROSUL) 325 (65 Fe) MG tablet     ibuprofen (ADVIL/MOTRIN) 200 MG tablet     levalbuterol (XOPENEX HFA) 45 MCG/ACT inhaler     metoprolol succinate ER (TOPROL-XL) 50 MG 24 hr tablet     ondansetron (ZOFRAN) 4 MG tablet     pantoprazole (PROTONIX) 40 MG EC tablet     predniSONE (DELTASONE) 20 MG tablet     propylthiouracil (PTU) 50 MG tablet     sucralfate (CARAFATE) 1 GM tablet     vitamin D2 (ERGOCALCIFEROL) 17919 units (1250 mcg) capsule     Current Facility-Administered Medications   Medication     [START ON 7/26/2023] denosumab (PROLIA) injection 60 mg     denosumab (PROLIA) injection 60 mg     Patient Active Problem List    Diagnosis     Nonhealing surgical wound     Gastroesophageal reflux disease with esophagitis     Iron deficiency anemia     Vitamin D deficiency     MTHFR gene mutation (H) Heterozygoyus April 2021, [facility]     Abnormal CT of the chest     Subacute thyroiditis     Benign essential hypertension     PAD (peripheral artery disease) (H)     Palpitations     Osteoporosis of multiple sites without pathological fracture     Hyperthyroidism       Objective:     /85   Pulse 108   Temp 97.9  F (36.6  C) (Oral)   Resp 16   Wt 53.5 kg (118 lb)   SpO2 99%   BMI 20.25 kg/m      Physical    General Appearance: Alert, pleasant, no distress, AVSS, sitting forward to lessen cough  Head: Normocephalic, without obvious abnormality, atraumatic  Eyes: Conjunctivae are normal.   Ears: Normal TMs and external ear canals, both ears  Nose: No significant congestion.  Throat: Throat is normal.  No exudate.  No vesicular lesions  Neck: No adenopathy  Lungs: Clear to auscultation bilaterally, fairly good air movement except right base, no wheeze, no rales, respirations unlabored. Productive cough.   Heart: Regular rate and rhythm  Extremities: No lower extremity edema  Psychiatric: Patient has a normal mood and affect.       Results for orders placed or performed in visit on 07/09/23   XR Chest 2 Views     Status: None (Preliminary result)    Narrative    EXAM: XR CHEST 2 VIEWS  LOCATION: [facility]  DATE: 07/09/2023    INDICATION: Increased cough and fever after URI two weeks ago.  COMPARISON: Chest x-ray 11/04/2021.      Impression    IMPRESSION: Negative chest. Scoliotic curvature of the spine.         This note has been dictated in part using voice recognition software.  Any grammatical or context distortions are unintentional and inherent to the software.  Please feel free to contact me directly for clarification if needed.

## 2023-07-09 NOTE — PATIENT INSTRUCTIONS
Azithromycin 500mg today then 250mg daily for 4 days  Prednisone 40mg daily for 5 days then 20mg daily for 5 days. May increase to 60mg for 1-2 days if no benefit after the first day then alecia.   Xopenex inhaler with spacer 2 puffs ever 4 hours as needed for wheeze or shortness of breath or spasms of cough.  formulary had this preferred over albuterol so give it a try and if you don't like it, we can send in albuterol. Should be comparable with less fast heart rate.   Tessalon perles if needed for cough.   Cough drops as needed.     Recheck if worse or no better.    Repeat covid test at home.

## 2023-07-27 ENCOUNTER — OFFICE VISIT (OUTPATIENT)
Dept: INTERNAL MEDICINE | Facility: CLINIC | Age: 63
End: 2023-07-27
Payer: COMMERCIAL

## 2023-07-27 VITALS
OXYGEN SATURATION: 97 % | SYSTOLIC BLOOD PRESSURE: 114 MMHG | HEART RATE: 97 BPM | RESPIRATION RATE: 16 BRPM | DIASTOLIC BLOOD PRESSURE: 80 MMHG | TEMPERATURE: 97.9 F

## 2023-07-27 DIAGNOSIS — M81.0 OSTEOPOROSIS OF MULTIPLE SITES WITHOUT PATHOLOGICAL FRACTURE: ICD-10-CM

## 2023-07-27 DIAGNOSIS — R05.3 PERSISTENT COUGH FOR 3 WEEKS OR LONGER: Primary | ICD-10-CM

## 2023-07-27 PROCEDURE — 99213 OFFICE O/P EST LOW 20 MIN: CPT | Mod: 25 | Performed by: INTERNAL MEDICINE

## 2023-07-27 PROCEDURE — 96372 THER/PROPH/DIAG INJ SC/IM: CPT | Performed by: INTERNAL MEDICINE

## 2023-07-27 RX ORDER — FLUTICASONE PROPIONATE AND SALMETEROL 250; 50 UG/1; UG/1
1 POWDER RESPIRATORY (INHALATION) EVERY 12 HOURS
Qty: 60 EACH | Refills: 0 | Status: SHIPPED | OUTPATIENT
Start: 2023-07-27 | End: 2023-12-19

## 2023-07-27 RX ORDER — BUDESONIDE AND FORMOTEROL FUMARATE DIHYDRATE 80; 4.5 UG/1; UG/1
2 AEROSOL RESPIRATORY (INHALATION) 2 TIMES DAILY
Qty: 10.2 G | Refills: 0 | Status: SHIPPED | OUTPATIENT
Start: 2023-07-27 | End: 2023-07-27

## 2023-07-27 NOTE — PROGRESS NOTES
Assessment & Plan     1. Persistent cough for 3 weeks or longer  Upper respiratory infection diagnosed earlier this month now with persistent cough.  Significantly improved from original symptoms after being prescribed a Z-Zaire and prednisone.  Lungs are clear.  She has been using an albuterol inhaler intermittently providing partial benefit.  I suspect there is a component of reactive airway disease and I think getting better control of underlying inflammation using Advair 1 puff twice daily for the next 4 weeks will be helpful.  Recommending rinsing and gargling after each use.  - fluticasone-salmeterol (ADVAIR DISKUS) 250-50 MCG/ACT inhaler; Inhale 1 puff into the lungs every 12 hours Rinse your mouth and gargle after each use  Dispense: 60 each; Refill: 0     2.  Osteoporosis without pathological fracture  She is receiving her fifth dose of Prolia today and will continue every 6 months.             See Patient Instructions    Jame uDnn MD  [facility]    Carlitos Castillo is a 63 year old, presenting for the following health issues:  Follow Up (Prolia)      7/27/2023     5:02 PM   Additional Questions   Roomed by        History of Present Illness       Reason for visit:  Prolia injection....continuing cough    She eats 2-3 servings of fruits and vegetables daily.She consumes 0 sweetened beverage(s) daily.She exercises with enough effort to increase her heart rate 30 to 60 minutes per day.  She exercises with enough effort to increase her heart rate 5 days per week.   She is taking medications regularly.               Review of Systems   Cough without purulent sputum      Objective    /80 (BP Location: Right arm, Patient Position: Sitting, Cuff Size: Adult Regular)   Pulse 97   Temp 97.9  F (36.6  C) (Oral)   Resp 16   SpO2 97%   There is no height or weight on file to calculate BMI.  Physical Exam   Lungs clear bilaterally without rales or  wheezes

## 2023-09-06 ENCOUNTER — MYC REFILL (OUTPATIENT)
Dept: INTERNAL MEDICINE | Facility: CLINIC | Age: 63
End: 2023-09-06
Payer: COMMERCIAL

## 2023-09-06 DIAGNOSIS — R11.0 NAUSEA: ICD-10-CM

## 2023-09-06 RX ORDER — ONDANSETRON 4 MG/1
4 TABLET, FILM COATED ORAL EVERY 6 HOURS PRN
Qty: 30 TABLET | Refills: 0 | Status: SHIPPED | OUTPATIENT
Start: 2023-09-06

## 2023-09-07 NOTE — TELEPHONE ENCOUNTER
"Last Written Prescription Date:  3/7/2023  Last Fill Quantity: 30,  # refills: 0   Last office visit provider:  7/27/2023     Requested Prescriptions   Pending Prescriptions Disp Refills    ondansetron (ZOFRAN) 4 MG tablet 30 tablet 0     Sig: Take 1 tablet (4 mg) by mouth every 6 hours as needed for nausea        Antivertigo/Antiemetic Agents Passed - 9/6/2023 11:22 PM        Passed - Recent (12 mo) or future (30 days) visit within the authorizing provider's specialty     Patient has had an office visit with the authorizing provider or a provider within the authorizing providers department within the previous 12 mos or has a future within next 30 days. See \"Patient Info\" tab in inbasket, or \"Choose Columns\" in Meds & Orders section of the refill encounter.              Passed - Medication is active on med list        Passed - Patient is 18 years of age or older             Anna Grady RN 09/06/23 11:23 PM  "

## 2023-09-10 ENCOUNTER — MYC MEDICAL ADVICE (OUTPATIENT)
Dept: INTERNAL MEDICINE | Facility: CLINIC | Age: 63
End: 2023-09-10
Payer: COMMERCIAL

## 2023-09-10 ENCOUNTER — MYC REFILL (OUTPATIENT)
Dept: INTERNAL MEDICINE | Facility: CLINIC | Age: 63
End: 2023-09-10
Payer: COMMERCIAL

## 2023-09-10 DIAGNOSIS — I10 BENIGN ESSENTIAL HYPERTENSION: ICD-10-CM

## 2023-09-10 DIAGNOSIS — E55.9 VITAMIN D DEFICIENCY: ICD-10-CM

## 2023-09-10 DIAGNOSIS — K21.00 GASTROESOPHAGEAL REFLUX DISEASE WITH ESOPHAGITIS WITHOUT HEMORRHAGE: ICD-10-CM

## 2023-09-10 DIAGNOSIS — M81.0 OSTEOPOROSIS OF MULTIPLE SITES WITHOUT PATHOLOGICAL FRACTURE: ICD-10-CM

## 2023-09-10 NOTE — TELEPHONE ENCOUNTER
"Routing refill request to provider for review/approval because:  Listed as OTC, provider review requested as this would be a new script.        Last Written Prescription Date:  NA  Last Fill Quantity: NA,  # refills: NA   Last office visit provider:  7/27/2023     Requested Prescriptions   Pending Prescriptions Disp Refills    calcium citrate-vitamin D (CITRACAL) 315-250 MG-UNIT TABS per tablet       Sig: Take 2 tablets by mouth daily       Vitamin Supplements (Adult) Protocol Passed - 9/10/2023  9:07 AM        Passed - High dose Vitamin D not ordered        Passed - Recent (12 mo) or future (30 days) visit within the authorizing provider's specialty     Patient has had an office visit with the authorizing provider or a provider within the authorizing providers department within the previous 12 mos or has a future within next 30 days. See \"Patient Info\" tab in inbasket, or \"Choose Columns\" in Meds & Orders section of the refill encounter.              Passed - Medication is active on med list             Helen White RN 09/10/23 10:44 AM  "

## 2023-09-11 RX ORDER — PANTOPRAZOLE SODIUM 40 MG/1
40 TABLET, DELAYED RELEASE ORAL 2 TIMES DAILY
Qty: 180 TABLET | Refills: 3 | Status: SHIPPED | OUTPATIENT
Start: 2023-09-11 | End: 2024-09-09

## 2023-09-11 RX ORDER — METOPROLOL SUCCINATE 50 MG/1
50 TABLET, EXTENDED RELEASE ORAL DAILY
Qty: 90 TABLET | Refills: 3 | Status: SHIPPED | OUTPATIENT
Start: 2023-09-11 | End: 2024-09-16

## 2023-09-11 RX ORDER — ERGOCALCIFEROL 1.25 MG/1
50000 CAPSULE, LIQUID FILLED ORAL WEEKLY
Qty: 13 CAPSULE | Refills: 3 | Status: SHIPPED | OUTPATIENT
Start: 2023-09-11 | End: 2024-09-16

## 2023-09-12 RX ORDER — THIAMINE HCL 100 MG
2 TABLET ORAL DAILY
Qty: 180 TABLET | Refills: 3 | Status: SHIPPED | OUTPATIENT
Start: 2023-09-12 | End: 2023-12-19

## 2023-11-04 ENCOUNTER — HEALTH MAINTENANCE LETTER (OUTPATIENT)
Age: 63
End: 2023-11-04

## 2023-12-18 ASSESSMENT — ENCOUNTER SYMPTOMS
NERVOUS/ANXIOUS: 0
SORE THROAT: 0
PARESTHESIAS: 0
ARTHRALGIAS: 1
MYALGIAS: 0
FEVER: 0
PALPITATIONS: 0
FREQUENCY: 0
HEADACHES: 0
HEMATURIA: 0
ABDOMINAL PAIN: 0
DYSURIA: 0
WEAKNESS: 0
SHORTNESS OF BREATH: 0
BREAST MASS: 0
CONSTIPATION: 0
CHILLS: 0
DIARRHEA: 0
HEMATOCHEZIA: 0
DIZZINESS: 0
EYE PAIN: 0
HEARTBURN: 1
NAUSEA: 1
JOINT SWELLING: 0
COUGH: 0

## 2023-12-19 ENCOUNTER — OFFICE VISIT (OUTPATIENT)
Dept: INTERNAL MEDICINE | Facility: CLINIC | Age: 63
End: 2023-12-19
Payer: COMMERCIAL

## 2023-12-19 ENCOUNTER — LAB (OUTPATIENT)
Dept: INTERNAL MEDICINE | Facility: CLINIC | Age: 63
End: 2023-12-19

## 2023-12-19 VITALS
HEART RATE: 90 BPM | DIASTOLIC BLOOD PRESSURE: 76 MMHG | RESPIRATION RATE: 14 BRPM | TEMPERATURE: 98.2 F | SYSTOLIC BLOOD PRESSURE: 118 MMHG

## 2023-12-19 DIAGNOSIS — Z12.11 SCREEN FOR COLON CANCER: ICD-10-CM

## 2023-12-19 DIAGNOSIS — E78.00 HYPERCHOLESTEROLEMIA: ICD-10-CM

## 2023-12-19 DIAGNOSIS — I10 BENIGN ESSENTIAL HYPERTENSION: ICD-10-CM

## 2023-12-19 DIAGNOSIS — K21.00 GASTROESOPHAGEAL REFLUX DISEASE WITH ESOPHAGITIS WITHOUT HEMORRHAGE: ICD-10-CM

## 2023-12-19 DIAGNOSIS — T88.7XXS UNSPECIFIED ADVERSE EFFECT OF DRUG OR MEDICAMENT, SEQUELA (CODE): ICD-10-CM

## 2023-12-19 DIAGNOSIS — D50.9 IRON DEFICIENCY ANEMIA, UNSPECIFIED IRON DEFICIENCY ANEMIA TYPE: ICD-10-CM

## 2023-12-19 DIAGNOSIS — M81.0 OSTEOPOROSIS OF MULTIPLE SITES WITHOUT PATHOLOGICAL FRACTURE: ICD-10-CM

## 2023-12-19 DIAGNOSIS — E55.9 VITAMIN D DEFICIENCY: ICD-10-CM

## 2023-12-19 DIAGNOSIS — I73.9 PAD (PERIPHERAL ARTERY DISEASE) (H): ICD-10-CM

## 2023-12-19 DIAGNOSIS — E05.90 HYPERTHYROIDISM: ICD-10-CM

## 2023-12-19 DIAGNOSIS — Z00.00 ROUTINE GENERAL MEDICAL EXAMINATION AT A HEALTH CARE FACILITY: Primary | ICD-10-CM

## 2023-12-19 PROBLEM — R93.89 ABNORMAL CT OF THE CHEST: Status: RESOLVED | Noted: 2021-02-01 | Resolved: 2023-12-19

## 2023-12-19 PROBLEM — R05.3 PERSISTENT COUGH FOR 3 WEEKS OR LONGER: Status: RESOLVED | Noted: 2023-07-27 | Resolved: 2023-12-19

## 2023-12-19 PROBLEM — T81.89XA NONHEALING SURGICAL WOUND: Status: RESOLVED | Noted: 2018-11-14 | Resolved: 2023-12-19

## 2023-12-19 LAB
ALBUMIN SERPL BCG-MCNC: 4.6 G/DL (ref 3.5–5.2)
ALBUMIN UR-MCNC: NEGATIVE MG/DL
ALP SERPL-CCNC: 51 U/L (ref 40–150)
ALT SERPL W P-5'-P-CCNC: 8 U/L (ref 0–50)
ANION GAP SERPL CALCULATED.3IONS-SCNC: 12 MMOL/L (ref 7–15)
APPEARANCE UR: CLEAR
AST SERPL W P-5'-P-CCNC: 23 U/L (ref 0–45)
BACTERIA #/AREA URNS HPF: ABNORMAL /HPF
BILIRUB SERPL-MCNC: 0.3 MG/DL
BILIRUB UR QL STRIP: NEGATIVE
BUN SERPL-MCNC: 12.2 MG/DL (ref 8–23)
CALCIUM SERPL-MCNC: 9.4 MG/DL (ref 8.8–10.2)
CHLORIDE SERPL-SCNC: 102 MMOL/L (ref 98–107)
CHOLEST SERPL-MCNC: 234 MG/DL
COLOR UR AUTO: YELLOW
CREAT SERPL-MCNC: 0.65 MG/DL (ref 0.51–0.95)
DEPRECATED HCO3 PLAS-SCNC: 23 MMOL/L (ref 22–29)
EGFRCR SERPLBLD CKD-EPI 2021: >90 ML/MIN/1.73M2
ERYTHROCYTE [DISTWIDTH] IN BLOOD BY AUTOMATED COUNT: 16.2 % (ref 10–15)
FASTING STATUS PATIENT QL REPORTED: NO
GLUCOSE SERPL-MCNC: 89 MG/DL (ref 70–99)
GLUCOSE UR STRIP-MCNC: NEGATIVE MG/DL
HCT VFR BLD AUTO: 35 % (ref 35–47)
HDLC SERPL-MCNC: 59 MG/DL
HGB BLD-MCNC: 10.3 G/DL (ref 11.7–15.7)
HGB UR QL STRIP: ABNORMAL
KETONES UR STRIP-MCNC: 15 MG/DL
LDLC SERPL CALC-MCNC: 152 MG/DL
LEUKOCYTE ESTERASE UR QL STRIP: NEGATIVE
MCH RBC QN AUTO: 22.6 PG (ref 26.5–33)
MCHC RBC AUTO-ENTMCNC: 29.4 G/DL (ref 31.5–36.5)
MCV RBC AUTO: 77 FL (ref 78–100)
MUCOUS THREADS #/AREA URNS LPF: PRESENT /LPF
NITRATE UR QL: NEGATIVE
NONHDLC SERPL-MCNC: 175 MG/DL
PH UR STRIP: 5.5 [PH] (ref 5–8)
PLATELET # BLD AUTO: 175 10E3/UL (ref 150–450)
POTASSIUM SERPL-SCNC: 3.9 MMOL/L (ref 3.4–5.3)
PROT SERPL-MCNC: 7.2 G/DL (ref 6.4–8.3)
RBC # BLD AUTO: 4.55 10E6/UL (ref 3.8–5.2)
RBC #/AREA URNS AUTO: ABNORMAL /HPF
SODIUM SERPL-SCNC: 137 MMOL/L (ref 135–145)
SP GR UR STRIP: 1.02 (ref 1–1.03)
SQUAMOUS #/AREA URNS AUTO: ABNORMAL /LPF
T3 SERPL-MCNC: 119 NG/DL (ref 85–202)
T4 FREE SERPL-MCNC: 2.42 NG/DL (ref 0.9–1.7)
TRIGL SERPL-MCNC: 116 MG/DL
TSH SERPL DL<=0.005 MIU/L-ACNC: 0.04 UIU/ML (ref 0.3–4.2)
UROBILINOGEN UR STRIP-ACNC: 0.2 E.U./DL
VIT D+METAB SERPL-MCNC: 47 NG/ML (ref 20–50)
WBC # BLD AUTO: 6.9 10E3/UL (ref 4–11)
WBC #/AREA URNS AUTO: ABNORMAL /HPF

## 2023-12-19 PROCEDURE — 85027 COMPLETE CBC AUTOMATED: CPT | Performed by: INTERNAL MEDICINE

## 2023-12-19 PROCEDURE — 80053 COMPREHEN METABOLIC PANEL: CPT | Performed by: INTERNAL MEDICINE

## 2023-12-19 PROCEDURE — 99214 OFFICE O/P EST MOD 30 MIN: CPT | Mod: 25 | Performed by: INTERNAL MEDICINE

## 2023-12-19 PROCEDURE — 84480 ASSAY TRIIODOTHYRONINE (T3): CPT | Performed by: INTERNAL MEDICINE

## 2023-12-19 PROCEDURE — 81001 URINALYSIS AUTO W/SCOPE: CPT | Performed by: INTERNAL MEDICINE

## 2023-12-19 PROCEDURE — 82306 VITAMIN D 25 HYDROXY: CPT | Performed by: INTERNAL MEDICINE

## 2023-12-19 PROCEDURE — 36415 COLL VENOUS BLD VENIPUNCTURE: CPT | Performed by: INTERNAL MEDICINE

## 2023-12-19 PROCEDURE — 84439 ASSAY OF FREE THYROXINE: CPT | Performed by: INTERNAL MEDICINE

## 2023-12-19 PROCEDURE — 99396 PREV VISIT EST AGE 40-64: CPT | Performed by: INTERNAL MEDICINE

## 2023-12-19 PROCEDURE — 84443 ASSAY THYROID STIM HORMONE: CPT | Performed by: INTERNAL MEDICINE

## 2023-12-19 PROCEDURE — 80061 LIPID PANEL: CPT | Performed by: INTERNAL MEDICINE

## 2023-12-19 ASSESSMENT — ENCOUNTER SYMPTOMS
NAUSEA: 1
EYE PAIN: 0
PARESTHESIAS: 0
MYALGIAS: 0
ABDOMINAL PAIN: 0
JOINT SWELLING: 0
FEVER: 0
HEADACHES: 0
SORE THROAT: 0
DIZZINESS: 0
HEMATOCHEZIA: 0
CHILLS: 0
HEARTBURN: 1
CONSTIPATION: 0
FREQUENCY: 0
SHORTNESS OF BREATH: 0
PALPITATIONS: 0
COUGH: 0
NERVOUS/ANXIOUS: 0
ARTHRALGIAS: 1
HEMATURIA: 0
DYSURIA: 0
WEAKNESS: 0
BREAST MASS: 0
DIARRHEA: 0

## 2023-12-19 NOTE — PROGRESS NOTES
SUBJECTIVE:   Anna is a 63 year old, presenting for the followin-year-old woman here for her annual physical and to follow-up medical problems including peripheral arterial disease and nonhealing wound on right ankle, osteoporosis, hypothyroidism, hypertension and GERD.  See assessment and plan for details.  Physical        2023     2:30 PM   Additional Questions   Roomed by Susy JULIEN       Healthy Habits:     Getting at least 3 servings of Calcium per day:  Yes    Bi-annual eye exam:  Yes    Dental care twice a year:  NO    Sleep apnea or symptoms of sleep apnea:  None    Diet:  Regular (no restrictions)    Frequency of exercise:  4-5 days/week    Duration of exercise:  15-30 minutes    Taking medications regularly:  No    Barriers to taking medications:  None    Medication side effects:  Not applicable    Additional concerns today:  No                      Social History     Tobacco Use    Smoking status: Former     Types: Cigarettes     Quit date: 3/14/2003     Years since quittin.7     Passive exposure: Past    Smokeless tobacco: Never    Tobacco comments:     Quit    Substance Use Topics    Alcohol use: No     Comment: Alcoholic Drinks/day: rare             2023     5:08 PM   Alcohol Use   Prescreen: >3 drinks/day or >7 drinks/week? Not Applicable     Reviewed orders with patient.  Reviewed health maintenance and updated orders accordingly - Yes  Lab work is in process    Breast Cancer Screenin/18/2023     5:10 PM   Breast CA Risk Assessment (FHS-7)   Do you have a family history of breast, colon, or ovarian cancer? No / Unknown           Pertinent mammograms are reviewed under the imaging tab.    History of abnormal Pap smear: Status post benign hysterectomy. Health Maintenance and Surgical History updated.     Reviewed and updated as needed this visit by clinical staff   Tobacco  Allergies  Meds  Problems  Med Hx  Surg Hx  Fam Hx          Reviewed and updated  as needed this visit by Provider   Tobacco  Allergies  Meds  Problems  Med Hx  Surg Hx  Fam Hx         Past Medical History:   Diagnosis Date    Abdominal pain, epigastric 11/05/2019    Abnormal CT of the chest 02/01/2021    Abnormal TSH     TSH suppressed 0.18 and normal free T4, recheck in 3 months    Abscess of left lower leg 06/07/2021    Achilles tendinitis 07/26/2019    Achilles tendon infection     Allergic rhinitis 09/03/2020    Chronic cough 03/23/2020    CT chest with mild bronchiectasis and small area of consolidation    Deep vein thrombosis (DVT) of brachial vein of left upper extremity (H) 04/17/2018    Associated with PICC line, follow-up ultrasound showing resolution of thrombus    Dermatitis     Family history of myocardial infarction     dad 35  mother 70    Fatigue 02/24/2017    GERD (gastroesophageal reflux disease)     EGD with esophagitis 2015, experiencing chest pain and dysphagia    History of anesthesia complications     Hypertension     Hyperthyroidism 1/26/2023    Impaired fasting glucose     Glucose 119 February 2021.  A1c 5.3%    Iron deficiency anemia 12/18/2015    Has required iron infusions    Kidney stones     Microscopic hematuria 03/07/2021    Longstanding finding    Motion sickness     MRSA infection 08/2020    Left leg wound    Open wound of foot, complicated     Achilles tendon rupture complicated by infected open wound with multiple surgeries including skin grafting    Osteoporosis of multiple sites without pathological fracture     DEXA with T score -2.4 L1 L4, -3.2 left femoral neck and -2.6 right femoral neck.  DEXA February 2022 with significant improvement -2.6 left femoral neck and -2.3 right femoral neck    Ovarian cyst     Palpitations 11/04/2021    Event monitor showing sinus tachycardia no arrhythmia November 2021    PONV (postoperative nausea and vomiting)     SIRS (systemic inflammatory response syndrome) (H) 08/16/2019    SOB (shortness of breath) 11/04/2021     Subacute thyroiditis 10/15/2021    TSH less than 0.1 and abnormal thyroid uptake scan 2021, asymptomatic    Vitamin D deficiency 2020      Past Surgical History:   Procedure Laterality Date    ANKLE SURGERY      Multiple surgeries [id number] after complications from Achilles rupture and repair with nonhealing open wound including skin grafting    APPLY WOUND VAC Left 2020    Procedure: VERAFLO WOUND VAC APPLICATION;  Surgeon: Mitchell Her MD;  Location:  OR    APPLY WOUND VAC Left 11/10/2020    Procedure: WOUND VAC APPLICATION;  Surgeon: Mitchell Her MD;  Location:  OR    BACK SURGERY       SECTION      x3    GRAFT SKIN SPLIT THICKNESS FROM EXTREMITY Left 11/10/2020    Procedure: SURGICAL PROCUREMENT, SPLIT-THICKNESS SKIN GRAFT, HARVEST FROM LEFT THIGH TO LEFT LEG WOUND;  Surgeon: Mitchell Her MD;  Location:  OR    GYN SURGERY      hysterectomy,  x 3    GYN SURGERY      right oophorectomy    HYSTERECTOMY      IR LOWER EXTREMITY ANGIOGRAM LEFT  10/02/2020    IR TRANSCATH FOREIGN BODY REMOVAL  2008    IRRIGATION AND DEBRIDEMENT LOWER EXTREMITY, COMBINED Left 2020    Procedure: MISONIX IRRIGATION AND DEBRIDEMENT LOWER EXTREMITY WITH VASHE APPLICATION;  Surgeon: Mitchell Her MD;  Location:  OR    IRRIGATION AND DEBRIDEMENT LOWER EXTREMITY, COMBINED Left 12/10/2021    Procedure: IRRIGATION AND DEBRIDEMENT, LEFT LOWER EXTREMITY WITH WOUND VAC PLACEMENT;  Surgeon: Carie Wiley MD;  Location: [facility] OR    IRRIGATION AND DEBRIDEMENT LOWER EXTREMITY, COMBINED Left 2022    Procedure: LEFT CALF ACHILLES IRRIGATION AND DEBRIDEMENT;  Surgeon: Carie Wiley MD;  Location: [facility] OR    OOPHORECTOMY Right     PICC  2021         PICC AND MIDLINE TEAM LINE INSERTION  2018         PICC SINGLE LUMEN PLACEMENT  2022         SOFT TISSUE SURGERY      multiple left Achilles area surgeries    THORACIC SURGERY       VASCULAR SURGERY  09 2021       Review of Systems   Constitutional:  Negative for chills and fever.   HENT:  Negative for congestion, ear pain, hearing loss and sore throat.    Eyes:  Negative for pain and visual disturbance.   Respiratory:  Negative for cough and shortness of breath.    Cardiovascular:  Negative for chest pain, palpitations and peripheral edema.   Gastrointestinal:  Positive for heartburn and nausea. Negative for abdominal pain, constipation, diarrhea and hematochezia.   Breasts:  Negative for tenderness, breast mass and discharge.   Genitourinary:  Negative for dysuria, frequency, genital sores, hematuria, pelvic pain, urgency, vaginal bleeding and vaginal discharge.   Musculoskeletal:  Positive for arthralgias. Negative for joint swelling and myalgias.   Skin:  Positive for rash.   Neurological:  Negative for dizziness, weakness, headaches and paresthesias.   Psychiatric/Behavioral:  Negative for mood changes. The patient is not nervous/anxious.           OBJECTIVE:   /76 (BP Location: Right arm, Patient Position: Sitting, Cuff Size: Adult Regular)   Pulse 90   Temp 98.2  F (36.8  C) (Oral)   Resp 14   LMP  (LMP Unknown)   Breastfeeding No   Physical Exam  EYES: Eyelids, conjunctiva, and sclera were normal. Pupils were normal. Cornea, iris, and lens were normal bilaterally.  HEAD, EARS, NOSE, MOUTH, AND THROAT: Head and face were normal. TMs and external auditory canals are normal.  Oropharynx normal  NECK: Neck appearance was normal. There were no neck masses and the thyroid was not enlarged and no nodules are felt.  No lymphadenopathy.  RESPIRATORY: Breathing pattern was normal and the chest moved symmetrically.   Lung sounds were normal and there were no rales or wheezes.  CARDIOVASCULAR: Heart rate and rhythm were normal.  S1 and S2 were normal and there were no extra sounds or murmurs. Peripheral pulses in arms and legs were normal.  There was no peripheral edema.  No carotid  bruits.  GASTROINTESTINAL:  Bowel sounds were present.   Palpation detected no tenderness, mass, or enlarged organs.   MUSCULOSKELETAL: Skeletal configuration was normal and muscle mass was normal for age. Joint appearance was overall normal.  LYMPHATIC: There were no enlarged nodes.  SKIN/HAIR/NAILS: Skin color was normal.  There were no concerning skin lesions.  NEUROLOGIC: The patient was alert and oriented to person, place, time, and circumstance. Speech was normal. Cranial nerves were normal. Motor strength was normal for age. The patient was normally coordinated.  Sensation intact.  PSYCHIATRIC:  Mood and affect were normal and the patient had normal recent and remote memory. The patient's judgment and insight were normal.         ASSESSMENT/PLAN:   1. Routine general medical examination at a health care facility  Immunizations are reviewed and recommending COVID-vaccine and RSV booster. Living will discussed.  Information provided.  Former smoker quitting 20 years ago.  Uses alcohol minimally.  Regular exercise and good diet habits discussed.  She has never had a screening colonoscopy and we discussed completing Cologuard is a reasonable alternative for colon cancer screening.  Recommending annual mammogram for breast cancer screening.  She is overdue and asking her to get that scheduled ASAP.  No longer seeing OB/GYN with history of hysterectomy.  Recommending seeing an ophthalmologist every year.  Seeing a dentist every 6 months recommended.  Skin exam performed and recommending regular use of sunblock.  We discussed seeing a dermatologist every 1 to 2 years.  Hepatitis C antibody for screening was normal.  Will screen for diabetes with nonfasting glucose.  Checking lipid profile.   - Lipid Profile (Chol, Trig, HDL, LDL calc); Future  - Lipid Profile (Chol, Trig, HDL, LDL calc)    2. PAD (peripheral artery disease) (H24)  Chronically infected nonhealing left Achilles wound with peripheral arterial disease.   Wound is currently closed and she is followed closely by Dr. Wiley at [facility].  She continues to wear a walking boot.  Also followed by vascular surgery with previous revascularization procedure.    3. Osteoporosis of multiple sites without pathological fracture  Tolerating Prolia and she will be due for her eighth dose in January 2024.  Try to get adequate calcium in her diet and with supplements.  Will plan to repeat her DEXA after she has completed 10 doses.  We also discussed the need to hold Prolia for 6 months if she has any significant dental work done as she is planning next year.  She understands risk for osteonecrosis of the jaw in the setting.  - denosumab (PROLIA) injection 60 mg    4. Hyperthyroidism  Followed by endocrinology.  Thought to have low-level Graves'.  Continues on propylthiouracil and needs to follow-up with endocrinology.  I am rechecking thyroid studies today and will forward to her endocrinologist Dr. Andrade.    5. Benign essential hypertension  Blood pressure well-controlled with Toprol-XL 50 mg daily.  Also controlling palpitations.  - CBC with platelets; Future  - Comprehensive metabolic panel (BMP + Alb, Alk Phos, ALT, AST, Total. Bili, TP); Future  - UA Macroscopic with reflex to Microscopic and Culture - Lab Collect; Future  - CBC with platelets  - Comprehensive metabolic panel (BMP + Alb, Alk Phos, ALT, AST, Total. Bili, TP)  - UA Macroscopic with reflex to Microscopic and Culture - Lab Collect  - UA Microscopic with Reflex to Culture    6. Gastroesophageal reflux disease with esophagitis without hemorrhage  Despite pantoprazole 40 mg twice daily and Carafate, she is having ongoing reflux symptoms.  She might be a candidate for a Nissen fundoplication and I am referring her to [facility] to further discuss.  - Adult GI  Referral - Consult Only; Future    7. Iron deficiency anemia, unspecified iron deficiency anemia type  Chronic anemia,  currently not using iron supplements.  Rechecking CBC.  - CBC with platelets; Future  - CBC with platelets    8. Vitamin D deficiency  Using 50,000 units of vitamin D2 weekly.  Recheck vitamin D level  - Vitamin D deficiency screening; Future  - Vitamin D deficiency screening    9. Screen for colon cancer    - COLOGUALARA(EXACT SCIENCES); Future    10. Unspecified adverse effect of drug or medicament, sequela (CODE)    - denosumab (PROLIA) injection 60 mg           Patient has been advised of split billing requirements and indicates understanding: Yes            She reports that she quit smoking about 20 years ago. Her smoking use included cigarettes. She has been exposed to tobacco smoke. She has never used smokeless tobacco.          Jame Dunn MD  [facility]

## 2023-12-19 NOTE — PATIENT INSTRUCTIONS
Preventive Health Recommendations  Female Ages 50 - 64    Yearly exam: See your health care provider every year in order to  Review health changes.   Discuss preventive care.    Review your medicines if your doctor has prescribed any.    You do not need a Pap test if your uterus was removed (hysterectomy) and you have not had cancer.  You should be tested each year for STDs (sexually transmitted diseases) if you're at risk.   Have a mammogram every year.  Please call [phone] to schedule as you are long overdue  Cologuard is being ordered for colon cancer screening as alternative to colonoscopy.  Cholesterol annually  Diabetes screening  DEXA should be repeated after you have completed 10 doses of Prolia    Shots: Get a flu shot each year. Get a tetanus shot every 10 years.  Recommending COVID-vaccine and RSV vaccine    Nutrition:   Eat at least 5 servings of fruits and vegetables each day.  Eat whole-grain bread, whole-wheat pasta and brown rice instead of white grains and rice.  Get adequate Calcium and Vitamin D.  Make sure you are getting at least 1200 mg of calcium daily in your diet and with supplements    Lifestyle  Exercise at least 150 minutes a week (30 minutes a day, 5 days a week). This will help you control your weight and prevent disease.  Limit alcohol to one drink per day.  No smoking.   Wear sunscreen to prevent skin cancer.  Consider seeing a dermatologist every 1 to 2 years for total-body skin exam  See your dentist every six months for an exam and cleaning.  See your eye doctor every 1 to 2 years.

## 2023-12-28 DIAGNOSIS — E06.1 SUBACUTE THYROIDITIS: ICD-10-CM

## 2023-12-28 RX ORDER — PROPYLTHIOURACIL 50 MG/1
100 TABLET ORAL 2 TIMES DAILY
Qty: 360 TABLET | Refills: 0 | Status: SHIPPED | OUTPATIENT
Start: 2023-12-28 | End: 2024-05-15

## 2024-01-29 ENCOUNTER — LAB (OUTPATIENT)
Dept: LAB | Facility: CLINIC | Age: 64
End: 2024-01-29
Payer: COMMERCIAL

## 2024-01-29 ENCOUNTER — ALLIED HEALTH/NURSE VISIT (OUTPATIENT)
Dept: FAMILY MEDICINE | Facility: CLINIC | Age: 64
End: 2024-01-29
Payer: COMMERCIAL

## 2024-01-29 DIAGNOSIS — M81.0 OSTEOPOROSIS OF MULTIPLE SITES WITHOUT PATHOLOGICAL FRACTURE: Primary | ICD-10-CM

## 2024-01-29 DIAGNOSIS — E06.1 SUBACUTE THYROIDITIS: ICD-10-CM

## 2024-01-29 PROCEDURE — 96372 THER/PROPH/DIAG INJ SC/IM: CPT | Performed by: INTERNAL MEDICINE

## 2024-01-29 PROCEDURE — 84443 ASSAY THYROID STIM HORMONE: CPT

## 2024-01-29 PROCEDURE — 99207 PR NO CHARGE NURSE ONLY: CPT

## 2024-01-29 PROCEDURE — 36415 COLL VENOUS BLD VENIPUNCTURE: CPT

## 2024-01-29 PROCEDURE — 84439 ASSAY OF FREE THYROXINE: CPT

## 2024-01-29 PROCEDURE — 84480 ASSAY TRIIODOTHYRONINE (T3): CPT

## 2024-01-29 NOTE — PROGRESS NOTES
Indication: Prolia  (denosumab) is a prescription medicine used to treat osteoporosis in patients who:   Are at high risk for fracture, meaning patients who have had a fracture related to osteoporosis, or who have multiple risk factors for fracture   Cannot use another osteoporosis medicine or other osteoporosis medicines did not work well   The timeline for early/late injections would be 4 weeks early and any time after the 6 month yannick. If a patient receives their injection late, then the subsequent injection would be 6 months from the date that they actually received the injection    1.  When was the last injection?  07/27/2023  2.  Did they check with their insurance for this calendar year?  Yes  3.  Is there an order in the chart?  Yes  4.  Has the patient had dental work involving the bone in the past month or will have work in the next 6 months?  No  5.  Did you have the patient wait 15 minutes after the injection?  Yes  6.  Remember to use .injection under the medication notes    The following steps were completed to comply with the REMS program for Prolia:  Reviewed information in the Medication Guide and Patient Counseling Chart, including the serious risks of Prolia  and the symptoms of each risk.  Advised patient to seek prompt medical attention if they have signs or symptoms of any of the serious risks.  Provided each patient a copy of the Medication Guide and Patient Brochure.  Clinic Administered Medication Documentation      Prolia Documentation    Indication: Prolia  (denosumab) is a prescription medicine used to treat osteoporosis in patients who:   Are at high risk for fracture, meaning patients who have had a fracture related to osteoporosis, or who have multiple risk factors for fracture.  Cannot use another osteoporosis medicine or other osteoporosis medicines did not work well.  The timeline for early/late injections would be 4 weeks early and any time after the 6 month yannick. If a patient  receives their injection late, then the subsequent injection would be 6 months from the date that they actually received the injection.    When was the last injection? 2023  Was the last injection at least 6 months ago? Yes  Has the prior authorization been completed?  Yes  Is there an active order (written within the past 365 days, with administrations remaining, not ) in the chart?  Yes  Patient denies any dental work involving the bone (e.g. tooth extraction or dental implants) in the past 4 weeks?  Yes  Patient denies plans for any dental work involving the bone (e.g. tooth extraction or dental implants) in the next 4 weeks? Yes    The following steps were completed to comply with the REMS program for Prolia:  Reviewed information in the Medication Guide and Patient Counseling Chart, including the serious risks of Prolia  and the symptoms of each risk.  Advised patient to seek prompt medical attention if they have signs or symptoms of any of the serious risks.  Provided each patient a copy of the Medication Guide and Patient Brochure.    Prior to injection, verified patient identity using patient's name and date of birth. Medication was administered. Please see MAR and medication order for additional information. Patient instructed to remain in clinic for 15 minutes and report any adverse reaction to staff immediately.    Vial/Syringe: Syringe  Was this medication supplied by the patient? No  Verified that the patient has refills remaining in their prescription.     Jo Ann Wellington, RN, BSN

## 2024-01-30 LAB
T3 SERPL-MCNC: 98 NG/DL (ref 85–202)
T4 FREE SERPL-MCNC: 1.88 NG/DL (ref 0.9–1.7)
TSH SERPL DL<=0.005 MIU/L-ACNC: 0.25 UIU/ML (ref 0.3–4.2)

## 2024-02-02 ENCOUNTER — VIRTUAL VISIT (OUTPATIENT)
Dept: ENDOCRINOLOGY | Facility: CLINIC | Age: 64
End: 2024-02-02
Payer: COMMERCIAL

## 2024-02-02 DIAGNOSIS — E05.90 HYPERTHYROIDISM: Primary | ICD-10-CM

## 2024-02-02 DIAGNOSIS — E05.00 GRAVES' DISEASE: ICD-10-CM

## 2024-02-02 PROBLEM — E06.1 SUBACUTE THYROIDITIS: Status: RESOLVED | Noted: 2021-10-15 | Resolved: 2024-02-02

## 2024-02-02 PROCEDURE — 99214 OFFICE O/P EST MOD 30 MIN: CPT | Mod: 95 | Performed by: INTERNAL MEDICINE

## 2024-02-02 NOTE — LETTER
2024         RE: Anna Sanderson  [address]  [id number]        Dear Colleague,    Thank you for referring your patient, Anna Sanderson, to the [facility]. Please see a copy of my visit note below.      ENDOCRINOLOGY VIDEO FOLLOW-UP        HISTORY OF PRESENT ILLNESS    Anna Sanderson is seen in follow-up via billable video visit.    Follow-up thyroid function tests drawn in primary care showed progressing hyperthyroidism.  We increased PTU to 100 mg twice daily around 2023.  Patient notes that she was not able to get medication from pharmacy until the first week of January.  She has been taking PTU consistently since then.    She may have noted more palpitations and tremors with change in thyroid status.  She has also noted itchiness with change in thyroid function tests.    Pertinent endocrine and related history:  1.  Hyperthyroidism.   -First noted to have abnormal thyroid function tests in 2021: TSH was partially suppressed and free T4 normal.  She had persistent abnormalities that became more pronounced, with TSH becoming fully suppressed by 2021 and free T4 rising by 2021.  -Radioiodine uptake and scan performed on 10/12/2021 revealed lower uptake at 7.9% within the thyroid, with question of subacute thyroiditis.  -Initiated on methimazole in 2022.  The patient sent [employer] message in 2022 noting that she had been feeling unwell: Hot, nauseated and exhausted.  PTU was prescribed then.  -Family history is remarkable for 2 sisters with hypothyroidism.  Mother with papillary thyroid carcinoma who underwent thyroidectomy and had some postoperative complication; now , although thyroid cancer was not the cause of death.  -Thyroid ultrasound on 2023 showed a normal-appearing thyroid gland without nodules.  -Follow-up radioiodine uptake and scan performed on 2023 showed 13% uptake at 24 hours and a diffuse  pattern, without hot or cold nodules.      Pertinent Social History: , has 3 children and 2 grandchildren.  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    PAST MEDICAL HISTORY  Past Medical History:   Diagnosis Date     Abdominal pain, epigastric 11/05/2019     Abnormal CT of the chest 02/01/2021     Abnormal TSH     TSH suppressed 0.18 and normal free T4, recheck in 3 months     Abscess of left lower leg 06/07/2021     Achilles tendinitis 07/26/2019     Achilles tendon infection      Allergic rhinitis 09/03/2020     Chronic cough 03/23/2020    CT chest with mild bronchiectasis and small area of consolidation     Deep vein thrombosis (DVT) of brachial vein of left upper extremity (H) 04/17/2018    Associated with PICC line, follow-up ultrasound showing resolution of thrombus     Dermatitis      Family history of myocardial infarction     dad 35  mother 70     Fatigue 02/24/2017     GERD (gastroesophageal reflux disease)     EGD with esophagitis 2015, experiencing chest pain and dysphagia     History of anesthesia complications      Hypertension      Hyperthyroidism 1/26/2023     Impaired fasting glucose     Glucose 119 February 2021.  A1c 5.3%     Iron deficiency anemia 12/18/2015    Has required iron infusions     Kidney stones      Microscopic hematuria 03/07/2021    Longstanding finding     Motion sickness      MRSA infection 08/2020    Left leg wound     Open wound of foot, complicated     Achilles tendon rupture complicated by infected open wound with multiple surgeries including skin grafting     Osteoporosis of multiple sites without pathological fracture     DEXA with T score -2.4 L1 L4, -3.2 left femoral neck and -2.6 right femoral neck.  DEXA February 2022 with significant improvement -2.6 left femoral neck and -2.3 right femoral neck     Ovarian cyst      Palpitations 11/04/2021    Event monitor showing sinus tachycardia no arrhythmia November 2021     PONV (postoperative nausea  and vomiting)      SIRS (systemic inflammatory response syndrome) (H) 08/16/2019     SOB (shortness of breath) 11/04/2021     Subacute thyroiditis 10/15/2021    TSH less than 0.1 and abnormal thyroid uptake scan October 2021, asymptomatic     Vitamin D deficiency 01/22/2020       MEDICATIONS  Current Outpatient Medications   Medication Sig Dispense Refill     aspirin (ASA) 81 MG EC tablet Take 1 tablet (81 mg) by mouth daily 60 tablet 0     calcium citrate-vitamin D (CITRACAL) 315-250 MG-UNIT TABS per tablet Take 2 tablets by mouth daily       ibuprofen (ADVIL/MOTRIN) 200 MG tablet Take 200-800 mg by mouth 2 times daily        metoprolol succinate ER (TOPROL XL) 50 MG 24 hr tablet Take 1 tablet (50 mg) by mouth daily 90 tablet 3     ondansetron (ZOFRAN) 4 MG tablet Take 1 tablet (4 mg) by mouth every 6 hours as needed for nausea 30 tablet 0     pantoprazole (PROTONIX) 40 MG EC tablet Take 1 tablet (40 mg) by mouth 2 times daily 180 tablet 3     propylthiouracil (PTU) 50 MG tablet Take 2 tablets (100 mg) by mouth 2 times daily 360 tablet 0     sucralfate (CARAFATE) 1 GM tablet TAKE 1 TABLET(1 GRAM) BY MOUTH FOUR TIMES DAILY 360 tablet 3     vitamin D2 (ERGOCALCIFEROL) 52231 units (1250 mcg) capsule Take 1 capsule (50,000 Units) by mouth once a week Saturdays 13 capsule 3       Allergies, family, and social history were reviewed and documented as needed in EHR.     REVIEW OF SYSTEMS  A focused ROS was performed, with pertinent positives and negatives as noted in the HPI.    PHYSICAL EXAM  LMP  (LMP Unknown)   There is no height or weight on file to calculate BMI.  Constitutional: Patient is alert, oriented and appears in no acute distress.  Eyes: Eyes grossly normal to inspection, EOMI, no stare, lid lag, or retraction; no conjunctival injection.  ENMT: Lips are without lesions.   Neck: No visible goiter or neck mass.  Respiratory: No audible wheeze or cough. No visible cyanosis. No visible increased work of  breathing.  Neurological: Alert and oriented times 3.  Cranial nerves grossly intact.        DATA REVIEW  Each of the following laboratory and/or imaging studies were reviewed.         ASSESSMENT  1.  Hyperthyroidism.    ~Although serology is negative, based on diffuse uptake of radioiodine on nuclear medicine study, as well as waxing and waning nature of hyperthyroidism, suspect Graves' disease.  We did scan the pelvis on last radioiodine uptake and scan to rule out struma ovarii: My review of images does not indicate a discrete area of ovarian uptake.  ~Thyroid function tests are improving with increased dose of PTU.  Would continue current dose without changes and reevaluate thyroid function tests in 4 to 6 weeks.  ~Given persistence of hyperthyroidism, would consider definitive therapy (especially since she has had concerns about her tolerance of antithyroid drugs and I have reservations about continuing PTU long-term with its potentially serious, although rare, risk of hepatic side effects).  We reviewed long-term management options: If she prefers to continue antithyroid drug, would rechallenge with methimazole; other options include radioiodine therapy and thyroidectomy.  Ms. Ailyn Sanderson will give more thought to these options and would like to rediscuss in May before making a decision.    2.  Graves' disease.  As above, likely Graves' disease based on evolution of her thyroid function tests as well as pattern of radioiodine uptake and scan.  No indication of extrathyroidal manifestations.    3.  Osteoporosis.  Managed by PCP, therefore deferred.      PLAN  -Continue  mg (two 50 mg tablets) twice daily  -Lab draw in 4 to 6 weeks: We will adjust PTU dose as needed at that time based on lab results  -Return for a follow-up visit on 5/15/2024 as scheduled, with labs before visit  -We will communicate results via [employer], or if needed by phone      Orders Placed This Encounter   Procedures     Hepatic  function panel     TSH     T4 free     T3 total     Video start time: 3:48 PM  Video end time: 3:59 PM    Physician location: On site    Video platform: Zeina Andrade MD   Division of Diabetes, Endocrinology and Metabolism  Department of Medicine         Again, thank you for allowing me to participate in the care of your patient.        Sincerely,        HASMUKH Andrade MD

## 2024-02-02 NOTE — PATIENT INSTRUCTIONS
-Continue  mg (two 50 mg tablets) twice daily  -Lab draw in 4 to 6 weeks: We will adjust PTU dose as needed at that time based on lab results  -Return for a follow-up visit on 5/15/2024 as scheduled, with labs before visit  -We will communicate results via [employer], or if needed by phone

## 2024-02-02 NOTE — PROGRESS NOTES
ENDOCRINOLOGY VIDEO FOLLOW-UP        HISTORY OF PRESENT ILLNESS    Anna Sanderson is seen in follow-up via billable video visit.    Follow-up thyroid function tests drawn in primary care showed progressing hyperthyroidism.  We increased PTU to 100 mg twice daily around 2023.  Patient notes that she was not able to get medication from pharmacy until the first week of January.  She has been taking PTU consistently since then.    She may have noted more palpitations and tremors with change in thyroid status.  She has also noted itchiness with change in thyroid function tests.    Pertinent endocrine and related history:  1.  Hyperthyroidism.   -First noted to have abnormal thyroid function tests in 2021: TSH was partially suppressed and free T4 normal.  She had persistent abnormalities that became more pronounced, with TSH becoming fully suppressed by 2021 and free T4 rising by 2021.  -Radioiodine uptake and scan performed on 10/12/2021 revealed lower uptake at 7.9% within the thyroid, with question of subacute thyroiditis.  -Initiated on methimazole in 2022.  The patient sent [employer] message in 2022 noting that she had been feeling unwell: Hot, nauseated and exhausted.  PTU was prescribed then.  -Family history is remarkable for 2 sisters with hypothyroidism.  Mother with papillary thyroid carcinoma who underwent thyroidectomy and had some postoperative complication; now , although thyroid cancer was not the cause of death.  -Thyroid ultrasound on 2023 showed a normal-appearing thyroid gland without nodules.  -Follow-up radioiodine uptake and scan performed on 2023 showed 13% uptake at 24 hours and a diffuse pattern, without hot or cold nodules.      Pertinent Social History: , has 3 children and 2 grandchildren.  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    PAST MEDICAL HISTORY  Past Medical History:   Diagnosis Date    Abdominal pain, epigastric  11/05/2019    Abnormal CT of the chest 02/01/2021    Abnormal TSH     TSH suppressed 0.18 and normal free T4, recheck in 3 months    Abscess of left lower leg 06/07/2021    Achilles tendinitis 07/26/2019    Achilles tendon infection     Allergic rhinitis 09/03/2020    Chronic cough 03/23/2020    CT chest with mild bronchiectasis and small area of consolidation    Deep vein thrombosis (DVT) of brachial vein of left upper extremity (H) 04/17/2018    Associated with PICC line, follow-up ultrasound showing resolution of thrombus    Dermatitis     Family history of myocardial infarction     dad 35  mother 70    Fatigue 02/24/2017    GERD (gastroesophageal reflux disease)     EGD with esophagitis 2015, experiencing chest pain and dysphagia    History of anesthesia complications     Hypertension     Hyperthyroidism 1/26/2023    Impaired fasting glucose     Glucose 119 February 2021.  A1c 5.3%    Iron deficiency anemia 12/18/2015    Has required iron infusions    Kidney stones     Microscopic hematuria 03/07/2021    Longstanding finding    Motion sickness     MRSA infection 08/2020    Left leg wound    Open wound of foot, complicated     Achilles tendon rupture complicated by infected open wound with multiple surgeries including skin grafting    Osteoporosis of multiple sites without pathological fracture     DEXA with T score -2.4 L1 L4, -3.2 left femoral neck and -2.6 right femoral neck.  DEXA February 2022 with significant improvement -2.6 left femoral neck and -2.3 right femoral neck    Ovarian cyst     Palpitations 11/04/2021    Event monitor showing sinus tachycardia no arrhythmia November 2021    PONV (postoperative nausea and vomiting)     SIRS (systemic inflammatory response syndrome) (H) 08/16/2019    SOB (shortness of breath) 11/04/2021    Subacute thyroiditis 10/15/2021    TSH less than 0.1 and abnormal thyroid uptake scan October 2021, asymptomatic    Vitamin D deficiency 01/22/2020       MEDICATIONS  Current  Outpatient Medications   Medication Sig Dispense Refill    aspirin (ASA) 81 MG EC tablet Take 1 tablet (81 mg) by mouth daily 60 tablet 0    calcium citrate-vitamin D (CITRACAL) 315-250 MG-UNIT TABS per tablet Take 2 tablets by mouth daily      ibuprofen (ADVIL/MOTRIN) 200 MG tablet Take 200-800 mg by mouth 2 times daily       metoprolol succinate ER (TOPROL XL) 50 MG 24 hr tablet Take 1 tablet (50 mg) by mouth daily 90 tablet 3    ondansetron (ZOFRAN) 4 MG tablet Take 1 tablet (4 mg) by mouth every 6 hours as needed for nausea 30 tablet 0    pantoprazole (PROTONIX) 40 MG EC tablet Take 1 tablet (40 mg) by mouth 2 times daily 180 tablet 3    propylthiouracil (PTU) 50 MG tablet Take 2 tablets (100 mg) by mouth 2 times daily 360 tablet 0    sucralfate (CARAFATE) 1 GM tablet TAKE 1 TABLET(1 GRAM) BY MOUTH FOUR TIMES DAILY 360 tablet 3    vitamin D2 (ERGOCALCIFEROL) 21441 units (1250 mcg) capsule Take 1 capsule (50,000 Units) by mouth once a week Saturdays 13 capsule 3       Allergies, family, and social history were reviewed and documented as needed in EHR.     REVIEW OF SYSTEMS  A focused ROS was performed, with pertinent positives and negatives as noted in the HPI.    PHYSICAL EXAM  LMP  (LMP Unknown)   There is no height or weight on file to calculate BMI.  Constitutional: Patient is alert, oriented and appears in no acute distress.  Eyes: Eyes grossly normal to inspection, EOMI, no stare, lid lag, or retraction; no conjunctival injection.  ENMT: Lips are without lesions.   Neck: No visible goiter or neck mass.  Respiratory: No audible wheeze or cough. No visible cyanosis. No visible increased work of breathing.  Neurological: Alert and oriented times 3.  Cranial nerves grossly intact.        DATA REVIEW  Each of the following laboratory and/or imaging studies were reviewed.         ASSESSMENT  1.  Hyperthyroidism.    ~Although serology is negative, based on diffuse uptake of radioiodine on nuclear medicine study,  as well as waxing and waning nature of hyperthyroidism, suspect Graves' disease.  We did scan the pelvis on last radioiodine uptake and scan to rule out struma ovarii: My review of images does not indicate a discrete area of ovarian uptake.  ~Thyroid function tests are improving with increased dose of PTU.  Would continue current dose without changes and reevaluate thyroid function tests in 4 to 6 weeks.  ~Given persistence of hyperthyroidism, would consider definitive therapy (especially since she has had concerns about her tolerance of antithyroid drugs and I have reservations about continuing PTU long-term with its potentially serious, although rare, risk of hepatic side effects).  We reviewed long-term management options: If she prefers to continue antithyroid drug, would rechallenge with methimazole; other options include radioiodine therapy and thyroidectomy.  Ms. Ailyn Sanderson will give more thought to these options and would like to rediscuss in May before making a decision.    2.  Graves' disease.  As above, likely Graves' disease based on evolution of her thyroid function tests as well as pattern of radioiodine uptake and scan.  No indication of extrathyroidal manifestations.    3.  Osteoporosis.  Managed by PCP, therefore deferred.      PLAN  -Continue  mg (two 50 mg tablets) twice daily  -Lab draw in 4 to 6 weeks: We will adjust PTU dose as needed at that time based on lab results  -Return for a follow-up visit on 5/15/2024 as scheduled, with labs before visit  -We will communicate results via [employer], or if needed by phone      Orders Placed This Encounter   Procedures    Hepatic function panel    TSH    T4 free    T3 total     Video start time: 3:48 PM  Video end time: 3:59 PM    Physician location: On site    Video platform: Zeina Andrade MD   Division of Diabetes, Endocrinology and Metabolism  Department of Medicine

## 2024-02-16 ENCOUNTER — OFFICE VISIT (OUTPATIENT)
Dept: INTERNAL MEDICINE | Facility: CLINIC | Age: 64
End: 2024-02-16
Payer: COMMERCIAL

## 2024-02-16 VITALS
HEART RATE: 78 BPM | DIASTOLIC BLOOD PRESSURE: 82 MMHG | SYSTOLIC BLOOD PRESSURE: 130 MMHG | WEIGHT: 120 LBS | OXYGEN SATURATION: 91 % | TEMPERATURE: 98.7 F | RESPIRATION RATE: 14 BRPM | BODY MASS INDEX: 20.49 KG/M2 | HEIGHT: 64 IN

## 2024-02-16 DIAGNOSIS — R50.9 FEVER AND CHILLS: Primary | ICD-10-CM

## 2024-02-16 DIAGNOSIS — R11.0 NAUSEA: ICD-10-CM

## 2024-02-16 DIAGNOSIS — R10.10 UPPER ABDOMINAL PAIN: ICD-10-CM

## 2024-02-16 DIAGNOSIS — M54.9 ACUTE MID BACK PAIN: ICD-10-CM

## 2024-02-16 LAB
ALBUMIN SERPL BCG-MCNC: 4.1 G/DL (ref 3.5–5.2)
ALBUMIN UR-MCNC: NEGATIVE MG/DL
ALP SERPL-CCNC: 68 U/L (ref 40–150)
ALT SERPL W P-5'-P-CCNC: 8 U/L (ref 0–50)
ANION GAP SERPL CALCULATED.3IONS-SCNC: 11 MMOL/L (ref 7–15)
APPEARANCE UR: CLEAR
AST SERPL W P-5'-P-CCNC: 15 U/L (ref 0–45)
BACTERIA #/AREA URNS HPF: ABNORMAL /HPF
BILIRUB SERPL-MCNC: 0.3 MG/DL
BILIRUB UR QL STRIP: NEGATIVE
BUN SERPL-MCNC: 9.2 MG/DL (ref 8–23)
CALCIUM SERPL-MCNC: 8.9 MG/DL (ref 8.8–10.2)
CHLORIDE SERPL-SCNC: 106 MMOL/L (ref 98–107)
COLOR UR AUTO: YELLOW
CREAT SERPL-MCNC: 0.62 MG/DL (ref 0.51–0.95)
DEPRECATED HCO3 PLAS-SCNC: 22 MMOL/L (ref 22–29)
EGFRCR SERPLBLD CKD-EPI 2021: >90 ML/MIN/1.73M2
ERYTHROCYTE [DISTWIDTH] IN BLOOD BY AUTOMATED COUNT: 17 % (ref 10–15)
GLUCOSE SERPL-MCNC: 103 MG/DL (ref 70–99)
GLUCOSE UR STRIP-MCNC: NEGATIVE MG/DL
HCT VFR BLD AUTO: 32.3 % (ref 35–47)
HGB BLD-MCNC: 9.5 G/DL (ref 11.7–15.7)
HGB UR QL STRIP: ABNORMAL
KETONES UR STRIP-MCNC: ABNORMAL MG/DL
LEUKOCYTE ESTERASE UR QL STRIP: ABNORMAL
LIPASE SERPL-CCNC: 32 U/L (ref 13–60)
MCH RBC QN AUTO: 22.5 PG (ref 26.5–33)
MCHC RBC AUTO-ENTMCNC: 29.4 G/DL (ref 31.5–36.5)
MCV RBC AUTO: 77 FL (ref 78–100)
MUCOUS THREADS #/AREA URNS LPF: PRESENT /LPF
NITRATE UR QL: NEGATIVE
PH UR STRIP: 5.5 [PH] (ref 5–8)
PLATELET # BLD AUTO: 340 10E3/UL (ref 150–450)
POTASSIUM SERPL-SCNC: 4.6 MMOL/L (ref 3.4–5.3)
PROT SERPL-MCNC: 7.1 G/DL (ref 6.4–8.3)
RBC # BLD AUTO: 4.22 10E6/UL (ref 3.8–5.2)
RBC #/AREA URNS AUTO: ABNORMAL /HPF
SODIUM SERPL-SCNC: 139 MMOL/L (ref 135–145)
SP GR UR STRIP: 1.01 (ref 1–1.03)
SQUAMOUS #/AREA URNS AUTO: ABNORMAL /LPF
UROBILINOGEN UR STRIP-ACNC: 0.2 E.U./DL
WBC # BLD AUTO: 11.1 10E3/UL (ref 4–11)
WBC #/AREA URNS AUTO: ABNORMAL /HPF
WBC CLUMPS #/AREA URNS HPF: PRESENT /HPF

## 2024-02-16 PROCEDURE — 83690 ASSAY OF LIPASE: CPT | Performed by: INTERNAL MEDICINE

## 2024-02-16 PROCEDURE — 80053 COMPREHEN METABOLIC PANEL: CPT | Performed by: INTERNAL MEDICINE

## 2024-02-16 PROCEDURE — 87186 SC STD MICRODIL/AGAR DIL: CPT | Performed by: INTERNAL MEDICINE

## 2024-02-16 PROCEDURE — 87086 URINE CULTURE/COLONY COUNT: CPT | Performed by: INTERNAL MEDICINE

## 2024-02-16 PROCEDURE — 85027 COMPLETE CBC AUTOMATED: CPT | Performed by: INTERNAL MEDICINE

## 2024-02-16 PROCEDURE — 99214 OFFICE O/P EST MOD 30 MIN: CPT | Performed by: INTERNAL MEDICINE

## 2024-02-16 PROCEDURE — 36415 COLL VENOUS BLD VENIPUNCTURE: CPT | Performed by: INTERNAL MEDICINE

## 2024-02-16 PROCEDURE — 81001 URINALYSIS AUTO W/SCOPE: CPT | Performed by: INTERNAL MEDICINE

## 2024-02-16 RX ORDER — SULFAMETHOXAZOLE/TRIMETHOPRIM 800-160 MG
1 TABLET ORAL 2 TIMES DAILY
Qty: 20 TABLET | Refills: 0 | Status: SHIPPED | OUTPATIENT
Start: 2024-02-16 | End: 2024-02-19

## 2024-02-16 ASSESSMENT — ENCOUNTER SYMPTOMS: BACK PAIN: 1

## 2024-02-16 NOTE — PROGRESS NOTES
Assessment & Plan     Fever and chills associated with mid back pain  63-year-old woman with the acute onset of pain in her mid back associated with fevers chills and nausea.  Symptoms began 4 days ago.  Briefly improved yesterday but became severe again today including temperature up to 101.  No abdominal pain although there is tenderness with palpation of the left upper abdomen.  Bilateral pain with palpation and costophrenic angles.  She appears uncomfortable but is hemodynamically stable with /82 and heart rate 78.  Currently afebrile.  No emesis.  No diarrhea.  No respiratory symptoms including cough or shortness of breath.  Negative COVID test at home.  Etiology of symptoms is currently unclear.  With fevers and chills, presumably infectious process.  She has noticed some increased urinary frequency and pyelonephritis would be primary concern.  We also discussed pancreatitis, cholangitis, peptic ulcer disease as other possible causes.  Will check urinalysis along with labs including CBC, lipase and CMP.  - CBC with platelets; Future  - Comprehensive metabolic panel (BMP + Alb, Alk Phos, ALT, AST, Total. Bili, TP); Future  - Lipase; Future  - UA Macroscopic with reflex to Microscopic and Culture - Lab Collect; Future      Upper abdominal pain associate with nausea  Exam with some tenderness with palpation left upper quadrant.  She takes pantoprazole 40 mg daily.  - CBC with platelets; Future  - Comprehensive metabolic panel (BMP + Alb, Alk Phos, ALT, AST, Total. Bili, TP); Future  - Lipase; Future  - UA Macroscopic with reflex to Microscopic and Culture - Lab Collect; Future                  Follow-up for annual physical in December 2024    Carlitos Castillo is a 63 year old, presenting for the following health issues:  Back Pain (Mid back pain, behind lungs, chills, shakes, started 4 days ago, neg covid tests)      2/16/2024    10:24 AM   Additional Questions   Roomed by Bertha NUGENT     Back Pain  "    History of Present Illness       Back Pain:  She presents for follow up of back pain. Patient's back pain is a new problem.    Original cause of back pain: other  First noticed back pain: in the last week  Patient feels back pain: constantlyLocation of back pain:  Other  Description of back pain: shooting  Back pain spreads: nowhere    Since patient first noticed back pain, pain is: always present, but gets better and worse  Does back pain interfere with her job:  Not applicable  On a scale of 1-10 (10 being the worst), patient describes pain as:  7  What makes back pain worse: bending and coughing   Acupuncture: not tried  Acetaminophen: not tried  Activity or exercise: not tried  Chiropractor:  Not tried  Cold: not tried  Heat: not tried  Massage: not tried  Muscle relaxants: not tried  NSAIDS: helpful  Opioids: not tried  Physical Therapy: not tried  Rest: not tried  Steroid Injection: not tried  Stretching: not tried  Surgery: not tried  TENS unit: not tried  Topical pain relievers: not tried  Other healthcare providers patient is seeing for back pain: None    She eats 4 or more servings of fruits and vegetables daily.She consumes 0 sweetened beverage(s) daily.She exercises with enough effort to increase her heart rate 20 to 29 minutes per day.    She is taking medications regularly.           Review of Systems  Constitutional, HEENT, cardiovascular, pulmonary, gi and gu systems are negative, except as otherwise noted.      Objective    /82 (BP Location: Right arm, Patient Position: Sitting, Cuff Size: Adult Regular)   Pulse 78   Temp 98.7  F (37.1  C)   Resp 14   Ht 1.626 m (5' 4\")   Wt 54.4 kg (120 lb)   LMP  (LMP Unknown)   SpO2 91%   BMI 20.60 kg/m    Body mass index is 20.6 kg/m .  Physical Exam   Middle-age woman who appears uncomfortable but not acutely ill.  Vital stable.  Currently afebrile  Lungs clear bilaterally  Heart regular rate and rhythm  Tenderness with palpation over " bilateral costophrenic angles  Abdomen soft with some tenderness left upper quadrant but no rebound and no hepatosplenomegaly or masses          Signed Electronically by: Jame Dunn MD

## 2024-02-17 LAB
BACTERIA UR CULT: ABNORMAL
BACTERIA UR CULT: ABNORMAL

## 2024-02-19 DIAGNOSIS — R50.9 FEVER AND CHILLS: Primary | ICD-10-CM

## 2024-02-19 RX ORDER — CIPROFLOXACIN 500 MG/1
500 TABLET, FILM COATED ORAL 2 TIMES DAILY
Qty: 14 TABLET | Refills: 0 | Status: SHIPPED | OUTPATIENT
Start: 2024-02-19 | End: 2024-02-26

## 2024-02-28 ENCOUNTER — OFFICE VISIT (OUTPATIENT)
Dept: FAMILY MEDICINE | Facility: CLINIC | Age: 64
End: 2024-02-28
Payer: COMMERCIAL

## 2024-02-28 ENCOUNTER — E-VISIT (OUTPATIENT)
Dept: URGENT CARE | Facility: CLINIC | Age: 64
End: 2024-02-28
Payer: COMMERCIAL

## 2024-02-28 VITALS
SYSTOLIC BLOOD PRESSURE: 139 MMHG | DIASTOLIC BLOOD PRESSURE: 83 MMHG | HEART RATE: 103 BPM | TEMPERATURE: 98.1 F | OXYGEN SATURATION: 99 % | RESPIRATION RATE: 16 BRPM

## 2024-02-28 DIAGNOSIS — Z53.9 DIAGNOSIS NOT YET DEFINED: Primary | ICD-10-CM

## 2024-02-28 DIAGNOSIS — J11.1 INFLUENZA-LIKE ILLNESS: Primary | ICD-10-CM

## 2024-02-28 PROCEDURE — 99207 PR NON-BILLABLE SERV PER CHARTING: CPT | Performed by: PHYSICIAN ASSISTANT

## 2024-02-28 PROCEDURE — 99213 OFFICE O/P EST LOW 20 MIN: CPT | Performed by: PHYSICIAN ASSISTANT

## 2024-02-28 RX ORDER — FLUTICASONE PROPIONATE 50 MCG
2 SPRAY, SUSPENSION (ML) NASAL DAILY
Qty: 9.9 ML | Refills: 0 | Status: SHIPPED | OUTPATIENT
Start: 2024-02-28 | End: 2024-03-14

## 2024-02-28 NOTE — PATIENT INSTRUCTIONS
Dear Anna Sanderson,    We are sorry you are not feeling well. Based on the responses you provided, it is recommended that you be seen in-person in urgent care so we can better evaluate your symptoms. Please click here to find the nearest urgent care location to you.   You will not be charged for this Visit. Thank you for trusting us with your care.    Sandra Valencia PA-C

## 2024-02-28 NOTE — PATIENT INSTRUCTIONS
Cough    You were seen today for a cough. This is likely due to a virus and will improve over the next 1-2 weeks on its own.    Symptom management:  - Drink plenty of non-caffeinated fluids  - Avoid smoke exposure  - May use tylenol or ibuprofen for discomfort  - Drink a warm non-caffeinated tea with honey  - Place a warm humidifier in your bedroom at night  - [employer] VaporRub  - Zinc    Reasons to return for re-evaluation:  - Develop a fever 100.4 or higher, current fever worsens, or fever does not improve in 72 hours  - Difficulty breathing or shortness of breath  - Cough continues to worsen including coughing up blood or coughing up thick, colored phlegm  - Unable to tolerate fluids    Otherwise, if symptoms have not improved in 7 days, follow-up with your primary care provider.

## 2024-02-28 NOTE — PROGRESS NOTES
Assessment & Plan:      Problem List Items Addressed This Visit    None  Visit Diagnoses       Influenza-like illness    -  Primary    Relevant Medications    fluticasone (FLONASE) 50 MCG/ACT nasal spray          Medical Decision Making  Patient presents with influenza-like illness for 3 to 4 days wondering if she would benefit from a short course of oral steroids.  Physical exam today appears reassuring with no wheezing or signs of respiratory distress on lung auscultation.  Given patient's early onset of symptoms with no history of asthma, see no indication to start on oral steroids at this time.  Discussed in length my concerns for starting steroids as they do come with side effects and risk for worse infection if used inappropriately.  Patient expressed understanding and appreciation.  Instead recommend trial of Flonase nasal steroid spray.  Otherwise continue with conservative measures of honey and zinc.  Discussed treatment and symptomatic care.  Allergies and medication interactions reviewed.  Discussed signs of worsening symptoms and when to follow-up with PCP if no symptom improvement.     Subjective:      Anna Sanderson is a 63 year old female here for evaluation of cough and fevers.  Onset of symptoms was 3 to 4 days ago.  Patient did have known exposure to influenza.  She received her influenza vaccine this season.  Patient has had severe cough in the past that usually improve with a short course of oral steroids.  No history of asthma diagnosis.  She has tried albuterol inhaler and Tessalon Perles with minimal improvement.     The following portions of the patient's history were reviewed and updated as appropriate: allergies, current medications, and problem list.     Review of Systems  Pertinent items are noted in HPI.    Allergies  Allergies   Allergen Reactions    Other Drug Allergy (See Comments) Other (See Comments)     CHLORAPREP Blisters.    Morphine Hives    Adhesive Tape Rash        Family History   Problem Relation Age of Onset    Coronary Artery Disease Mother         91yo    Thyroid Disease Mother     Osteoporosis Mother     Coronary Artery Disease Father     Alzheimer Disease Sister     Cerebrovascular Disease Sister     Hypertension Sister     No Known Problems Sister     No Known Problems Brother     Aortic dissection Brother     No Known Problems Daughter     No Known Problems Son     No Known Problems Son     Breast Cancer No family hx of        Social History     Tobacco Use    Smoking status: Former     Types: Cigarettes     Quit date: 3/14/2003     Years since quittin.9     Passive exposure: Past    Smokeless tobacco: Never    Tobacco comments:     Quit    Substance Use Topics    Alcohol use: No     Comment: Alcoholic Drinks/day: rare        Objective:      /83   Pulse 103   Temp 98.1  F (36.7  C) (Oral)   Resp 16   LMP  (LMP Unknown)   SpO2 99%   General appearance - alert, well appearing, and in no distress and non-toxic  Ears - TMs intact with moderate mucoid fluid and bulging bilaterally, no erythema  Nose - normal and patent, no erythema, discharge or polyps  Mouth - mucous membranes moist, pharynx normal without lesions  Neck - supple, no significant adenopathy  Chest - clear to auscultation, no wheezes, rales or rhonchi, symmetric air entry  Heart - normal rate, regular rhythm, normal S1, S2, no murmurs, rubs, clicks or gallops    The use of Dragon/[employer] dictation services was used to construct the content of this note; any grammatical errors are non-intentional. Please contact the author directly if you are in need of any clarification.

## 2024-03-14 DIAGNOSIS — J11.1 INFLUENZA-LIKE ILLNESS: ICD-10-CM

## 2024-03-15 RX ORDER — FLUTICASONE PROPIONATE 50 MCG
2 SPRAY, SUSPENSION (ML) NASAL DAILY
Qty: 9.9 ML | Refills: 5 | Status: SHIPPED | OUTPATIENT
Start: 2024-03-15 | End: 2024-07-22

## 2024-03-28 NOTE — CONSULTS
Consultation - [facility]  Anna Fish,  1960, MRN [id number]    [facility]  Dehiscence of operative wound [T81.31XA]  Painful ankle [M25.579]  Abscess of left lower leg [L02.416]    PCP: Jame Dunn, [phone]   Code status:  Full Code       Extended Emergency Contact Information  Primary Emergency Contact: yadira fish  Home Phone: [phone]  Relation: Son  Secondary Emergency Contact: Sher Fish  Address: [address] [place] [address] [facility]  Home Phone: [phone]  Mobile Phone: [phone]  Relation: Spouse       Assessment and Plan   Active Problems:    Abscess of left lower leg    Impression: Recurrent infection left lower extremity.    As she is growing 4+ gram-negative bacilli while on Cipro, likely this is a Cipro resistant organism.    Incidentally found to have COVID-19 infection.  She has received 3 doses of vaccine and is not immunocompromised.    Recommendations: Continue vancomycin given history of MRSA, but would add Zosyn at this time to cover for gram-negative bacilli.    No indication for additional treatment for COVID-19 at this time.    If she is growing Cipro resistant Pseudomonas aeruginosa, she will require discharge with intravenous antimicrobial chemotherapy.    Thank you for consulting [facility]    Florentin Mcneil MD  [phone]     Chief Complaint <principal problem not specified>       HPI   We have been requested by Dr. Carie Wiley to evaluate Anna Fish for left lower extremity infection who is a 61 year old year old female.    Patient is well-known to our service due to multiple previous infections.  Reviewing her chart, she has had various organisms including Klebsiella, MRSA, staph lugdunensis, Pseudomonas aeruginosa which is the most recent organism.    She was discharged from the hospital about a month  ago and has been on Cipro since then.    She now returns with increasing drainage from her left lower extremity wound.  She says this was associated with feeling bad.  Do not have any documented fevers.    She came in for an I&D, and preop screening came up positive for COVID-19.    She lives with her , child and grandchild who goes to .    She denies any dyspnea, cough or shortness of breath.  She denies nausea, vomiting, diarrhea, rash.     Medical History  Patient Active Problem List   Diagnosis     Nonhealing surgical wound     Achilles tendinitis     Achilles tendon infection     Atopic dermatitis, unspecified type     Gastroesophageal reflux disease with esophagitis     Iron deficiency anemia     Osteoporosis of multiple sites without pathological fracture     Vitamin D deficiency     Skin ulcer of left ankle with necrosis of muscle (H)     Non-healing surgical wound, subsequent encounter     MTHFR gene mutation (H) Heterozygoyus April 2021, [facility]     Abnormal CT of the chest     Abnormal TSH     Microscopic hematuria     Subacute thyroiditis     Abscess of left lower leg     Benign essential hypertension     Gastroesophageal reflux disease without esophagitis     PAD (peripheral artery disease) (H)     SOB (shortness of breath)     Palpitations     Past Medical History:   Diagnosis Date     Abdominal pain, epigastric 11/05/2019     Abnormal CT of the chest 02/01/2021     Abnormal TSH     TSH suppressed 0.18 and normal free T4, recheck in 3 months     Abscess of left lower leg 06/07/2021     Achilles tendon infection      Allergic rhinitis 09/03/2020     Chronic cough 03/23/2020    CT chest with mild bronchiectasis and small area of consolidation     Deep vein thrombosis (DVT) of brachial vein of left upper extremity (H) 04/17/2018    Associated with PICC line, follow-up ultrasound showing resolution of thrombus     Dermatitis      Family history of myocardial infarction     dad 35   mother 70     Fatigue 2017     GERD (gastroesophageal reflux disease)     EGD with esophagitis , experiencing chest pain and dysphagia     History of anesthesia complications      Hypertension      Impaired fasting glucose     Glucose 119 2021.  A1c 5.3%     Iron deficiency anemia 2015    Has required iron infusions     Kidney stones      Microscopic hematuria 2021    Longstanding finding     Motion sickness      MRSA infection 2020    Left leg wound     Open wound of foot, complicated     Achilles tendon rupture complicated by infected open wound with multiple surgeries including skin grafting     Osteoporosis of multiple sites without pathological fracture     DEXA with T score -2.4 L1 L4, -3.2 left femoral neck and -2.6 right femoral neck     Ovarian cyst      Palpitations 2021    Event monitor showing sinus tachycardia no arrhythmia 2021     PONV (postoperative nausea and vomiting)      SIRS (systemic inflammatory response syndrome) (H) 2019     SOB (shortness of breath) 2021     Subacute thyroiditis 10/15/2021    TSH less than 0.1 and abnormal thyroid uptake scan 2021, asymptomatic     Vitamin D deficiency 2020    Surgical History  She  has a past surgical history that includes Soft tissue surgery; IR Lower Extremity Angiogram Left (10/2/2020); GYN surgery (); GYN surgery; Irrigation and debridement lower extremity, combined (Left, 2020); Apply Wound Vac (Left, 2020); Graft skin split thickness from extremity (Left, 11/10/2020); Apply Wound Vac (Left, 11/10/2020); IR Foreign Body Removal (2008); Ankle surgery;  Section; Oophorectomy (Right, ); Hysterectomy (); Picc And Midline Team Line Insertion (3/23/2018); Picc (2021); and Irrigation and debridement lower extremity, combined (Left, 12/10/2021).   Social History  Reviewed, and she  reports that she quit smoking about 18 years ago. Her smoking  use included cigarettes. She has never used smokeless tobacco. She reports that she does not drink alcohol and does not use drugs.   Allergies  Allergies   Allergen Reactions     No Clinical Screening - See Comments Dermatitis and Rash     CHLORAPREP  Skin blisters- in the past     Morphine Hives     Adhesive Tape Rash     Other Drug Allergy (See Comments) Other (See Comments)     CHLORAPREP Blisters.    Family History  Noncontributory to current problem except as mentioned above    Psychosocial Needs  Social History     Social History Narrative    .  to Sher. 3 children       Additional psychosocial needs reviewed per nursing assessment.     Prior to Admission Medications   Facility-Administered Medications Prior to Admission   Medication Dose Route Frequency Provider Last Rate Last Admin     denosumab (PROLIA) injection 60 mg  60 mg Subcutaneous Q6 Months Jame Dunn MD   60 mg at 09/29/21 1323     Medications Prior to Admission   Medication Sig Dispense Refill Last Dose     hydrOXYzine (ATARAX) 25 MG tablet Take 1 tablet (25 mg) by mouth every 6 hours as needed for other (adjuvant pain) 20 tablet 0 Unknown at Unknown time     ibuprofen (ADVIL/MOTRIN) 200 MG tablet Take 200-800 mg by mouth 2 times daily    1/11/2022 at 0700     metoprolol succinate ER (TOPROL-XL) 25 MG 24 hr tablet Take 25 mg by mouth every evening    1/11/2022 at 2100     ondansetron (ZOFRAN) 4 MG tablet Take 4 mg by mouth every 6 hours as needed    1/11/2022 at 0700     pantoprazole (PROTONIX) 40 MG EC tablet Take 40 mg by mouth 2 times daily    1/12/2022 at 0600     vitamin D2 (ERGOCALCIFEROL) 95422 units (1250 mcg) capsule Take 1 capsule (50,000 Units) by mouth once a week Saturdays 13 capsule 3 1/8/2022 at Unknown time          Review of Systems:  Pertinent items are noted in HPI., otherwise all others negative. Physical Exam:  Temp:  [97.4  F (36.3  C)-98.3  F (36.8  C)] 98.3  F (36.8  C)  Pulse:  [65-95]  "65  Resp:  [16-25] 16  BP: (110-159)/(56-93) 115/60  SpO2:  [94 %-100 %] 98 %    /60 (BP Location: Right arm)   Pulse 65   Temp 98.3  F (36.8  C) (Oral)   Resp 16   Ht 1.626 m (5' 4.02\")   Wt 54 kg (119 lb)   SpO2 98%   BMI 20.42 kg/m    General appearance: alert, appears stated age and cooperative  Head: Normocephalic, without obvious abnormality, atraumatic  Eyes: EOMI, no icterus  Neck: no adenopathy and supple, symmetrical, trachea midline  Lungs: clear to auscultation bilaterally  Heart: RRR, no murmur  Abdomen: soft, non-tender; bowel sounds normal; no masses,  no organomegaly  Extremities: Left lower extremity wrapped in postoperative dressing.  Skin: Skin color, texture, turgor normal. No rashes or lesions  Neurologic: Grossly normal       Pertinent Labs  Lab Results: personally reviewed.   WBC   Date/Time Value Ref Range Status   10/02/2020 08:45 AM 6.8 4.0 - 11.0 10e9/L Final   04/11/2018 02:35 PM 6.0 4.0 - 11.0 10e9/L Final   03/28/2018 09:25 AM 7.6 4.0 - 11.0 10e9/L Final     WBC Count   Date/Time Value Ref Range Status   01/06/2022 08:40 AM 6.9 4.0 - 11.0 10e3/uL Final   12/13/2021 06:05 AM 5.8 4.0 - 11.0 10e3/uL Final   12/12/2021 06:30 AM 7.0 4.0 - 11.0 10e3/uL Final     Hemoglobin   Date/Time Value Ref Range Status   01/13/2022 05:58 AM 8.3 (L) 11.7 - 15.7 g/dL Final   01/06/2022 08:40 AM 9.4 (L) 11.7 - 15.7 g/dL Final   12/13/2021 06:05 AM 8.6 (L) 11.7 - 15.7 g/dL Final   10/02/2020 08:45 AM 11.6 (L) 11.7 - 15.7 g/dL Final   04/11/2018 02:35 PM 9.9 (L) 11.7 - 15.7 g/dL Final   03/28/2018 09:25 AM 9.9 (L) 11.7 - 15.7 g/dL Final     Hematocrit   Date/Time Value Ref Range Status   01/06/2022 08:40 AM 32.5 (L) 35.0 - 47.0 % Final   12/13/2021 06:05 AM 28.7 (L) 35.0 - 47.0 % Final   12/12/2021 06:30 AM 27.5 (L) 35.0 - 47.0 % Final   10/02/2020 08:45 AM 36.5 35.0 - 47.0 % Final   04/11/2018 02:35 PM 32.5 (L) 35.0 - 47.0 % Final   03/28/2018 09:25 AM 32.5 (L) 35.0 - 47.0 % Final     Platelet " Count   Date/Time Value Ref Range Status   01/06/2022 08:40  150 - 450 10e3/uL Final   12/13/2021 06:05  150 - 450 10e3/uL Final   12/12/2021 06:30  150 - 450 10e3/uL Final   10/02/2020 08:45  150 - 450 10e9/L Final   04/11/2018 02:35  150 - 450 10e9/L Final   03/28/2018 09:25  (H) 150 - 450 10e9/L Final        Sodium   Date/Time Value Ref Range Status   12/10/2021 05:57  136 - 145 mmol/L Final   12/09/2021 07:28  136 - 145 mmol/L Final   06/28/2021 09:00  136 - 145 mmol/L Final   01/20/2010 09:30  133 - 144 mmol/L Final   09/04/2009 10:25  133 - 144 mmol/L Final   06/12/2009 10:39  133 - 144 mmol/L Final     Carbon Dioxide   Date/Time Value Ref Range Status   01/20/2010 09:30 AM 26 20 - 32 mmol/L Final   09/04/2009 10:25 AM 26 20 - 32 mmol/L Final   06/12/2009 10:39 AM 25 20 - 32 mmol/L Final     Carbon Dioxide (CO2)   Date/Time Value Ref Range Status   12/10/2021 05:57 AM 22 22 - 31 mmol/L Final   12/09/2021 07:28 PM 19 (L) 22 - 31 mmol/L Final   06/28/2021 09:00 AM 23 22 - 31 mmol/L Final     Urea Nitrogen   Date/Time Value Ref Range Status   12/10/2021 05:57 AM 11 8 - 22 mg/dL Final   12/09/2021 07:28 PM 11 8 - 22 mg/dL Final   06/28/2021 09:00 AM 10 8 - 22 mg/dL Final   04/11/2018 02:35 PM 13 7 - 30 mg/dL Final   03/28/2018 09:25 AM 7 7 - 30 mg/dL Final   01/20/2010 09:30 AM 13 5 - 24 mg/dL Final     CRP Inflammation   Date Value Ref Range Status   04/11/2018 <2.9 0.0 - 8.0 mg/L Final     CRP   Date Value Ref Range Status   01/06/2022 0.8 (H) 0.0-<0.8 mg/dL Final     Collected Updated Procedure Result Status    01/12/2022 1526 01/12/2022 2039 Gram Stain [[id number]]   (Abnormal)   Tissue from Leg, Below Knee, Left    Final result Component Value   Gram Stain Result 2+ Gram negative bacilli Abnormal    Gram Stain Result 1+ Gram positive cocci Abnormal    Gram Stain Result 3+ WBC seen Abnormal    Predominantly PMNs           01/12/2022 1526  01/13/2022 1131 Tissue Aerobic Bacterial Culture Routine [[id number]]   (Abnormal)   Tissue from Leg, Below Knee, Left    Preliminary result Component Value   Culture Culture in progress P    2+ Gram negative bacilli Abnormal  P           01/12/2022 1518 01/12/2022 1558 Anaerobic Bacterial Culture Routine [[id number]]   Swab from Leg, Below Knee, Left    In process Component Value   No component results           01/12/2022 1518 01/12/2022 1958 Gram Stain [[id number]]   (Abnormal)   Swab from Leg, Below Knee, Left    Final result Component Value   Gram Stain Result 3+ Gram positive cocci Abnormal    Gram Stain Result 2+ Gram negative bacilli Abnormal    Gram Stain Result 1+ WBC seen Abnormal            01/12/2022 1518 01/13/2022 1144 Swab Aerobic Bacterial Culture Routine [[id number]]   (Abnormal)   Swab from Leg, Below Knee, Left    Preliminary result Component Value   Culture Culture in progress P    4+ Gram negative bacilli Abnormal  P           01/12/2022 1517 01/12/2022 1558 Anaerobic Bacterial Culture Routine [[id number]]   Swab from Leg, Below Knee, Left    In process Component Value   No component results           01/12/2022 1517 01/12/2022 1948 Gram Stain [[id number]]   (Abnormal)   Swab from Leg, Below Knee, Left    Final result Component Value   Gram Stain Result 1+ Gram positive cocci Abnormal    Gram Stain Result 2+ WBC seen Abnormal            01/12/2022 1517 01/13/2022 1136 Swab Aerobic Bacterial Culture Routine [[id number]]   (Abnormal)   Swab from Leg, Below Knee, Left    Preliminary result Component Value   Culture Culture in progress P    2+ Gram negative bacilli Abnormal  P           01/11/2022 1049 01/11/2022 1841 Asymptomatic COVID-19 Virus (Coronavirus) by PCR Nose [[id number]]    (Abnormal)   Swab from Nose    Final result Component Value   SARS CoV2 PCR Positive Abnormal    POSITIVE: SARS-CoV-2 (COVID-19) RNA detected, presumed positive.           12/10/2021 1145 12/10/2021  Detail Level: Zone 1255 Acid-Fast Bacilli Culture and Stain [[id number]]    Tissue from Leg, Below Knee, Left    In process Component Value   No component results           12/10/2021 1145 12/17/2021 0816 Anaerobic Bacterial Culture Routine [[id number]]   Tissue from Leg, Below Knee, Left    Final result Component Value   Culture No anaerobic organisms isolated           12/10/2021 1145 01/07/2022 0810 Fungal or Yeast Culture Routine [[id number]]   Tissue from Leg, Below Knee, Left    Final result Component Value   Culture No Growth           12/10/2021 1145 12/13/2021 0352 Tissue Aerobic Bacterial Culture Routine [[id number]]    (Abnormal)   Tissue from Leg, Below Knee, Left    Final result Component Value   Culture 1+ Pseudomonas aeruginosa Abnormal     1+ Pseudomonas aeruginosa Abnormal            12/10/2021 1145 12/12/2021 2050 Acid-Fast Bacilli Culture and Stain [[id number]]    Tissue from Leg, Below Knee, Left    Preliminary result Component Value   Acid Fast Stain No acid fast bacilli seen P   Performed by [facility],[address] [place] [id number] [phone], Carie Sabillon MD, Lab. Director           12/10/2021 1142 12/10/2021 1247 Acid-Fast Bacilli Culture and Stain [[id number]]    Tissue from Leg, Below Knee, Left    In process Component Value   No component results           12/10/2021 1142 12/17/2021 0814 Anaerobic Bacterial Culture Routine [[id number]]   Tissue from Leg, Below Knee, Left    Final result Component Value   Culture No anaerobic organisms isolated           12/10/2021 1142 01/07/2022 0810 Fungal or Yeast Culture Routine [[id number]]   Tissue from Leg, Below Knee, Left    Final result Component Value   Culture No Growth           12/10/2021 1142 12/12/2021 1225 Tissue Aerobic Bacterial Culture Routine [[id number]]   (Abnormal)   Tissue from Leg, Below Knee, Left    Final result Component Value   Culture 3+ Pseudomonas aeruginosa Abnormal     Susceptibilities done on  previous cultures    1+ Staphylococcus caprae Abnormal     Identification obtained by MALDI-TOF mass spectrometry research use only database. Test characteristics determined and verified by the [facility].   Susceptibilities not routinely done                  Pertinent Radiology  Radiology Results:     MR Tibia Fibula Lower Leg Left wo & w Contr    Result Date: 12/9/2021  EXAM: MR TIBIA FIBULA LOWER LEG LEFT WO and W CONTR LOCATION: [facility] DATE/TIME: 12/9/2021 7:39 PM INDICATION: Soft tissue infection. COMPARISON: None. TECHNIQUE: Routine. Additional postgadolinium T1 sequences were obtained. IV CONTRAST: 5ml gadavist FINDINGS: There is an ulcer in the posterior aspect of the lower calf and ankle. A soft tissue defect extends from the skin to the center of the Achilles tendon, where there is an abscess but dissects into the muscles of the posterior compartment (axial image 45).  This abscess measures 2.4 x 1.2 cm in short axis, 10 cm from superior to inferior (axial postcontrast series 9 images 8-3 6). There is infectious myositis throughout the posterior deep posterior compartments, with soft tissue edema extending along the fascial planes and the interosseous membrane. There is no abnormal fascial enhancement to specifically indicate acute fasciitis. Soft tissue edema and enhancement in the lower calf is consistent with cellulitis. There are defects in the superficial adipose tissue over the posterior medial aspect of the left calf, which are sequela of prior surgery and/or ulceration. There is chronic severe atrophy and fatty infiltration of the gastrocnemius muscle, consistent with relative disuse. No atrophy elsewhere. Mild reactive edema within the muscle fibers of the anterior compartment, related to infection/inflammation along the interosseous membrane. Of infectious myositis involving the muscles of the lateral compartment, inferiorly. Aside from  "the central defect in the distal Achilles tendon, no other tendon tear. The flexor, extensor, and peroneal tendons are intact at the ankle. Proximal tendons appear intact. No evidence of osteomyelitis or reactive osteitis. No knee or ankle joint effusion. Normal joint alignment.     IMPRESSION: 1.  Skin ulcer in the posterior aspect of the lower calf/healed, with a sinus tract projecting to the third of the Achilles tendon, and an abscess within the posterior muscle compartments of the lower calf, measuring 2.4 x 1.2 x 10 cm. 2.  Edema/infectious myositis involving the muscle fibers in the posterior, deep posterior, lateral, and anterior compartments as described above. 3.  No fascial enhancement to specifically indicate fasciitis. 4.  Soft tissue deficiency over the posterior and medial aspect lower calf suggesting prior surgical debridement. 5.  Skin edema and enhancement in the lower calf suggesting cellulitis. 6.  No evidence of osteomyelitis/reactive osteitis.    POC US Guidance Needle Placement    Result Date: 12/10/2021  Ultrasound was performed as guidance to an anesthesia procedure.  Click \"PACS images\" hyperlink below to view any stored images.  For specific procedure details, view procedure note authored by anesthesia.               "

## 2024-05-04 ENCOUNTER — LAB (OUTPATIENT)
Dept: LAB | Facility: CLINIC | Age: 64
End: 2024-05-04
Payer: COMMERCIAL

## 2024-05-04 DIAGNOSIS — Z00.00 HEALTH CARE MAINTENANCE: Primary | ICD-10-CM

## 2024-05-04 DIAGNOSIS — E05.00 GRAVES' DISEASE: ICD-10-CM

## 2024-05-04 DIAGNOSIS — E05.90 HYPERTHYROIDISM: ICD-10-CM

## 2024-05-04 LAB
ALBUMIN SERPL BCG-MCNC: 4.7 G/DL (ref 3.5–5.2)
ALP SERPL-CCNC: 54 U/L (ref 40–150)
ALT SERPL W P-5'-P-CCNC: 10 U/L (ref 0–50)
AST SERPL W P-5'-P-CCNC: 25 U/L (ref 0–45)
BILIRUB DIRECT SERPL-MCNC: <0.2 MG/DL (ref 0–0.3)
BILIRUB SERPL-MCNC: 0.3 MG/DL
HOLD SPECIMEN: NORMAL
PROT SERPL-MCNC: 7.3 G/DL (ref 6.4–8.3)
T3 SERPL-MCNC: 126 NG/DL (ref 85–202)
T4 FREE SERPL-MCNC: 2.34 NG/DL (ref 0.9–1.7)
TSH SERPL DL<=0.005 MIU/L-ACNC: 0.04 UIU/ML (ref 0.3–4.2)

## 2024-05-04 PROCEDURE — 36415 COLL VENOUS BLD VENIPUNCTURE: CPT

## 2024-05-04 PROCEDURE — 80076 HEPATIC FUNCTION PANEL: CPT

## 2024-05-04 PROCEDURE — 84480 ASSAY TRIIODOTHYRONINE (T3): CPT

## 2024-05-04 PROCEDURE — 84443 ASSAY THYROID STIM HORMONE: CPT

## 2024-05-04 PROCEDURE — 84439 ASSAY OF FREE THYROXINE: CPT

## 2024-05-15 ENCOUNTER — OFFICE VISIT (OUTPATIENT)
Dept: ENDOCRINOLOGY | Facility: CLINIC | Age: 64
End: 2024-05-15
Payer: COMMERCIAL

## 2024-05-15 VITALS
BODY MASS INDEX: 20.94 KG/M2 | DIASTOLIC BLOOD PRESSURE: 78 MMHG | HEART RATE: 88 BPM | SYSTOLIC BLOOD PRESSURE: 136 MMHG | WEIGHT: 122 LBS

## 2024-05-15 DIAGNOSIS — E05.00 GRAVES' DISEASE: Primary | ICD-10-CM

## 2024-05-15 PROCEDURE — 99214 OFFICE O/P EST MOD 30 MIN: CPT | Performed by: INTERNAL MEDICINE

## 2024-05-15 RX ORDER — PROPYLTHIOURACIL 50 MG/1
100 TABLET ORAL 3 TIMES DAILY
Qty: 540 TABLET | Refills: 0 | Status: SHIPPED | OUTPATIENT
Start: 2024-05-15 | End: 2024-08-06 | Stop reason: ALTCHOICE

## 2024-05-15 NOTE — LETTER
5/15/2024         RE: Anna Sanderson  [address]  [address] [facility] [address]        Dear Colleague,    Thank you for referring your patient, Anna Sanderson, to the [facility]. Please see a copy of my visit note below.      ENDOCRINOLOGY FOLLOW-UP        HISTORY OF PRESENT ILLNESS    Anna Sanderson (prefers Anna) is seen in follow-up.    The patient has continued on  mg twice daily: She has been taking this consistently.    Previsit labs show persistent hyperthyroidism: She has noted increased irritability and heat intolerance.  Continues to experience intermittent palpitations, as well as itching.  Has rare tremors.    Has dry eyes requiring daily eyedrops.  Otherwise no vision changes (aside from needing a change in eyeglass prescription due to change in visual acuity) or congestion.    Pertinent endocrine and related history:  1.  Hyperthyroidism.   -First noted to have abnormal thyroid function tests in 2021: TSH was partially suppressed and free T4 normal.  She had persistent abnormalities that became more pronounced, with TSH becoming fully suppressed by 2021 and free T4 rising by 2021.  -Radioiodine uptake and scan performed on 10/12/2021 revealed lower uptake at 7.9% within the thyroid, with question of subacute thyroiditis.  -Initiated on methimazole in 2022.  The patient sent "[employer]t message in 2022 noting that she had been feeling unwell: Hot, nauseated and exhausted.  PTU was prescribed then.  -Family history is remarkable for 2 sisters with hypothyroidism.  Mother with papillary thyroid carcinoma who underwent thyroidectomy and had some postoperative complication; now , although thyroid cancer was not the cause of death.  -Thyroid ultrasound on 2023 showed a normal-appearing thyroid gland without nodules.  -Follow-up radioiodine uptake and scan performed on 2023 showed 13% uptake at 24 hours and a diffuse  pattern, without hot or cold nodules.    Pertinent Social History: , has 3 children and 2 grandchildren.  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    PAST MEDICAL HISTORY  Past Medical History:   Diagnosis Date     Abdominal pain, epigastric 11/05/2019     Abnormal CT of the chest 02/01/2021     Abnormal TSH     TSH suppressed 0.18 and normal free T4, recheck in 3 months     Abscess of left lower leg 06/07/2021     Achilles tendinitis 07/26/2019     Achilles tendon infection      Allergic rhinitis 09/03/2020     Chronic cough 03/23/2020    CT chest with mild bronchiectasis and small area of consolidation     Deep vein thrombosis (DVT) of brachial vein of left upper extremity (H) 04/17/2018    Associated with PICC line, follow-up ultrasound showing resolution of thrombus     Dermatitis      Family history of myocardial infarction     dad 35  mother 70     Fatigue 02/24/2017     GERD (gastroesophageal reflux disease)     EGD with esophagitis 2015, experiencing chest pain and dysphagia     History of anesthesia complications      Hypertension      Hyperthyroidism 1/26/2023     Impaired fasting glucose     Glucose 119 February 2021.  A1c 5.3%     Iron deficiency anemia 12/18/2015    Has required iron infusions     Kidney stones      Microscopic hematuria 03/07/2021    Longstanding finding     Motion sickness      MRSA infection 08/2020    Left leg wound     Open wound of foot, complicated     Achilles tendon rupture complicated by infected open wound with multiple surgeries including skin grafting     Osteoporosis of multiple sites without pathological fracture     DEXA with T score -2.4 L1 L4, -3.2 left femoral neck and -2.6 right femoral neck.  DEXA February 2022 with significant improvement -2.6 left femoral neck and -2.3 right femoral neck     Ovarian cyst      Palpitations 11/04/2021    Event monitor showing sinus tachycardia no arrhythmia November 2021     PONV (postoperative nausea  and vomiting)      SIRS (systemic inflammatory response syndrome) (H) 08/16/2019     SOB (shortness of breath) 11/04/2021     Subacute thyroiditis 10/15/2021    TSH less than 0.1 and abnormal thyroid uptake scan October 2021, asymptomatic     Vitamin D deficiency 01/22/2020       MEDICATIONS  Current Outpatient Medications   Medication Sig Dispense Refill     aspirin (ASA) 81 MG EC tablet Take 1 tablet (81 mg) by mouth daily 60 tablet 0     calcium citrate-vitamin D (CITRACAL) 315-250 MG-UNIT TABS per tablet Take 2 tablets by mouth daily       fluticasone (FLONASE) 50 MCG/ACT nasal spray Spray 2 sprays into both nostrils daily 9.9 mL 5     ibuprofen (ADVIL/MOTRIN) 200 MG tablet Take 200-800 mg by mouth 2 times daily        metoprolol succinate ER (TOPROL XL) 50 MG 24 hr tablet Take 1 tablet (50 mg) by mouth daily 90 tablet 3     ondansetron (ZOFRAN) 4 MG tablet Take 1 tablet (4 mg) by mouth every 6 hours as needed for nausea 30 tablet 0     pantoprazole (PROTONIX) 40 MG EC tablet Take 1 tablet (40 mg) by mouth 2 times daily 180 tablet 3     propylthiouracil (PTU) 50 MG tablet Take 2 tablets (100 mg) by mouth 3 times daily 540 tablet 0     sucralfate (CARAFATE) 1 GM tablet TAKE 1 TABLET(1 GRAM) BY MOUTH FOUR TIMES DAILY 360 tablet 3     vitamin D2 (ERGOCALCIFEROL) 34968 units (1250 mcg) capsule Take 1 capsule (50,000 Units) by mouth once a week Saturdays 13 capsule 3       Allergies, family, and social history were reviewed and documented as needed in EHR.     REVIEW OF SYSTEMS  A focused ROS was performed, with pertinent positives and negatives as noted in the HPI.    PHYSICAL EXAM  /78 (BP Location: Right arm, Patient Position: Sitting, Cuff Size: Adult Regular)   Pulse 88   Wt 55.3 kg (122 lb)   LMP  (LMP Unknown)   BMI 20.94 kg/m    Body mass index is 20.94 kg/m .  Constitutional: Patient is alert, oriented and appears in no acute distress.  Eyes: Eyes grossly normal to inspection, EOMI, no stare, lid  lag, or retraction; no conjunctival injection.  ENMT: Lips are without lesions.   Neck: No visible goiter or neck mass.  Cardiovascular: Regular rate and rhythm with normal S1 and S2.  Respiratory: No audible wheeze or cough. No visible cyanosis. No visible increased work of breathing.  Neurological: Alert and oriented times 3.  No tremor.    DATA REVIEW  Each of the following laboratory and/or imaging studies were reviewed.          ASSESSMENT  1.  Hyperthyroidism.    ~Although serology is negative, based on diffuse uptake of radioiodine on nuclear medicine study, as well as waxing and waning nature of hyperthyroidism, suspect Graves' disease.  We did scan the pelvis on last radioiodine uptake and scan to rule out struma ovarii: My review of images does not indicate a discrete area of ovarian uptake.  ~Thyroid function tests are worsening, with progressing hyperthyroidism.  We will increase dose of PTU.   ~Once more, given persistence of hyperthyroidism, would recommend either definitive therapy or rechallenge with methimazole (I have reservations with indefinite use of PTU given its potential for adverse hepatic side effects).  We again reviewed long-term management options: If she prefers to continue antithyroid drug, would rechallenge with methimazole; other options include radioiodine therapy and thyroidectomy (we discussed lifelong thyroid hormone requirements with the latter 2 options, potential exacerbation of thyroid eye disease with radioiodine therapy, and the typical side effects of thyroidectomy including bleeding, infection and hypoparathyroidism).  Ms. Ailyn Sanderson will give more thought to these options--she would like to see if her granddaughter, who lives with her, would be able to maintain distance so that she can follow precautions after radioiodine therapy.  If this is feasible, she is inclined to pursue radioiodine therapy but will let me know her preferences.    2.  Graves' disease.  As above,  likely Graves' disease based on evolution of her thyroid function tests as well as pattern of radioiodine uptake and scan.  No indication of active thyroid eye disease.    3.  Osteoporosis.  Managed by PCP, therefore deferred.      PLAN  -Increase  mg (two 50 mg tablets) three times daily (if having difficulty with three times daily dosing, can change to 150 mg--3 tablets--twice daily)  -Lab draw in 4 to 6 weeks  -Give some thought to treatment options: re-challenge with methimazole, radioiodine or surgery, and update me with preference  -Return for a follow-up visit in 2-3 months, with labs before visit  -We will communicate results via [employer], or if needed by phone      Orders Placed This Encounter   Procedures     TSH     T4 free     T3 total     Hepatic function panel     I spent a total of 31 minutes on the date of encounter reviewing medical records, evaluating the patient, coordinating care and documenting in the EHR, as detailed above.    Isaias Andrade MD   Division of Diabetes, Endocrinology and Metabolism  Department of Medicine         Again, thank you for allowing me to participate in the care of your patient.        Sincerely,        Isaias Andrade MD

## 2024-05-15 NOTE — PATIENT INSTRUCTIONS
-Increase  mg (two 50 mg tablets) three times daily.  If having difficulty with three times daily dosing, can change to 150 mg (3 tablets) twice daily.  -Lab draw in 4 to 6 weeks  -Give some thought to treatment options: re-challenge with methimazole, radioiodine or surgery, and update me with preference  -Return for a follow-up visit in 2-3 months, with labs before visit  -We will communicate results via [facility], or if needed by phone

## 2024-05-15 NOTE — PROGRESS NOTES
ENDOCRINOLOGY FOLLOW-UP        HISTORY OF PRESENT ILLNESS    Anna Sanderson (prefers Anna) is seen in follow-up.    The patient has continued on  mg twice daily: She has been taking this consistently.    Previsit labs show persistent hyperthyroidism: She has noted increased irritability and heat intolerance.  Continues to experience intermittent palpitations, as well as itching.  Has rare tremors.    Has dry eyes requiring daily eyedrops.  Otherwise no vision changes (aside from needing a change in eyeglass prescription due to change in visual acuity) or congestion.    Pertinent endocrine and related history:  1.  Hyperthyroidism.   -First noted to have abnormal thyroid function tests in 2021: TSH was partially suppressed and free T4 normal.  She had persistent abnormalities that became more pronounced, with TSH becoming fully suppressed by 2021 and free T4 rising by 2021.  -Radioiodine uptake and scan performed on 10/12/2021 revealed lower uptake at 7.9% within the thyroid, with question of subacute thyroiditis.  -Initiated on methimazole in 2022.  The patient sent [employer] message in 2022 noting that she had been feeling unwell: Hot, nauseated and exhausted.  PTU was prescribed then.  -Family history is remarkable for 2 sisters with hypothyroidism.  Mother with papillary thyroid carcinoma who underwent thyroidectomy and had some postoperative complication; now , although thyroid cancer was not the cause of death.  -Thyroid ultrasound on 2023 showed a normal-appearing thyroid gland without nodules.  -Follow-up radioiodine uptake and scan performed on 2023 showed 13% uptake at 24 hours and a diffuse pattern, without hot or cold nodules.    Pertinent Social History: , has 3 children and 2 grandchildren.  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    PAST MEDICAL HISTORY  Past Medical History:   Diagnosis Date    Abdominal pain, epigastric  11/05/2019    Abnormal CT of the chest 02/01/2021    Abnormal TSH     TSH suppressed 0.18 and normal free T4, recheck in 3 months    Abscess of left lower leg 06/07/2021    Achilles tendinitis 07/26/2019    Achilles tendon infection     Allergic rhinitis 09/03/2020    Chronic cough 03/23/2020    CT chest with mild bronchiectasis and small area of consolidation    Deep vein thrombosis (DVT) of brachial vein of left upper extremity (H) 04/17/2018    Associated with PICC line, follow-up ultrasound showing resolution of thrombus    Dermatitis     Family history of myocardial infarction     dad 35  mother 70    Fatigue 02/24/2017    GERD (gastroesophageal reflux disease)     EGD with esophagitis 2015, experiencing chest pain and dysphagia    History of anesthesia complications     Hypertension     Hyperthyroidism 1/26/2023    Impaired fasting glucose     Glucose 119 February 2021.  A1c 5.3%    Iron deficiency anemia 12/18/2015    Has required iron infusions    Kidney stones     Microscopic hematuria 03/07/2021    Longstanding finding    Motion sickness     MRSA infection 08/2020    Left leg wound    Open wound of foot, complicated     Achilles tendon rupture complicated by infected open wound with multiple surgeries including skin grafting    Osteoporosis of multiple sites without pathological fracture     DEXA with T score -2.4 L1 L4, -3.2 left femoral neck and -2.6 right femoral neck.  DEXA February 2022 with significant improvement -2.6 left femoral neck and -2.3 right femoral neck    Ovarian cyst     Palpitations 11/04/2021    Event monitor showing sinus tachycardia no arrhythmia November 2021    PONV (postoperative nausea and vomiting)     SIRS (systemic inflammatory response syndrome) (H) 08/16/2019    SOB (shortness of breath) 11/04/2021    Subacute thyroiditis 10/15/2021    TSH less than 0.1 and abnormal thyroid uptake scan October 2021, asymptomatic    Vitamin D deficiency 01/22/2020       MEDICATIONS  Current  Outpatient Medications   Medication Sig Dispense Refill    aspirin (ASA) 81 MG EC tablet Take 1 tablet (81 mg) by mouth daily 60 tablet 0    calcium citrate-vitamin D (CITRACAL) 315-250 MG-UNIT TABS per tablet Take 2 tablets by mouth daily      fluticasone (FLONASE) 50 MCG/ACT nasal spray Spray 2 sprays into both nostrils daily 9.9 mL 5    ibuprofen (ADVIL/MOTRIN) 200 MG tablet Take 200-800 mg by mouth 2 times daily       metoprolol succinate ER (TOPROL XL) 50 MG 24 hr tablet Take 1 tablet (50 mg) by mouth daily 90 tablet 3    ondansetron (ZOFRAN) 4 MG tablet Take 1 tablet (4 mg) by mouth every 6 hours as needed for nausea 30 tablet 0    pantoprazole (PROTONIX) 40 MG EC tablet Take 1 tablet (40 mg) by mouth 2 times daily 180 tablet 3    propylthiouracil (PTU) 50 MG tablet Take 2 tablets (100 mg) by mouth 3 times daily 540 tablet 0    sucralfate (CARAFATE) 1 GM tablet TAKE 1 TABLET(1 GRAM) BY MOUTH FOUR TIMES DAILY 360 tablet 3    vitamin D2 (ERGOCALCIFEROL) 44576 units (1250 mcg) capsule Take 1 capsule (50,000 Units) by mouth once a week Saturdays 13 capsule 3       Allergies, family, and social history were reviewed and documented as needed in EHR.     REVIEW OF SYSTEMS  A focused ROS was performed, with pertinent positives and negatives as noted in the HPI.    PHYSICAL EXAM  /78 (BP Location: Right arm, Patient Position: Sitting, Cuff Size: Adult Regular)   Pulse 88   Wt 55.3 kg (122 lb)   LMP  (LMP Unknown)   BMI 20.94 kg/m    Body mass index is 20.94 kg/m .  Constitutional: Patient is alert, oriented and appears in no acute distress.  Eyes: Eyes grossly normal to inspection, EOMI, no stare, lid lag, or retraction; no conjunctival injection.  ENMT: Lips are without lesions.   Neck: No visible goiter or neck mass.  Cardiovascular: Regular rate and rhythm with normal S1 and S2.  Respiratory: No audible wheeze or cough. No visible cyanosis. No visible increased work of breathing.  Neurological: Alert and  oriented times 3.  No tremor.    DATA REVIEW  Each of the following laboratory and/or imaging studies were reviewed.          ASSESSMENT  1.  Hyperthyroidism.    ~Although serology is negative, based on diffuse uptake of radioiodine on nuclear medicine study, as well as waxing and waning nature of hyperthyroidism, suspect Graves' disease.  We did scan the pelvis on last radioiodine uptake and scan to rule out struma ovarii: My review of images does not indicate a discrete area of ovarian uptake.  ~Thyroid function tests are worsening, with progressing hyperthyroidism.  We will increase dose of PTU.   ~Once more, given persistence of hyperthyroidism, would recommend either definitive therapy or rechallenge with methimazole (I have reservations with indefinite use of PTU given its potential for adverse hepatic side effects).  We again reviewed long-term management options: If she prefers to continue antithyroid drug, would rechallenge with methimazole; other options include radioiodine therapy and thyroidectomy (we discussed lifelong thyroid hormone requirements with the latter 2 options, potential exacerbation of thyroid eye disease with radioiodine therapy, and the typical side effects of thyroidectomy including bleeding, infection and hypoparathyroidism).  Ms. Ailyn Sanderson will give more thought to these options--she would like to see if her granddaughter, who lives with her, would be able to maintain distance so that she can follow precautions after radioiodine therapy.  If this is feasible, she is inclined to pursue radioiodine therapy but will let me know her preferences.    2.  Graves' disease.  As above, likely Graves' disease based on evolution of her thyroid function tests as well as pattern of radioiodine uptake and scan.  No indication of active thyroid eye disease.    3.  Osteoporosis.  Managed by PCP, therefore deferred.      PLAN  -Increase  mg (two 50 mg tablets) three times daily (if having  difficulty with three times daily dosing, can change to 150 mg--3 tablets--twice daily)  -Lab draw in 4 to 6 weeks  -Give some thought to treatment options: re-challenge with methimazole, radioiodine or surgery, and update me with preference  -Return for a follow-up visit in 2-3 months, with labs before visit  -We will communicate results via [employer], or if needed by phone      Orders Placed This Encounter   Procedures    TSH    T4 free    T3 total    Hepatic function panel     I spent a total of 31 minutes on the date of encounter reviewing medical records, evaluating the patient, coordinating care and documenting in the EHR, as detailed above.    Isaias Andrade MD   Division of Diabetes, Endocrinology and Metabolism  Department of Medicine

## 2024-07-22 ENCOUNTER — OFFICE VISIT (OUTPATIENT)
Dept: INTERNAL MEDICINE | Facility: CLINIC | Age: 64
End: 2024-07-22
Payer: COMMERCIAL

## 2024-07-22 VITALS
RESPIRATION RATE: 16 BRPM | HEART RATE: 53 BPM | OXYGEN SATURATION: 99 % | WEIGHT: 122 LBS | BODY MASS INDEX: 20.83 KG/M2 | DIASTOLIC BLOOD PRESSURE: 80 MMHG | HEIGHT: 64 IN | TEMPERATURE: 98.4 F | SYSTOLIC BLOOD PRESSURE: 134 MMHG

## 2024-07-22 DIAGNOSIS — E05.00 GRAVES' DISEASE: ICD-10-CM

## 2024-07-22 DIAGNOSIS — K21.00 GASTROESOPHAGEAL REFLUX DISEASE WITH ESOPHAGITIS WITHOUT HEMORRHAGE: ICD-10-CM

## 2024-07-22 DIAGNOSIS — R00.2 PALPITATIONS: ICD-10-CM

## 2024-07-22 DIAGNOSIS — D50.9 IRON DEFICIENCY ANEMIA, UNSPECIFIED IRON DEFICIENCY ANEMIA TYPE: ICD-10-CM

## 2024-07-22 DIAGNOSIS — R07.9 CHEST PAIN, UNSPECIFIED TYPE: Primary | ICD-10-CM

## 2024-07-22 DIAGNOSIS — I10 BENIGN ESSENTIAL HYPERTENSION: ICD-10-CM

## 2024-07-22 PROBLEM — R50.9 FEVER AND CHILLS: Status: RESOLVED | Noted: 2024-02-16 | Resolved: 2024-07-22

## 2024-07-22 LAB
ALBUMIN SERPL BCG-MCNC: 4.5 G/DL (ref 3.5–5.2)
ALP SERPL-CCNC: 51 U/L (ref 40–150)
ALT SERPL W P-5'-P-CCNC: 10 U/L (ref 0–50)
ANION GAP SERPL CALCULATED.3IONS-SCNC: 11 MMOL/L (ref 7–15)
AST SERPL W P-5'-P-CCNC: 25 U/L (ref 0–45)
ATRIAL RATE - MUSE: 91 BPM
BILIRUB DIRECT SERPL-MCNC: <0.2 MG/DL (ref 0–0.3)
BILIRUB SERPL-MCNC: 0.3 MG/DL
BUN SERPL-MCNC: 9.2 MG/DL (ref 8–23)
CALCIUM SERPL-MCNC: 9.3 MG/DL (ref 8.8–10.4)
CHLORIDE SERPL-SCNC: 105 MMOL/L (ref 98–107)
CREAT SERPL-MCNC: 0.66 MG/DL (ref 0.51–0.95)
DIASTOLIC BLOOD PRESSURE - MUSE: NORMAL MMHG
EGFRCR SERPLBLD CKD-EPI 2021: >90 ML/MIN/1.73M2
ERYTHROCYTE [DISTWIDTH] IN BLOOD BY AUTOMATED COUNT: 16.3 % (ref 10–15)
GLUCOSE SERPL-MCNC: 86 MG/DL (ref 70–99)
HCO3 SERPL-SCNC: 23 MMOL/L (ref 22–29)
HCT VFR BLD AUTO: 33.6 % (ref 35–47)
HGB BLD-MCNC: 9.9 G/DL (ref 11.7–15.7)
INTERPRETATION ECG - MUSE: NORMAL
MCH RBC QN AUTO: 22.5 PG (ref 26.5–33)
MCHC RBC AUTO-ENTMCNC: 29.5 G/DL (ref 31.5–36.5)
MCV RBC AUTO: 76 FL (ref 78–100)
P AXIS - MUSE: 72 DEGREES
PLATELET # BLD AUTO: 335 10E3/UL (ref 150–450)
POTASSIUM SERPL-SCNC: 4.3 MMOL/L (ref 3.4–5.3)
PR INTERVAL - MUSE: 118 MS
PROT SERPL-MCNC: 7.1 G/DL (ref 6.4–8.3)
QRS DURATION - MUSE: 78 MS
QT - MUSE: 376 MS
QTC - MUSE: 462 MS
R AXIS - MUSE: 39 DEGREES
RBC # BLD AUTO: 4.4 10E6/UL (ref 3.8–5.2)
SODIUM SERPL-SCNC: 139 MMOL/L (ref 135–145)
SYSTOLIC BLOOD PRESSURE - MUSE: NORMAL MMHG
T AXIS - MUSE: 39 DEGREES
T3 SERPL-MCNC: 132 NG/DL (ref 85–202)
T4 FREE SERPL-MCNC: 2.94 NG/DL (ref 0.9–1.7)
TSH SERPL DL<=0.005 MIU/L-ACNC: 0.01 UIU/ML (ref 0.3–4.2)
VENTRICULAR RATE- MUSE: 91 BPM
WBC # BLD AUTO: 6.7 10E3/UL (ref 4–11)

## 2024-07-22 PROCEDURE — 93005 ELECTROCARDIOGRAM TRACING: CPT | Performed by: INTERNAL MEDICINE

## 2024-07-22 PROCEDURE — 99214 OFFICE O/P EST MOD 30 MIN: CPT | Performed by: INTERNAL MEDICINE

## 2024-07-22 PROCEDURE — 36415 COLL VENOUS BLD VENIPUNCTURE: CPT | Performed by: INTERNAL MEDICINE

## 2024-07-22 PROCEDURE — 85027 COMPLETE CBC AUTOMATED: CPT | Performed by: INTERNAL MEDICINE

## 2024-07-22 PROCEDURE — 93010 ELECTROCARDIOGRAM REPORT: CPT | Performed by: GENERAL ACUTE CARE HOSPITAL

## 2024-07-22 PROCEDURE — 84439 ASSAY OF FREE THYROXINE: CPT | Performed by: INTERNAL MEDICINE

## 2024-07-22 PROCEDURE — 82248 BILIRUBIN DIRECT: CPT | Performed by: INTERNAL MEDICINE

## 2024-07-22 PROCEDURE — G2211 COMPLEX E/M VISIT ADD ON: HCPCS | Performed by: INTERNAL MEDICINE

## 2024-07-22 PROCEDURE — 84443 ASSAY THYROID STIM HORMONE: CPT | Performed by: INTERNAL MEDICINE

## 2024-07-22 PROCEDURE — 84480 ASSAY TRIIODOTHYRONINE (T3): CPT | Performed by: INTERNAL MEDICINE

## 2024-07-22 PROCEDURE — 80053 COMPREHEN METABOLIC PANEL: CPT | Performed by: INTERNAL MEDICINE

## 2024-07-22 ASSESSMENT — ENCOUNTER SYMPTOMS: BACK PAIN: 1

## 2024-07-22 NOTE — PATIENT INSTRUCTIONS
If you develop any recurrent chest pain/shoulder pain that is not resolving after 20 minutes, you need to be evaluated in the ER and should call [phone]

## 2024-07-22 NOTE — PROGRESS NOTES
Assessment & Plan     Chest pain, unspecified type  64-year-old woman with recurrent episodes of chest pain/shoulder pain over the last 3 months.  Pains are becoming more frequent and occur once or twice per week.  Usually lasting up to 20 minutes.  Often occurs at rest.  She has not found anything that regularly resolves her symptoms.  Certainly not clearly musculoskeletal.  Some associated dyspnea.  She does have risk factors for coronary artery disease including chronic tobacco use.  Her EKG is showing normal sinus rhythm without any acute ST or T wave abnormality.  However, coronary ischemia cannot be excluded and I am recommending that she get a nuclear medicine stress test completed ASAP.  Because of her foot drop and boot that she chronically wears, I do not think that she would tolerate a traditional treadmill stress test but would recommend pursuing a Lexiscan as an alternative.  We discussed the need for immediate evaluation in the ER should she develop any recurrent symptoms that are not resolving spontaneously after 15-20 minutes as her symptoms so far I have done.  She should also resume taking aspirin 81 mg daily at this time.  If her stress test is normal, other etiologies for symptoms can be pursued.  - EKG 12-lead, tracing only  - CBC with platelets; Future  - Comprehensive metabolic panel (BMP + Alb, Alk Phos, ALT, AST, Total. Bili, TP); Future  - NM Lexiscan stress test; Future    Graves' disease  She is followed by endocrinology and needs to have definitive treatment for her Graves' disease.  TSH remains suppressed and she has an elevated T4.  She continues on PTU.  Radioactive iodine has been recommended and I agree with this treatment strategy.  Will recheck TFTs today as ordered by her endocrinologist and she has follow-up next month.    Palpitations  She had an episode of palpitations yesterday and felt lightheaded.  There is no chest pain.  With her incompletely treated hyperthyroidism,  she is at risk for atrial fibrillation.  If any recurrent symptoms she will need a monitor.    Benign essential hypertension  Blood pressure is currently under control    Gastroesophageal reflux disease with esophagitis without hemorrhage  Despite pantoprazole and Carafate, she continues to have reflux symptoms but the symptoms are different than the pain that she is experiencing in her chest and shoulder    Iron deficiency anemia, unspecified iron deficiency anemia type  Rechecking CBC ruling out anemia as a contributing factor to the above    The longitudinal plan of care for the diagnosis(es)/condition(s) as documented were addressed during this visit. Due to the added complexity in care, I will continue to support Anna in the subsequent management and with ongoing continuity of care.             Annual physical is scheduled for December 2024    Subjective   Anna is a 64 year old, presenting for the following health issues:  Back Pain (Mid/lower back both sides, constant ) and Chest Pain (Left chest pain into shoulder a couple times a week )      7/22/2024     4:31 PM   Additional Questions   Roomed by      Back Pain     History of Present Illness       Reason for visit:  Chest and back pain  Symptom onset:  More than a month  Symptoms include:  Chest and back pain, shortness of breath  Symptom intensity:  Moderate  Symptom progression:  Staying the same    She eats 2-3 servings of fruits and vegetables daily.She consumes 0 sweetened beverage(s) daily.She exercises with enough effort to increase her heart rate 30 to 60 minutes per day.  She exercises with enough effort to increase her heart rate 5 days per week.   She is taking medications regularly.           Review of Systems  Constitutional, HEENT, cardiovascular, pulmonary, gi and gu systems are negative, except as otherwise noted.      Objective    /80 (BP Location: Right arm, Patient Position: Sitting, Cuff Size: Adult Regular)   Pulse 53   " Temp 98.4  F (36.9  C) (Oral)   Resp 16   Ht 1.626 m (5' 4\")   Wt 55.3 kg (122 lb)   LMP  (LMP Unknown)   SpO2 99%   Breastfeeding No   BMI 20.94 kg/m    Body mass index is 20.94 kg/m .  Physical Exam       Well-appearing middle-aged woman  Normal range of motion left shoulder.  No reproducible tenderness.  No rash  Lungs clear bilaterally no rales or wheezes  Heart regular rate and rhythm without murmur or gallop  Abdomen soft without hepatosplenomegaly masses or tenderness        Signed Electronically by: Jame Dunn MD    "

## 2024-08-01 ENCOUNTER — HOSPITAL ENCOUNTER (OUTPATIENT)
Dept: CARDIOLOGY | Facility: CLINIC | Age: 64
Discharge: HOME | End: 2024-08-01
Attending: INTERNAL MEDICINE
Payer: COMMERCIAL

## 2024-08-01 ENCOUNTER — HOSPITAL ENCOUNTER (OUTPATIENT)
Dept: NUCLEAR MEDICINE | Facility: CLINIC | Age: 64
Discharge: HOME | End: 2024-08-01
Attending: INTERNAL MEDICINE
Payer: COMMERCIAL

## 2024-08-01 DIAGNOSIS — R07.9 CHEST PAIN, UNSPECIFIED TYPE: ICD-10-CM

## 2024-08-01 LAB
CV STRESS CURRENT BP HE: NORMAL
CV STRESS CURRENT HR HE: 102
CV STRESS CURRENT HR HE: 104
CV STRESS CURRENT HR HE: 105
CV STRESS CURRENT HR HE: 111
CV STRESS CURRENT HR HE: 112
CV STRESS CURRENT HR HE: 113
CV STRESS CURRENT HR HE: 120
CV STRESS CURRENT HR HE: 120
CV STRESS CURRENT HR HE: 126
CV STRESS CURRENT HR HE: 133
CV STRESS CURRENT HR HE: 137
CV STRESS CURRENT HR HE: 94
CV STRESS DEVIATION TIME HE: NORMAL
CV STRESS ECHO PERCENT HR HE: NORMAL
CV STRESS EXERCISE STAGE HE: NORMAL
CV STRESS FINAL RESTING BP HE: NORMAL
CV STRESS FINAL RESTING HR HE: 105
CV STRESS MAX HR HE: 137
CV STRESS MAX TREADMILL GRADE HE: 0
CV STRESS MAX TREADMILL SPEED HE: 0
CV STRESS PEAK DIA BP HE: NORMAL
CV STRESS PEAK SYS BP HE: NORMAL
CV STRESS PHASE HE: NORMAL
CV STRESS PROTOCOL HE: NORMAL
CV STRESS RESTING PT POSITION HE: NORMAL
CV STRESS ST DEVIATION AMOUNT HE: NORMAL
CV STRESS ST DEVIATION ELEVATION HE: NORMAL
CV STRESS ST EVELATION AMOUNT HE: NORMAL
CV STRESS TEST TYPE HE: NORMAL
CV STRESS TOTAL STAGE TIME MIN 1 HE: NORMAL
RATE PRESSURE PRODUCT: NORMAL
STRESS ECHO BASELINE DIASTOLIC HE: 70
STRESS ECHO BASELINE HR: 102
STRESS ECHO BASELINE SYSTOLIC BP: 162
STRESS ECHO CALCULATED PERCENT HR: 87 %
STRESS ECHO LAST STRESS DIASTOLIC BP: 69
STRESS ECHO LAST STRESS HR: 113
STRESS ECHO LAST STRESS SYSTOLIC BP: 155
STRESS ECHO TARGET HR: 158

## 2024-08-01 PROCEDURE — 93016 CV STRESS TEST SUPVJ ONLY: CPT | Performed by: INTERNAL MEDICINE

## 2024-08-01 PROCEDURE — A9500 TC99M SESTAMIBI: HCPCS | Performed by: INTERNAL MEDICINE

## 2024-08-01 PROCEDURE — 78452 HT MUSCLE IMAGE SPECT MULT: CPT | Mod: 26 | Performed by: INTERNAL MEDICINE

## 2024-08-01 PROCEDURE — 343N000001 HC RX 343: Performed by: INTERNAL MEDICINE

## 2024-08-01 PROCEDURE — 78452 HT MUSCLE IMAGE SPECT MULT: CPT

## 2024-08-01 PROCEDURE — 93018 CV STRESS TEST I&R ONLY: CPT | Performed by: INTERNAL MEDICINE

## 2024-08-01 PROCEDURE — 250N000011 HC RX IP 250 OP 636: Performed by: INTERNAL MEDICINE

## 2024-08-01 PROCEDURE — 93017 CV STRESS TEST TRACING ONLY: CPT

## 2024-08-01 RX ORDER — REGADENOSON 0.08 MG/ML
0.4 INJECTION, SOLUTION INTRAVENOUS ONCE
Status: COMPLETED | OUTPATIENT
Start: 2024-08-01 | End: 2024-08-01

## 2024-08-01 RX ORDER — AMINOPHYLLINE 25 MG/ML
50-100 INJECTION, SOLUTION INTRAVENOUS
Status: DISCONTINUED | OUTPATIENT
Start: 2024-08-01 | End: 2024-08-02 | Stop reason: HOSPADM

## 2024-08-01 RX ADMIN — REGADENOSON 0.4 MG: 0.08 INJECTION, SOLUTION INTRAVENOUS at 13:04

## 2024-08-01 RX ADMIN — Medication 8.8 MILLICURIE: at 12:10

## 2024-08-01 RX ADMIN — Medication 31.1 MILLICURIE: at 13:05

## 2024-08-05 ENCOUNTER — MYC MEDICAL ADVICE (OUTPATIENT)
Dept: ENDOCRINOLOGY | Facility: CLINIC | Age: 64
End: 2024-08-05
Payer: COMMERCIAL

## 2024-08-05 DIAGNOSIS — E05.90 HYPERTHYROIDISM: Primary | ICD-10-CM

## 2024-08-06 RX ORDER — METHIMAZOLE 10 MG/1
20 TABLET ORAL DAILY
Qty: 60 TABLET | Refills: 1 | Status: SHIPPED | OUTPATIENT
Start: 2024-08-06 | End: 2024-08-16 | Stop reason: SINTOL

## 2024-08-16 ENCOUNTER — OFFICE VISIT (OUTPATIENT)
Dept: ENDOCRINOLOGY | Facility: CLINIC | Age: 64
End: 2024-08-16
Payer: COMMERCIAL

## 2024-08-16 VITALS
DIASTOLIC BLOOD PRESSURE: 75 MMHG | HEART RATE: 103 BPM | BODY MASS INDEX: 21.22 KG/M2 | SYSTOLIC BLOOD PRESSURE: 127 MMHG | OXYGEN SATURATION: 99 % | WEIGHT: 123.6 LBS

## 2024-08-16 DIAGNOSIS — E05.00 GRAVES' DISEASE: ICD-10-CM

## 2024-08-16 DIAGNOSIS — E05.90 HYPERTHYROIDISM: Primary | ICD-10-CM

## 2024-08-16 PROCEDURE — 99214 OFFICE O/P EST MOD 30 MIN: CPT | Performed by: INTERNAL MEDICINE

## 2024-08-16 RX ORDER — PROPYLTHIOURACIL 50 MG/1
100 TABLET ORAL 3 TIMES DAILY
Qty: 180 TABLET | Refills: 2 | Status: SHIPPED | OUTPATIENT
Start: 2024-08-16

## 2024-08-16 RX ORDER — METOPROLOL SUCCINATE 25 MG/1
25 TABLET, EXTENDED RELEASE ORAL DAILY
Qty: 90 TABLET | Refills: 0 | Status: SHIPPED | OUTPATIENT
Start: 2024-08-16

## 2024-08-16 NOTE — PATIENT INSTRUCTIONS
-Increase  mg (two 50 mg tablets) three times daily--take consistently three times daily  -Increase metoprolol XL to 75 mg daily (use existing 50 mg tablets PLUS 25 mg tablets once daily; once thyroid function tests normalize, we can return to usual dose of metoprolol)  -Lab draw in 1 month  -Referral to ENT to discuss thyroidectomy (update me if surgery date is set)  -Return for a follow-up visit in 3 months, with labs before visit  -We will communicate results via [employer], or if needed by phone

## 2024-08-16 NOTE — LETTER
8/16/2024      Anna Sanderson  [address] [facility] [place] [address]      Dear Colleague,    Thank you for referring your patient, Anna Sanderson, to the [facility]. Please see a copy of my visit note below.      ENDOCRINOLOGY FOLLOW-UP        HISTORY OF PRESENT ILLNESS    Anna Sanderson (prefers Anna) is seen in follow-up.    We adjusted PTU up to 100 mg 3 times daily at our last visit in 5/2024.  She has had difficulty in the past taking PTU 3 times daily.    In the interim since last visit, she developed increasing chest pain and shortness of breath and was evaluated in primary care clinic.  Stress test performed on 8/1/2024 showed normal results.    Since she was having difficulty taking PTU 3 times daily, the patient wanted to trial methimazole (which she previously had not tolerated).  Today, she notes that she started methimazole 20 mg daily as we planned on 8/6/2024: Unfortunately, she developed a rash along her chest and neck soon after starting the medication.    She has gone back to using PTU: Taking 100 mg twice daily.  It is difficult to remember third dose of the day since she is usually very busy with work.    Continues to experience intermittent episodes of chest discomfort as well as palpitations.  Bowel movements are regular.    Has dry eyes requiring daily eyedrops.  Otherwise no vision changes, conjunctival injection or congestion.    Pertinent endocrine and related history:  1.  Hyperthyroidism.   -First noted to have abnormal thyroid function tests in 2/2021: TSH was partially suppressed and free T4 normal.  She had persistent abnormalities that became more pronounced, with TSH becoming fully suppressed by 9/2021 and free T4 rising by 11/2021.  -Radioiodine uptake and scan performed on 10/12/2021 revealed lower uptake at 7.9% within the thyroid, with question of subacute thyroiditis.  -Initiated on methimazole in 8/2022.  The patient sent  [employer] message in 2022 noting that she had been feeling unwell: Hot, nauseated and exhausted.  PTU was prescribed then.  -Family history is remarkable for 2 sisters with hypothyroidism.  Mother with papillary thyroid carcinoma who underwent thyroidectomy and had some postoperative complication; now , although thyroid cancer was not the cause of death.  -Thyroid ultrasound on 2023 showed a normal-appearing thyroid gland without nodules.  -Follow-up radioiodine uptake and scan performed on 2023 showed 13% uptake at 24 hours and a diffuse pattern, without hot or cold nodules.    Pertinent Social History: , has 3 children and 2 grandchildren.  Primary caregiver for one of her grandchildren.  Also caregiver for her .  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    PAST MEDICAL HISTORY  Past Medical History:   Diagnosis Date     Abdominal pain, epigastric 2019     Abnormal CT of the chest 2021     Abnormal TSH     TSH suppressed 0.18 and normal free T4, recheck in 3 months     Abscess of left lower leg 2021     Achilles tendinitis 2019     Achilles tendon infection      Allergic rhinitis 2020     Chronic cough 2020    CT chest with mild bronchiectasis and small area of consolidation     Deep vein thrombosis (DVT) of brachial vein of left upper extremity (H) 2018    Associated with PICC line, follow-up ultrasound showing resolution of thrombus     Dermatitis      Family history of myocardial infarction     dad 35  mother 70     Fatigue 2017     GERD (gastroesophageal reflux disease)     EGD with esophagitis , experiencing chest pain and dysphagia     History of anesthesia complications      Hypertension      Hyperthyroidism 2023     Impaired fasting glucose     Glucose 119 2021.  A1c 5.3%     Iron deficiency anemia 2015    Has required iron infusions     Kidney stones      Microscopic hematuria  03/07/2021    Longstanding finding     Motion sickness      MRSA infection 08/2020    Left leg wound     Open wound of foot, complicated     Achilles tendon rupture complicated by infected open wound with multiple surgeries including skin grafting     Osteoporosis of multiple sites without pathological fracture     DEXA with T score -2.4 L1 L4, -3.2 left femoral neck and -2.6 right femoral neck.  DEXA February 2022 with significant improvement -2.6 left femoral neck and -2.3 right femoral neck     Ovarian cyst      Palpitations 11/04/2021    Event monitor showing sinus tachycardia no arrhythmia November 2021     PONV (postoperative nausea and vomiting)      SIRS (systemic inflammatory response syndrome) (H) 08/16/2019     SOB (shortness of breath) 11/04/2021     Subacute thyroiditis 10/15/2021    TSH less than 0.1 and abnormal thyroid uptake scan October 2021, asymptomatic     Vitamin D deficiency 01/22/2020       MEDICATIONS  Current Outpatient Medications   Medication Sig Dispense Refill     aspirin (ASA) 81 MG EC tablet Take 1 tablet (81 mg) by mouth daily 60 tablet 0     calcium citrate-vitamin D (CITRACAL) 315-250 MG-UNIT TABS per tablet Take 2 tablets by mouth daily       ibuprofen (ADVIL/MOTRIN) 200 MG tablet Take 200-800 mg by mouth 2 times daily        metoprolol succinate ER (TOPROL XL) 25 MG 24 hr tablet Take 1 tablet (25 mg) by mouth daily in addition to current 50 mg tablet to total to 75 mg daily. 90 tablet 0     metoprolol succinate ER (TOPROL XL) 50 MG 24 hr tablet Take 1 tablet (50 mg) by mouth daily 90 tablet 3     ondansetron (ZOFRAN) 4 MG tablet Take 1 tablet (4 mg) by mouth every 6 hours as needed for nausea 30 tablet 0     pantoprazole (PROTONIX) 40 MG EC tablet Take 1 tablet (40 mg) by mouth 2 times daily 180 tablet 3     propylthiouracil (PTU) 50 MG tablet Take 2 tablets (100 mg) by mouth 3 times daily 180 tablet 2     vitamin D2 (ERGOCALCIFEROL) 56476 units (1250 mcg) capsule Take 1 capsule  (50,000 Units) by mouth once a week Saturdays 13 capsule 3     sucralfate (CARAFATE) 1 GM tablet TAKE 1 TABLET(1 GRAM) BY MOUTH FOUR TIMES DAILY (Patient not taking: Reported on 8/16/2024) 360 tablet 3       Allergies, family, and social history were reviewed and documented as needed in EHR.     REVIEW OF SYSTEMS  A focused ROS was performed, with pertinent positives and negatives as noted in the HPI.    PHYSICAL EXAM  /75 (BP Location: Right arm, Patient Position: Sitting, Cuff Size: Adult Regular)   Pulse 103   Wt 56.1 kg (123 lb 9.6 oz)   LMP  (LMP Unknown)   SpO2 99%   BMI 21.22 kg/m    Body mass index is 21.22 kg/m .  Constitutional: Patient is alert, oriented and appears in no acute distress.  Eyes: Eyes grossly normal to inspection, EOMI, no stare, lid lag, or retraction; no conjunctival injection.  ENMT: Lips are without lesions.   Neck: No visible goiter or neck mass.  Cardiovascular: Slightly tachycardic, regular rhythm with normal S1 and S2.  Respiratory: No audible wheeze or cough. No visible cyanosis. No visible increased work of breathing.  Neurological: Alert and oriented times 3.  No tremor.    DATA REVIEW  Each of the following laboratory and/or imaging studies were reviewed.          ASSESSMENT  1.  Hyperthyroidism.    ~Although serology is negative, based on diffuse uptake of radioiodine on nuclear medicine study, as well as waxing and waning nature of hyperthyroidism, suspect Graves' disease.  We did scan the pelvis on last radioiodine uptake and scan to rule out struma ovarii: My review of images does not indicate a discrete area of ovarian uptake.  ~Progressing hyperthyroidism, unable to tolerate methimazole with rechallenge.  Having difficulty taking PTU 3 times daily: We discussed the importance of working on consistent 3 times daily dosing to maintain adequate control of hyperthyroidism.  We will recheck thyroid function tests in 1 month and adjust methimazole as needed.  ~As  before, would recommend definitive therapy: Given her multiple social responsibilities, Ms. Ailyn Sanderson does not think it would be feasible to practice precautions after radioiodine therapy.  Therefore, her preference is to pursue surgical intervention.  We discussed lifelong thyroid hormone requirement post thyroidectomy.  Patient is interested in meeting with ENT surgery to discuss the option of thyroidectomy.  I have placed referral.    2.  Graves' disease.  As above, likely Graves' disease based on evolution of her thyroid function tests as well as pattern of radioiodine uptake and scan.  No indication of active thyroid eye disease.    3.  Osteoporosis.  Managed by PCP, therefore deferred.      PLAN  -Increase  mg (two 50 mg tablets) three times daily--take consistently three times daily  -Increase metoprolol XL to 75 mg daily (use existing 50 mg tablets PLUS 25 mg tablets once daily; once thyroid function tests normalize, we can return to usual dose of metoprolol)  -Lab draw in 1 month  -Referral to ENT to discuss thyroidectomy (update me if surgery date is set)  -Return for a follow-up visit in 3 months, with labs before visit  -We will communicate results via [employer], or if needed by phone      Orders Placed This Encounter   Procedures     TSH     T4 free     T3 total     Hepatic function panel     Adult ENT  Referral       I spent a total of 30 minutes on the date of encounter reviewing medical records, evaluating the patient, coordinating care and documenting in the EHR, as detailed above.    Isaias Andrade MD   Division of Diabetes, Endocrinology and Metabolism  Department of Medicine         Again, thank you for allowing me to participate in the care of your patient.        Sincerely,        Isaias Andrade MD

## 2024-08-16 NOTE — PROGRESS NOTES
ENDOCRINOLOGY FOLLOW-UP        HISTORY OF PRESENT ILLNESS    Anna Sanderson (prefers Anna) is seen in follow-up.    We adjusted PTU up to 100 mg 3 times daily at our last visit in 5/2024.  She has had difficulty in the past taking PTU 3 times daily.    In the interim since last visit, she developed increasing chest pain and shortness of breath and was evaluated in primary care clinic.  Stress test performed on 8/1/2024 showed normal results.    Since she was having difficulty taking PTU 3 times daily, the patient wanted to trial methimazole (which she previously had not tolerated).  Today, she notes that she started methimazole 20 mg daily as we planned on 8/6/2024: Unfortunately, she developed a rash along her chest and neck soon after starting the medication.    She has gone back to using PTU: Taking 100 mg twice daily.  It is difficult to remember third dose of the day since she is usually very busy with work.    Continues to experience intermittent episodes of chest discomfort as well as palpitations.  Bowel movements are regular.    Has dry eyes requiring daily eyedrops.  Otherwise no vision changes, conjunctival injection or congestion.    Pertinent endocrine and related history:  1.  Hyperthyroidism.   -First noted to have abnormal thyroid function tests in 2/2021: TSH was partially suppressed and free T4 normal.  She had persistent abnormalities that became more pronounced, with TSH becoming fully suppressed by 9/2021 and free T4 rising by 11/2021.  -Radioiodine uptake and scan performed on 10/12/2021 revealed lower uptake at 7.9% within the thyroid, with question of subacute thyroiditis.  -Initiated on methimazole in 8/2022.  The patient sent [employer] message in 9/2022 noting that she had been feeling unwell: Hot, nauseated and exhausted.  PTU was prescribed then.  -Family history is remarkable for 2 sisters with hypothyroidism.  Mother with papillary thyroid carcinoma who underwent  thyroidectomy and had some postoperative complication; now , although thyroid cancer was not the cause of death.  -Thyroid ultrasound on 2023 showed a normal-appearing thyroid gland without nodules.  -Follow-up radioiodine uptake and scan performed on 2023 showed 13% uptake at 24 hours and a diffuse pattern, without hot or cold nodules.    Pertinent Social History: , has 3 children and 2 grandchildren.  Primary caregiver for one of her grandchildren.  Also caregiver for her .  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    PAST MEDICAL HISTORY  Past Medical History:   Diagnosis Date    Abdominal pain, epigastric 2019    Abnormal CT of the chest 2021    Abnormal TSH     TSH suppressed 0.18 and normal free T4, recheck in 3 months    Abscess of left lower leg 2021    Achilles tendinitis 2019    Achilles tendon infection     Allergic rhinitis 2020    Chronic cough 2020    CT chest with mild bronchiectasis and small area of consolidation    Deep vein thrombosis (DVT) of brachial vein of left upper extremity (H) 2018    Associated with PICC line, follow-up ultrasound showing resolution of thrombus    Dermatitis     Family history of myocardial infarction     dad 35  mother 70    Fatigue 2017    GERD (gastroesophageal reflux disease)     EGD with esophagitis , experiencing chest pain and dysphagia    History of anesthesia complications     Hypertension     Hyperthyroidism 2023    Impaired fasting glucose     Glucose 119 2021.  A1c 5.3%    Iron deficiency anemia 2015    Has required iron infusions    Kidney stones     Microscopic hematuria 2021    Longstanding finding    Motion sickness     MRSA infection 2020    Left leg wound    Open wound of foot, complicated     Achilles tendon rupture complicated by infected open wound with multiple surgeries including skin grafting    Osteoporosis of  multiple sites without pathological fracture     DEXA with T score -2.4 L1 L4, -3.2 left femoral neck and -2.6 right femoral neck.  DEXA February 2022 with significant improvement -2.6 left femoral neck and -2.3 right femoral neck    Ovarian cyst     Palpitations 11/04/2021    Event monitor showing sinus tachycardia no arrhythmia November 2021    PONV (postoperative nausea and vomiting)     SIRS (systemic inflammatory response syndrome) (H) 08/16/2019    SOB (shortness of breath) 11/04/2021    Subacute thyroiditis 10/15/2021    TSH less than 0.1 and abnormal thyroid uptake scan October 2021, asymptomatic    Vitamin D deficiency 01/22/2020       MEDICATIONS  Current Outpatient Medications   Medication Sig Dispense Refill    aspirin (ASA) 81 MG EC tablet Take 1 tablet (81 mg) by mouth daily 60 tablet 0    calcium citrate-vitamin D (CITRACAL) 315-250 MG-UNIT TABS per tablet Take 2 tablets by mouth daily      ibuprofen (ADVIL/MOTRIN) 200 MG tablet Take 200-800 mg by mouth 2 times daily       metoprolol succinate ER (TOPROL XL) 25 MG 24 hr tablet Take 1 tablet (25 mg) by mouth daily in addition to current 50 mg tablet to total to 75 mg daily. 90 tablet 0    metoprolol succinate ER (TOPROL XL) 50 MG 24 hr tablet Take 1 tablet (50 mg) by mouth daily 90 tablet 3    ondansetron (ZOFRAN) 4 MG tablet Take 1 tablet (4 mg) by mouth every 6 hours as needed for nausea 30 tablet 0    pantoprazole (PROTONIX) 40 MG EC tablet Take 1 tablet (40 mg) by mouth 2 times daily 180 tablet 3    propylthiouracil (PTU) 50 MG tablet Take 2 tablets (100 mg) by mouth 3 times daily 180 tablet 2    vitamin D2 (ERGOCALCIFEROL) 71170 units (1250 mcg) capsule Take 1 capsule (50,000 Units) by mouth once a week Saturdays 13 capsule 3    sucralfate (CARAFATE) 1 GM tablet TAKE 1 TABLET(1 GRAM) BY MOUTH FOUR TIMES DAILY (Patient not taking: Reported on 8/16/2024) 360 tablet 3       Allergies, family, and social history were reviewed and documented as needed  in EHR.     REVIEW OF SYSTEMS  A focused ROS was performed, with pertinent positives and negatives as noted in the HPI.    PHYSICAL EXAM  /75 (BP Location: Right arm, Patient Position: Sitting, Cuff Size: Adult Regular)   Pulse 103   Wt 56.1 kg (123 lb 9.6 oz)   LMP  (LMP Unknown)   SpO2 99%   BMI 21.22 kg/m    Body mass index is 21.22 kg/m .  Constitutional: Patient is alert, oriented and appears in no acute distress.  Eyes: Eyes grossly normal to inspection, EOMI, no stare, lid lag, or retraction; no conjunctival injection.  ENMT: Lips are without lesions.   Neck: No visible goiter or neck mass.  Cardiovascular: Slightly tachycardic, regular rhythm with normal S1 and S2.  Respiratory: No audible wheeze or cough. No visible cyanosis. No visible increased work of breathing.  Neurological: Alert and oriented times 3.  No tremor.    DATA REVIEW  Each of the following laboratory and/or imaging studies were reviewed.          ASSESSMENT  1.  Hyperthyroidism.    ~Although serology is negative, based on diffuse uptake of radioiodine on nuclear medicine study, as well as waxing and waning nature of hyperthyroidism, suspect Graves' disease.  We did scan the pelvis on last radioiodine uptake and scan to rule out struma ovarii: My review of images does not indicate a discrete area of ovarian uptake.  ~Progressing hyperthyroidism, unable to tolerate methimazole with rechallenge.  Having difficulty taking PTU 3 times daily: We discussed the importance of working on consistent 3 times daily dosing to maintain adequate control of hyperthyroidism.  We will recheck thyroid function tests in 1 month and adjust methimazole as needed.  ~As before, would recommend definitive therapy: Given her multiple social responsibilities, Ms. Ailyn Sanderson does not think it would be feasible to practice precautions after radioiodine therapy.  Therefore, her preference is to pursue surgical intervention.  We discussed lifelong thyroid  hormone requirement post thyroidectomy.  Patient is interested in meeting with ENT surgery to discuss the option of thyroidectomy.  I have placed referral.    2.  Graves' disease.  As above, likely Graves' disease based on evolution of her thyroid function tests as well as pattern of radioiodine uptake and scan.  No indication of active thyroid eye disease.    3.  Osteoporosis.  Managed by PCP, therefore deferred.      PLAN  -Increase  mg (two 50 mg tablets) three times daily--take consistently three times daily  -Increase metoprolol XL to 75 mg daily (use existing 50 mg tablets PLUS 25 mg tablets once daily; once thyroid function tests normalize, we can return to usual dose of metoprolol)  -Lab draw in 1 month  -Referral to ENT to discuss thyroidectomy (update me if surgery date is set)  -Return for a follow-up visit in 3 months, with labs before visit  -We will communicate results via [employer], or if needed by phone      Orders Placed This Encounter   Procedures    TSH    T4 free    T3 total    Hepatic function panel    Adult ENT  Referral       I spent a total of 30 minutes on the date of encounter reviewing medical records, evaluating the patient, coordinating care and documenting in the EHR, as detailed above.    Isaias Andrade MD   Division of Diabetes, Endocrinology and Metabolism  Department of Medicine

## 2024-08-19 DIAGNOSIS — E05.00 GRAVES' DISEASE: ICD-10-CM

## 2024-08-19 DIAGNOSIS — E05.90 HYPERTHYROIDISM: ICD-10-CM

## 2024-08-19 RX ORDER — PROPYLTHIOURACIL 50 MG/1
100 TABLET ORAL 3 TIMES DAILY
Qty: 180 TABLET | Refills: 2 | OUTPATIENT
Start: 2024-08-19

## 2024-08-19 NOTE — TELEPHONE ENCOUNTER
'  PLAN  -Increase  mg (two 50 mg tablets) three times daily--take consistently three times daily  -Increase metoprolol XL to 75 mg daily (use existing 50 mg tablets PLUS 25 mg tablets once daily; once thyroid function tests normalize, we can return to usual dose of metoprolol)  -Lab draw in 1 month    Requested Prescriptions   Pending Prescriptions Disp Refills    propylthiouracil (PTU) 50 MG tablet 180 tablet 2     Sig: Take 2 tablets (100 mg) by mouth 3 times daily       Anti-Thyroid Agents Protocol Failed - 8/19/2024 10:21 AM        Failed - Normal TSH in past 12 months     Recent Labs   Lab Test 07/22/24  1718   TSH 0.01*              Passed - Medication is active on the patient's med list        Passed - Recent (12 month) or future (90 days) visit with authorizing provider s specialty     The patient must have completed an in-person or virtual visit within the past 12 months or has a future visit scheduled within the next 90 days with the authorizing provider s specialty.  Urgent care and e-visits do not quality as an office visit for this protocol.          Passed - Medication indicated for associated diagnosis     The medication is prescribed for one or more of the following conditions:        Hyperthyroidism       Disorder of thyroid gland       Thyrotoxicosis       Thyroid storm       Thyroid eye disease            Passed - Patient is age 18 or older        Passed - No active pregnancy on record        Passed - No positive pregnancy test in past 12 months

## 2024-09-07 DIAGNOSIS — K21.00 GASTROESOPHAGEAL REFLUX DISEASE WITH ESOPHAGITIS WITHOUT HEMORRHAGE: ICD-10-CM

## 2024-09-09 RX ORDER — PANTOPRAZOLE SODIUM 40 MG/1
40 TABLET, DELAYED RELEASE ORAL 2 TIMES DAILY
Qty: 180 TABLET | Refills: 2 | Status: SHIPPED | OUTPATIENT
Start: 2024-09-09

## 2024-09-11 NOTE — TELEPHONE ENCOUNTER
"FUTURE VISIT INFORMATION      FUTURE VISIT INFORMATION:  Date: 11/11/24  Time: 8 AM  [place] [facility] [place]  REFERRAL INFORMATION:  Referring provider:  Isaias Andrade MD   Referring providers clinic:  [facility] ENDOCRINOLOGY   Reason for visit/diagnosis:  E05.90 (ICD-10-CM) - Hyperthyroidism  E05.00 (ICD-10-CM) - Graves' disease  Dr. Magana or Dr. Coy, consideration of thyroidectomy  Referral from Isaias Andrade, referral notes in [facility]. [place] [facility]     RECORDS REQUESTED FROM      Clinic name Comments Records Status Imaging Status   [facility] ENDOCRINOLOGY  8/16/24 referral/ note- Isaias Andrade MD  [facility]    [facility] 4/4/23 + 10/12/21 NM Thyroid   1/31/23 US Thyroid   1/31/23 XR Esophagram w Upper GI [facility] PACS           \"Please notify/message CSS if patient completed outside imaging prior to scheduled appointment and/or any outside records that might have been missed at pre visit -Thanks\"  "

## 2024-09-16 DIAGNOSIS — I10 BENIGN ESSENTIAL HYPERTENSION: ICD-10-CM

## 2024-09-16 DIAGNOSIS — E55.9 VITAMIN D DEFICIENCY: ICD-10-CM

## 2024-09-16 RX ORDER — METOPROLOL SUCCINATE 50 MG/1
50 TABLET, EXTENDED RELEASE ORAL DAILY
Qty: 90 TABLET | Refills: 3 | Status: SHIPPED | OUTPATIENT
Start: 2024-09-16

## 2024-09-16 RX ORDER — ERGOCALCIFEROL 1.25 MG/1
50000 CAPSULE, LIQUID FILLED ORAL WEEKLY
Qty: 13 CAPSULE | Refills: 3 | Status: SHIPPED | OUTPATIENT
Start: 2024-09-16

## 2024-11-11 ENCOUNTER — PRE VISIT (OUTPATIENT)
Dept: OTOLARYNGOLOGY | Facility: CLINIC | Age: 64
End: 2024-11-11

## 2024-11-13 ENCOUNTER — TELEPHONE (OUTPATIENT)
Dept: ENDOCRINOLOGY | Facility: CLINIC | Age: 64
End: 2024-11-13

## 2024-11-13 DIAGNOSIS — E05.90 HYPERTHYROIDISM: ICD-10-CM

## 2024-11-13 DIAGNOSIS — E05.00 GRAVES' DISEASE: ICD-10-CM

## 2024-11-13 NOTE — TELEPHONE ENCOUNTER
Pt needs labs now    PLAN  -Increase  mg (two 50 mg tablets) three times daily--take consistently three times daily  -Increase metoprolol XL to 75 mg daily (use existing 50 mg tablets PLUS 25 mg tablets once daily; once thyroid function tests normalize, we can return to usual dose of metoprolol)  -Lab draw in 1 month  -Referral to ENT to discuss thyroidectomy (update me if surgery date is set)  -Return for a follow-up visit in 3 months, with labs before visit  -We will communicate results via [employer], or if needed by phone    Requested Prescriptions   Pending Prescriptions Disp Refills    metoprolol succinate ER (TOPROL XL) 25 MG 24 hr tablet 90 tablet 0     Sig: Take 1 tablet (25 mg) by mouth daily. in addition to current 50 mg tablet to total to 75 mg daily.       Beta-Blockers Protocol Passed - 11/13/2024  4:13 PM        Passed - Most recent blood pressure under 140/90 in past 12 months     BP Readings from Last 3 Encounters:   08/16/24 127/75   07/22/24 134/80   05/15/24 136/78       No data recorded            Passed - Patient is age 6 or older        Passed - Medication is active on med list        Passed - Medication indicated for associated diagnosis     Medication is associated with one or more of the following diagnoses:     Hypertension (HTN)   Atrial fibrillation/flutter   Angina   ASCVD   Migraine   Heart Failure   Tremor   Anxiety   Ocular hypertension   Glaucoma   PTSD   Obstructive hypertrophic cardiomyopathy   History of myocarditis   Palpitations   POTS (postural orthostatic tachycardia syndrome)   SVT (supraventricular tachycardia)   Hyperthyroidism   Tachycardia   Acute non-st segment elevation myocardial infarction   Subsequent non-st segment elevation myocardial infarction   Ischemic myocardial dysfunction          Passed - Recent (12 mo) or future (90 days) visit within the authorizing provider's specialty     The patient must have completed an in-person or virtual visit within the past  12 months or has a future visit scheduled within the next 90 days with the authorizing provider s specialty.  Urgent care and e-visits do not quality as an office visit for this protocol.

## 2024-11-14 NOTE — TELEPHONE ENCOUNTER
Spoke w/patient, she is going to schedule a lab only appointment via My Chart for the [facility]. She is aware labs are due asap prior to refills.

## 2024-11-18 ENCOUNTER — MYC REFILL (OUTPATIENT)
Dept: INTERNAL MEDICINE | Facility: CLINIC | Age: 64
End: 2024-11-18
Payer: COMMERCIAL

## 2024-11-18 DIAGNOSIS — R11.0 NAUSEA: ICD-10-CM

## 2024-11-18 RX ORDER — ONDANSETRON 4 MG/1
4 TABLET, FILM COATED ORAL EVERY 6 HOURS PRN
Qty: 30 TABLET | Refills: 1 | Status: SHIPPED | OUTPATIENT
Start: 2024-11-18

## 2024-11-18 RX ORDER — METOPROLOL SUCCINATE 25 MG/1
25 TABLET, EXTENDED RELEASE ORAL DAILY
Qty: 90 TABLET | Refills: 0 | OUTPATIENT
Start: 2024-11-18

## 2025-01-23 NOTE — TELEPHONE ENCOUNTER
"FUTURE VISIT INFORMATION        FUTURE VISIT INFORMATION:  Date: 3/31/25  Time:1:40 PM  [place] [facility] [place]  REFERRAL INFORMATION:  Referring provider:  Isaias Andrade MD   Referring providers clinic:  [facility] ENDOCRINOLOGY   Reason for visit/diagnosis:  E05.90 (ICD-10-CM) - Hyperthyroidism  E05.00 (ICD-10-CM) - Graves' disease  Dr. Magana or Dr. Coy, consideration of thyroidectomy  Referral from Isaias Andrade, referral notes in [facility]. [place] [facility] [place]ORDS REQUESTED FROM        Clinic name Comments Records Status Imaging Status   [facility] ENDOCRINOLOGY  8/16/24 referral/ note- Isaias Andrade MD  [facility]     [facility] 4/4/23 + 10/12/21 NM Thyroid   1/31/23 US Thyroid   1/31/23 XR Esophagram w Upper GI [facility] PACS                \"Please notify/message CSS if patient completed outside imaging prior to scheduled appointment and/or any outside records that might have been missed at pre visit -Thanks\"        "

## 2025-01-28 ENCOUNTER — MYC REFILL (OUTPATIENT)
Dept: INTERNAL MEDICINE | Facility: CLINIC | Age: 65
End: 2025-01-28
Payer: COMMERCIAL

## 2025-01-28 DIAGNOSIS — R11.0 NAUSEA: ICD-10-CM

## 2025-01-29 RX ORDER — ONDANSETRON 4 MG/1
4 TABLET, FILM COATED ORAL EVERY 6 HOURS PRN
Qty: 30 TABLET | Refills: 1 | Status: SHIPPED | OUTPATIENT
Start: 2025-01-29

## 2025-02-12 DIAGNOSIS — Z92.29 PERSONAL HISTORY OF OTHER DRUG THERAPY: ICD-10-CM

## 2025-02-12 DIAGNOSIS — M81.0 AGE-RELATED OSTEOPOROSIS WITHOUT CURRENT PATHOLOGICAL FRACTURE: Primary | ICD-10-CM

## 2025-03-31 ENCOUNTER — PRE VISIT (OUTPATIENT)
Dept: OTOLARYNGOLOGY | Facility: CLINIC | Age: 65
End: 2025-03-31

## 2025-03-31 NOTE — TELEPHONE ENCOUNTER
REFERRAL INFORMATION:  Referring By: Isaias Andrade MD   Referring Clinic: [facility] ENDOCRINOLOGY   Reason for Visit/Diagnosis: E05.90 (ICD-10-CM) - Hyperthyroidism  E05.00 (ICD-10-CM) - Graves' disease  Dr. Magana or Dr. Coy, consideration of thyroidectomy  Referral from Isaias Andrade, referral notes in [facility]. [place] [facility] [place]    FUTURE VISIT INFORMATION:  Appointment Date: 5/19/25:   Appointment Time: 9:40 AM   NOTES STATUS DETAILS   OFFICE NOTE from referring provider [facility] ENDOCRINOLOGY   8/16/24 referral/ note- Isaias Andrade MD    IMAGES *pertaining images & report*       CT, MRI, PET, NM, US, XRAYS, etc ... Epic/ PACS  4/4/23 + 10/12/21 NM Thyroid   1/31/23 US Thyroid   1/31/23 XR Esophagram

## 2025-04-10 ENCOUNTER — ORDERS ONLY (AUTO-RELEASED) (OUTPATIENT)
Dept: INTERNAL MEDICINE | Facility: CLINIC | Age: 65
End: 2025-04-10

## 2025-04-10 DIAGNOSIS — R00.2 PALPITATIONS: ICD-10-CM

## 2025-04-18 ENCOUNTER — MYC MEDICAL ADVICE (OUTPATIENT)
Dept: ENDOCRINOLOGY | Facility: CLINIC | Age: 65
End: 2025-04-18
Payer: COMMERCIAL

## 2025-04-18 DIAGNOSIS — E05.90 HYPERTHYROIDISM: ICD-10-CM

## 2025-04-18 DIAGNOSIS — E05.00 GRAVES' DISEASE: ICD-10-CM

## 2025-04-18 NOTE — TELEPHONE ENCOUNTER
LOV 8/16/24:  PLAN  -Increase  mg (two 50 mg tablets) three times daily--take consistently three times daily  -Increase metoprolol XL to 75 mg daily (use existing 50 mg tablets PLUS 25 mg tablets once daily; once thyroid function tests normalize, we can return to usual dose of metoprolol)  -Lab draw in 1 month  -Referral to ENT to discuss thyroidectomy (update me if surgery date is set)  -Return for a follow-up visit in 3 months, with labs before visit  -We will communicate results via [employer], or if needed by phone

## 2025-04-22 NOTE — TELEPHONE ENCOUNTER
04.22- x1 Left message for patient to call and schedule a follow appointment. MIRIAM in the Summer per provider.

## 2025-04-24 RX ORDER — PROPYLTHIOURACIL 50 MG/1
100 TABLET ORAL 3 TIMES DAILY
Qty: 180 TABLET | Refills: 2 | Status: SHIPPED | OUTPATIENT
Start: 2025-04-24

## 2025-05-14 ENCOUNTER — LAB (OUTPATIENT)
Dept: LAB | Facility: CLINIC | Age: 65
End: 2025-05-14
Payer: COMMERCIAL

## 2025-05-14 DIAGNOSIS — E05.90 HYPERTHYROIDISM: ICD-10-CM

## 2025-05-14 DIAGNOSIS — E05.00 GRAVES' DISEASE: ICD-10-CM

## 2025-05-14 PROCEDURE — 84480 ASSAY TRIIODOTHYRONINE (T3): CPT

## 2025-05-14 PROCEDURE — 84443 ASSAY THYROID STIM HORMONE: CPT

## 2025-05-14 PROCEDURE — 84439 ASSAY OF FREE THYROXINE: CPT

## 2025-05-14 PROCEDURE — 36415 COLL VENOUS BLD VENIPUNCTURE: CPT

## 2025-05-15 LAB
T3 SERPL-MCNC: 100 NG/DL (ref 85–202)
T4 FREE SERPL-MCNC: 1.93 NG/DL (ref 0.9–1.7)
TSH SERPL DL<=0.005 MIU/L-ACNC: 0.02 UIU/ML (ref 0.3–4.2)

## 2025-05-16 ENCOUNTER — RESULTS FOLLOW-UP (OUTPATIENT)
Dept: ENDOCRINOLOGY | Facility: CLINIC | Age: 65
End: 2025-05-16

## 2025-05-19 ENCOUNTER — PRE VISIT (OUTPATIENT)
Dept: OTOLARYNGOLOGY | Facility: CLINIC | Age: 65
End: 2025-05-19

## 2025-05-19 ENCOUNTER — OFFICE VISIT (OUTPATIENT)
Dept: OTOLARYNGOLOGY | Facility: CLINIC | Age: 65
End: 2025-05-19
Attending: INTERNAL MEDICINE
Payer: COMMERCIAL

## 2025-05-19 VITALS
BODY MASS INDEX: 20.66 KG/M2 | WEIGHT: 121 LBS | SYSTOLIC BLOOD PRESSURE: 156 MMHG | HEART RATE: 92 BPM | TEMPERATURE: 97.7 F | OXYGEN SATURATION: 98 % | HEIGHT: 64 IN | DIASTOLIC BLOOD PRESSURE: 89 MMHG

## 2025-05-19 DIAGNOSIS — E05.90 HYPERTHYROIDISM: Primary | ICD-10-CM

## 2025-05-19 DIAGNOSIS — E05.00 GRAVES' DISEASE: ICD-10-CM

## 2025-05-19 PROCEDURE — 3079F DIAST BP 80-89 MM HG: CPT | Performed by: STUDENT IN AN ORGANIZED HEALTH CARE EDUCATION/TRAINING PROGRAM

## 2025-05-19 PROCEDURE — 99205 OFFICE O/P NEW HI 60 MIN: CPT | Mod: 25 | Performed by: STUDENT IN AN ORGANIZED HEALTH CARE EDUCATION/TRAINING PROGRAM

## 2025-05-19 PROCEDURE — 3077F SYST BP >= 140 MM HG: CPT | Performed by: STUDENT IN AN ORGANIZED HEALTH CARE EDUCATION/TRAINING PROGRAM

## 2025-05-19 PROCEDURE — 31575 DIAGNOSTIC LARYNGOSCOPY: CPT | Performed by: STUDENT IN AN ORGANIZED HEALTH CARE EDUCATION/TRAINING PROGRAM

## 2025-05-19 PROCEDURE — 1126F AMNT PAIN NOTED NONE PRSNT: CPT | Performed by: STUDENT IN AN ORGANIZED HEALTH CARE EDUCATION/TRAINING PROGRAM

## 2025-05-19 ASSESSMENT — PAIN SCALES - GENERAL: PAINLEVEL_OUTOF10: NO PAIN (0)

## 2025-05-19 NOTE — PATIENT INSTRUCTIONS
You were seen in the ENT Clinic today by Dr. Coy. If you have any questions or concerns after your appointment, please contact us (see below)    The following has been recommended for you based upon your appointment today:  Let us know if you would like to proceed with surgery with Dr. Coy     How to Contact Us:  Send a mobicanvas message to your provider. Our team will respond to you via mobicanvas. Occasionally, we will need to call you to get further information.  For urgent matters (Monday-Friday), call the ENT Clinic: [phone] and speak with a call center team member - they will route your call appropriately.   If you'd like to speak directly with a nurse, please find our contact information below. We do our best to check voicemail frequently throughout the day, and will work to call you back within 1-2 days. For urgent matters, please use the general clinic phone numbers listed above.    Betsey ELAM RN, BSN  RN Care Coordinator, ENT Clinic  [facility]  Direct: [phone]

## 2025-05-19 NOTE — NURSING NOTE
"Chief Complaint   Patient presents with    Consult     Hyperparathyroidism     .Blood pressure (!) 156/89, pulse 92, temperature 97.7  F (36.5  C), height 1.626 m (5' 4\"), weight 54.9 kg (121 lb), SpO2 98%, not currently breastfeeding.     Erwin Brooks LPN    "

## 2025-05-19 NOTE — LETTER
2025       RE: Anna Sanderson  [address] [place] [address]     Dear Colleague,    Thank you for referring your patient, Anna Sanderson, to the [facility] E[facility] NOSE AND [facility]ROAT [facility]INIC [place] [facility]. Please see a copy of my visit note below.    Head and neck surgery  May 19, 2025    HPI: Mrs. Ailyn Sanderson is a pleasant 65 year old female with a history of graves disease that is symptomatic despite several years of treatment, presenting for evaluation of possible total thyroidectomy. She first had thyroid problems in 2021, trialed methimazole and now PTU without adequate symptom control. At the urging of her PCP and endocrinologist, she is presenting for surgical evaluation. 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       PMH: Grave's disease, hyperthyroidism, osteoporosis    Meds:   PTU   Metoprolol   Vitamin D3  Protonix   Advil    PSH:  section x3, achilles tendon repair     Family history: Two sisters with hypothyroidism, mother had history of papillary thyroid carcinoma. She mentions that two family members suffered from a thyroid storm after surgery, which is a concern that she has about surgery.     Allergies: Hives to morphine, adhesive tape. Blisters to chloraprep     ROS: Palpitations, hot/cold fluctuations are most bothersome symptoms    Physical Exam:   Alert, no acute distress  No palpable cervical adenopathy  No palpable thyroid masses  No lesions seen in the oral cavity or oropharynx    Procedure:  Fiberoptic laryngoscopy performed showing normal vocal cord mobility    Assessment:   Anna is an otherwise healthy 65 year old female with graves disease who is eligible for a total thyroidectomy if she chooses to pursue surgical management. Risks and benefits of surgery were discussed and all her  questions were answered.     We reviewed the risks including but not limited to infection, bleeding, return trips to the operating room, 3-5% risk of permanent recurrent laryngeal nerve injury, 5-10% risk of temporary recurrent laryngeal nerve weakness, hypoparathyroidism, inadvertent removal of parathyroid glands.      Plan: Anna will let us know if she desires surgical management. If surgery is pursued, we will   Appreciate endocrinology's cares to optimize her preoperative thyroid levels   Potassium iodide 1 week preoperative    Bertram Coy MD    60 minutes spent on the date of the encounter in chart review, patient visit, review of tests, documentation and/or discussion with other providers about the issues documented above aside from time spent doing flexible laryngoscopy.       Again, thank you for allowing me to participate in the care of your patient.      Sincerely,    Bertram Coy MD

## 2025-05-19 NOTE — PROGRESS NOTES
Head and neck surgery  May 19, 2025    HPI: Mrs. Ailyn Sanderson is a pleasant 65 year old female with a history of graves disease that is symptomatic despite several years of treatment, presenting for evaluation of possible total thyroidectomy. She first had thyroid problems in 2021, trialed methimazole and now PTU without adequate symptom control. At the urging of her PCP and endocrinologist, she is presenting for surgical evaluation. 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       PMH: Grave's disease, hyperthyroidism, osteoporosis    Meds:   PTU   Metoprolol   Vitamin D3  Protonix   Advil    PSH:  section x3, achilles tendon repair     Family history: Two sisters with hypothyroidism, mother had history of papillary thyroid carcinoma. She mentions that two family members suffered from a thyroid storm after surgery, which is a concern that she has about surgery.     Allergies: Hives to morphine, adhesive tape. Blisters to chloraprep     ROS: Palpitations, hot/cold fluctuations are most bothersome symptoms    Physical Exam:   Alert, no acute distress  No palpable cervical adenopathy  No palpable thyroid masses  No lesions seen in the oral cavity or oropharynx    Procedure:  Fiberoptic laryngoscopy performed showing normal vocal cord mobility    Assessment:   Anna is an otherwise healthy 65 year old female with graves disease who is eligible for a total thyroidectomy if she chooses to pursue surgical management. Risks and benefits of surgery were discussed and all her questions were answered.     We reviewed the risks including but not limited to infection, bleeding, return trips to the operating room, 3-5% risk of permanent recurrent laryngeal nerve injury, 5-10% risk of temporary recurrent laryngeal nerve weakness, hypoparathyroidism, inadvertent removal of parathyroid glands.      Plan: Anna will  let us know if she desires surgical management. If surgery is pursued, we will   Appreciate endocrinology's cares to optimize her preoperative thyroid levels   Potassium iodide 1 week preoperative    Bertram Coy MD    60 minutes spent on the date of the encounter in chart review, patient visit, review of tests, documentation and/or discussion with other providers about the issues documented above aside from time spent doing flexible laryngoscopy.

## 2025-05-22 ENCOUNTER — RESULTS FOLLOW-UP (OUTPATIENT)
Dept: INTERNAL MEDICINE | Facility: CLINIC | Age: 65
End: 2025-05-22

## 2025-05-22 PROBLEM — R00.2 PALPITATIONS: Status: ACTIVE | Noted: 2021-11-04

## 2025-05-22 LAB — CV ZIO PRELIM RESULTS: NORMAL

## 2025-05-24 ENCOUNTER — HOSPITAL ENCOUNTER (EMERGENCY)
Facility: CLINIC | Age: 65
Discharge: HOME | End: 2025-05-24
Attending: EMERGENCY MEDICINE | Admitting: EMERGENCY MEDICINE
Payer: COMMERCIAL

## 2025-05-24 ENCOUNTER — OFFICE VISIT (OUTPATIENT)
Dept: URGENT CARE | Facility: URGENT CARE | Age: 65
End: 2025-05-24
Payer: COMMERCIAL

## 2025-05-24 ENCOUNTER — APPOINTMENT (OUTPATIENT)
Dept: CT IMAGING | Facility: CLINIC | Age: 65
End: 2025-05-24
Attending: EMERGENCY MEDICINE
Payer: COMMERCIAL

## 2025-05-24 VITALS
RESPIRATION RATE: 16 BRPM | OXYGEN SATURATION: 100 % | BODY MASS INDEX: 20.25 KG/M2 | SYSTOLIC BLOOD PRESSURE: 147 MMHG | WEIGHT: 118 LBS | HEART RATE: 103 BPM | TEMPERATURE: 97.6 F | DIASTOLIC BLOOD PRESSURE: 83 MMHG

## 2025-05-24 VITALS
HEIGHT: 64 IN | OXYGEN SATURATION: 99 % | SYSTOLIC BLOOD PRESSURE: 142 MMHG | HEART RATE: 87 BPM | TEMPERATURE: 97.9 F | BODY MASS INDEX: 20.14 KG/M2 | WEIGHT: 118 LBS | RESPIRATION RATE: 20 BRPM | DIASTOLIC BLOOD PRESSURE: 66 MMHG

## 2025-05-24 DIAGNOSIS — J06.9 VIRAL URI: ICD-10-CM

## 2025-05-24 DIAGNOSIS — R07.81 PLEURITIC CHEST PAIN: ICD-10-CM

## 2025-05-24 DIAGNOSIS — R06.00 DYSPNEA, UNSPECIFIED TYPE: Primary | ICD-10-CM

## 2025-05-24 DIAGNOSIS — R00.2 PALPITATIONS: ICD-10-CM

## 2025-05-24 LAB
ANION GAP SERPL CALCULATED.3IONS-SCNC: 11 MMOL/L (ref 7–15)
ATRIAL RATE - MUSE: 99 BPM
BASOPHILS # BLD AUTO: 0.1 10E3/UL (ref 0–0.2)
BASOPHILS NFR BLD AUTO: 1 %
BUN SERPL-MCNC: 10.1 MG/DL (ref 8–23)
CALCIUM SERPL-MCNC: 8.9 MG/DL (ref 8.8–10.4)
CHLORIDE SERPL-SCNC: 107 MMOL/L (ref 98–107)
CREAT SERPL-MCNC: 0.69 MG/DL (ref 0.51–0.95)
D DIMER PPP FEU-MCNC: 0.82 UG/ML FEU (ref 0–0.5)
DIASTOLIC BLOOD PRESSURE - MUSE: 77 MMHG
EGFRCR SERPLBLD CKD-EPI 2021: >90 ML/MIN/1.73M2
EOSINOPHIL # BLD AUTO: 0.1 10E3/UL (ref 0–0.7)
EOSINOPHIL NFR BLD AUTO: 2 %
ERYTHROCYTE [DISTWIDTH] IN BLOOD BY AUTOMATED COUNT: 16.4 % (ref 10–15)
FLUAV RNA SPEC QL NAA+PROBE: NEGATIVE
FLUBV RNA RESP QL NAA+PROBE: NEGATIVE
GLUCOSE SERPL-MCNC: 93 MG/DL (ref 70–99)
HCO3 SERPL-SCNC: 21 MMOL/L (ref 22–29)
HCT VFR BLD AUTO: 37.6 % (ref 35–47)
HGB BLD-MCNC: 10.9 G/DL (ref 11.7–15.7)
IMM GRANULOCYTES # BLD: 0 10E3/UL
IMM GRANULOCYTES NFR BLD: 0 %
INTERPRETATION ECG - MUSE: NORMAL
LYMPHOCYTES # BLD AUTO: 2.1 10E3/UL (ref 0.8–5.3)
LYMPHOCYTES NFR BLD AUTO: 36 %
MAGNESIUM SERPL-MCNC: 2.2 MG/DL (ref 1.7–2.3)
MCH RBC QN AUTO: 22.2 PG (ref 26.5–33)
MCHC RBC AUTO-ENTMCNC: 29 G/DL (ref 31.5–36.5)
MCV RBC AUTO: 76 FL (ref 78–100)
MONOCYTES # BLD AUTO: 0.5 10E3/UL (ref 0–1.3)
MONOCYTES NFR BLD AUTO: 9 %
NEUTROPHILS # BLD AUTO: 3 10E3/UL (ref 1.6–8.3)
NEUTROPHILS NFR BLD AUTO: 52 %
NRBC # BLD AUTO: 0 10E3/UL
NRBC BLD AUTO-RTO: 0 /100
NT-PROBNP SERPL-MCNC: 53 PG/ML (ref 0–226)
P AXIS - MUSE: 76 DEGREES
PLATELET # BLD AUTO: 345 10E3/UL (ref 150–450)
POTASSIUM SERPL-SCNC: 4.5 MMOL/L (ref 3.4–5.3)
PR INTERVAL - MUSE: 110 MS
QRS DURATION - MUSE: 72 MS
QT - MUSE: 358 MS
QTC - MUSE: 459 MS
R AXIS - MUSE: 69 DEGREES
RBC # BLD AUTO: 4.92 10E6/UL (ref 3.8–5.2)
RSV RNA SPEC NAA+PROBE: NEGATIVE
SARS-COV-2 RNA RESP QL NAA+PROBE: NEGATIVE
SODIUM SERPL-SCNC: 139 MMOL/L (ref 135–145)
SYSTOLIC BLOOD PRESSURE - MUSE: 170 MMHG
T AXIS - MUSE: 62 DEGREES
T4 FREE SERPL-MCNC: 1.71 NG/DL (ref 0.9–1.7)
TROPONIN T SERPL HS-MCNC: 11 NG/L
TSH SERPL DL<=0.005 MIU/L-ACNC: 0.03 UIU/ML (ref 0.3–4.2)
VENTRICULAR RATE- MUSE: 99 BPM
WBC # BLD AUTO: 5.9 10E3/UL (ref 4–11)

## 2025-05-24 PROCEDURE — 85025 COMPLETE CBC W/AUTO DIFF WBC: CPT | Performed by: EMERGENCY MEDICINE

## 2025-05-24 PROCEDURE — 99285 EMERGENCY DEPT VISIT HI MDM: CPT | Mod: 25

## 2025-05-24 PROCEDURE — 99214 OFFICE O/P EST MOD 30 MIN: CPT | Performed by: PHYSICIAN ASSISTANT

## 2025-05-24 PROCEDURE — 84443 ASSAY THYROID STIM HORMONE: CPT | Performed by: EMERGENCY MEDICINE

## 2025-05-24 PROCEDURE — 71275 CT ANGIOGRAPHY CHEST: CPT

## 2025-05-24 PROCEDURE — 84484 ASSAY OF TROPONIN QUANT: CPT | Performed by: EMERGENCY MEDICINE

## 2025-05-24 PROCEDURE — 84439 ASSAY OF FREE THYROXINE: CPT | Performed by: EMERGENCY MEDICINE

## 2025-05-24 PROCEDURE — 93005 ELECTROCARDIOGRAM TRACING: CPT | Performed by: EMERGENCY MEDICINE

## 2025-05-24 PROCEDURE — 3079F DIAST BP 80-89 MM HG: CPT | Performed by: PHYSICIAN ASSISTANT

## 2025-05-24 PROCEDURE — 83735 ASSAY OF MAGNESIUM: CPT | Performed by: EMERGENCY MEDICINE

## 2025-05-24 PROCEDURE — 36415 COLL VENOUS BLD VENIPUNCTURE: CPT | Performed by: EMERGENCY MEDICINE

## 2025-05-24 PROCEDURE — 87637 SARSCOV2&INF A&B&RSV AMP PRB: CPT | Performed by: EMERGENCY MEDICINE

## 2025-05-24 PROCEDURE — 85379 FIBRIN DEGRADATION QUANT: CPT | Performed by: EMERGENCY MEDICINE

## 2025-05-24 PROCEDURE — 250N000011 HC RX IP 250 OP 636: Performed by: EMERGENCY MEDICINE

## 2025-05-24 PROCEDURE — 80048 BASIC METABOLIC PNL TOTAL CA: CPT | Performed by: EMERGENCY MEDICINE

## 2025-05-24 PROCEDURE — 3077F SYST BP >= 140 MM HG: CPT | Performed by: PHYSICIAN ASSISTANT

## 2025-05-24 PROCEDURE — 83880 ASSAY OF NATRIURETIC PEPTIDE: CPT | Performed by: EMERGENCY MEDICINE

## 2025-05-24 RX ORDER — IOPAMIDOL 755 MG/ML
75 INJECTION, SOLUTION INTRAVASCULAR ONCE
Status: COMPLETED | OUTPATIENT
Start: 2025-05-24 | End: 2025-05-24

## 2025-05-24 RX ADMIN — IOPAMIDOL 75 ML: 755 INJECTION, SOLUTION INTRAVENOUS at 14:36

## 2025-05-24 ASSESSMENT — ACTIVITIES OF DAILY LIVING (ADL)
ADLS_ACUITY_SCORE: 52

## 2025-05-24 ASSESSMENT — COLUMBIA-SUICIDE SEVERITY RATING SCALE - C-SSRS
2. HAVE YOU ACTUALLY HAD ANY THOUGHTS OF KILLING YOURSELF IN THE PAST MONTH?: NO
6. HAVE YOU EVER DONE ANYTHING, STARTED TO DO ANYTHING, OR PREPARED TO DO ANYTHING TO END YOUR LIFE?: NO
1. IN THE PAST MONTH, HAVE YOU WISHED YOU WERE DEAD OR WISHED YOU COULD GO TO SLEEP AND NOT WAKE UP?: NO

## 2025-05-24 NOTE — PROGRESS NOTES
Urgent Care Clinic Visit    Chief Complaint   Patient presents with    Breathing Problem     Shortness of breath and when breathing in back hurts. Symptoms started last night.  Patient also has had a cough on and off. And also feeling off.

## 2025-05-24 NOTE — PATIENT INSTRUCTIONS
Present to the emergency room for evaluation and treatment of your pleuritic chest pain with shortness of breath for further evaluation and treatment

## 2025-05-24 NOTE — ED PROVIDER NOTES
Progress Note    The patient was signed out to me by Dr. SHERON Persaud.        ED Course as of 05/24/25 1556   Sat May 24, 2025   1518 Signout: pleuritic back/chest pain since last night, mild cough. Awaiting CT PE read. Awaiting delta troponin. Has known hyperthyroidism.    1555 CT Chest Pulmonary Embolism w Contrast  1.  No evidence of pulmonary embolism.  2.  Multiple small nodular and tree-in-bud pulmonary opacities in the posterior aspect of the right lower lobe, likely infectious.  3.  Scattered areas of bronchiolar mucoid impaction, nonspecific, can be seen with respiratory bronchiolitis.   3:57 PM I met the patient, updated on her CT scan and my impression of viral upper respiratory infection.  She feels well.  Oxygenating normally, no cough.  She will follow-up with the viral swab at home.      Final diagnoses:   Pleuritic chest pain   Viral URI       Avi Beatty MD  Emergency Medicine  [facility]       Avi Beatty MD  05/24/25 [id number]

## 2025-05-24 NOTE — ED TRIAGE NOTES
Pt sent here from clinic after being seen for pleuritic chest pain. Pain is in left upper back with inspiration.

## 2025-05-24 NOTE — ED PROVIDER NOTES
EMERGENCY DEPARTMENT ENCOUNTER      NAME: Anna Sanderson  AGE: 65 year old female  YOB: 1960  MRN: [id number]  EVALUATION DATE & TIME: 5/24/2025 12:48 PM    PCP: Jame Dunn    ED PROVIDER: Maria Fernanda Persaud DO      Chief Complaint   Patient presents with    Back Pain    Shortness of Breath         FINAL IMPRESSION:  1. Pleuritic chest pain          ED COURSE & MEDICAL DECISION MAKING:    Pertinent Labs & Imaging studies reviewed. (See chart for details)  1:00 PM I met the patient and performed my initial interview and exam.    65 year old female presents to the Emergency Department for evaluation of pain that radiates into her back with deep breaths.  Started last night.  Felt a little unwell.  No fevers.  Went to urgent care who recommended evaluation here.  She is otherwise healthy.  Reports recent Holter monitor for 2 weeks, noted elevated heart rate but thought to be due to her Graves' disease.  She has been following with ENT with plans for thyroidectomy.  She is nontoxic-appearing.  EKG without evidence of arrhythmia ischemia.  Initial high-sensitivity troponin is elevated, pending repeat.  Certainly concern for possible PE, infection, pneumothorax.  D-dimer is elevated, will obtain CT imaging of the chest.  Independent interpretation by me without any large central PE.  Pending official results, repeat troponin at time of handoff.  Likely can be discharged if reassuring workup.    At the conclusion of the encounter I discussed the results of all of the tests and the disposition. The questions were answered. The patient or family acknowledged understanding and was agreeable with the care plan.     Medical Decision Making      Admission considered. Patient was signed out to the oncoming physician, disposition pending.    MIPS (CTPE, Dental pain, Foster, Sinusitis, Asthma/COPD, Head Trauma): CT Pulmonary Angiogram:The patient had an abnormal d-dimer.    SEPSIS: None      MEDICATIONS  GIVEN IN THE EMERGENCY:  Medications   iopamidol (ISOVUE-370) solution 75 mL (75 mLs Intravenous $Given 5/24/25 7355)       NEW PRESCRIPTIONS STARTED AT TODAY'S ER VISIT  New Prescriptions    No medications on file          =================================================================    HPI    Patient information was obtained from: patient     Use of : N/A     Anna NYE Ailyn Sanderson is a 65 year old female with a pertinent history of hypertension, peripheral artery disease, and osteoporosis who presents to this ED via walk-in for evaluation of back pain and shortness of breath.    Patient got home from work last night and began feeling shortness of breath and a sharp pain in her back when she breathed. She has had an intermittent cough. She takes Advil ~daily. The patient went to Urgent Care initially but was sent to the ED.    No recent travel. Patient denies cough, fever, vomiting, any recent injuries, or any other concerns at this time.    Per chart review, patient was seen at [facility] on 5/24/2025 for shortness of breath and back pain when breathing. Patient was sent directly to the ED for further workup.       REVIEW OF SYSTEMS   Per \[facility]\""    PAST MEDICAL HISTORY:  Past Medical History:   Diagnosis Date    Abdominal pain, epigastric 11/05/2019    Abnormal CT of the chest 02/01/2021    Abnormal TSH     TSH suppressed 0.18 and normal free T4, recheck in 3 months    Abscess of left lower leg 06/07/2021    Achilles tendinitis 07/26/2019    Achilles tendon infection     Allergic rhinitis 09/03/2020    Chronic cough 03/23/2020    CT chest with mild bronchiectasis and small area of consolidation    Deep vein thrombosis (DVT) of brachial vein of left upper extremity (H) 04/17/2018    Associated with PICC line, follow-up ultrasound showing resolution of thrombus    Dermatitis     Family history of myocardial infarction     dad 35  mother 70    Fatigue 02/24/2017    GERD (gastroesophageal  reflux disease)     EGD with esophagitis , experiencing chest pain and dysphagia    History of anesthesia complications     Hypertension     Hyperthyroidism 2023    Impaired fasting glucose     Glucose 119 2021.  A1c 5.3%    Iron deficiency anemia 2015    Has required iron infusions    Kidney stones     Microscopic hematuria 2021    Longstanding finding    Motion sickness     MRSA infection 2020    Left leg wound    Open wound of foot, complicated     Achilles tendon rupture complicated by infected open wound with multiple surgeries including skin grafting    Osteoporosis of multiple sites without pathological fracture     DEXA with T score -2.4 L1 L4, -3.2 left femoral neck and -2.6 right femoral neck.  DEXA 2022 with significant improvement -2.6 left femoral neck and -2.3 right femoral neck    Ovarian cyst     Palpitations 2021    Event monitor showing sinus tachycardia no arrhythmia 2021.  Zio patch with brief episodes of SVT but no atrial fibrillation May 2025    PONV (postoperative nausea and vomiting)     SIRS (systemic inflammatory response syndrome) (H) 2019    SOB (shortness of breath) 2021    Subacute thyroiditis 10/15/2021    TSH less than 0.1 and abnormal thyroid uptake scan 2021, asymptomatic    Vitamin D deficiency 2020       PAST SURGICAL HISTORY:  Past Surgical History:   Procedure Laterality Date    ANKLE SURGERY      Multiple surgeries [id number] after complications from Achilles rupture and repair with nonhealing open wound including skin grafting    APPLY WOUND VAC Left 2020    Procedure: VERAFLO WOUND VAC APPLICATION;  Surgeon: Mitchell Her MD;  Location:  OR    APPLY WOUND VAC Left 11/10/2020    Procedure: WOUND VAC APPLICATION;  Surgeon: Mitchell Her MD;  Location:  OR    BACK SURGERY       SECTION      x3    GRAFT SKIN SPLIT THICKNESS FROM EXTREMITY Left 11/10/2020    Procedure:  SURGICAL PROCUREMENT, SPLIT-THICKNESS SKIN GRAFT, HARVEST FROM LEFT THIGH TO LEFT LEG WOUND;  Surgeon: Mitchell Her MD;  Location:  OR    GYN SURGERY  2000    hysterectomy,  x 3    GYN SURGERY      right oophorectomy    HYSTERECTOMY  2002    IR LOWER EXTREMITY ANGIOGRAM LEFT  10/02/2020    IR TRANSCATH FOREIGN BODY REMOVAL  2008    IRRIGATION AND DEBRIDEMENT LOWER EXTREMITY, COMBINED Left 2020    Procedure: MISONIX IRRIGATION AND DEBRIDEMENT LOWER EXTREMITY WITH VASHE APPLICATION;  Surgeon: Mitchell Her MD;  Location:  OR    IRRIGATION AND DEBRIDEMENT LOWER EXTREMITY, COMBINED Left 12/10/2021    Procedure: IRRIGATION AND DEBRIDEMENT, LEFT LOWER EXTREMITY WITH WOUND VAC PLACEMENT;  Surgeon: Carie Wiley MD;  Location: [facility]    IRRIGATION AND DEBRIDEMENT LOWER EXTREMITY, COMBINED Left 2022    Procedure: LEFT CALF ACHILLES IRRIGATION AND DEBRIDEMENT;  Surgeon: Carie Wiley MD;  Location: [facility] OR    OOPHORECTOMY Right     PICC  2021         PICC AND MIDLINE TEAM LINE INSERTION  2018         PICC SINGLE LUMEN PLACEMENT  2022         SOFT TISSUE SURGERY      multiple left Achilles area surgeries    THORACIC SURGERY      VASCULAR SURGERY  2021           CURRENT MEDICATIONS:    aspirin (ASA) 81 MG EC tablet  calcium citrate-vitamin D (CITRACAL) 315-250 MG-UNIT TABS per tablet  cholecalciferol (VITAMIN D3) 125 mcg (5000 units) capsule  ibuprofen (ADVIL/MOTRIN) 200 MG tablet  metoprolol succinate ER (TOPROL XL) 25 MG 24 hr tablet  metoprolol succinate ER (TOPROL XL) 50 MG 24 hr tablet  ondansetron (ZOFRAN) 4 MG tablet  pantoprazole (PROTONIX) 40 MG EC tablet  potassium iodide (SSKI) 1 GM/ML solution  propylthiouracil (PTU) 50 MG tablet  sucralfate (CARAFATE) 1 GM tablet         ALLERGIES:  Allergies   Allergen Reactions    Other Drug Allergy (See Comments) Other (See Comments)     CHLORAPREP Blisters.    Morphine Hives    Adhesive  Tape Rash       FAMILY HISTORY:  Family History   Problem Relation Age of Onset    Coronary Artery Disease Mother         91yo    Thyroid Disease Mother     Osteoporosis Mother     Coronary Artery Disease Father     Alzheimer Disease Sister     Cerebrovascular Disease Sister     Hypertension Sister     No Known Problems Sister     No Known Problems Brother     Aortic dissection Brother     No Known Problems Daughter     No Known Problems Son     No Known Problems Son     Breast Cancer No family hx of        SOCIAL HISTORY:   Social History     Socioeconomic History    Marital status:    Tobacco Use    Smoking status: Former     Current packs/day: 0.00     Types: Cigarettes     Quit date: 3/14/2003     Years since quittin.2     Passive exposure: Past    Smokeless tobacco: Never    Tobacco comments:     Quit    Vaping Use    Vaping status: Never Used   Substance and Sexual Activity    Alcohol use: No     Comment: Alcoholic Drinks/day: rare    Drug use: No   Other Topics Concern    Parent/sibling w/ CABG, MI or angioplasty before 65F 55M? Yes   Social History Narrative    .  to Sher. 3 children       Social Drivers of Health     Financial Resource Strain: Low Risk  (2025)    Financial Resource Strain     Within the past 12 months, have you or your family members you live with been unable to get utilities (heat, electricity) when it was really needed?: No   Food Insecurity: Low Risk  (2025)    Food Insecurity     Within the past 12 months, did you worry that your food would run out before you got money to buy more?: No     Within the past 12 months, did the food you bought just not last and you didn t have money to get more?: No   Transportation Needs: Low Risk  (2025)    Transportation Needs     Within the past 12 months, has lack of transportation kept you from medical appointments, getting your medicines, non-medical meetings or appointments, work, or from getting  "things that you need?: No   Physical Activity: Sufficiently Active (5/22/2025)    Exercise Vital Sign     Days of Exercise per Week: 5 days     Minutes of Exercise per Session: 60 min   Stress: No Stress Concern Present (5/22/2025)    [place] [employer] - Occupational Stress Questionnaire     Feeling of Stress : Only a little   Social Connections: Unknown (5/22/2025)    Social Connection and Isolation Panel [NHANES]     Frequency of Social Gatherings with Friends and Family: Once a week   Interpersonal Safety: Low Risk  (4/10/2025)    Interpersonal Safety     Do you feel physically and emotionally safe where you currently live?: Yes     Within the past 12 months, have you been hit, slapped, kicked or otherwise physically hurt by someone?: No     Within the past 12 months, have you been humiliated or emotionally abused in other ways by your partner or ex-partner?: No   Housing Stability: Low Risk  (5/22/2025)    Housing Stability     Do you have housing? : Yes     Are you worried about losing your housing?: No       VITALS:  BP (!) 158/58   Pulse 97   Temp 97.9  F (36.6  C) (Oral)   Resp 28   Ht 1.626 m (5' 4\")   Wt 53.5 kg (118 lb)   LMP  (LMP Unknown)   SpO2 98%   BMI 20.25 kg/m      PHYSICAL EXAM    Physical Exam  Constitutional:       General: She is not in acute distress.  HENT:      Head: Normocephalic and atraumatic.      Mouth/Throat:      Pharynx: Oropharynx is clear.   Eyes:      Pupils: Pupils are equal, round, and reactive to light.   Cardiovascular:      Rate and Rhythm: Normal rate and regular rhythm.      Pulses: Normal pulses.      Heart sounds: Normal heart sounds.   Pulmonary:      Effort: Pulmonary effort is normal.      Breath sounds: Normal breath sounds.   Abdominal:      General: Abdomen is flat. Bowel sounds are normal.      Palpations: Abdomen is soft.      Tenderness: There is no abdominal tenderness.   Musculoskeletal:         General: Normal range of motion. "   Skin:     General: Skin is warm and dry.      Capillary Refill: Capillary refill takes less than 2 seconds.   Neurological:      General: No focal deficit present.      Mental Status: She is alert and oriented to person, place, and time.           LAB:  All pertinent labs reviewed and interpreted.  Labs Ordered and Resulted from Time of ED Arrival to Time of ED Departure   D DIMER QUANTITATIVE - Abnormal       Result Value    D-Dimer Quantitative 0.82 (*)    BASIC METABOLIC PANEL - Abnormal    Sodium 139      Potassium 4.5      Chloride 107      Carbon Dioxide (CO2) 21 (*)     Anion Gap 11      Urea Nitrogen 10.1      Creatinine 0.69      GFR Estimate >90      Calcium 8.9      Glucose 93     TSH WITH FREE T4 REFLEX - Abnormal    TSH 0.03 (*)    CBC WITH PLATELETS AND DIFFERENTIAL - Abnormal    WBC Count 5.9      RBC Count 4.92      Hemoglobin 10.9 (*)     Hematocrit 37.6      MCV 76 (*)     MCH 22.2 (*)     MCHC 29.0 (*)     RDW 16.4 (*)     Platelet Count 345      % Neutrophils 52      % Lymphocytes 36      % Monocytes 9      % Eosinophils 2      % Basophils 1      % Immature Granulocytes 0      NRBCs per 100 WBC 0      Absolute Neutrophils 3.0      Absolute Lymphocytes 2.1      Absolute Monocytes 0.5      Absolute Eosinophils 0.1      Absolute Basophils 0.1      Absolute Immature Granulocytes 0.0      Absolute NRBCs 0.0     T4 FREE - Abnormal    Free T4 1.71 (*)    TROPONIN T, HIGH SENSITIVITY - Normal    Troponin T, High Sensitivity 11     MAGNESIUM - Normal    Magnesium 2.2     NT-PROBNP - Normal    NT-proBNP 53     TROPONIN T, HIGH SENSITIVITY   INFLUENZA A/B, RSV AND SARS-COV2 PCR       RADIOLOGY:  Reviewed all pertinent imaging. Please see official radiology report.  CT Chest Pulmonary Embolism w Contrast   Final Result   IMPRESSION:   1.  No evidence of pulmonary embolism.   2.  Multiple small nodular and tree-in-bud pulmonary opacities in the posterior aspect of the right lower lobe, likely infectious.    3.  Scattered areas of bronchiolar mucoid impaction, nonspecific, can be seen with respiratory bronchiolitis.          EKG:    Performed at: [facility] on 05/24/2025 at 12:55pm    Impression: Sinus rhythm with short LA. Nonspecific ST abnormality. When compared with ECG of 07/22/2024, no significant changes where found.    Rate: 99 BPM  Rhythm: Sinus  Axis: 76 69 62  LA Interval: 110 ms  QRS Interval: 72 ms  QTc Interval: 358/459 ms  ST Changes: Nonspecific ST abnormality  Comparison: When compared with ECG of 07/22/2024, no significant changes where found.    I have independently reviewed and interpreted the EKG(s) documented above.      I, Silvestre Villela, am serving as a scribe to document services personally performed by Dr. Maria Fernanda Persaud based on my observation and the provider's statements to me. I, Maria Fernanda Persaud, DO attest that Silvestre Villela is acting in a scribe capacity, has observed my performance of the services and has documented them in accordance with my direction.    Maria Fernanda Persaud DO  Emergency Medicine  [facility] EMERGENCY ROOM  [address] [facility] [address]  [phone]  Dept: [phone]      Maria Fernanda Persaud DO  05/24/25 1543

## 2025-05-24 NOTE — PROGRESS NOTES
Patient presents with:  Breathing Problem: Shortness of breath and when breathing in back hurts. Symptoms started last night.  Patient also has had a cough on and off. And also feeling off.      Clinical Decision Making:  Patient has a prior history of DVT and she has shortness of breath with pleuritic chest pain with deep inspiration and is sent to next higher level of care for evaluation of her constellation of symptoms with dyspnea pleuritic chest pain and heart palpitations for evaluation of pulmonary embolism.  Patient will present to the [facility] ER for further evaluation and treatment.  Questions were answered to her satisfaction before discharge      ICD-10-CM    1. Dyspnea, unspecified type  R06.00       2. Pleuritic chest pain  R07.81       3. Palpitations  R00.2           Patient Instructions   Present to the emergency room for evaluation and treatment of your pleuritic chest pain with shortness of breath for further evaluation and treatment        HPI:  Anna Sanderson is a 65 year old female who has a past medical history of DVT of the brachiocephalic vein and Graves' disease who presents today for history of pleuritic chest pain shortness of breath.  Patient is denying fever chills night sweats cough nausea vomiting dizziness no new heart palpitations no pain or swelling in the thighs or lower extremity no recent history of being sedentary or recent trauma to the extremities and no noted dehydration.  Symptoms started last night.    History obtained from chart review and the patient.    Problem List:  2024-02: Fever and chills  2024-02: Graves' disease  2023-07: Persistent cough for 3 weeks or longer  2023-01: Hyperthyroidism  2022-02: Osteoporosis of multiple sites without pathological fracture  2021-11: SOB (shortness of breath)  2021-11: Palpitations  2021-11: Palpitations  2021-06: PAD (peripheral artery disease)  2021-06: Benign essential hypertension  2021-06: Gastroesophageal  reflux disease without esophagitis  2021-06: Subclinical hyperthyroidism  2021-06: Abscess of left lower leg  2021-05: Abnormal TSH  2021-04: MTHFR gene mutation (H) Heterozygoyus April 2021, [facility]  2021-03: Microscopic hematuria  2021-02: Abnormal CT of the chest  2020-10: Non-healing surgical wound, subsequent encounter  2020-09: Achilles tendon infection  2020-09: Allergic rhinitis  2020-09: Atopic dermatitis, unspecified type  2020-09: Gastroesophageal reflux disease with esophagitis  2020-09: Osteoporosis of multiple sites without pathological fracture  2020-09: Skin ulcer of left ankle with necrosis of muscle (H)  2020-03: Chronic cough  2020-01: Vitamin D deficiency  2019-11: Abdominal pain, epigastric  2019-08: SIRS (systemic inflammatory response syndrome) (H)  2019-07: Achilles tendinitis  2018-11: Nonhealing surgical wound  2018-04: Deep vein thrombosis (DVT) of brachial vein of left upper   extremity (H)  2015-12: Iron deficiency anemia      Past Medical History:   Diagnosis Date    Abdominal pain, epigastric 11/05/2019    Abnormal CT of the chest 02/01/2021    Abnormal TSH     TSH suppressed 0.18 and normal free T4, recheck in 3 months    Abscess of left lower leg 06/07/2021    Achilles tendinitis 07/26/2019    Achilles tendon infection     Allergic rhinitis 09/03/2020    Chronic cough 03/23/2020    CT chest with mild bronchiectasis and small area of consolidation    Deep vein thrombosis (DVT) of brachial vein of left upper extremity (H) 04/17/2018    Associated with PICC line, follow-up ultrasound showing resolution of thrombus    Dermatitis     Family history of myocardial infarction     dad 35  mother 70    Fatigue 02/24/2017    GERD (gastroesophageal reflux disease)     EGD with esophagitis 2015, experiencing chest pain and dysphagia    History of anesthesia complications     Hypertension     Hyperthyroidism 01/26/2023    Impaired fasting glucose     Glucose 119 February 2021.  A1c  5.3%    Iron deficiency anemia 2015    Has required iron infusions    Kidney stones     Microscopic hematuria 2021    Longstanding finding    Motion sickness     MRSA infection 2020    Left leg wound    Open wound of foot, complicated     Achilles tendon rupture complicated by infected open wound with multiple surgeries including skin grafting    Osteoporosis of multiple sites without pathological fracture     DEXA with T score -2.4 L1 L4, -3.2 left femoral neck and -2.6 right femoral neck.  DEXA 2022 with significant improvement -2.6 left femoral neck and -2.3 right femoral neck    Ovarian cyst     Palpitations 2021    Event monitor showing sinus tachycardia no arrhythmia 2021.  Zio patch with brief episodes of SVT but no atrial fibrillation May 2025    PONV (postoperative nausea and vomiting)     SIRS (systemic inflammatory response syndrome) (H) 2019    SOB (shortness of breath) 2021    Subacute thyroiditis 10/15/2021    TSH less than 0.1 and abnormal thyroid uptake scan 2021, asymptomatic    Vitamin D deficiency 2020       Social History     Tobacco Use    Smoking status: Former     Current packs/day: 0.00     Types: Cigarettes     Quit date: 3/14/2003     Years since quittin.2     Passive exposure: Past    Smokeless tobacco: Never    Tobacco comments:     Quit    Substance Use Topics    Alcohol use: No     Comment: Alcoholic Drinks/day: rare       Review of Systems  As above in HPI otherwise negative.    Vitals:    25 1206 25 1211   BP: (!) 145/88 (!) 147/83   Pulse: 103    Resp: 16    Temp: 97.6  F (36.4  C)    SpO2: 100%    Weight: 53.5 kg (118 lb)        General: Patient is resting comfortably no acute distress is afebrile  HEENT: Head is normocephalic atraumatic   eyes are PERRL EOMI sclera anicteric   TMs are clear bilaterally  Throat is clear and no exudate  No cervical lymphadenopathy present  LUNGS: Clear to  auscultation bilaterally patient has pleuritic pain with deep inspiration  HEART: Regular rate and rhythm  Skin: Without rash non-diaphoretic  Extremities:  No noted Homans' sign no swelling of the calves or thighs.    Physical Exam         At the end of the encounter, I discussed results, diagnosis, medications. Discussed red flags for immediate return to clinic/ER, as well as indications for follow up if no improvement. Patient understood and agreed to plan. Patient was stable for discharge.

## 2025-05-24 NOTE — DISCHARGE INSTRUCTIONS
"The viral swab results will be uploaded to [employer] later this evening.    Otherwise your symptoms should last 5 to 7 days, the discomfort in the chest may last little bit longer, but you should be feeling back to normal (not \"sick\").  "

## 2025-05-28 ENCOUNTER — MYC MEDICAL ADVICE (OUTPATIENT)
Dept: ENDOCRINOLOGY | Facility: CLINIC | Age: 65
End: 2025-05-28
Payer: COMMERCIAL

## 2025-05-28 ENCOUNTER — TELEPHONE (OUTPATIENT)
Dept: OTOLARYNGOLOGY | Facility: CLINIC | Age: 65
End: 2025-05-28
Payer: COMMERCIAL

## 2025-05-28 DIAGNOSIS — E05.00 GRAVES' DISEASE: Primary | ICD-10-CM

## 2025-05-28 NOTE — TELEPHONE ENCOUNTER
Called patient to schedule surgery with Dr. Coy     Spoke with: Anna (patient)      Date of Surgery: 7/3/2025    Estimated Arrival time Discussed with Patient:  No    Location of surgery: [facility]     Pre-Op H&P: PCP - [facility]     Post-Op Appt Date w/ Surgeon:  Dr. Coy 7/14/2025 at 1140 am     Discussed with patient pre-op RN will call 2-3 days prior to surgery with arrival time and instructions:  Yes       Standard Surgery Packet Sent: Yes via [employer]      Additional Comments: All patients questions were answered and was instructed to review surgical packet and call back with any questions or concerns.       Reba De Jesus on 5/28/2025 at 5:29 PM

## 2025-06-05 ENCOUNTER — OFFICE VISIT (OUTPATIENT)
Dept: INTERNAL MEDICINE | Facility: CLINIC | Age: 65
End: 2025-06-05
Payer: COMMERCIAL

## 2025-06-05 VITALS
DIASTOLIC BLOOD PRESSURE: 80 MMHG | HEART RATE: 88 BPM | TEMPERATURE: 98 F | RESPIRATION RATE: 18 BRPM | SYSTOLIC BLOOD PRESSURE: 132 MMHG

## 2025-06-05 DIAGNOSIS — E05.90 HYPERTHYROIDISM: ICD-10-CM

## 2025-06-05 DIAGNOSIS — R07.81 PLEURITIC CHEST PAIN: Primary | ICD-10-CM

## 2025-06-05 RX ORDER — METHYLPREDNISOLONE 4 MG/1
TABLET ORAL
Qty: 21 TABLET | Refills: 0 | Status: SHIPPED | OUTPATIENT
Start: 2025-06-05

## 2025-06-05 NOTE — PROGRESS NOTES
Assessment & Plan     Pleuritic chest pain  65-year-old woman here to follow-up after ER evaluation for the acute onset of pleuritic chest pain.  Describes a severe pain in her left upper back that is also felt substernally.  Significantly worse with coughing or sneezing.  Associated with dyspnea.  Workup in the ER reviewed.  EKG without changes and troponin negative.  Slightly elevated D-dimer.  CTA chest negative for pulmonary embolus.  Small nodular and tree-in-bud pulmonary opacities in the posterior aspect of the right lower lobe likely infectious.  Thought to be viral URI.  This may be precipitating factor for pleurisy.  I suspect that is what is causing her current symptoms.  She is already using ibuprofen.  Recommending starting a Medrol Dosepak.  Less likely this represents esophageal spasm which she does have a history of.  She takes pantoprazole regularly.  If she does not respond to the Medrol, she can try Carafate.  - methylPREDNISolone (MEDROL DOSEPAK) 4 MG tablet therapy pack; Follow Package Directions    Hyperthyroidism  Scheduled for thyroidectomy next month.  Continues on  mg 3 times daily.    The longitudinal plan of care for the diagnosis(es)/condition(s) as documented were addressed during this visit. Due to the added complexity in care, I will continue to support Anna in the subsequent management and with ongoing continuity of care.         Follow-up  Return in about 2 weeks (around 6/19/2025) for follow-up for previously scheduled visit.    Carlitos Castillo is a 65 year old, presenting for the following health issues: Here to follow-up ER evaluation with pleuritic chest and back pain with shortness of breath.  See assessment and plan for details  Follow Up (ER, [facility], 5/24/25, pleuritic back/chest pain. Still has pain and SOB)        6/5/2025    11:05 AM   Additional Questions   Roomed by          6/5/2025   Declines Weight   Did patient decline having their weight taken? Yes          Review of Systems      No significant cough.  No fevers or chills      Objective    /80 (BP Location: Right arm, Patient Position: Sitting, Cuff Size: Adult Regular)   Pulse 88   Temp 98  F (36.7  C) (Oral)   Resp 18   LMP  (LMP Unknown)   There is no height or weight on file to calculate BMI.  Physical Exam     Well-appearing middle-aged woman who does not appear in any acute respiratory distress with O2 sats 99% on room air  Lungs clear bilaterally no rales or wheezes  Heart regular rate and rhythm without murmur or gallop  No reproducible chest wall pain          Signed Electronically by: Jame Dunn MD

## 2025-06-12 ENCOUNTER — LAB (OUTPATIENT)
Dept: LAB | Facility: CLINIC | Age: 65
End: 2025-06-12
Attending: INTERNAL MEDICINE
Payer: COMMERCIAL

## 2025-06-12 ENCOUNTER — HOSPITAL ENCOUNTER (OUTPATIENT)
Dept: MAMMOGRAPHY | Facility: CLINIC | Age: 65
End: 2025-06-12
Attending: INTERNAL MEDICINE
Payer: COMMERCIAL

## 2025-06-12 DIAGNOSIS — E05.00 GRAVES' DISEASE: ICD-10-CM

## 2025-06-12 DIAGNOSIS — Z12.31 VISIT FOR SCREENING MAMMOGRAM: ICD-10-CM

## 2025-06-12 DIAGNOSIS — E05.90 HYPERTHYROIDISM: ICD-10-CM

## 2025-06-12 LAB
ALBUMIN SERPL BCG-MCNC: 4.7 G/DL (ref 3.5–5.2)
ALP SERPL-CCNC: 75 U/L (ref 40–150)
ALT SERPL W P-5'-P-CCNC: 7 U/L (ref 0–50)
AST SERPL W P-5'-P-CCNC: 16 U/L (ref 0–45)
BILIRUB SERPL-MCNC: 0.3 MG/DL
BILIRUBIN DIRECT (ROCHE PRO & PURE): 0.11 MG/DL (ref 0–0.45)
PROT SERPL-MCNC: 7.5 G/DL (ref 6.4–8.3)
T3 SERPL-MCNC: 117 NG/DL (ref 85–202)
T4 FREE SERPL-MCNC: 3.01 NG/DL (ref 0.9–1.7)
TSH SERPL DL<=0.005 MIU/L-ACNC: 0.01 UIU/ML (ref 0.3–4.2)

## 2025-06-12 PROCEDURE — 36415 COLL VENOUS BLD VENIPUNCTURE: CPT

## 2025-06-12 PROCEDURE — 80076 HEPATIC FUNCTION PANEL: CPT

## 2025-06-12 PROCEDURE — 84443 ASSAY THYROID STIM HORMONE: CPT

## 2025-06-12 PROCEDURE — 77063 BREAST TOMOSYNTHESIS BI: CPT

## 2025-06-12 PROCEDURE — 84439 ASSAY OF FREE THYROXINE: CPT

## 2025-06-12 PROCEDURE — 84480 ASSAY TRIIODOTHYRONINE (T3): CPT

## 2025-06-13 ENCOUNTER — RESULTS FOLLOW-UP (OUTPATIENT)
Dept: ENDOCRINOLOGY | Facility: CLINIC | Age: 65
End: 2025-06-13

## 2025-06-17 ENCOUNTER — PATIENT OUTREACH (OUTPATIENT)
Dept: CARE COORDINATION | Facility: CLINIC | Age: 65
End: 2025-06-17

## 2025-06-27 ENCOUNTER — ORDERS ONLY (AUTO-RELEASED) (OUTPATIENT)
Dept: INTERNAL MEDICINE | Facility: CLINIC | Age: 65
End: 2025-06-27

## 2025-06-27 DIAGNOSIS — Z12.11 SCREEN FOR COLON CANCER: ICD-10-CM

## 2025-07-08 ENCOUNTER — DOCUMENTATION ONLY (OUTPATIENT)
Dept: OTHER | Facility: CLINIC | Age: 65
End: 2025-07-08
Payer: COMMERCIAL

## 2025-07-12 ENCOUNTER — LAB (OUTPATIENT)
Dept: LAB | Facility: CLINIC | Age: 65
End: 2025-07-12
Payer: COMMERCIAL

## 2025-07-12 DIAGNOSIS — E55.9 VITAMIN D DEFICIENCY: ICD-10-CM

## 2025-07-12 DIAGNOSIS — E05.00 GRAVES' DISEASE: ICD-10-CM

## 2025-07-12 DIAGNOSIS — I10 BENIGN ESSENTIAL HYPERTENSION: ICD-10-CM

## 2025-07-12 DIAGNOSIS — E05.90 HYPERTHYROIDISM: ICD-10-CM

## 2025-07-12 DIAGNOSIS — Z00.00 ROUTINE GENERAL MEDICAL EXAMINATION AT A HEALTH CARE FACILITY: ICD-10-CM

## 2025-07-12 DIAGNOSIS — D50.9 IRON DEFICIENCY ANEMIA, UNSPECIFIED IRON DEFICIENCY ANEMIA TYPE: ICD-10-CM

## 2025-07-12 LAB
ALBUMIN SERPL BCG-MCNC: 4.2 G/DL (ref 3.5–5.2)
ALBUMIN UR-MCNC: ABNORMAL MG/DL
ALP SERPL-CCNC: 66 U/L (ref 40–150)
ALT SERPL W P-5'-P-CCNC: 8 U/L (ref 0–50)
ANION GAP SERPL CALCULATED.3IONS-SCNC: 11 MMOL/L (ref 7–15)
APPEARANCE UR: CLEAR
AST SERPL W P-5'-P-CCNC: 21 U/L (ref 0–45)
BILIRUB SERPL-MCNC: 0.3 MG/DL
BILIRUB UR QL STRIP: ABNORMAL
BILIRUBIN DIRECT (ROCHE PRO & PURE): 0.12 MG/DL (ref 0–0.45)
BUN SERPL-MCNC: 9 MG/DL (ref 8–23)
CALCIUM SERPL-MCNC: 9.2 MG/DL (ref 8.8–10.4)
CHLORIDE SERPL-SCNC: 106 MMOL/L (ref 98–107)
CHOLEST SERPL-MCNC: 217 MG/DL
COLOR UR AUTO: YELLOW
CREAT SERPL-MCNC: 0.68 MG/DL (ref 0.51–0.95)
EGFRCR SERPLBLD CKD-EPI 2021: >90 ML/MIN/1.73M2
ERYTHROCYTE [DISTWIDTH] IN BLOOD BY AUTOMATED COUNT: 16.6 % (ref 10–15)
FASTING STATUS PATIENT QL REPORTED: YES
FASTING STATUS PATIENT QL REPORTED: YES
FERRITIN SERPL-MCNC: 12 NG/ML (ref 11–328)
GLUCOSE SERPL-MCNC: 86 MG/DL (ref 70–99)
GLUCOSE UR STRIP-MCNC: NEGATIVE MG/DL
HCO3 SERPL-SCNC: 22 MMOL/L (ref 22–29)
HCT VFR BLD AUTO: 34.1 % (ref 35–47)
HDLC SERPL-MCNC: 61 MG/DL
HGB BLD-MCNC: 10.2 G/DL (ref 11.7–15.7)
HGB UR QL STRIP: NEGATIVE
IRON BINDING CAPACITY (ROCHE): 461 UG/DL (ref 240–430)
IRON SATN MFR SERPL: 6 % (ref 15–46)
IRON SERPL-MCNC: 29 UG/DL (ref 37–145)
KETONES UR STRIP-MCNC: 15 MG/DL
LDLC SERPL CALC-MCNC: 140 MG/DL
LEUKOCYTE ESTERASE UR QL STRIP: NEGATIVE
MCH RBC QN AUTO: 22.4 PG (ref 26.5–33)
MCHC RBC AUTO-ENTMCNC: 29.9 G/DL (ref 31.5–36.5)
MCV RBC AUTO: 75 FL (ref 78–100)
MUCOUS THREADS #/AREA URNS LPF: PRESENT /LPF
NITRATE UR QL: NEGATIVE
NONHDLC SERPL-MCNC: 156 MG/DL
PH UR STRIP: 6 [PH] (ref 5–8)
PLATELET # BLD AUTO: 360 10E3/UL (ref 150–450)
POTASSIUM SERPL-SCNC: 4.8 MMOL/L (ref 3.4–5.3)
PROT SERPL-MCNC: 6.8 G/DL (ref 6.4–8.3)
RBC # BLD AUTO: 4.56 10E6/UL (ref 3.8–5.2)
RBC #/AREA URNS AUTO: ABNORMAL /HPF
SODIUM SERPL-SCNC: 139 MMOL/L (ref 135–145)
SP GR UR STRIP: 1.01 (ref 1–1.03)
T3 SERPL-MCNC: 130 NG/DL (ref 85–202)
T4 FREE SERPL-MCNC: 2.16 NG/DL (ref 0.9–1.7)
TRIGL SERPL-MCNC: 78 MG/DL
TSH SERPL DL<=0.005 MIU/L-ACNC: 0.01 UIU/ML (ref 0.3–4.2)
UROBILINOGEN UR STRIP-ACNC: 0.2 E.U./DL
VIT D+METAB SERPL-MCNC: 51 NG/ML (ref 20–50)
WBC # BLD AUTO: 5.7 10E3/UL (ref 4–11)
WBC #/AREA URNS AUTO: ABNORMAL /HPF

## 2025-07-12 PROCEDURE — 81001 URINALYSIS AUTO W/SCOPE: CPT

## 2025-07-12 PROCEDURE — 36415 COLL VENOUS BLD VENIPUNCTURE: CPT

## 2025-07-12 PROCEDURE — 80061 LIPID PANEL: CPT

## 2025-07-12 PROCEDURE — 83540 ASSAY OF IRON: CPT

## 2025-07-12 PROCEDURE — 84439 ASSAY OF FREE THYROXINE: CPT

## 2025-07-12 PROCEDURE — 85027 COMPLETE CBC AUTOMATED: CPT

## 2025-07-12 PROCEDURE — 82728 ASSAY OF FERRITIN: CPT

## 2025-07-12 PROCEDURE — 83550 IRON BINDING TEST: CPT

## 2025-07-12 PROCEDURE — 82248 BILIRUBIN DIRECT: CPT

## 2025-07-12 PROCEDURE — 80053 COMPREHEN METABOLIC PANEL: CPT

## 2025-07-12 PROCEDURE — 82306 VITAMIN D 25 HYDROXY: CPT

## 2025-07-12 PROCEDURE — 84480 ASSAY TRIIODOTHYRONINE (T3): CPT

## 2025-07-12 PROCEDURE — 84443 ASSAY THYROID STIM HORMONE: CPT

## 2025-07-14 DIAGNOSIS — D50.9 IRON DEFICIENCY ANEMIA, UNSPECIFIED IRON DEFICIENCY ANEMIA TYPE: Primary | ICD-10-CM

## 2025-07-14 DIAGNOSIS — E78.00 HYPERCHOLESTEROLEMIA: Primary | ICD-10-CM

## 2025-07-14 RX ORDER — FERROUS SULFATE 325(65) MG
325 TABLET ORAL EVERY OTHER DAY
COMMUNITY
Start: 2025-07-14

## 2025-07-30 ENCOUNTER — HOSPITAL ENCOUNTER (OUTPATIENT)
Dept: CT IMAGING | Facility: CLINIC | Age: 65
Discharge: HOME | End: 2025-07-30
Attending: INTERNAL MEDICINE
Payer: COMMERCIAL

## 2025-07-30 DIAGNOSIS — E78.00 HYPERCHOLESTEROLEMIA: ICD-10-CM

## 2025-07-30 PROCEDURE — 75571 CT HRT W/O DYE W/CA TEST: CPT | Mod: 26 | Performed by: INTERNAL MEDICINE

## 2025-07-30 PROCEDURE — 75571 CT HRT W/O DYE W/CA TEST: CPT

## 2025-07-31 DIAGNOSIS — E78.00 HYPERCHOLESTEROLEMIA: Primary | ICD-10-CM

## 2025-07-31 DIAGNOSIS — Z51.81 ENCOUNTER FOR THERAPEUTIC DRUG MONITORING: ICD-10-CM

## 2025-07-31 PROBLEM — R93.1 ELEVATED CORONARY ARTERY CALCIUM SCORE: Status: ACTIVE | Noted: 2025-07-31

## 2025-07-31 PROBLEM — R91.1 PULMONARY NODULE: Status: ACTIVE | Noted: 2025-07-31

## 2025-07-31 LAB
CV CALCIUM SCORE AGATSTON LM: 38
CV CALCIUM SCORING AGATSON LAD: 267
CV CALCIUM SCORING AGATSTON CX: 0
CV CALCIUM SCORING AGATSTON RCA: 30
CV CALCIUM SCORING AGATSTON TOTAL: 335

## 2025-07-31 RX ORDER — ROSUVASTATIN CALCIUM 10 MG/1
10 TABLET, COATED ORAL DAILY
Qty: 90 TABLET | Refills: 3 | Status: SHIPPED | OUTPATIENT
Start: 2025-07-31

## 2025-08-01 ENCOUNTER — TRANSFERRED RECORDS (OUTPATIENT)
Dept: HEALTH INFORMATION MANAGEMENT | Facility: CLINIC | Age: 65
End: 2025-08-01
Payer: COMMERCIAL

## 2025-08-01 NOTE — ANESTHESIA PREPROCEDURE EVALUATION
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 Pt has had prior anesthetic. Type: General    History of anesthetic complications   - PONV        ROS/MED HX    ENT/Pulmonary:      (-) tobacco use, asthma and recent URI   Neurologic:      (-) seizures, CVA and migraines   Cardiovascular:  - neg cardiovascular ROS       METS/Exercise Tolerance:     Hematologic:         Musculoskeletal: Comment: Complex wound of the distal posterior left calf ( 5 x 5 x 1.5 cm) overlying the Achilles tendon region.  Remote history of left ankle fracture.  History of Achilles tendon rupture approximately 12 years ago.  Status post failed left radial forearm free flap.  Status post failed left gastroc rotational flap.  Status post multiple Achilles procedures including debridements and an FHL tendon transfer.  Status post skin grafting of the affected area in 2017.   (+)  other musculoskeletal-       GI/Hepatic:     (+) GERD Asymptomatic on medication,      (-) liver disease   Renal/Genitourinary:      (-) renal disease   Endo:      (-) Type II DM and thyroid disease   Psychiatric:         Infectious Disease:         Malignancy:         Other:                          Physical Exam  Normal systems: cardiovascular, pulmonary and  "dental    Airway   Mallampati: II  TM distance: >3 FB  Neck ROM: full    Dental     Cardiovascular       Pulmonary             Lab Results   Component Value Date    WBC 6.8 10/02/2020    HGB 11.6 (L) 10/02/2020    HCT 36.5 10/02/2020     10/02/2020    CRP <2.9 04/11/2018    SED 20 04/11/2018     01/20/2010    POTASSIUM 4.4 01/20/2010    CHLORIDE 106 01/20/2010    CO2 26 01/20/2010    BUN 13 04/11/2018    CR 0.80 10/02/2020    GLC 81 01/20/2010    NILESH 9.3 01/20/2010    ALBUMIN 4.3 01/20/2010    PROTTOTAL 7.5 01/20/2010    ALT <6 01/20/2010    AST 15 04/11/2018    ALKPHOS 64 01/20/2010    BILITOTAL 0.2 01/20/2010    PTT 31 10/02/2020    INR 0.89 10/02/2020       Preop Vitals  BP Readings from Last 3 Encounters:   10/02/20 116/58   10/01/20 133/87   10/01/20 133/87    Pulse Readings from Last 3 Encounters:   10/02/20 87   10/01/20 95   10/01/20 95      Resp Readings from Last 3 Encounters:   10/02/20 16   10/01/20 18   10/01/20 18    SpO2 Readings from Last 3 Encounters:   10/02/20 96%   09/23/20 98%   10/31/18 97%      Temp Readings from Last 1 Encounters:   10/02/20 36.8  C (98.2  F) (Oral)    Ht Readings from Last 1 Encounters:   10/02/20 1.626 m (5' 4\")      Wt Readings from Last 1 Encounters:   10/02/20 53.2 kg (117 lb 4.8 oz)    Estimated body mass index is 20.13 kg/m  as calculated from the following:    Height as of 10/2/20: 1.626 m (5' 4\").    Weight as of 10/2/20: 53.2 kg (117 lb 4.8 oz).       Anesthesia Plan      History & Physical Review  History and physical reviewed and following examination; no interval change.    ASA Status:  2 .    NPO Status:  > 8 hours    Plan for General with Intravenous and Propofol induction. Maintenance will be Balanced.    PONV prophylaxis:  Ondansetron (or other 5HT-3)    Patient was instructed to abstain from smoking on day of procedureThe patient is not a current smoker  and patient did not smoke on day of surgery     Postoperative Care  Postoperative pain " management:  IV analgesics and Multi-modal analgesia.      Consents  Anesthetic plan, risks, benefits and alternatives discussed with:  Patient.  Use of blood products discussed: Yes.   Use of blood products discussed with Patient.  .                 Ian Castro, DO

## (undated) DEVICE — SU PROLENE 3-0 PS-1 18" 8663G

## (undated) DEVICE — Device

## (undated) DEVICE — SOL NACL 0.9% INJ 250ML BAG 2B1322Q

## (undated) DEVICE — ESU PENCIL SMOKE EVAC W/ROCKER SWITCH 0703-047-000

## (undated) DEVICE — DERMACARRIER 1.5:1 ZIMMER 00-2195-012-00

## (undated) DEVICE — DRAPE LAP W/ARMBOARD 29410

## (undated) DEVICE — PREP POVIDONE IODINE SOLUTION 10% 4OZ BOTTLE 29906-004

## (undated) DEVICE — DRSG TEGADERM 4X4 3/4" 1626

## (undated) DEVICE — CUFF TOURN 24IN STRL DISP

## (undated) DEVICE — SYR 10ML LL W/O NDL 302995

## (undated) DEVICE — TUBING IRRIG TUR Y TYPE 96" LF 6543-01

## (undated) DEVICE — PREP POVIDONE-IODINE 7.5% SCRUB 4OZ BOTTLE MDS093945

## (undated) DEVICE — SUCTION TIP YANKAUER W/O VENT K86

## (undated) DEVICE — GLOVE PROTEXIS W/NEU-THERA 7.5  2D73TE75

## (undated) DEVICE — COLLECTION KIT E SWAB REG 220245

## (undated) DEVICE — GLOVE UNDER INDICATOR PI SZ 7.0 LF 41670

## (undated) DEVICE — PADDING CAST 4IN WEBRIL STRL 2502

## (undated) DEVICE — DRAPE IOBAN INCISE 23X17" 6650EZ

## (undated) DEVICE — LINEN TOWEL PACK X5 5464

## (undated) DEVICE — BANDAGE ELASTIC 4X550 LF DBL 593-94LF

## (undated) DEVICE — BLADE KNIFE SURG 15 371115

## (undated) DEVICE — DRSG ACTICOAT 7 4X5" 20141

## (undated) DEVICE — BLADE DERMATOME ZIMMER  00-8800-000-10

## (undated) DEVICE — SOLUTION IRRIG 2B7127 .9NS 3000ML BAG

## (undated) DEVICE — SOL WATER IRRIG 1000ML BOTTLE 2F7114

## (undated) DEVICE — MANIFOLD NEPTUNE 4 PORT 700-20

## (undated) DEVICE — DRSG ADAPTIC 3X8" 6113

## (undated) DEVICE — SU VICRYL+ 3-0 27IN SH UND VCP416H

## (undated) DEVICE — CUSTOM PACK LOWER EXTREMITY SOP5BLEHEA

## (undated) DEVICE — DRAPE EXTREMITY W/ARMBOARD 29405

## (undated) DEVICE — GLOVE BIOGEL PI ULTRATOUCH G SZ 7.0 42170

## (undated) DEVICE — CLEANSER WOUND DEBRIDEMENT VASHE 250ML 00313

## (undated) DEVICE — NDL SPINAL 18GA 3.5" 405184

## (undated) DEVICE — SUTURE PDS 2-0 27 VIO CT-2 + VLT PDP333

## (undated) DEVICE — CANISTER WOUND VAC W/GEL 1000ML M8275093/5

## (undated) DEVICE — DRSG GAUZE 4X4" TRAY 6939

## (undated) DEVICE — VAC DRESSING KIT MEDIUM TRAC M8275052/10

## (undated) DEVICE — TRAY PREP DRY SKIN SCRUB 067

## (undated) DEVICE — GLOVE BIOGEL PI INDICATOR 9.0 LF  41690

## (undated) DEVICE — SYR 20ML LL W/O NDL 302830

## (undated) DEVICE — PLATE GROUNDING ADULT W/CORD 9165L

## (undated) DEVICE — NDL SPINAL 22GA 3.5" QUINCKE 405181

## (undated) DEVICE — SU MONOCRYL 4-0 PS-2 18" UND Y496G

## (undated) DEVICE — SOL NACL 0.9% IRRIG 1000ML BOTTLE 2F7124

## (undated) DEVICE — SUCTION MANIFOLD NEPTUNE 2 SYS 1 PORT 702-025-000

## (undated) DEVICE — ESU GROUND PAD UNIVERSAL W/O CORD

## (undated) DEVICE — PACK MINOR SBA15MIFSE

## (undated) DEVICE — SPONGE LAP 18X18" X8435

## (undated) DEVICE — DRSG AQUACEL AG EXTRA 4X4.7" 420677

## (undated) DEVICE — CONTAINER URINE SPEC 4OZ STRL 1053

## (undated) DEVICE — GOWN LG DISP 9515

## (undated) DEVICE — DRSG TELFA 3X4" 1050

## (undated) DEVICE — ESU PENCIL W/SMOKE EVAC CVPLP2000

## (undated) DEVICE — TOURNIQUET SGL  BLADDER 30"X4" BLUE 5921030135

## (undated) DEVICE — PREP SKIN SCRUB TRAY 4461A

## (undated) DEVICE — DRSG VAC VERAFLO CLEANSE LG 5-PK ULTVCC05LG

## (undated) DEVICE — CANISTER WOUND VAC W/GEL 500ML M8275063/5

## (undated) DEVICE — SYR BULB IRRIG 50ML LATEX FREE 0035280

## (undated) DEVICE — BLADE KNIFE SURG 10 371110

## (undated) RX ORDER — FENTANYL CITRATE 50 UG/ML
INJECTION, SOLUTION INTRAMUSCULAR; INTRAVENOUS
Status: DISPENSED
Start: 2020-10-02

## (undated) RX ORDER — PROPOFOL 10 MG/ML
INJECTION, EMULSION INTRAVENOUS
Status: DISPENSED
Start: 2020-11-10

## (undated) RX ORDER — LIDOCAINE HYDROCHLORIDE 20 MG/ML
INJECTION, SOLUTION EPIDURAL; INFILTRATION; INTRACAUDAL; PERINEURAL
Status: DISPENSED
Start: 2020-11-10

## (undated) RX ORDER — FENTANYL CITRATE 50 UG/ML
INJECTION, SOLUTION INTRAMUSCULAR; INTRAVENOUS
Status: DISPENSED
Start: 2020-11-10

## (undated) RX ORDER — DEXAMETHASONE SODIUM PHOSPHATE 4 MG/ML
INJECTION, SOLUTION INTRA-ARTICULAR; INTRALESIONAL; INTRAMUSCULAR; INTRAVENOUS; SOFT TISSUE
Status: DISPENSED
Start: 2020-11-10

## (undated) RX ORDER — CEFAZOLIN SODIUM 2 G/100ML
INJECTION, SOLUTION INTRAVENOUS
Status: DISPENSED
Start: 2020-11-10

## (undated) RX ORDER — LIDOCAINE HYDROCHLORIDE 20 MG/ML
INJECTION, SOLUTION INFILTRATION; PERINEURAL
Status: DISPENSED
Start: 2021-12-10

## (undated) RX ORDER — FENTANYL CITRATE 50 UG/ML
INJECTION, SOLUTION INTRAMUSCULAR; INTRAVENOUS
Status: DISPENSED
Start: 2020-11-05

## (undated) RX ORDER — BUPIVACAINE HYDROCHLORIDE 5 MG/ML
INJECTION, SOLUTION EPIDURAL; INTRACAUDAL
Status: DISPENSED
Start: 2021-12-10

## (undated) RX ORDER — LIDOCAINE HYDROCHLORIDE 10 MG/ML
INJECTION, SOLUTION INFILTRATION; PERINEURAL
Status: DISPENSED
Start: 2020-11-10

## (undated) RX ORDER — CEFAZOLIN SODIUM 2 G/100ML
INJECTION, SOLUTION INTRAVENOUS
Status: DISPENSED
Start: 2020-11-05

## (undated) RX ORDER — BUPIVACAINE HYDROCHLORIDE 2.5 MG/ML
INJECTION, SOLUTION EPIDURAL; INFILTRATION; INTRACAUDAL
Status: DISPENSED
Start: 2020-11-10

## (undated) RX ORDER — HEPARIN SODIUM 200 [USP'U]/100ML
INJECTION, SOLUTION INTRAVENOUS
Status: DISPENSED
Start: 2020-10-02

## (undated) RX ORDER — MINERAL OIL
OIL (ML) MISCELLANEOUS
Status: DISPENSED
Start: 2020-11-10

## (undated) RX ORDER — ONDANSETRON 2 MG/ML
INJECTION INTRAMUSCULAR; INTRAVENOUS
Status: DISPENSED
Start: 2020-11-10

## (undated) RX ORDER — LIDOCAINE HYDROCHLORIDE 10 MG/ML
INJECTION, SOLUTION INFILTRATION; PERINEURAL
Status: DISPENSED
Start: 2020-10-02